# Patient Record
Sex: FEMALE | Race: WHITE | NOT HISPANIC OR LATINO | Employment: FULL TIME | URBAN - METROPOLITAN AREA
[De-identification: names, ages, dates, MRNs, and addresses within clinical notes are randomized per-mention and may not be internally consistent; named-entity substitution may affect disease eponyms.]

---

## 2017-01-16 ENCOUNTER — OFFICE VISIT (OUTPATIENT)
Dept: INTERNAL MEDICINE | Facility: CLINIC | Age: 32
End: 2017-01-16
Payer: COMMERCIAL

## 2017-01-16 VITALS
TEMPERATURE: 99 F | SYSTOLIC BLOOD PRESSURE: 122 MMHG | HEART RATE: 78 BPM | WEIGHT: 127.44 LBS | DIASTOLIC BLOOD PRESSURE: 66 MMHG | HEIGHT: 63 IN | BODY MASS INDEX: 22.58 KG/M2

## 2017-01-16 DIAGNOSIS — J06.9 UPPER RESPIRATORY TRACT INFECTION, UNSPECIFIED TYPE: Primary | ICD-10-CM

## 2017-01-16 PROCEDURE — 99213 OFFICE O/P EST LOW 20 MIN: CPT | Mod: S$GLB,,, | Performed by: INTERNAL MEDICINE

## 2017-01-16 PROCEDURE — 99999 PR PBB SHADOW E&M-EST. PATIENT-LVL III: CPT | Mod: PBBFAC,,, | Performed by: INTERNAL MEDICINE

## 2017-01-16 PROCEDURE — 1159F MED LIST DOCD IN RCRD: CPT | Mod: S$GLB,,, | Performed by: INTERNAL MEDICINE

## 2017-01-16 RX ORDER — HYDROCODONE BITARTRATE AND HOMATROPINE METHYLBROMIDE ORAL SOLUTION 5; 1.5 MG/5ML; MG/5ML
5 LIQUID ORAL EVERY 4 HOURS PRN
Qty: 175 ML | Refills: 0 | Status: SHIPPED | OUTPATIENT
Start: 2017-01-16 | End: 2017-01-26

## 2017-01-16 RX ORDER — METHYLPREDNISOLONE 4 MG/1
TABLET ORAL
Qty: 1 PACKAGE | Refills: 0 | Status: SHIPPED | OUTPATIENT
Start: 2017-01-16 | End: 2017-04-24 | Stop reason: ALTCHOICE

## 2017-01-16 NOTE — MR AVS SNAPSHOT
[facility]   [place]  [place] [address]  Phone: [phone]  Fax: [phone]                  Sari Serna   2017 5:40 PM   Office Visit    Description:  Female : 1985   Provider:  Rufus Albrecht MD   Department:  [facility]           Reason for Visit     Sinusitis     Cough           Diagnoses this Visit        Comments    Upper respiratory tract infection, unspecified type    -  Primary            To Do List           Goals (5 Years of Data)     None      Follow-Up and Disposition     Return if symptoms worsen or fail to improve.       These Medications        Disp Refills Start End    hydrocodone-homatropine 5-1.5 mg/5 ml (HYCODAN) 5-1.5 mg/5 mL Syrp 175 mL 0 2017    Take 5 mLs by mouth every 4 (four) hours as needed (cough). - Oral    Pharmacy: [facility]/pharmacy #[id number] - [place] - [address] Ph #: [phone]       methylPREDNISolone (MEDROL DOSEPACK) 4 mg tablet 1 Package 0 2017     use as directed    Pharmacy: [facility]/pharmacy #[id number] - [place] - [address] Ph #: [phone]         [facility] On Call     [facility] On Call Nurse Care Line -  Assistance  Registered nurses in the [facility] On Call Center provide clinical advisement, health education, appointment booking, and other advisory services.  Call for this free service at [phone].             Medications           Message regarding Medications     Verify the changes and/or additions to your medication regime listed below are the same as discussed with your clinician today.  If any of these changes or additions are incorrect, please notify your healthcare provider.        START taking these NEW medications        Refills    hydrocodone-homatropine 5-1.5 mg/5 ml (HYCODAN) 5-1.5 mg/5 mL Syrp 0    Sig: Take 5 mLs by mouth every 4 (four) hours as needed (cough).    Class: Normal    Route: Oral    methylPREDNISolone (MEDROL DOSEPACK) 4 mg tablet  "0    Sig: use as directed    Class: Normal      STOP taking these medications     promethazine-codeine 6.25-10 mg/5 ml (PHENERGAN WITH CODEINE) 6.25-10 mg/5 mL syrup Take 5 mLs by mouth every 4 (four) hours as needed for Cough.           Verify that the below list of medications is an accurate representation of the medications you are currently taking.  If none reported, the list may be blank. If incorrect, please contact your healthcare provider. Carry this list with you in case of emergency.           Current Medications     clonazePAM (KLONOPIN) 0.5 MG tablet TAKE 1/2-1 TABLET TWICE A DAY AS NEEDED FOR ANXIETY    escitalopram oxalate (LEXAPRO) 10 MG tablet TAKE 1 TABLET (10 MG TOTAL) BY MOUTH ONCE DAILY.    norgestimate-ethinyl estradiol (ORTHO-CYCLEN) 0.25-35 mg-mcg per tablet Take 1 tablet by mouth once daily. Schedule annual exam    escitalopram oxalate (LEXAPRO) 10 MG tablet TAKE 1 TABLET (10 MG TOTAL) BY MOUTH ONCE DAILY.    fluticasone-salmeterol 250-50 mcg/dose (ADVAIR) 250-50 mcg/dose diskus inhaler Inhale 1 puff into the lungs 2 (two) times daily.    hydrocodone-homatropine 5-1.5 mg/5 ml (HYCODAN) 5-1.5 mg/5 mL Syrp Take 5 mLs by mouth every 4 (four) hours as needed (cough).    levalbuterol (XOPENEX HFA) 45 mcg/actuation inhaler Inhale 1-2 puffs into the lungs every 4 (four) hours as needed for Wheezing.    methylPREDNISolone (MEDROL DOSEPACK) 4 mg tablet use as directed           Clinical Reference Information           Vital Signs - Last Recorded  Most recent update: 1/16/2017  5:40 PM by Jeana Richardson MA    BP Pulse Temp Ht Wt LMP    122/66 (BP Location: Right arm, Patient Position: Sitting, BP Method: Manual) 78 98.6 °F (37 °C) (Oral) 5' 3" (1.6 m) 57.8 kg (127 lb 6.8 oz) 12/31/2016    BMI                22.57 kg/m2          Blood Pressure          Most Recent Value    BP  122/66      Allergies as of 1/16/2017     Doxycycline      Immunizations Administered on Date of Encounter - 1/16/2017     None "

## 2017-01-16 NOTE — PROGRESS NOTES
Subjective:       Patient ID: Sari Serna is a 31 y.o. female.    Chief Complaint: Sinusitis and Cough    HPI  Pt with 1 week of nasal congestion, coryza, min prod cough w/o F/C, SOB  Review of Systems    Objective:      Physical Exam   Constitutional: She appears well-developed. No distress.   HENT:   Head: Normocephalic.   Mouth/Throat: Oropharynx is clear and moist.   Sinuses nontender   Eyes: Conjunctivae and EOM are normal.   Neck: Normal range of motion. No tracheal deviation present.   Cardiovascular: Normal rate, regular rhythm and intact distal pulses.    Pulmonary/Chest: Effort normal and breath sounds normal. No respiratory distress.   Abdominal: She exhibits no distension.   Musculoskeletal: Normal range of motion. She exhibits no edema.   Lymphadenopathy:     She has no cervical adenopathy.   Neurological: She is alert. No cranial nerve deficit. She exhibits normal muscle tone. Coordination normal.   Skin: Skin is warm and dry. No rash noted. She is not diaphoretic. No erythema.   Psychiatric: She has a normal mood and affect. Her behavior is normal.   Vitals reviewed.      Assessment:       1. Upper respiratory tract infection, unspecified type        Plan:       Sari Strong was seen today for sinusitis and cough.    Diagnoses and all orders for this visit:    Upper respiratory tract infection, unspecified type  -     hydrocodone-homatropine 5-1.5 mg/5 ml (HYCODAN) 5-1.5 mg/5 mL Syrp; Take 5 mLs by mouth every 4 (four) hours as needed (cough).  -     methylPREDNISolone (MEDROL DOSEPACK) 4 mg tablet; use as directed      Return if symptoms worsen or fail to improve.

## 2017-01-25 DIAGNOSIS — Z30.9 ENCOUNTER FOR CONTRACEPTIVE MANAGEMENT, UNSPECIFIED CONTRACEPTIVE ENCOUNTER TYPE: ICD-10-CM

## 2017-01-25 RX ORDER — NORGESTIMATE AND ETHINYL ESTRADIOL 0.25-0.035
1 KIT ORAL DAILY
Qty: 28 TABLET | Refills: 3 | Status: SHIPPED | OUTPATIENT
Start: 2017-01-25 | End: 2017-04-24 | Stop reason: SDUPTHER

## 2017-03-27 RX ORDER — ESCITALOPRAM OXALATE 10 MG/1
TABLET ORAL
Qty: 30 TABLET | Refills: 0 | Status: SHIPPED | OUTPATIENT
Start: 2017-03-27 | End: 2017-04-30 | Stop reason: SDUPTHER

## 2017-04-24 ENCOUNTER — OFFICE VISIT (OUTPATIENT)
Dept: OBSTETRICS AND GYNECOLOGY | Facility: CLINIC | Age: 32
End: 2017-04-24
Payer: COMMERCIAL

## 2017-04-24 VITALS
WEIGHT: 126.63 LBS | DIASTOLIC BLOOD PRESSURE: 72 MMHG | SYSTOLIC BLOOD PRESSURE: 118 MMHG | BODY MASS INDEX: 22.44 KG/M2 | HEIGHT: 63 IN

## 2017-04-24 DIAGNOSIS — Z11.51 SCREENING FOR HUMAN PAPILLOMAVIRUS: ICD-10-CM

## 2017-04-24 DIAGNOSIS — Z12.4 ROUTINE CERVICAL SMEAR: ICD-10-CM

## 2017-04-24 DIAGNOSIS — Z01.419 ENCOUNTER FOR GYNECOLOGICAL EXAMINATION (GENERAL) (ROUTINE) WITHOUT ABNORMAL FINDINGS: Primary | ICD-10-CM

## 2017-04-24 PROCEDURE — 88175 CYTOPATH C/V AUTO FLUID REDO: CPT

## 2017-04-24 PROCEDURE — 99395 PREV VISIT EST AGE 18-39: CPT | Mod: S$GLB,,, | Performed by: OBSTETRICS & GYNECOLOGY

## 2017-04-24 PROCEDURE — 87624 HPV HI-RISK TYP POOLED RSLT: CPT

## 2017-04-24 PROCEDURE — 99999 PR PBB SHADOW E&M-EST. PATIENT-LVL II: CPT | Mod: PBBFAC,,, | Performed by: OBSTETRICS & GYNECOLOGY

## 2017-04-24 RX ORDER — NORGESTIMATE AND ETHINYL ESTRADIOL 0.25-0.035
1 KIT ORAL DAILY
Qty: 84 TABLET | Refills: 3 | Status: SHIPPED | OUTPATIENT
Start: 2017-04-24 | End: 2018-04-16 | Stop reason: SDUPTHER

## 2017-04-24 RX ORDER — CLONAZEPAM 0.5 MG/1
TABLET ORAL
Qty: 30 TABLET | Refills: 0 | Status: SHIPPED | OUTPATIENT
Start: 2017-04-24 | End: 2018-10-27 | Stop reason: SDUPTHER

## 2017-04-24 RX ORDER — NORGESTIMATE AND ETHINYL ESTRADIOL 0.25-0.035
1 KIT ORAL DAILY
Qty: 84 TABLET | Refills: 3 | OUTPATIENT
Start: 2017-04-24

## 2017-04-24 NOTE — PROGRESS NOTES
Subjective:       Patient ID: Sari Serna is a 32 y.o. female.    Chief Complaint:  Well Woman (Last annual 2015 pap normal )      Patient Active Problem List   Diagnosis    Unspecified viral infection, in conditions classified elsewhere and of unspecified site    Low back pain    Acne    Eye refraction disorder - Both Eyes    Chronic vaginitis       History of Present Illness  32 y.o. yo  here for annual exam. No gyn complaints. Doing well. Happy with OCP      Past Medical History:   Diagnosis Date    Acne     ALLERGIC RHINITIS     Anxiety     Dysmenorrhea     Low back pain     Migraine headache        Past Surgical History:   Procedure Laterality Date    FACIAL COSMETIC SURGERY             OB History    Para Term  AB SAB TAB Ectopic Multiple Living   0                      Patient's last menstrual period was 2017.   Date of Last Pap: 10/27/2012    Review of Systems  Review of Systems   Constitutional: Negative for fatigue and unexpected weight change.   Respiratory: Negative for shortness of breath.    Cardiovascular: Negative for chest pain.   Gastrointestinal: Negative for abdominal pain, constipation, diarrhea, nausea and vomiting.   Genitourinary: Negative for dysuria.   Musculoskeletal: Negative for back pain.   Skin: Negative for rash.   Neurological: Negative for headaches.   Hematological: Does not bruise/bleed easily.   Psychiatric/Behavioral: Negative for behavioral problems.        Objective:   Physical Exam:   Constitutional: She is oriented to person, place, and time. Vital signs are normal. She appears well-developed and well-nourished. No distress.        Pulmonary/Chest: She exhibits no mass. Right breast exhibits no mass, no nipple discharge, no skin change, no tenderness, no bleeding and no swelling. Left breast exhibits no mass, no nipple discharge, no skin change, no tenderness, no bleeding and no swelling. Breasts are symmetrical.         Abdominal: Soft. Normal appearance and bowel sounds are normal. She exhibits no distension and no mass. There is no tenderness. There is no rebound.     Genitourinary: Vagina normal and uterus normal. There is no rash, tenderness, lesion or injury on the right labia. There is no rash, tenderness, lesion or injury on the left labia. Uterus is not deviated, not enlarged, not fixed, not tender, not hosting fibroids and not experiencing uterine prolapse. Cervix is normal. Right adnexum displays no mass, no tenderness and no fullness. Left adnexum displays no mass, no tenderness and no fullness. No erythema, tenderness, rectocele, cystocele or unspecified prolapse of vaginal walls in the vagina. No vaginal discharge found. Cervix exhibits no motion tenderness, no discharge and no friability.           Musculoskeletal: Normal range of motion and moves all extremeties.      Lymphadenopathy:     She has no axillary adenopathy.        Right: No supraclavicular adenopathy present.        Left: No supraclavicular adenopathy present.    Neurological: She is alert and oriented to person, place, and time.    Skin: Skin is warm and dry.    Psychiatric: She has a normal mood and affect. Her behavior is normal. Judgment normal.        Assessment/ Plan:     1. Encounter for gynecological examination (general) (routine) without abnormal findings  norgestimate-ethinyl estradiol (ORTHO-CYCLEN) 0.25-35 mg-mcg per tablet   2. Routine cervical smear  Liquid-based pap smear, screening   3. Screening for human papillomavirus  HPV High Risk Genotypes, PCR       Follow-up with me in 1 year

## 2017-04-24 NOTE — MR AVS SNAPSHOT
[facility]  [address]  [place] [address]  Phone: [phone]  Fax: [phone]                  Sari Serna   2017 3:30 PM   Office Visit    Description:  Female : 1985   Provider:  Liudmila Monaco MD   Department:  [facility]           Reason for Visit     Well Woman           Diagnoses this Visit        Comments    Encounter for gynecological examination (general) (routine) without abnormal findings    -  Primary     Routine cervical smear         Screening for human papillomavirus                To Do List           Future Appointments        Provider Department Dept Phone    2017 1:40 PM Nhi Shearer MD [facility] [phone]      Goals (5 Years of Data)     None      Follow-Up and Disposition     Return in about 1 year (around 2018) for Annual exam.    Follow-up and Disposition History       These Medications        Disp Refills Start End    norgestimate-ethinyl estradiol (ORTHO-CYCLEN) 0.25-35 mg-mcg per tablet 84 tablet 3 2017     Take 1 tablet by mouth once daily. - Oral    Pharmacy: [facility]/pharmacy #[id number] - [place] - [address] Ph #: [phone]         [facility] On Call     [facility] On Call Nurse Care Line -  Assistance  Unless otherwise directed by your provider, please contact [facility] On-Call, our nurse care line that is available for  assistance.     Registered nurses in the [facility] On Call Center provide: appointment scheduling, clinical advisement, health education, and other advisory services.  Call: [phone] (toll free)               Medications           Message regarding Medications     Verify the changes and/or additions to your medication regime listed below are the same as discussed with your clinician today.  If any of these changes or additions are incorrect, please notify your healthcare provider.        CHANGE how you are taking these medications      "Start Taking Instead of    norgestimate-ethinyl estradiol (ORTHO-CYCLEN) 0.25-35 mg-mcg per tablet norgestimate-ethinyl estradiol (ORTHO-CYCLEN) 0.25-35 mg-mcg per tablet    Dosage:  Take 1 tablet by mouth once daily. Dosage:  Take 1 tablet by mouth once daily. Schedule annual exam    Reason for Change:  Reorder       STOP taking these medications     methylPREDNISolone (MEDROL DOSEPACK) 4 mg tablet use as directed           Verify that the below list of medications is an accurate representation of the medications you are currently taking.  If none reported, the list may be blank. If incorrect, please contact your healthcare provider. Carry this list with you in case of emergency.           Current Medications     cetirizine (ZYRTEC) 10 mg Cap Take by mouth.    clonazePAM (KLONOPIN) 0.5 MG tablet TAKE 1/2-1 TABLET TWICE A DAY AS NEEDED FOR ANXIETY    escitalopram oxalate (LEXAPRO) 10 MG tablet TAKE 1 TABLET BY MOUTH ONCE DAILY.    norgestimate-ethinyl estradiol (ORTHO-CYCLEN) 0.25-35 mg-mcg per tablet Take 1 tablet by mouth once daily.    fluticasone-salmeterol 250-50 mcg/dose (ADVAIR) 250-50 mcg/dose diskus inhaler Inhale 1 puff into the lungs 2 (two) times daily.    levalbuterol (XOPENEX HFA) 45 mcg/actuation inhaler Inhale 1-2 puffs into the lungs every 4 (four) hours as needed for Wheezing.           Clinical Reference Information           Your Vitals Were     BP Height Weight Last Period BMI    118/72 5' 3" (1.6 m) 57.5 kg (126 lb 10.5 oz) 04/17/2017 22.44 kg/m2      Blood Pressure          Most Recent Value    BP  118/72      Allergies as of 4/24/2017     Doxycycline      Immunizations Administered on Date of Encounter - 4/24/2017     None      Orders Placed During Today's Visit      Normal Orders This Visit    HPV High Risk Genotypes, PCR     Liquid-based pap smear, screening       Language Assistance Services     ATTENTION: Language assistance services are available, free of charge. Please call " [phone].      ATENCIÓN: Si habla español, tiene a vizcaino disposición servicios gratuitos de asistencia lingüística. Llame al [phone].     CHÚ Ý: N?u b?n nói Ti?ng Vi?t, có các d?ch v? h? tr? ngôn ng? mi?n phí dành cho b?n. G?i s? [phone].         [facility] complies with applicable Federal civil rights laws and does not discriminate on the basis of race, color, national origin, age, disability, or sex.

## 2017-04-30 RX ORDER — ESCITALOPRAM OXALATE 10 MG/1
TABLET ORAL
Qty: 30 TABLET | Refills: 0 | Status: SHIPPED | OUTPATIENT
Start: 2017-04-30 | End: 2017-05-04 | Stop reason: SDUPTHER

## 2017-05-01 LAB
HPV16 DNA SPEC QL NAA+PROBE: NEGATIVE
HPV16+18+H RISK 12 DNA CVX-IMP: NEGATIVE
HPV18 DNA SPEC QL NAA+PROBE: NEGATIVE

## 2017-05-02 ENCOUNTER — OFFICE VISIT (OUTPATIENT)
Dept: OPTOMETRY | Facility: CLINIC | Age: 32
End: 2017-05-02
Payer: COMMERCIAL

## 2017-05-02 DIAGNOSIS — H10.13 ALLERGIC CONJUNCTIVITIS, BILATERAL: Primary | ICD-10-CM

## 2017-05-02 PROCEDURE — 92012 INTRM OPH EXAM EST PATIENT: CPT | Mod: S$GLB,,, | Performed by: OPTOMETRIST

## 2017-05-02 PROCEDURE — 99999 PR PBB SHADOW E&M-EST. PATIENT-LVL II: CPT | Mod: PBBFAC,,, | Performed by: OPTOMETRIST

## 2017-05-02 RX ORDER — FLUOROMETHOLONE 1 MG/ML
1 SUSPENSION/ DROPS OPHTHALMIC 3 TIMES DAILY
Qty: 5 ML | Refills: 0 | Status: SHIPPED | OUTPATIENT
Start: 2017-05-02 | End: 2017-05-09

## 2017-05-02 NOTE — PROGRESS NOTES
HPI     OD sore to touch since yesterday. No redness, epiphoria, discharge or   photophobia. Usually wears DWSCL 12-14 hrs, doesn't sleep in lenses.       Last edited by Kyra Gale on 5/2/2017  2:28 PM.     ROS     Negative for: Constitutional, Gastrointestinal, Neurological, Skin,   Genitourinary, Musculoskeletal, HENT, Endocrine, Cardiovascular, Eyes,   Respiratory, Psychiatric, Allergic/Imm, Heme/Lymph    Last edited by Chevy Bowling, OD on 5/2/2017  2:41 PM. (History)        Assessment /Plan     For exam results, see Encounter Report.    Allergic conjunctivitis, bilateral  -     fluorometholone 0.1% (FML) 0.1 % DrpS; Place 1 drop into both eyes 3 (three) times daily.  Dispense: 5 mL; Refill: 0      1. Start FML drops 1 drop 3x/day x 1 week, use zaditor long term for prevention. Also rewet drops for lubrication when wearing contacts. RTC 1 week if no better.

## 2017-05-05 RX ORDER — ESCITALOPRAM OXALATE 10 MG/1
10 TABLET ORAL DAILY
Qty: 90 TABLET | Refills: 1 | Status: SHIPPED | OUTPATIENT
Start: 2017-05-05 | End: 2017-11-28 | Stop reason: SDUPTHER

## 2017-06-14 ENCOUNTER — OFFICE VISIT (OUTPATIENT)
Dept: INTERNAL MEDICINE | Facility: CLINIC | Age: 32
End: 2017-06-14
Payer: COMMERCIAL

## 2017-06-14 VITALS
BODY MASS INDEX: 22.81 KG/M2 | SYSTOLIC BLOOD PRESSURE: 130 MMHG | WEIGHT: 128.75 LBS | HEART RATE: 72 BPM | DIASTOLIC BLOOD PRESSURE: 70 MMHG | HEIGHT: 63 IN

## 2017-06-14 DIAGNOSIS — F41.1 GAD (GENERALIZED ANXIETY DISORDER): ICD-10-CM

## 2017-06-14 DIAGNOSIS — F33.9 RECURRENT MAJOR DEPRESSIVE DISORDER, REMISSION STATUS UNSPECIFIED: ICD-10-CM

## 2017-06-14 DIAGNOSIS — R11.2 NAUSEA AND VOMITING, INTRACTABILITY OF VOMITING NOT SPECIFIED, UNSPECIFIED VOMITING TYPE: ICD-10-CM

## 2017-06-14 DIAGNOSIS — Z00.00 PREVENTATIVE HEALTH CARE: Primary | ICD-10-CM

## 2017-06-14 DIAGNOSIS — R68.89 HEAT INTOLERANCE: ICD-10-CM

## 2017-06-14 DIAGNOSIS — R00.2 PALPITATION: ICD-10-CM

## 2017-06-14 PROCEDURE — 93010 ELECTROCARDIOGRAM REPORT: CPT | Mod: S$GLB,,, | Performed by: INTERNAL MEDICINE

## 2017-06-14 PROCEDURE — 93005 ELECTROCARDIOGRAM TRACING: CPT | Mod: S$GLB,,, | Performed by: INTERNAL MEDICINE

## 2017-06-14 PROCEDURE — 99395 PREV VISIT EST AGE 18-39: CPT | Mod: S$GLB,,, | Performed by: INTERNAL MEDICINE

## 2017-06-14 PROCEDURE — 99999 PR PBB SHADOW E&M-EST. PATIENT-LVL III: CPT | Mod: PBBFAC,,, | Performed by: INTERNAL MEDICINE

## 2017-06-14 NOTE — PROGRESS NOTES
Subjective:       Patient ID: Sari Serna is a 32 y.o. female.    Chief Complaint: Annual Exam    HPI 32-year-old female presents to clinic today for annual physical exam she reports having some heat intolerance with occasional palpitations nausea she states that occur maybe twice a year for the last 4 years on one occasion she actually threw up.  Denies any abdominal pain no significant headaches.  Denies any chest pain or shortness of breath.  No syncope.  She does report that she believes that her heart rate was elevated when it occurred.  Review of Systems  otherwise negative  Objective:      Physical Exam  General: Well-appearing, well-nourished.  No distress  HEENT: conjunctivae are normal.  Pupils are equal and reative to light.  TM's are clear and intact bilaterally.  Hearing is grossly normal.  Nasopharynx is clear.  Oropharynx is clear.  Neck: Supple.  No thyroid megaly.  No bruits.  Lymph: No cervical or supraclavicular adenopathy.  Heart: Regular rate and rhythm, without murmur, rub or gallop.  Lungs: Clear to auscultation; respiratory effort normal.  Abdomen: Soft, nontender, nondistended.  Normoactive bowel sounds.  No hepatomegaly.  No masses.  Extremities: Good distal pulses.  No edema.  Psych: Oriented to time person place.  Judgment and insight seem unimpaired.  Mood and affect are appropriate.  Assessment:       1. Preventative health care    2. Heat intolerance    3. Palpitation    4. ADAM (generalized anxiety disorder)    5. Recurrent major depressive disorder, remission status unspecified    6. Nausea and vomiting, intractability of vomiting not specified, unspecified vomiting type        Plan:       Sari Strong was seen today for annual exam.    Diagnoses and all orders for this visit:    Preventative health care  -     CBC auto differential; Future  -     Comprehensive metabolic panel; Future  -     Lipid panel; Future  -     TSH; Future    Heat intolerance  Check  TSH.  Palpitation  -     IN OFFICE EKG 12-LEAD (to [place])  Discussed with patient that if her symptoms become more frequent or more concerned we may need to have a cardiology consultation.  too infrequent at this point to perform Holter monitor given episodes or twice a year may be.  ADAM (generalized anxiety disorder)  Controlled.  Continue current medical regimen.  Prescription refills addressed.  Followup advised. See after visit summary.  Recurrent major depressive disorder, remission status unspecified  Controlled.  Continue current medical regimen.  Prescription refills addressed.  Followup advised. See after visit summary.  Nausea and vomiting, intractability of vomiting not specified, unspecified vomiting type  -     US Abdomen Complete; Future

## 2017-06-24 ENCOUNTER — HOSPITAL ENCOUNTER (OUTPATIENT)
Dept: RADIOLOGY | Facility: HOSPITAL | Age: 32
Discharge: HOME | End: 2017-06-24
Attending: INTERNAL MEDICINE
Payer: COMMERCIAL

## 2017-06-24 DIAGNOSIS — R11.2 NAUSEA AND VOMITING, INTRACTABILITY OF VOMITING NOT SPECIFIED, UNSPECIFIED VOMITING TYPE: ICD-10-CM

## 2017-07-24 ENCOUNTER — PATIENT MESSAGE (OUTPATIENT)
Dept: INTERNAL MEDICINE | Facility: CLINIC | Age: 32
End: 2017-07-24

## 2017-07-29 ENCOUNTER — LAB VISIT (OUTPATIENT)
Dept: LAB | Facility: HOSPITAL | Age: 32
End: 2017-07-29
Attending: INTERNAL MEDICINE
Payer: COMMERCIAL

## 2017-07-29 DIAGNOSIS — Z00.00 PREVENTATIVE HEALTH CARE: ICD-10-CM

## 2017-07-29 LAB
ALBUMIN SERPL BCP-MCNC: 4 G/DL
ALP SERPL-CCNC: 55 U/L
ALT SERPL W/O P-5'-P-CCNC: 13 U/L
ANION GAP SERPL CALC-SCNC: 10 MMOL/L
AST SERPL-CCNC: 20 U/L
BASOPHILS # BLD AUTO: 0.01 K/UL
BASOPHILS NFR BLD: 0.2 %
BILIRUB SERPL-MCNC: 0.5 MG/DL
BUN SERPL-MCNC: 9 MG/DL
CALCIUM SERPL-MCNC: 9.2 MG/DL
CHLORIDE SERPL-SCNC: 105 MMOL/L
CHOLEST/HDLC SERPL: 2.8 {RATIO}
CO2 SERPL-SCNC: 24 MMOL/L
CREAT SERPL-MCNC: 0.8 MG/DL
DIFFERENTIAL METHOD: NORMAL
EOSINOPHIL # BLD AUTO: 0.1 K/UL
EOSINOPHIL NFR BLD: 1.1 %
ERYTHROCYTE [DISTWIDTH] IN BLOOD BY AUTOMATED COUNT: 13.1 %
EST. GFR  (AFRICAN AMERICAN): >60 ML/MIN/1.73 M^2
EST. GFR  (NON AFRICAN AMERICAN): >60 ML/MIN/1.73 M^2
GLUCOSE SERPL-MCNC: 93 MG/DL
HCT VFR BLD AUTO: 44.3 %
HDL/CHOLESTEROL RATIO: 35.2 %
HDLC SERPL-MCNC: 236 MG/DL
HDLC SERPL-MCNC: 83 MG/DL
HGB BLD-MCNC: 15 G/DL
LDLC SERPL CALC-MCNC: 129.8 MG/DL
LYMPHOCYTES # BLD AUTO: 1.8 K/UL
LYMPHOCYTES NFR BLD: 34.6 %
MCH RBC QN AUTO: 29.8 PG
MCHC RBC AUTO-ENTMCNC: 33.9 G/DL
MCV RBC AUTO: 88 FL
MONOCYTES # BLD AUTO: 0.4 K/UL
MONOCYTES NFR BLD: 8.4 %
NEUTROPHILS # BLD AUTO: 2.9 K/UL
NEUTROPHILS NFR BLD: 55.5 %
NONHDLC SERPL-MCNC: 153 MG/DL
PLATELET # BLD AUTO: 307 K/UL
PMV BLD AUTO: 10.5 FL
POTASSIUM SERPL-SCNC: 3.9 MMOL/L
PROT SERPL-MCNC: 7.6 G/DL
RBC # BLD AUTO: 5.04 M/UL
SODIUM SERPL-SCNC: 139 MMOL/L
TRIGL SERPL-MCNC: 116 MG/DL
TSH SERPL DL<=0.005 MIU/L-ACNC: 1.03 UIU/ML
WBC # BLD AUTO: 5.23 K/UL

## 2017-07-29 PROCEDURE — 85025 COMPLETE CBC W/AUTO DIFF WBC: CPT

## 2017-07-29 PROCEDURE — 80053 COMPREHEN METABOLIC PANEL: CPT

## 2017-07-29 PROCEDURE — 80061 LIPID PANEL: CPT

## 2017-07-29 PROCEDURE — 84443 ASSAY THYROID STIM HORMONE: CPT

## 2017-07-29 PROCEDURE — 36415 COLL VENOUS BLD VENIPUNCTURE: CPT | Mod: PO

## 2017-08-05 ENCOUNTER — HOSPITAL ENCOUNTER (OUTPATIENT)
Dept: RADIOLOGY | Facility: HOSPITAL | Age: 32
Discharge: HOME | End: 2017-08-05
Attending: INTERNAL MEDICINE
Payer: COMMERCIAL

## 2017-08-05 PROCEDURE — 76700 US EXAM ABDOM COMPLETE: CPT | Mod: 26,,, | Performed by: RADIOLOGY

## 2017-08-05 PROCEDURE — 76700 US EXAM ABDOM COMPLETE: CPT | Mod: TC

## 2017-08-06 ENCOUNTER — PATIENT MESSAGE (OUTPATIENT)
Dept: INTERNAL MEDICINE | Facility: CLINIC | Age: 32
End: 2017-08-06

## 2017-11-06 ENCOUNTER — OFFICE VISIT (OUTPATIENT)
Dept: URGENT CARE | Facility: CLINIC | Age: 32
End: 2017-11-06
Payer: COMMERCIAL

## 2017-11-06 VITALS
TEMPERATURE: 98 F | HEART RATE: 78 BPM | RESPIRATION RATE: 18 BRPM | BODY MASS INDEX: 23.04 KG/M2 | WEIGHT: 130 LBS | OXYGEN SATURATION: 99 % | HEIGHT: 63 IN | DIASTOLIC BLOOD PRESSURE: 81 MMHG | SYSTOLIC BLOOD PRESSURE: 123 MMHG

## 2017-11-06 DIAGNOSIS — N30.00 ACUTE CYSTITIS WITHOUT HEMATURIA: Primary | ICD-10-CM

## 2017-11-06 DIAGNOSIS — R30.0 DYSURIA: ICD-10-CM

## 2017-11-06 LAB
B-HCG UR QL: NEGATIVE
BILIRUB UR QL STRIP: NEGATIVE
CTP QC/QA: YES
GLUCOSE UR QL STRIP: NEGATIVE
KETONES UR QL STRIP: NEGATIVE
LEUKOCYTE ESTERASE UR QL STRIP: POSITIVE
PH, POC UA: 7.5
POC BLOOD, URINE: NEGATIVE
POC NITRATES, URINE: POSITIVE
PROT UR QL STRIP: NEGATIVE
SP GR UR STRIP: 1.01 (ref 1–1.03)
UROBILINOGEN UR STRIP-ACNC: NORMAL (ref 0.1–1.1)

## 2017-11-06 PROCEDURE — 81003 URINALYSIS AUTO W/O SCOPE: CPT | Mod: QW,S$GLB,, | Performed by: PHYSICIAN ASSISTANT

## 2017-11-06 PROCEDURE — 81025 URINE PREGNANCY TEST: CPT | Mod: S$GLB,,, | Performed by: PHYSICIAN ASSISTANT

## 2017-11-06 PROCEDURE — 99203 OFFICE O/P NEW LOW 30 MIN: CPT | Mod: S$GLB,,, | Performed by: PHYSICIAN ASSISTANT

## 2017-11-06 RX ORDER — NITROFURANTOIN 25; 75 MG/1; MG/1
100 CAPSULE ORAL 2 TIMES DAILY
Qty: 10 CAPSULE | Refills: 0 | Status: SHIPPED | OUTPATIENT
Start: 2017-11-06 | End: 2017-11-11

## 2017-11-06 NOTE — PROGRESS NOTES
"Subjective:       Patient ID: Sari Serna is a 32 y.o. female.    Vitals:  height is 5' 3" (1.6 m) and weight is 59 kg (130 lb). Her temperature is 98.1 °F (36.7 °C). Her blood pressure is 123/81 and her pulse is 78. Her respiration is 18 and oxygen saturation is 99%.     Chief Complaint: Dysuria    Dysuria    This is a new problem. The current episode started yesterday. The problem occurs every urination. The problem has been gradually worsening. The quality of the pain is described as burning. The pain is at a severity of 6/10. The pain is moderate. There has been no fever. She is sexually active. There is no history of pyelonephritis. Associated symptoms include frequency and urgency. Pertinent negatives include no chills, hematuria, nausea or vomiting. She has tried nothing for the symptoms. The treatment provided no relief.     Review of Systems   Constitution: Negative for chills and fever.   Skin: Negative for itching.   Musculoskeletal: Negative for back pain.   Gastrointestinal: Negative for abdominal pain, nausea and vomiting.   Genitourinary: Positive for dysuria, frequency and urgency. Negative for genital sores, hematuria, missed menses and non-menstrual bleeding.       Objective:      Physical Exam   Constitutional: She is oriented to person, place, and time. She appears well-developed and well-nourished.   HENT:   Head: Normocephalic and atraumatic.   Right Ear: External ear normal.   Left Ear: External ear normal.   Nose: Nose normal.   Mouth/Throat: Mucous membranes are normal.   Eyes: Conjunctivae and lids are normal.   Neck: Trachea normal and full passive range of motion without pain. Neck supple.   Cardiovascular: Normal rate, regular rhythm and normal heart sounds.    Pulmonary/Chest: Effort normal and breath sounds normal. No respiratory distress.   Abdominal: Soft. Normal appearance and bowel sounds are normal. She exhibits no distension, no abdominal bruit, no pulsatile midline " mass and no mass. There is no tenderness.   Musculoskeletal: Normal range of motion. She exhibits no edema or tenderness (no cva tenderness).   Neurological: She is alert and oriented to person, place, and time. She has normal strength.   Skin: Skin is warm, dry and intact. She is not diaphoretic. No pallor.   Psychiatric: She has a normal mood and affect. Her speech is normal and behavior is normal. Judgment and thought content normal. Cognition and memory are normal.   Nursing note and vitals reviewed.      Assessment:       1. Acute cystitis without hematuria        Plan:         Acute cystitis without hematuria    Other orders  -     nitrofurantoin, macrocrystal-monohydrate, (MACROBID) 100 MG capsule; Take 1 capsule (100 mg total) by mouth 2 (two) times daily.  Dispense: 10 capsule; Refill: 0

## 2017-11-29 RX ORDER — ESCITALOPRAM OXALATE 10 MG/1
10 TABLET ORAL DAILY
Qty: 90 TABLET | Refills: 1 | Status: SHIPPED | OUTPATIENT
Start: 2017-11-29 | End: 2018-06-05 | Stop reason: SDUPTHER

## 2018-03-04 ENCOUNTER — OFFICE VISIT (OUTPATIENT)
Dept: URGENT CARE | Facility: CLINIC | Age: 33
End: 2018-03-04
Payer: COMMERCIAL

## 2018-03-04 VITALS
WEIGHT: 125 LBS | SYSTOLIC BLOOD PRESSURE: 118 MMHG | DIASTOLIC BLOOD PRESSURE: 76 MMHG | BODY MASS INDEX: 22.15 KG/M2 | OXYGEN SATURATION: 98 % | TEMPERATURE: 98 F | HEART RATE: 99 BPM | RESPIRATION RATE: 18 BRPM | HEIGHT: 63 IN

## 2018-03-04 DIAGNOSIS — J40 BRONCHITIS: ICD-10-CM

## 2018-03-04 DIAGNOSIS — J30.1 ACUTE SEASONAL ALLERGIC RHINITIS DUE TO POLLEN: Primary | ICD-10-CM

## 2018-03-04 PROCEDURE — 99213 OFFICE O/P EST LOW 20 MIN: CPT | Mod: S$GLB,,, | Performed by: FAMILY MEDICINE

## 2018-03-04 RX ORDER — PROMETHAZINE HYDROCHLORIDE AND CODEINE PHOSPHATE 6.25; 1 MG/5ML; MG/5ML
SOLUTION ORAL
Qty: 180 ML | Refills: 0 | Status: SHIPPED | OUTPATIENT
Start: 2018-03-04 | End: 2018-03-12

## 2018-03-04 RX ORDER — ALBUTEROL SULFATE 90 UG/1
2 AEROSOL, METERED RESPIRATORY (INHALATION) EVERY 6 HOURS PRN
Qty: 1 INHALER | Refills: 1 | Status: SHIPPED | OUTPATIENT
Start: 2018-03-04 | End: 2018-03-12

## 2018-03-04 RX ORDER — PREDNISONE 10 MG/1
TABLET ORAL
Qty: 30 TABLET | Refills: 0 | Status: SHIPPED | OUTPATIENT
Start: 2018-03-04 | End: 2018-03-12

## 2018-03-04 RX ORDER — BENZONATATE 100 MG/1
100 CAPSULE ORAL EVERY 6 HOURS PRN
Qty: 30 CAPSULE | Refills: 1 | Status: SHIPPED | OUTPATIENT
Start: 2018-03-04 | End: 2018-03-07

## 2018-03-04 NOTE — PROGRESS NOTES
"Subjective:       Patient ID: Sari Serna is a 32 y.o. female.    Vitals:  height is 5' 3" (1.6 m) and weight is 56.7 kg (125 lb). Her temperature is 98.2 °F (36.8 °C). Her blood pressure is 118/76 and her pulse is 99. Her respiration is 18 and oxygen saturation is 98%.     Chief Complaint: Cough    Cough   This is a new problem. The current episode started in the past 7 days. The problem has been gradually worsening. The problem occurs constantly. The cough is productive of sputum. Associated symptoms include nasal congestion and postnasal drip. Pertinent negatives include no chest pain, chills, ear pain, eye redness, fever, headaches, myalgias, sore throat, shortness of breath or wheezing. She has tried OTC cough suppressant (Phenegran with codiene. ) for the symptoms. The treatment provided no relief. Her past medical history is significant for bronchitis.     Review of Systems   Constitution: Negative for chills, fever and malaise/fatigue.   HENT: Positive for postnasal drip. Negative for congestion, ear pain, hoarse voice and sore throat.    Eyes: Negative for discharge and redness.   Cardiovascular: Negative for chest pain, dyspnea on exertion and leg swelling.   Respiratory: Positive for cough and sputum production. Negative for shortness of breath and wheezing.    Musculoskeletal: Negative for myalgias.   Gastrointestinal: Negative for abdominal pain and nausea.   Neurological: Negative for headaches.       Objective:      Physical Exam   Constitutional: She is oriented to person, place, and time. She appears well-developed and well-nourished. She is cooperative.  Non-toxic appearance. She does not appear ill. No distress.   HENT:   Head: Normocephalic and atraumatic.   Right Ear: Hearing, tympanic membrane, external ear and ear canal normal.   Left Ear: Hearing, tympanic membrane, external ear and ear canal normal.   Nose: Nose normal. No mucosal edema, rhinorrhea or nasal deformity. No epistaxis. " Right sinus exhibits no maxillary sinus tenderness and no frontal sinus tenderness. Left sinus exhibits no maxillary sinus tenderness and no frontal sinus tenderness.   Mouth/Throat: Uvula is midline, oropharynx is clear and moist and mucous membranes are normal. No trismus in the jaw. Normal dentition. No uvula swelling. No posterior oropharyngeal erythema.   Eyes: Conjunctivae and lids are normal. Right eye exhibits no discharge. Left eye exhibits no discharge. No scleral icterus.   Sclera clear bilat   Neck: Trachea normal, normal range of motion, full passive range of motion without pain and phonation normal. Neck supple.   Cardiovascular: Normal rate, regular rhythm, normal heart sounds, intact distal pulses and normal pulses.    Pulmonary/Chest: Effort normal and breath sounds normal. She has no wheezes. She has no rales. She exhibits no tenderness.   Abdominal: Soft. Normal appearance and bowel sounds are normal. She exhibits no distension, no pulsatile midline mass and no mass. There is no tenderness.   Musculoskeletal: Normal range of motion. She exhibits no edema or deformity.   Lymphadenopathy:     She has no cervical adenopathy.   Neurological: She is alert and oriented to person, place, and time. She exhibits normal muscle tone. Coordination normal.   Skin: Skin is warm, dry and intact. She is not diaphoretic. No pallor.   Psychiatric: She has a normal mood and affect. Her speech is normal and behavior is normal. Judgment and thought content normal. Cognition and memory are normal.   Nursing note and vitals reviewed.      Assessment:       1. Acute seasonal allergic rhinitis due to pollen    2. Bronchitis        Plan:         Acute seasonal allergic rhinitis due to pollen    Bronchitis  -     promethazine-codeine 6.25-10 mg/5 ml (PHENERGAN WITH CODEINE) 6.25-10 mg/5 mL syrup; Take one tsp po q 6 hrs prn cough...  Dispense: 180 mL; Refill: 0  -     benzonatate (TESSALON PERLES) 100 MG capsule; Take 1  capsule (100 mg total) by mouth every 6 (six) hours as needed for Cough.  Dispense: 30 capsule; Refill: 1  -     albuterol 90 mcg/actuation inhaler; Inhale 2 puffs into the lungs every 6 (six) hours as needed for Wheezing or Shortness of Breath. Rescue  Dispense: 1 Inhaler; Refill: 1  -     predniSONE (DELTASONE) 10 MG tablet; Take 2 po bid x 3 days, one bid x 3 days, then one daily till finish  Dispense: 30 tablet; Refill: 0        Follow up with PMD

## 2018-03-04 NOTE — PATIENT INSTRUCTIONS
What Is Acute Bronchitis?  Acute bronchitis is when the airways in your lungs (bronchial tubes) become red and swollen (inflamed). It is usually caused by a viral infection. But it can also occur because of a bacteria or allergen. Symptoms include a cough that produces yellow or greenish mucus and can last for days or sometimes weeks.  Inside healthy lungs    Air travels in and out of the lungs through the airways. The linings of these airways produce sticky mucus. This mucus traps particles that enter the lungs. Tiny structures called cilia then sweep the particles out of the airways.     Healthy airway: Airways are normally open. Air moves in and out easily.      Healthy cilia: Tiny, hairlike cilia sweep mucus and particles up and out of the airways.   Lungs with bronchitis  Bronchitis often occurs with a cold or the flu virus. The airways become inflamed (red and swollen). There is a deep hacking cough from the extra mucus. Other symptoms may include:  · Wheezing  · Chest discomfort  · Shortness of breath  · Mild fever  A second infection, this time due to bacteria, may then occur. And airways irritated by allergens or smoke are more likely to get infected.        Inflamed airway: Inflammation and extra mucus narrow the airway, causing shortness of breath.      Impaired cilia: Extra mucus impairs cilia, causing congestion and wheezing. Smoking makes the problem worse.   Making a diagnosis  A physical exam, health history, and certain tests help your healthcare provider make the diagnosis.  Health history  Your healthcare provider will ask you about your symptoms.  The exam  Your provider listens to your chest for signs of congestion. He or she may also check your ears, nose, and throat.  Possible tests  · A sputum test for bacteria. This requires a sample of mucus from your lungs.  · A nasal or throat swab. This tests to see if you have a bacterial infection.  · A chest X-ray. This is done if your healthcare  provider thinks you have pneumonia.  · Tests to check for an underlying condition. Other tests may be done to check for things such as allergies, asthma, or COPD (chronic obstructive pulmonary disease). You may need to see a specialist for more lung function testing.  Treating a cough  The main treatment for bronchitis is easing symptoms. Avoiding smoke, allergens, and other things that trigger coughing can often help. If the infection is bacterial, you may be given antibiotics. During the illness, it's important to get plenty of sleep. To ease symptoms:  · Dont smoke. Also avoid secondhand smoke.  · Use a humidifier. Or try breathing in steam from a hot shower. This may help loosen mucus.  · Drink a lot of water and juice. They can soothe the throat and may help thin mucus.  · Sit up or use extra pillows when in bed. This helps to lessen coughing and congestion.  · Ask your provider about using medicine. Ask about using cough medicine, pain and fever medicine, or a decongestant.  Antibiotics  Most cases of bronchitis are caused by cold or flu viruses. They dont need antibiotics to treat them, even if your mucus is thick and green or yellow. Antibiotics dont treat viral illness and antibiotics have not been shown to have any benefit in cases of acute bronchitis. Taking antibiotics when they are not needed increases your risk of getting an infection later that is antibiotic-resistant. Antibiotics can also cause severe cases of diarrhea that require other antibiotics to treat.  It is important that you accept your healthcare provider's opinion to not use antibiotics. Your provider will prescribe antibiotics if the infection is caused by bacteria. If they are prescribed:  · Take all of the medicine. Take the medicine until it is used up, even if symptoms have improved. If you dont, the bronchitis may come back.  · Take the medicines as directed. For instance, some medicines should be taken with food.  · Ask about  side effects. Ask your provider or pharmacist what side effects are common, and what to do about them.  Follow-up care  You should see your provider again in 2 to 3 weeks. By this time, symptoms should have improved. An infection that lasts longer may mean you have a more serious problem.  Prevention  · Avoid tobacco smoke. If you smoke, quit. Stay away from smoky places. Ask friends and family not to smoke around you, or in your home or car.  · Get checked for allergies.  · Ask your provider about getting a yearly flu shot. Also ask about pneumococcal or pneumonia shots.  · Wash your hands often. This helps reduce the chance of picking up viruses that cause colds and flu.  Call your healthcare provider if:  · Symptoms worsen, or you have new symptoms  · Breathing problems worsen or  become severe  · Symptoms dont get better within a week, or within 3 days of taking antibiotics   Date Last Reviewed: 2/1/2017  © 0063-1135 [employer] [address] [place] [address], [place] [address]. All rights reserved. This information is not intended as a substitute for professional medical care. Always follow your healthcare professional's instructions.

## 2018-03-05 ENCOUNTER — PATIENT MESSAGE (OUTPATIENT)
Dept: INTERNAL MEDICINE | Facility: CLINIC | Age: 33
End: 2018-03-05

## 2018-03-07 ENCOUNTER — OFFICE VISIT (OUTPATIENT)
Dept: INTERNAL MEDICINE | Facility: CLINIC | Age: 33
End: 2018-03-07
Payer: COMMERCIAL

## 2018-03-07 ENCOUNTER — PATIENT MESSAGE (OUTPATIENT)
Dept: INTERNAL MEDICINE | Facility: CLINIC | Age: 33
End: 2018-03-07

## 2018-03-07 ENCOUNTER — TELEPHONE (OUTPATIENT)
Dept: URGENT CARE | Facility: CLINIC | Age: 33
End: 2018-03-07

## 2018-03-07 VITALS
WEIGHT: 133.63 LBS | TEMPERATURE: 98 F | BODY MASS INDEX: 23.68 KG/M2 | OXYGEN SATURATION: 98 % | SYSTOLIC BLOOD PRESSURE: 116 MMHG | HEIGHT: 63 IN | DIASTOLIC BLOOD PRESSURE: 62 MMHG | HEART RATE: 72 BPM

## 2018-03-07 DIAGNOSIS — J06.9 URI WITH COUGH AND CONGESTION: Primary | ICD-10-CM

## 2018-03-07 PROCEDURE — 99999 PR PBB SHADOW E&M-EST. PATIENT-LVL III: CPT | Mod: PBBFAC,,, | Performed by: INTERNAL MEDICINE

## 2018-03-07 PROCEDURE — 99213 OFFICE O/P EST LOW 20 MIN: CPT | Mod: S$GLB,,, | Performed by: INTERNAL MEDICINE

## 2018-03-07 RX ORDER — PROMETHAZINE HYDROCHLORIDE AND DEXTROMETHORPHAN HYDROBROMIDE 6.25; 15 MG/5ML; MG/5ML
5 SYRUP ORAL EVERY 6 HOURS PRN
Qty: 118 ML | Refills: 0 | Status: SHIPPED | OUTPATIENT
Start: 2018-03-07 | End: 2018-03-12

## 2018-03-07 NOTE — PROGRESS NOTES
Subjective:       Patient ID: Sari Serna is a 32 y.o. female.    Chief Complaint: Cold Exposure (poss flu like symptoms )    HPI Mrs. Serna is a 32 year old female who presents with chief complaint of cold.  She began feeling ill last Wednesday.  Her first symptom was a sore throat.  She now has associated congestion, runny nose, sinus pressure, cough, chest pain with coughing, and headaches.  She went to an urgent care on Sunday.  She was diagnosed with bronchitis and she was prescribed prednisone, albuterol, promethazine with codeine cough syrup, and Tessalon Perles.  She has been taking his medications since her urgent care appointment.  However she is not getting any relief from her symptoms.  She finds the cough to be the most bothersome symptom and this seems to keep her awake at night.  Her symptoms are constant.  Nothing is making her symptoms better or worse.    Of note due to her history of allergies, she also uses a Neti pot and takes Nasonex and Zyrtec.    Review of Systems   Constitutional: Negative for fever.   HENT: Positive for congestion, rhinorrhea, sinus pressure and sore throat. Negative for ear pain.    Respiratory: Positive for cough (chest pain with cough).    Cardiovascular: Negative for chest pain.   Gastrointestinal: Negative for diarrhea and vomiting.   Skin: Negative for rash.   Allergic/Immunologic: Positive for environmental allergies.   Neurological: Positive for headaches.       Objective:      Physical Exam   Constitutional: She is oriented to person, place, and time. She appears well-developed and well-nourished. No distress.   HENT:   Head: Normocephalic and atraumatic.   Right Ear: External ear normal.   Left Ear: External ear normal.   Nose: Mucosal edema and rhinorrhea present.   Mouth/Throat: Oropharynx is clear and moist. No posterior oropharyngeal erythema.   Eyes: Conjunctivae and EOM are normal. Right eye exhibits no discharge. Left eye exhibits no discharge.  No scleral icterus.   Cardiovascular: Normal rate, regular rhythm, normal heart sounds and intact distal pulses.  Exam reveals no gallop and no friction rub.    No murmur heard.  Pulmonary/Chest: Effort normal and breath sounds normal. No respiratory distress. She has no wheezes. She has no rales.   Neurological: She is alert and oriented to person, place, and time.   Skin: Skin is warm and dry. She is not diaphoretic.   Psychiatric: She has a normal mood and affect. Her behavior is normal. Judgment and thought content normal.   Vitals reviewed.      Assessment:       1. URI with cough and congestion        Plan:     #1 URI with cough and congestion  Discussed with patient that she has a viral illness, and while we have no cure for this viral illness, we can try to treat the symptoms as they arise.  Discussed with patient that she is already being treated with appropriate medications for treatment of her symptoms.  We will try Promethazine DM cough syrup to see if this provides more relief than her current cough syrup.  She is aware that she cannot take both cough syrups at the same time.  Advised to continue Nasonex, and tessalon perles (if desired)  Stressed the importance of rest and hydration  Return for fever (>100) that does not respond to tylenol or ibuprofen or cough > 6 weeks    RTC PRN

## 2018-03-07 NOTE — LETTER
March 7, 2018      [facility]  [address]  [place] [address]  Phone: [phone]  Fax: [phone]       Patient: Sari Serna   YOB: 1985  Date of Visit: 03/07/2018    To Whom It May Concern:    Citlali Serna  was at [facility] on 03/07/2018. She may return to work/school on 03/08/2018 with no restrictions. If you have any questions or concerns, or if I can be of further assistance, please do not hesitate to contact me.    Sincerely,    Adenike Geronimo MA

## 2018-03-09 ENCOUNTER — PATIENT MESSAGE (OUTPATIENT)
Dept: INTERNAL MEDICINE | Facility: CLINIC | Age: 33
End: 2018-03-09

## 2018-03-10 ENCOUNTER — OFFICE VISIT (OUTPATIENT)
Dept: URGENT CARE | Facility: CLINIC | Age: 33
End: 2018-03-10
Payer: COMMERCIAL

## 2018-03-10 ENCOUNTER — HOSPITAL ENCOUNTER (EMERGENCY)
Facility: HOSPITAL | Age: 33
Discharge: HOME | End: 2018-03-10
Attending: EMERGENCY MEDICINE
Payer: COMMERCIAL

## 2018-03-10 VITALS
DIASTOLIC BLOOD PRESSURE: 80 MMHG | HEART RATE: 110 BPM | WEIGHT: 130 LBS | TEMPERATURE: 99 F | OXYGEN SATURATION: 97 % | RESPIRATION RATE: 16 BRPM | HEIGHT: 63 IN | BODY MASS INDEX: 23.04 KG/M2 | SYSTOLIC BLOOD PRESSURE: 134 MMHG

## 2018-03-10 VITALS
DIASTOLIC BLOOD PRESSURE: 58 MMHG | RESPIRATION RATE: 18 BRPM | SYSTOLIC BLOOD PRESSURE: 122 MMHG | WEIGHT: 130 LBS | BODY MASS INDEX: 23.04 KG/M2 | HEIGHT: 63 IN | TEMPERATURE: 99 F | OXYGEN SATURATION: 98 % | HEART RATE: 74 BPM

## 2018-03-10 DIAGNOSIS — R09.1 PLEURISY: ICD-10-CM

## 2018-03-10 DIAGNOSIS — R05.9 COUGH: ICD-10-CM

## 2018-03-10 DIAGNOSIS — J40 BRONCHITIS: Primary | ICD-10-CM

## 2018-03-10 DIAGNOSIS — J06.9 VIRAL URI WITH COUGH: Primary | ICD-10-CM

## 2018-03-10 LAB
B-HCG UR QL: NEGATIVE
CTP QC/QA: YES

## 2018-03-10 PROCEDURE — 81025 URINE PREGNANCY TEST: CPT | Performed by: EMERGENCY MEDICINE

## 2018-03-10 PROCEDURE — 25000003 PHARM REV CODE 250: Performed by: EMERGENCY MEDICINE

## 2018-03-10 PROCEDURE — 99214 OFFICE O/P EST MOD 30 MIN: CPT | Mod: 25,S$GLB,, | Performed by: FAMILY MEDICINE

## 2018-03-10 PROCEDURE — 96372 THER/PROPH/DIAG INJ SC/IM: CPT | Mod: S$GLB,,, | Performed by: FAMILY MEDICINE

## 2018-03-10 PROCEDURE — 99284 EMERGENCY DEPT VISIT MOD MDM: CPT | Mod: 25

## 2018-03-10 RX ORDER — BETAMETHASONE SODIUM PHOSPHATE AND BETAMETHASONE ACETATE 3; 3 MG/ML; MG/ML
6 INJECTION, SUSPENSION INTRA-ARTICULAR; INTRALESIONAL; INTRAMUSCULAR; SOFT TISSUE
Status: COMPLETED | OUTPATIENT
Start: 2018-03-10 | End: 2018-03-10

## 2018-03-10 RX ORDER — AZITHROMYCIN 250 MG/1
TABLET, FILM COATED ORAL
Qty: 6 TABLET | Refills: 0 | Status: SHIPPED | OUTPATIENT
Start: 2018-03-10 | End: 2018-04-03

## 2018-03-10 RX ORDER — CODEINE PHOSPHATE AND GUAIFENESIN 10; 100 MG/5ML; MG/5ML
5 SOLUTION ORAL
Status: COMPLETED | OUTPATIENT
Start: 2018-03-10 | End: 2018-03-10

## 2018-03-10 RX ORDER — HYDROCODONE POLISTIREX AND CHLORPHENIRAMINE POLISTIREX 10; 8 MG/5ML; MG/5ML
5 SUSPENSION, EXTENDED RELEASE ORAL
Status: DISCONTINUED | OUTPATIENT
Start: 2018-03-10 | End: 2018-03-10

## 2018-03-10 RX ORDER — TRAMADOL HYDROCHLORIDE 50 MG/1
50 TABLET ORAL
Qty: 6 TABLET | Refills: 0 | Status: SHIPPED | OUTPATIENT
Start: 2018-03-10 | End: 2018-03-12

## 2018-03-10 RX ORDER — KETOROLAC TROMETHAMINE 10 MG/1
10 TABLET, FILM COATED ORAL
Qty: 15 TABLET | Refills: 0 | Status: SHIPPED | OUTPATIENT
Start: 2018-03-10 | End: 2018-04-03

## 2018-03-10 RX ORDER — KETOROLAC TROMETHAMINE 30 MG/ML
30 INJECTION, SOLUTION INTRAMUSCULAR; INTRAVENOUS
Status: COMPLETED | OUTPATIENT
Start: 2018-03-10 | End: 2018-03-10

## 2018-03-10 RX ADMIN — GUAIFENESIN AND CODEINE PHOSPHATE 5 ML: 100; 10 SOLUTION ORAL at 05:03

## 2018-03-10 RX ADMIN — KETOROLAC TROMETHAMINE 30 MG: 30 INJECTION, SOLUTION INTRAMUSCULAR; INTRAVENOUS at 05:03

## 2018-03-10 RX ADMIN — BETAMETHASONE SODIUM PHOSPHATE AND BETAMETHASONE ACETATE 6 MG: 3; 3 INJECTION, SUSPENSION INTRA-ARTICULAR; INTRALESIONAL; INTRAMUSCULAR; SOFT TISSUE at 05:03

## 2018-03-10 NOTE — ED PROVIDER NOTES
Encounter Date: 3/10/2018    SCRIBE #1 NOTE: Veena SANTILLAN am scribing for, and in the presence of, Dr. Ambriz.       History     Chief Complaint   Patient presents with    Cough     states has had bronchitis x1 week; coughing spells causing urinary frequency; and concerned may have broken ribs;      Sari Serna is a 32 y.o. female who  has a past medical history of Acne; ALLERGIC RHINITIS; Anxiety; Dysmenorrhea; Low back pain; and Migraine headache.    The patient presents to the ED due to persistent cough that began approximately 1.5 weeks ago.  Cough is productive with green/yellow phlegm.  Pt states she had episodes of hard cough that caused her to vomit and feel SOB.  She denies any associated fever/chills, body aches, headache, or abdominal pain. Pt states she had seen her PCP twice recently for the same symptoms.  Pt was first seen 6 days ago, diagnosed with allergies, and prescribed prednisone, albuterol, and codeine, which did not improve her symptoms.  Pt states she returned to her doctor 3 days ago and was prescribed promethazine, which still did not relieve her symptoms.  She reports associated left upper rib pain.  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  Pt is a nonsmoker, does not drink ETOH, and denies any other substance abuse.       The history is provided by the patient.     Review of patient's allergies indicates:   Allergen Reactions    Doxycycline      Other reaction(s): Stomach upset     Past Medical History:   Diagnosis Date    Acne     ALLERGIC RHINITIS     Anxiety     Dysmenorrhea     Low back pain     Migraine headache      Past Surgical History:   Procedure Laterality Date    FACIAL COSMETIC SURGERY      2013     Family History   Problem Relation Age of Onset    Endometriosis Mother     Cirrhosis Mother     Stroke Paternal Grandfather     Amblyopia Neg Hx     Blindness Neg Hx     Cataracts Neg Hx     Glaucoma Neg Hx      Macular degeneration Neg Hx     Retinal detachment Neg Hx     Strabismus Neg Hx     Breast cancer Neg Hx     Colon cancer Neg Hx     Ovarian cancer Neg Hx     Thyroid disease Neg Hx     Cancer Neg Hx      Social History   Substance Use Topics    Smoking status: Never Smoker    Smokeless tobacco: Never Used    Alcohol use Yes      Comment: occasion     Review of Systems   Constitutional: Negative for chills and fever.   HENT: Negative for sore throat.    Respiratory: Positive for cough and shortness of breath (secondary to cough).    Cardiovascular: Negative for chest pain.   Gastrointestinal: Positive for vomiting (post-tussive). Negative for constipation, diarrhea and nausea.   Genitourinary: Negative for dysuria, frequency and urgency.   Musculoskeletal: Negative for back pain.   Skin: Negative for rash and wound.   Neurological: Negative for syncope and weakness.   Hematological: Does not bruise/bleed easily.   Psychiatric/Behavioral: Negative for agitation, behavioral problems and confusion.       Physical Exam     Initial Vitals [03/10/18 0320]   BP Pulse Resp Temp SpO2   133/77 84 20 98.4 °F (36.9 °C) 99 %      MAP       95.67         Physical Exam    Nursing note and vitals reviewed.  Constitutional: She appears well-developed and well-nourished. She is not diaphoretic. No distress.   Well-appearing, NAD   HENT:   Head: Normocephalic and atraumatic.   Mouth/Throat: Oropharynx is clear and moist.   Eyes: EOM are normal. Pupils are equal, round, and reactive to light.   Neck: Normal range of motion. Neck supple. No tracheal deviation present.   Cardiovascular: Normal rate, regular rhythm, normal heart sounds and intact distal pulses.   Pulmonary/Chest: Breath sounds normal. No stridor. No respiratory distress. She has no wheezes.   Grossly clear, no wheezes   Abdominal: Soft. Bowel sounds are normal. She exhibits no distension and no mass. There is no tenderness.   Musculoskeletal: Normal range of  motion. She exhibits no edema.   Neurological: She is alert and oriented to person, place, and time. She has normal strength. No cranial nerve deficit or sensory deficit.   Skin: Skin is warm and dry. Capillary refill takes less than 2 seconds. No rash noted. No pallor.   Psychiatric: She has a normal mood and affect. Her behavior is normal. Thought content normal.         ED Course   Procedures  Labs Reviewed   POCT URINE PREGNANCY        Imaging Results          X-Ray Chest PA And Lateral (Final result)  Result time 03/10/18 04:30:33    Final result by Tiffanie Stokes MD (03/10/18 04:30:33)                 Impression:        No acute radiographic findings in the chest.      Electronically signed by: TIFFANIE STOKES MD  Date:     03/10/18  Time:    04:30              Narrative:    Technique: PA and LAT chest radiographs.    Comparison: Radiograph 01/14/2004.    Findings:    Mediastinal structures are midline. Cardiac silhouette is normal in size. Lung volumes are normal and symmetric. No pulmonary consolidation.  No pneumothorax or pleural effusion. No free air beneath the diaphragm. Bones demonstrate no acute abnormalities.                                Medical Decision Making:   History:   Old Medical Records: I decided to obtain old medical records.  Initial Assessment:   32 y.o. female presents with persistent cough for 1.5 weeks despite multiple evaluations and medications. Vitals unremarkable, afebrile, exam benign.  Lungs are clear.  Plan to obtain CXR and reassess.  Differential Diagnosis:   Differential Diagnosis includes, but is not limited to:  Viral URI, Strep pharyngitis, viral pharyngitis, foreign body aspiration/ingestion, bronchitis, asthma exacerbation, CHF exacerbation, COPD exacerbation, allergy/atopy, influenza, pertussis, PE, pneumonia, lung abscess, fungal infection, TB, epiglottitis.    Clinical Tests:   Radiological Study: Ordered and Reviewed  ED Management:  CXR clear.  On reassessment,  patient well-appearing.  Discussed symptomatic and supportive care for likely viral URI, cough, and importance of PCP f/u for further evaluation and management.  Patient stable for D/C, given return precautions including fever, inability to tolerate Po, SOB, chest pain, or any other concerns.      After complete evaluation, including thorough history and physical exam, the patient's symptoms are most likely due to viral upper respiratory infection. There are no concerning features on physical exam to suggest bacterial otitis media/externa, sinusitis, pharyngitis, or peritonsillar abscess. Vital signs do not suggest sepsis. Lung sounds are clear and not consistent with pneumonia. There is no neck pain or limited ROM to suggest retropharyngeal abscess or meningitis. The patient will be treated with supportive care.      Upon re-evaluation, the patient's status has remained stable.  After complete ED evaluation, clinical impression is most consistent with viral URI with cough.  At this time, I feel there is no emergent condition requiring further evaluation or admission. I believe the patient is stable for discharge from the ED. The patient and any additional family present were updated with test results, overall clinical impression, and recommended further plan of care. All questions were answered. The patient expressed understanding and agreed with current plan for discharge with PCP follow-up within 1 week. Strict return precautions were provided, including return/worsening of current symptoms or any other concerns.       Medications   guaifenesin-codeine 100-10 mg/5 ml syrup 5 mL (5 mLs Oral Given 3/10/18 0521)                             Clinical Impression:   The primary encounter diagnosis was Viral URI with cough. A diagnosis of Cough was also pertinent to this visit.          I, Dr. Nima Ambriz, personally performed the services described in this documentation. All medical record entries made by the irina  were at my direction and in my presence.  I have reviewed the chart and agree that the record reflects my personal performance and is accurate and complete.     Nima Ambriz MD.                 Nima Ambriz MD  04/10/18 0800

## 2018-03-10 NOTE — ED NOTES
Patient identifiers for Sari Serna checked and correct.  LOC: The patient is awake, alert and aware of environment with an appropriate affect, the patient is oriented x 3 and speaking appropriately.  APPEARANCE: Patient uncomfortable and in no acute distress, patient is clean and well groomed, patient's clothing are properly fastened.  SKIN: The skin is warm and dry, patient has normal skin turgor and moist mucus membranes.  MUSKULOSKELETAL: Patient moving all extremities well, no obvious swelling or deformities noted.  RESPIRATORY: Airway is open and patent, respirations are spontaneous, patient has a normal effort and rate.

## 2018-03-10 NOTE — PROGRESS NOTES
"Subjective:       Patient ID: Sari Serna is a 32 y.o. female.    Vitals:  height is 5' 3" (1.6 m) and weight is 59 kg (130 lb). Her tympanic temperature is 99 °F (37.2 °C). Her blood pressure is 134/80 and her pulse is 110. Her respiration is 16 and oxygen saturation is 97%.     Chief Complaint: Pain    PT being treated for Bronchitis 7 days ago. Pt here today with bilateral side pain. Pt seen at [facility] ED 3 this am, normal CXR.  Reviewed past history.  Patient had low dose oral steroid with bronchidilatory initially.  She was seen another time and received cough medicine and was in ED last night.  CXR was normal in appearance.  She was given cough medicine.  She is reporting worsening pain in her chest constantly in lower rib area bilaterally and reports most severe with coughing or sneezing.     She has been taking ibuprofen every 4-6 hours which she reports is 800 mg each time.       Pain   This is a new problem. The current episode started yesterday. The problem occurs constantly. The problem has been rapidly worsening. Pertinent negatives include no abdominal pain, chest pain, chills, fever, headaches, nausea, rash, sore throat or vomiting. The symptoms are aggravated by coughing and walking. She has tried NSAIDs for the symptoms. The treatment provided no relief.     Review of Systems   Constitution: Negative for chills and fever.   HENT: Negative for sore throat.    Eyes: Negative for blurred vision.   Cardiovascular: Negative for chest pain.   Respiratory: Negative for shortness of breath.    Skin: Negative for rash.   Musculoskeletal: Negative for back pain and joint pain.   Gastrointestinal: Negative for abdominal pain, diarrhea, nausea and vomiting.   Neurological: Negative for headaches.   Psychiatric/Behavioral: The patient is not nervous/anxious.        Objective:      Physical Exam   Constitutional: She is oriented to person, place, and time. She appears well-developed and " well-nourished. She is cooperative.  Non-toxic appearance. She does not appear ill. She appears distressed.   HENT:   Head: Normocephalic and atraumatic.   Right Ear: Hearing, tympanic membrane, external ear and ear canal normal.   Left Ear: Hearing, tympanic membrane, external ear and ear canal normal.   Nose: Nose normal. No mucosal edema, rhinorrhea or nasal deformity. No epistaxis. Right sinus exhibits no maxillary sinus tenderness and no frontal sinus tenderness. Left sinus exhibits no maxillary sinus tenderness and no frontal sinus tenderness.   Mouth/Throat: Uvula is midline, oropharynx is clear and moist and mucous membranes are normal. No trismus in the jaw. Normal dentition. No uvula swelling. No posterior oropharyngeal erythema.   Eyes: Conjunctivae and lids are normal. No scleral icterus.   Sclera clear bilat   Neck: Trachea normal, full passive range of motion without pain and phonation normal. Neck supple.   Cardiovascular: Regular rhythm, normal heart sounds, intact distal pulses and normal pulses.  Tachycardia present.    Pulmonary/Chest: Effort normal. No respiratory distress. She has no decreased breath sounds. She has no wheezes. She has rhonchi in the right lower field and the left lower field. She has no rales. She exhibits tenderness and bony tenderness. She exhibits no mass, no crepitus, no edema, no deformity, no swelling and no retraction.       Few rhonchi that are fine and scattered in the bases.  No wheezes or rales.    Abdominal: Soft. Normal appearance and bowel sounds are normal. She exhibits no distension. There is no tenderness.   Musculoskeletal: Normal range of motion. She exhibits no edema or deformity.   Neurological: She is alert and oriented to person, place, and time. She exhibits normal muscle tone. Coordination normal.   Skin: Skin is warm, dry and intact. She is not diaphoretic. No pallor.   Psychiatric: She has a normal mood and affect. Her speech is normal and behavior is  normal. Judgment and thought content normal. Cognition and memory are normal.   Nursing note and vitals reviewed.      Assessment:       1. Bronchitis    2. Pleurisy        Plan:         Bronchitis  -     azithromycin (ZITHROMAX Z-GELY) 250 MG tablet; Take 2 tablets (500 mg) on  Day 1,  followed by 1 tablet (250 mg) once daily on Days 2 through 5.  Dispense: 6 tablet; Refill: 0  -     Ambulatory referral to Pulmonology    Pleurisy  -     betamethasone acetate-betamethasone sodium phosphate injection 6 mg; Inject 1 mL (6 mg total) into the muscle one time.  -     ketorolac injection 30 mg; Inject 30 mg into the muscle one time.  -     ketorolac (TORADOL) 10 mg tablet; Take 1 tablet (10 mg total) by mouth 3 (three) times daily after meals. Start 6 hours after shot.  Dispense: 15 tablet; Refill: 0  -     traMADol (ULTRAM) 50 mg tablet; Take 1 tablet (50 mg total) by mouth every 24 hours as needed for Pain (use especially at bedtime as needed.).  Dispense: 6 tablet; Refill: 0  -     Ambulatory referral to Pulmonology      Follow Up Comments   Make sure that you follow up with your primary care doctor in the next 2-5 days if needed .  Return to the Urgent Care if signs or symptoms change and certainly if you have worsening symptoms go to the nearest emergency department for further evaluation.

## 2018-03-10 NOTE — PATIENT INSTRUCTIONS
Pleurisy  You have pain in your chest. Your healthcare provider has told you that you have pleurisy, or pleuritis. Pleurisy is swelling (inflammation) of the pleura. The pleura are two layers of thin smooth tissue that surround the lungs and line the chest.  What are the symptoms of pleurisy?     Pleurisy is inflammation of the pleura. The pleura cover the lungs and line the chest.   Pleurisy usually causes sharp chest pain. It is usually worse when you take a deep breath, cough, or sneeze.  What causes pleurisy?  Many things can cause pleurisy. A common cause is a viral infection like the flu or pneumonia. Serious lung problems that can cause it include:  · A blood clot in the lung (pulmonary embolism)  · Air between the pleura (pneumothorax)  Serious heart problems that can cause pleurisy include:  · Heart attack  · Inflammation of the covering of the heart (pericarditis)  How is pleurisy diagnosed?  Your healthcare provider examines you and asks you about your symptoms and health history. He or she will first check you for the serious causes of chest pain. You may have:  · Lab tests  · Imaging tests such as chest X-ray, CT scan, or ultrasound  · EKG  How is pleurisy treated?  Treatment depends on what is causing the pleurisy. Serious conditions are treated in the hospital. You may need medicines to decrease the inflammation and pain.     Call 911  Call 911 if any of these occur:  · Trouble breathing  · Chest pain that gets worse  Call your healthcare provider  Call your healthcare provider right away if you have:  · A fever of 100.4°F (38°C) or higher, or as directed by your healthcare provider   Date Last Reviewed: 11/1/2016  © 0011-0422 [employer]. [address] [place] [address], [place] [address]. All rights reserved. This information is not intended as a substitute for professional medical care. Always follow your healthcare professional's instructions.    Sari Strong was seen today for  pain.    Diagnoses and all orders for this visit:    Bronchitis  -     azithromycin (ZITHROMAX Z-GELY) 250 MG tablet; Take 2 tablets (500 mg) on  Day 1,  followed by 1 tablet (250 mg) once daily on Days 2 through 5.  -     Ambulatory referral to Pulmonology    Pleurisy  -     betamethasone acetate-betamethasone sodium phosphate injection 6 mg; Inject 1 mL (6 mg total) into the muscle one time.  -     ketorolac injection 30 mg; Inject 30 mg into the muscle one time.  -     ketorolac (TORADOL) 10 mg tablet; Take 1 tablet (10 mg total) by mouth 3 (three) times daily after meals. Start 6 hours after shot.  -     traMADol (ULTRAM) 50 mg tablet; Take 1 tablet (50 mg total) by mouth every 24 hours as needed for Pain (use especially at bedtime as needed.).  -     Ambulatory referral to Pulmonology            Follow Up Comments   Make sure that you follow up with your primary care doctor in the next 2-5 days if needed .  Return to the Urgent Care if signs or symptoms change and certainly if you have worsening symptoms go to the nearest emergency department for further evaluation.     Alie Melendrez MD

## 2018-03-12 ENCOUNTER — PATIENT MESSAGE (OUTPATIENT)
Dept: INTERNAL MEDICINE | Facility: CLINIC | Age: 33
End: 2018-03-12

## 2018-03-12 ENCOUNTER — HOSPITAL ENCOUNTER (OUTPATIENT)
Dept: RADIOLOGY | Facility: HOSPITAL | Age: 33
Discharge: HOME | End: 2018-03-12
Attending: INTERNAL MEDICINE
Payer: COMMERCIAL

## 2018-03-12 ENCOUNTER — LAB VISIT (OUTPATIENT)
Dept: LAB | Facility: HOSPITAL | Age: 33
End: 2018-03-12
Attending: INTERNAL MEDICINE
Payer: COMMERCIAL

## 2018-03-12 ENCOUNTER — OFFICE VISIT (OUTPATIENT)
Dept: INTERNAL MEDICINE | Facility: CLINIC | Age: 33
End: 2018-03-12
Payer: COMMERCIAL

## 2018-03-12 VITALS
HEART RATE: 90 BPM | BODY MASS INDEX: 24.18 KG/M2 | WEIGHT: 136.44 LBS | OXYGEN SATURATION: 97 % | DIASTOLIC BLOOD PRESSURE: 80 MMHG | HEIGHT: 63 IN | SYSTOLIC BLOOD PRESSURE: 124 MMHG

## 2018-03-12 DIAGNOSIS — R07.9 CHEST PAIN, UNSPECIFIED TYPE: ICD-10-CM

## 2018-03-12 DIAGNOSIS — R07.9 CHEST PAIN, UNSPECIFIED TYPE: Primary | ICD-10-CM

## 2018-03-12 DIAGNOSIS — R07.89 CHEST WALL PAIN: Primary | ICD-10-CM

## 2018-03-12 LAB
ALBUMIN SERPL BCP-MCNC: 3.8 G/DL
ALP SERPL-CCNC: 68 U/L
ALT SERPL W/O P-5'-P-CCNC: 15 U/L
ANION GAP SERPL CALC-SCNC: 12 MMOL/L
AST SERPL-CCNC: 16 U/L
BASOPHILS # BLD AUTO: 0.03 K/UL
BASOPHILS NFR BLD: 0.2 %
BILIRUB SERPL-MCNC: 0.3 MG/DL
BUN SERPL-MCNC: 13 MG/DL
CALCIUM SERPL-MCNC: 9.9 MG/DL
CHLORIDE SERPL-SCNC: 102 MMOL/L
CO2 SERPL-SCNC: 25 MMOL/L
CREAT SERPL-MCNC: 0.7 MG/DL
CRP SERPL-MCNC: 16.2 MG/L
D DIMER PPP IA.FEU-MCNC: 0.68 MG/L FEU
DIFFERENTIAL METHOD: ABNORMAL
EOSINOPHIL # BLD AUTO: 0 K/UL
EOSINOPHIL NFR BLD: 0.1 %
ERYTHROCYTE [DISTWIDTH] IN BLOOD BY AUTOMATED COUNT: 13 %
ERYTHROCYTE [SEDIMENTATION RATE] IN BLOOD BY WESTERGREN METHOD: 35 MM/HR
EST. GFR  (AFRICAN AMERICAN): >60 ML/MIN/1.73 M^2
EST. GFR  (NON AFRICAN AMERICAN): >60 ML/MIN/1.73 M^2
GLUCOSE SERPL-MCNC: 94 MG/DL
HCT VFR BLD AUTO: 43.6 %
HGB BLD-MCNC: 14.5 G/DL
IMM GRANULOCYTES # BLD AUTO: 0.09 K/UL
IMM GRANULOCYTES NFR BLD AUTO: 0.7 %
LYMPHOCYTES # BLD AUTO: 1.8 K/UL
LYMPHOCYTES NFR BLD: 12.9 %
MCH RBC QN AUTO: 29.2 PG
MCHC RBC AUTO-ENTMCNC: 33.3 G/DL
MCV RBC AUTO: 88 FL
MONOCYTES # BLD AUTO: 0.5 K/UL
MONOCYTES NFR BLD: 3.6 %
NEUTROPHILS # BLD AUTO: 11.2 K/UL
NEUTROPHILS NFR BLD: 82.5 %
NRBC BLD-RTO: 0 /100 WBC
PLATELET # BLD AUTO: 425 K/UL
PMV BLD AUTO: 10 FL
POTASSIUM SERPL-SCNC: 4 MMOL/L
PROT SERPL-MCNC: 8 G/DL
RBC # BLD AUTO: 4.96 M/UL
SODIUM SERPL-SCNC: 139 MMOL/L
WBC # BLD AUTO: 13.62 K/UL

## 2018-03-12 PROCEDURE — 86140 C-REACTIVE PROTEIN: CPT

## 2018-03-12 PROCEDURE — 93010 ELECTROCARDIOGRAM REPORT: CPT | Mod: S$GLB,,, | Performed by: INTERNAL MEDICINE

## 2018-03-12 PROCEDURE — 99214 OFFICE O/P EST MOD 30 MIN: CPT | Mod: S$GLB,,, | Performed by: INTERNAL MEDICINE

## 2018-03-12 PROCEDURE — 85379 FIBRIN DEGRADATION QUANT: CPT

## 2018-03-12 PROCEDURE — 36415 COLL VENOUS BLD VENIPUNCTURE: CPT | Mod: PO

## 2018-03-12 PROCEDURE — 85025 COMPLETE CBC W/AUTO DIFF WBC: CPT

## 2018-03-12 PROCEDURE — 93005 ELECTROCARDIOGRAM TRACING: CPT | Mod: S$GLB,,, | Performed by: INTERNAL MEDICINE

## 2018-03-12 PROCEDURE — 71110 X-RAY EXAM RIBS BIL 3 VIEWS: CPT | Mod: 26,,, | Performed by: RADIOLOGY

## 2018-03-12 PROCEDURE — 80053 COMPREHEN METABOLIC PANEL: CPT

## 2018-03-12 PROCEDURE — 85651 RBC SED RATE NONAUTOMATED: CPT

## 2018-03-12 PROCEDURE — 99999 PR PBB SHADOW E&M-EST. PATIENT-LVL IV: CPT | Mod: PBBFAC,,, | Performed by: INTERNAL MEDICINE

## 2018-03-12 PROCEDURE — 71110 X-RAY EXAM RIBS BIL 3 VIEWS: CPT | Mod: TC,FY

## 2018-03-12 RX ORDER — METHYLPREDNISOLONE 4 MG/1
TABLET ORAL
Qty: 1 PACKAGE | Refills: 0 | Status: SHIPPED | OUTPATIENT
Start: 2018-03-12 | End: 2018-04-03

## 2018-03-12 RX ORDER — TIZANIDINE 4 MG/1
4 TABLET ORAL EVERY 8 HOURS PRN
Qty: 30 TABLET | Refills: 0 | Status: SHIPPED | OUTPATIENT
Start: 2018-03-12 | End: 2018-03-22

## 2018-03-12 RX ORDER — HYDROCODONE BITARTRATE AND ACETAMINOPHEN 5; 325 MG/1; MG/1
1 TABLET ORAL EVERY 8 HOURS PRN
Qty: 21 TABLET | Refills: 0 | Status: SHIPPED | OUTPATIENT
Start: 2018-03-12 | End: 2018-03-19

## 2018-03-12 NOTE — PROGRESS NOTES
Subjective:       Patient ID: Sari Serna is a 32 y.o. female.    Chief Complaint: Cough    HPI 32-year-old female presents to clinic today she reports that she feels absolutely terrible.  She is accompanied today by her mother because she felt actually too weak and bad to drive.  She's been sick for around 12 days she's exercising 4 different providers she's gone to urgent care twice seen a colleague within the office and gone to the emergency department.  She has had severe coughing that now she has severe pain in her left axillary and left shoulder blade region she scared that she may have broken a rib she's coughed so hard so long.  3/4 she received medical attention and was prescribed promethazine with codeine, Tessalon Perles albuterol inhaler and prednisone.  She reports no improvement was seen by medical provider on 3/7 were no additional pharmacologic prescription were provided except for Promethazine DM.  On 3/10 she went to the emergency department was diagnosed with a viral URI and no medication was provided.  She had urine testing and a chest x-ray.  Chest x-ray was negative.  On 3/10 she went to urgent care where she was prescribed a Z-Blue, Toradol and steroid injection and a prescription for tramadol and Toradol and a pulmonary consultation.  She states she just feels like she can't take being sick like this any longer.  She denies any persistent fever.  States that the cough is improved but the pain from the coughing is severe.  She feels absolutely terrible.  She doesn't feel like really any other medications have helped.  She may have had some transient benefit wall after she received a steroid injection.  Pain in her chest does increase with breathing and movement.  Review of Systems  no rashes noted joint arthritis no significant shortness of breath  Objective:      Physical Exam  General:  appears under the weather and in discomfort.  well-nourished.  No distress  HEENT: conjunctivae  are normal.  Pupils are equal and reative to light.  TM's are clear and intact bilaterally.  Hearing is grossly normal.  Nasopharynx is clear.  Oropharynx is clear.  Neck: Supple.  No thyroid megaly.  No bruits.  Lymph: No cervical or supraclavicular adenopathy.  Heart: Regular rate and rhythm, without murmur, rub or gallop.  Lungs: Clear to auscultation; respiratory effort normal.  Abdomen: Soft, nontender, nondistended.  Normoactive bowel sounds.  No hepatomegaly.  No masses.  Extremities: Good distal pulses.  No edema.  Psych: Oriented to time person place.  Judgment and insight seem unimpaired.  Mood and affect are appropriate.  EKG performed and reviewed in office normal cardiology interpretation pending    Assessment:       1. Chest pain, unspecified type        Plan:       Sari Strong was seen today for cough.    Diagnoses and all orders for this visit:    Chest pain, unspecified type  -     X-Ray Ribs 3 Views Bilateral; Future radiologist's interpretation negative for fracture.  -     tiZANidine (ZANAFLEX) 4 MG tablet; Take 1 tablet (4 mg total) by mouth every 8 (eight) hours as needed.  -     hydrocodone-acetaminophen 5-325mg (NORCO) 5-325 mg per tablet; Take 1 tablet by mouth every 8 (eight) hours as needed for Pain.  Discontinue tramadol  -     CBC auto differential; Future patient does have an elevation of her white blood cell count this may be related to possible infectious process versus recent steroids.  Will recommend that she go ahead and complete the Z-Blue previously prescribed.  -     Comprehensive metabolic panel; Future  -     Sedimentation rate, manual; Future  -     D dimer, quantitative; Future  -     C-reactive protein; Future  -     IN OFFICE EKG 12-LEAD (to Muse)       Discussed use, benefits, as well as potential adverse side effects.  Follow-up scheduled and precautions given.  Attempted to contact patient by phone and left message on her voice message

## 2018-03-13 ENCOUNTER — PATIENT MESSAGE (OUTPATIENT)
Dept: INTERNAL MEDICINE | Facility: CLINIC | Age: 33
End: 2018-03-13

## 2018-03-14 ENCOUNTER — PATIENT MESSAGE (OUTPATIENT)
Dept: INTERNAL MEDICINE | Facility: CLINIC | Age: 33
End: 2018-03-14

## 2018-03-14 ENCOUNTER — HOSPITAL ENCOUNTER (EMERGENCY)
Facility: HOSPITAL | Age: 33
Discharge: HOME | End: 2018-03-14
Attending: EMERGENCY MEDICINE
Payer: COMMERCIAL

## 2018-03-14 ENCOUNTER — TELEPHONE (OUTPATIENT)
Dept: INTERNAL MEDICINE | Facility: CLINIC | Age: 33
End: 2018-03-14

## 2018-03-14 VITALS
RESPIRATION RATE: 16 BRPM | BODY MASS INDEX: 23.04 KG/M2 | SYSTOLIC BLOOD PRESSURE: 122 MMHG | HEART RATE: 87 BPM | WEIGHT: 130 LBS | DIASTOLIC BLOOD PRESSURE: 75 MMHG | OXYGEN SATURATION: 97 % | HEIGHT: 63 IN | TEMPERATURE: 98 F

## 2018-03-14 DIAGNOSIS — R07.9 CHEST PAIN: ICD-10-CM

## 2018-03-14 LAB
B-HCG UR QL: NEGATIVE
CTP QC/QA: YES

## 2018-03-14 PROCEDURE — 99284 EMERGENCY DEPT VISIT MOD MDM: CPT | Mod: 25

## 2018-03-14 PROCEDURE — 25500020 PHARM REV CODE 255: Performed by: EMERGENCY MEDICINE

## 2018-03-14 PROCEDURE — 93005 ELECTROCARDIOGRAM TRACING: CPT

## 2018-03-14 PROCEDURE — 93010 ELECTROCARDIOGRAM REPORT: CPT | Mod: ,,, | Performed by: INTERNAL MEDICINE

## 2018-03-14 PROCEDURE — 81025 URINE PREGNANCY TEST: CPT | Performed by: EMERGENCY MEDICINE

## 2018-03-14 RX ADMIN — IOHEXOL 100 ML: 350 INJECTION, SOLUTION INTRAVENOUS at 02:03

## 2018-03-14 NOTE — ED NOTES
Pt states she has been having cough and allergies x 2 weeks.  Pt states that she was seen in ED and UC and diagnosed with bronchitis.  Pt states that she continues having rib pain, worse to L side.  Pt states she was seen per PCP and had blood work drawn.  Pt states she was told to come to ED d/t elevated D dimer.

## 2018-03-14 NOTE — ED PROVIDER NOTES
Encounter Date: 3/14/2018    SCRIBE #1 NOTE: I, Cooper Valles, am scribing for, and in the presence of,  Dr. Maia Leal. I have scribed the entire note.       History     Chief Complaint   Patient presents with    Abnormal Lab     sent by pcp for elevated d-dimer, wbc, sed rate, and crp. Pt has been having a cough for the past 2 weeks and is c/o left side pain.      Time patient was seen by the provider: 1:45 PM      The patient is a 32 y.o. female with co-morbidities including: elevated D-dimer, WBC, sed rate, and CRP who presents to the ED with a complaint of abnormal lab results and left sided chest wall pain. She has been feeling allergy symptoms for the last two weeks with associated cough. She has tried various remedies for her symptoms including benadryl, tylenol, Claritin and steroid injection.  She was seen at urgent care and given po prednisone, toradol, and tramadol all with no relief. She saw her PCP one day prior who Rx muscle relaxer and vicodin with no relief. She currently c/o left sided chest wall pain. She denies feeling SOB but does feel chest wall pain with inhalation. She is a non-smoker, has been less active than normal, has not noticed any swelling in her legs, no fhx of blood clots of DVT, does take birth control.  Pt is in the ED today for a CT of her chest to rule out PE.        The history is provided by the patient.     Review of patient's allergies indicates:   Allergen Reactions    Doxycycline      Other reaction(s): Stomach upset     Past Medical History:   Diagnosis Date    Acne     ALLERGIC RHINITIS     Anxiety     Dysmenorrhea     Low back pain     Migraine headache      Past Surgical History:   Procedure Laterality Date    FACIAL COSMETIC SURGERY      2013     Family History   Problem Relation Age of Onset    Endometriosis Mother     Cirrhosis Mother     Stroke Paternal Grandfather     Amblyopia Neg Hx     Blindness Neg Hx     Cataracts Neg Hx     Glaucoma Neg  Hx     Macular degeneration Neg Hx     Retinal detachment Neg Hx     Strabismus Neg Hx     Breast cancer Neg Hx     Colon cancer Neg Hx     Ovarian cancer Neg Hx     Thyroid disease Neg Hx     Cancer Neg Hx      Social History   Substance Use Topics    Smoking status: Never Smoker    Smokeless tobacco: Never Used    Alcohol use Yes      Comment: occasion     Review of Systems   Constitutional: Negative for activity change, appetite change, chills and fever.   HENT: Negative for congestion and sore throat.    Respiratory: Positive for cough and chest tightness. Negative for shortness of breath.    Cardiovascular: Negative for chest pain and palpitations.   Gastrointestinal: Negative for abdominal pain, nausea and vomiting.   Endocrine: Negative for polydipsia and polyphagia.   Genitourinary: Negative for difficulty urinating and dysuria.   Musculoskeletal: Negative for back pain.        Left side chest wall pain   Skin: Negative for pallor and rash.   Neurological: Negative for dizziness and weakness.   Hematological: Does not bruise/bleed easily.   All other systems reviewed and are negative.      Physical Exam     Initial Vitals [03/14/18 1338]   BP Pulse Resp Temp SpO2   (!) 114/58 77 20 98.4 °F (36.9 °C) 98 %      MAP       76.67         Physical Exam    Nursing note and vitals reviewed.  Constitutional: She appears well-developed and well-nourished. She is not diaphoretic. No distress.   HENT:   Head: Normocephalic and atraumatic.   Mouth/Throat: Oropharynx is clear and moist.   Eyes: Conjunctivae are normal.   Neck: Normal range of motion. Neck supple.   Cardiovascular: Normal rate, regular rhythm and normal heart sounds.   Pulmonary/Chest: Breath sounds normal. She has no wheezes. She exhibits tenderness (left lateral chest wall tenderness to palpation).   Abdominal: Soft. She exhibits no distension. There is no tenderness.   Musculoskeletal: Normal range of motion. She exhibits no edema or  tenderness (calf).   Neurological: She is alert and oriented to person, place, and time.   Skin: Skin is warm and dry.   Psychiatric: Her behavior is normal. Thought content normal.         ED Course   Procedures  Labs Reviewed   POCT URINE PREGNANCY     Imaging Results          CTA Chest Non-Coronary (PE Study) (Final result)  Result time 03/14/18 15:29:36    Final result by Deysi Kat MD (03/14/18 15:29:36)                 Impression:        No acute cardiopulmonary abnormality identified.  Specifically, no evidence for pulmonary thromboembolus or pulmonary infarction.    A few additional findings as above.      Electronically signed by: DEYSI KAT MD, MD  Date:     03/14/18  Time:    15:29              Narrative:    Chest CT with contrast.    Comparison: Chest radiograph 3/10/18, abdominal ultrasound 8/5/17    Technique: 1.25 mm axial images of the chest were obtained after the administration of 100 cc of Omni 350 IV contrast.  This is a PE protocol.    Results:No evidence for pulmonary thromboembolism to the level of the segmental arteries.    There is a normal 3 vessel left sided arch.  No evidence for aneurysm or dissection.  The heart is normal in size without significant pericardial fluid.  No evidence for cardiac thrombus.  The pulmonary trunk and bilateral main pulmonary arteries are normal in size.    The esophagus appears normal in caliber and contour along its course.  No mediastinal, hilar or axillary lymphadenopathy.    The trachea and major bronchi are patent.  Minimal biapical pleural-parenchymal scarring. Minimal dependent atelectasis.  No suspicious pulmonary nodules. The lungs are symmetrically well expanded. No focal consolidation, pleural effusion or pneumothorax.    The structures at the base of the neck are without significant abnormality.  The extrathoracic soft tissues are within normal limits. Included upper abdomen is significant for several scattered low-attenuation parenchymal  foci throughout both hepatic lobes, 2 of which are larger than 1 cm with fluid attenuation suggesting simple cysts, while the others are subcentimeter and too small to characterize.    Included osseous structures appear intact.                              EKG Readings: (Independently Interpreted)   3:50 PM  NSR sinus arrhythmia  rate 84  no STEMI  no ectopy          Medical Decision Making:   History:   Old Records Summarized: records from clinic visits.       <> Summary of Records: CMP x2 days prior shows normal renal function  Initial Assessment:   31 y/o f who presents with left chest wall pain and elevated D-dimer measured by her PCP 2 days prior  Differential Diagnosis:   Pe, chest wall pain, pneumonia, pleurisy, anxiety  Clinical Tests:   Radiological Study: Ordered and Reviewed  ED Management:  1:55 PM  CTA, EKG and UPT    3:49 PM  CTA negative, will discharge home.  Pt is well appearing and in no distress    Pt counseled on their diagnosis and the importance of following up with PCP.  Pt also cautioned on when to return to ED.  Pt verbalizes understanding of discharge plan and will return to ED immediately if symptoms worsen                        Clinical Impression:   The encounter diagnosis was Chest pain.    Disposition:   Disposition: Discharged  Condition: Stable       I, Dr. Maia Leal, personally performed the services described in this documentation. All medical record entries made by the scribe were at my direction and in my presence.  I have reviewed the chart and agree that the record reflects my personal performance and is accurate and complete. Maia Leal MD.  12:11 PM 03/15/2018                     Maia Leal MD  03/15/18 [id number]

## 2018-04-01 ENCOUNTER — PATIENT MESSAGE (OUTPATIENT)
Dept: INTERNAL MEDICINE | Facility: CLINIC | Age: 33
End: 2018-04-01

## 2018-04-03 ENCOUNTER — OFFICE VISIT (OUTPATIENT)
Dept: DERMATOLOGY | Facility: CLINIC | Age: 33
End: 2018-04-03
Payer: COMMERCIAL

## 2018-04-03 DIAGNOSIS — L23.2 ALLERGIC CONTACT DERMATITIS DUE TO COSMETICS: Primary | ICD-10-CM

## 2018-04-03 DIAGNOSIS — L72.0 MILIA: ICD-10-CM

## 2018-04-03 DIAGNOSIS — H01.116 EYELID DERMATITIS, ALLERGIC/CONTACT, LEFT: ICD-10-CM

## 2018-04-03 DIAGNOSIS — L21.9 SEBORRHEIC DERMATITIS: ICD-10-CM

## 2018-04-03 DIAGNOSIS — L81.4 LENTIGO: ICD-10-CM

## 2018-04-03 DIAGNOSIS — H01.113 EYELID DERMATITIS, ALLERGIC/CONTACT, RIGHT: ICD-10-CM

## 2018-04-03 DIAGNOSIS — D22.9 MULTIPLE BENIGN NEVI: ICD-10-CM

## 2018-04-03 PROCEDURE — 99203 OFFICE O/P NEW LOW 30 MIN: CPT | Mod: 25,S$GLB,, | Performed by: DERMATOLOGY

## 2018-04-03 PROCEDURE — 10040 EXTRACTION: CPT | Mod: S$GLB,,, | Performed by: DERMATOLOGY

## 2018-04-03 RX ORDER — KETOCONAZOLE 20 MG/ML
SHAMPOO, SUSPENSION TOPICAL
Qty: 240 ML | Refills: 5 | Status: SHIPPED | OUTPATIENT
Start: 2018-04-03 | End: 2019-10-21 | Stop reason: SDUPTHER

## 2018-04-03 RX ORDER — TACROLIMUS 1 MG/G
OINTMENT TOPICAL
Qty: 60 G | Refills: 2 | Status: SHIPPED | OUTPATIENT
Start: 2018-04-03 | End: 2019-01-30

## 2018-04-03 NOTE — PROGRESS NOTES
Subjective:       Patient ID:  Sari Serna is a 32 y.o. female who presents for   Chief Complaint   Patient presents with    Rash     eyelids, FBSE     Rash  - Initial  Affected locations: eyelids.  Duration: 10 years  Signs / symptoms: burning, peeling, redness, scaling, irritated, dryness and swelling  Severity: moderate  Timing: intermittent  Exacerbated by: products, donny mascara and makeup remover wipes.  Relieving factors/Treatments tried: nothing (tried protopic and elidel in past when very flared)  Improvement on treatment: no relief      Also, complains of bumps on eyes. Getting more and more. Growing. Rubs her eyes a lot due to allergies which causes them to get red and irritated.    Review of Systems   Constitutional: Negative for fever, chills, weight loss, weight gain, fatigue, night sweats and malaise.   Eyes: Positive for itching, eye watering, eye irritation and eyelid inflammation. Negative for visual change.   Skin: Positive for rash. Negative for daily sunscreen use and recent sunburn.   Hematologic/Lymphatic: Bruises/bleeds easily (bruises easily, unknown cause).        Objective:    Physical Exam   Constitutional: She appears well-developed and well-nourished.   Eyes: Abnormal Lids.    Neurological: She is alert and oriented to person, place, and time.   Psychiatric: She has a normal mood and affect.   Skin:   Areas Examined (abnormalities noted in diagram):   Scalp / Hair Palpated and Inspected  Head / Face Inspection Performed  Neck Inspection Performed  Chest / Axilla Inspection Performed  Abdomen Inspection Performed  Genitals / Buttocks / Groin Inspection Performed  Back Inspection Performed  RUE Inspected  LUE Inspection Performed  RLE Inspected  LLE Inspection Performed  Nails and Digits Inspection Performed                   Diagram Legend     Erythematous scaling macule/papule c/w actinic keratosis       Vascular papule c/w angioma      Pigmented verrucoid papule/plaque c/w  "seborrheic keratosis      Yellow umbilicated papule c/w sebaceous hyperplasia      Irregularly shaped tan macule c/w lentigo     1-2 mm smooth white papules consistent with Milia      Movable subcutaneous cyst with punctum c/w epidermal inclusion cyst      Subcutaneous movable cyst c/w pilar cyst      Firm pink to brown papule c/w dermatofibroma      Pedunculated fleshy papule(s) c/w skin tag(s)      Evenly pigmented macule c/w junctional nevus     Mildly variegated pigmented, slightly irregular-bordered macule c/w mildly atypical nevus      Flesh colored to evenly pigmented papule c/w intradermal nevus       Pink pearly papule/plaque c/w basal cell carcinoma      Erythematous hyperkeratotic cursted plaque c/w SCC      Surgical scar with no sign of skin cancer recurrence      Open and closed comedones      Inflammatory papules and pustules      Verrucoid papule consistent consistent with wart     Erythematous eczematous patches and plaques     Dystrophic onycholytic nail with subungual debris c/w onychomycosis     Umbilicated papule    Erythematous-base heme-crusted tan verrucoid plaque consistent with inflamed seborrheic keratosis     Erythematous Silvery Scaling Plaque c/w Psoriasis     See annotation      Assessment / Plan:      Allergic contact dermatitis due to cosmetics  Suspect allergy to MI/MCI. Avoid suspected allergen. Patient was given list of "safe" products to use.   Discussed patch testing with patient if condition recurs or persists.  Discussed 3 possible outcomes:   1. Positive test, but clinically irrelevant   2. Negative test--pt is not allergic to the 60-70 most common allergens on [employer]   3. Positive test--allergen is avoided, and rash resolves.   Discussed patches will be applied on a Monday, and test will be read on a Wednesday and Friday.  Pt is unable to get the back wet for these days.     Eyelid dermatitis, allergic/contact, left and right  -     tacrolimus (PROTOPIC) 0.1 % " ointment; Apply to affected areas of face BID prn eczema/rash.  Dispense: 60 g; Refill: 2    Seborrheic dermatitis  -     ketoconazole (NIZORAL) 2 % shampoo; Wash scalp with medicated shampoo at least 2x/week. Let sit on scalp at least 5 minutes prior to rinsing.  Dispense: 240 mL; Refill: 5    Multiple benign nevi  Multiple benign-appearing nevi present on exam today. Reassurance provided. Counseled patient to periodically examine moles and return to clinic if any moles change or become symptomatic.    Lentigo  These are benign sun spots which should be monitored for changes. Patient instructed in importance of daily broad-spectrum sunscreen use with SPF of at least 30. Sun avoidance and topical protection/protective clothing discussed.    Milia  This is a small, benign cyst. Reassurance provided. No treatment is necessary unless it is symptomatic. These may resolve on their own.  Acne surgery procedure note:  The affected areas were cleaned with alcohol. Lesions were incised with #11 blade or with 20 gauge needle. Contents of the milia and/or comedones were expressed with a comedo extractor x 4 lesions. No complications.     Follow-up in about 2 months (around 6/3/2018), or if symptoms worsen or fail to improve.

## 2018-04-16 DIAGNOSIS — Z01.419 ENCOUNTER FOR GYNECOLOGICAL EXAMINATION (GENERAL) (ROUTINE) WITHOUT ABNORMAL FINDINGS: ICD-10-CM

## 2018-04-16 RX ORDER — NORGESTIMATE AND ETHINYL ESTRADIOL 0.25-0.035
1 KIT ORAL DAILY
Qty: 84 TABLET | Refills: 0 | Status: SHIPPED | OUTPATIENT
Start: 2018-04-16 | End: 2018-07-12 | Stop reason: SDUPTHER

## 2018-06-01 ENCOUNTER — OFFICE VISIT (OUTPATIENT)
Dept: DERMATOLOGY | Facility: CLINIC | Age: 33
End: 2018-06-01
Payer: COMMERCIAL

## 2018-06-01 DIAGNOSIS — L81.9 POSTINFLAMMATORY PIGMENTARY CHANGES: ICD-10-CM

## 2018-06-01 DIAGNOSIS — L23.2 ALLERGIC CONTACT DERMATITIS DUE TO COSMETICS: ICD-10-CM

## 2018-06-01 DIAGNOSIS — L21.9 SEBORRHEIC DERMATITIS: Primary | ICD-10-CM

## 2018-06-01 PROCEDURE — 99213 OFFICE O/P EST LOW 20 MIN: CPT | Mod: S$GLB,,, | Performed by: DERMATOLOGY

## 2018-06-01 NOTE — PROGRESS NOTES
Subjective:       Patient ID:  Sari Serna is a 33 y.o. female who presents for   Chief Complaint   Patient presents with    Rash     face, follow up, doing much better     Pt is here today for a follow up on her rash. Pt states that she is doing much better. She did not use the protopic because it burned, but the shampoo gave her significant relief.         Rash  - Follow-up  Diagnosis: seb derm and ACD.  Symptom course: improving  Currently using: ketoconazole shampoo; free and clear products.  Affected locations: face and scalp  Signs / symptoms: asymptomatic  Severity: mild      Review of Systems   Skin: Negative for itching and rash.        Objective:    Physical Exam   Constitutional: She appears well-developed and well-nourished. No distress.   Eyes: Lids are normal.  No conjunctival no injection.   Neurological: She is alert and oriented to person, place, and time. She is not disoriented.   Psychiatric: She has a normal mood and affect.   Skin:   Areas Examined (abnormalities noted in diagram):   Scalp / Hair Palpated and Inspected  Head / Face Inspection Performed  Neck Inspection Performed  RUE Inspected  LUE Inspection Performed              Diagram Legend     Erythematous scaling macule/papule c/w actinic keratosis       Vascular papule c/w angioma      Pigmented verrucoid papule/plaque c/w seborrheic keratosis      Yellow umbilicated papule c/w sebaceous hyperplasia      Irregularly shaped tan macule c/w lentigo     1-2 mm smooth white papules consistent with Milia      Movable subcutaneous cyst with punctum c/w epidermal inclusion cyst      Subcutaneous movable cyst c/w pilar cyst      Firm pink to brown papule c/w dermatofibroma      Pedunculated fleshy papule(s) c/w skin tag(s)      Evenly pigmented macule c/w junctional nevus     Mildly variegated pigmented, slightly irregular-bordered macule c/w mildly atypical nevus      Flesh colored to evenly pigmented papule c/w intradermal nevus        Pink pearly papule/plaque c/w basal cell carcinoma      Erythematous hyperkeratotic cursted plaque c/w SCC      Surgical scar with no sign of skin cancer recurrence      Open and closed comedones      Inflammatory papules and pustules      Verrucoid papule consistent consistent with wart     Erythematous eczematous patches and plaques     Dystrophic onycholytic nail with subungual debris c/w onychomycosis     Umbilicated papule    Erythematous-base heme-crusted tan verrucoid plaque consistent with inflamed seborrheic keratosis     Erythematous Silvery Scaling Plaque c/w Psoriasis     See annotation      Assessment / Plan:        Seborrheic dermatitis  Can now use ketoconazole shampoo prn only.    Allergic contact dermatitis due to cosmetics  Much improved. Rec'd continue to avoid MCI/MI in all products.    Postinflammatory pigmentary changes  This is a normal discoloration of the skin after an inflammatory process has resolved. It may take months to years to fade.  Patient instructed on importance of daily sun protection with a broad-spectrum sunscreen of SPF 30 or higher. Sun avoidance and topical protection discussed.   Patient encouraged to wear hat for all outdoor exposure.    Follow-up if symptoms worsen or fail to improve.

## 2018-06-05 RX ORDER — ESCITALOPRAM OXALATE 10 MG/1
TABLET ORAL
Qty: 90 TABLET | Refills: 0 | Status: SHIPPED | OUTPATIENT
Start: 2018-06-05 | End: 2018-09-06 | Stop reason: SDUPTHER

## 2018-07-10 DIAGNOSIS — Z01.419 ENCOUNTER FOR GYNECOLOGICAL EXAMINATION (GENERAL) (ROUTINE) WITHOUT ABNORMAL FINDINGS: ICD-10-CM

## 2018-07-10 RX ORDER — NORGESTIMATE AND ETHINYL ESTRADIOL 0.25-0.035
1 KIT ORAL DAILY
Qty: 84 TABLET | Refills: 0 | OUTPATIENT
Start: 2018-07-10

## 2018-07-12 DIAGNOSIS — Z01.419 ENCOUNTER FOR GYNECOLOGICAL EXAMINATION (GENERAL) (ROUTINE) WITHOUT ABNORMAL FINDINGS: ICD-10-CM

## 2018-07-13 RX ORDER — NORGESTIMATE AND ETHINYL ESTRADIOL 0.25-0.035
1 KIT ORAL DAILY
Qty: 84 TABLET | Refills: 1 | Status: SHIPPED | OUTPATIENT
Start: 2018-07-13 | End: 2018-12-10 | Stop reason: SDUPTHER

## 2018-09-07 RX ORDER — ESCITALOPRAM OXALATE 10 MG/1
TABLET ORAL
Qty: 90 TABLET | Refills: 0 | Status: SHIPPED | OUTPATIENT
Start: 2018-09-07 | End: 2018-11-26

## 2018-09-26 ENCOUNTER — OFFICE VISIT (OUTPATIENT)
Dept: URGENT CARE | Facility: CLINIC | Age: 33
End: 2018-09-26
Payer: COMMERCIAL

## 2018-09-26 VITALS
HEART RATE: 90 BPM | WEIGHT: 130 LBS | TEMPERATURE: 99 F | HEIGHT: 63 IN | DIASTOLIC BLOOD PRESSURE: 69 MMHG | BODY MASS INDEX: 23.04 KG/M2 | SYSTOLIC BLOOD PRESSURE: 119 MMHG
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
## 2018-09-26 DIAGNOSIS — S19.9XXA INJURY OF NECK, INITIAL ENCOUNTER: ICD-10-CM

## 2018-09-26 PROCEDURE — 99203 OFFICE O/P NEW LOW 30 MIN: CPT | Mod: S$GLB,,, | Performed by: PREVENTIVE MEDICINE

## 2018-09-26 RX ORDER — CETIRIZINE HYDROCHLORIDE 5 MG/1
5 TABLET ORAL DAILY
COMMUNITY
End: 2021-05-14

## 2018-09-26 RX ORDER — DIPHENHYDRAMINE HCL 25 MG
25 CAPSULE ORAL EVERY 6 HOURS PRN
COMMUNITY
End: 2023-10-02 | Stop reason: ALTCHOICE
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
## 2018-09-26 NOTE — LETTER
[facility] [place]  [address], [address]  [place] [address]  Phone: [phone]  Fax: [phone]    Pt Name: Sari Serna  Injury Date: 09/25/2018   Employee ID: [id number] Date: 09/26/2018   Company: [employer]      Appointment Time: 12:30 PM Arrived:    Provider: Nico Lopez MD Time Out:1:55 PM     Office Treatment:   1. Work related injury    2. Injury of neck, initial encounter          Patient Instructions: (Take Ibuprofen or Tylenol if needed for pain & discomfort)      Restrictions:(Regular Duty 9/26/2018)  Discharged from Occupational Health     ajcalli

## 2018-10-07 ENCOUNTER — PATIENT MESSAGE (OUTPATIENT)
Dept: INTERNAL MEDICINE | Facility: CLINIC | Age: 33
End: 2018-10-07

## 2018-10-08 ENCOUNTER — OFFICE VISIT (OUTPATIENT)
Dept: FAMILY MEDICINE | Facility: CLINIC | Age: 33
End: 2018-10-08
Payer: COMMERCIAL

## 2018-10-08 VITALS
SYSTOLIC BLOOD PRESSURE: 118 MMHG | TEMPERATURE: 99 F | HEIGHT: 63 IN | DIASTOLIC BLOOD PRESSURE: 76 MMHG | OXYGEN SATURATION: 98 % | WEIGHT: 129 LBS | BODY MASS INDEX: 22.86 KG/M2 | HEART RATE: 83 BPM

## 2018-10-08 DIAGNOSIS — R09.82 POSTNASAL DRIP: ICD-10-CM

## 2018-10-08 DIAGNOSIS — R09.81 SINUS CONGESTION: Primary | ICD-10-CM

## 2018-10-08 PROCEDURE — 3008F BODY MASS INDEX DOCD: CPT | Mod: CPTII,S$GLB,, | Performed by: FAMILY MEDICINE

## 2018-10-08 PROCEDURE — 99999 PR PBB SHADOW E&M-EST. PATIENT-LVL III: CPT | Mod: PBBFAC,,, | Performed by: FAMILY MEDICINE

## 2018-10-08 PROCEDURE — 99214 OFFICE O/P EST MOD 30 MIN: CPT | Mod: S$GLB,,, | Performed by: FAMILY MEDICINE

## 2018-10-08 RX ORDER — FLUTICASONE PROPIONATE 50 MCG
2 SPRAY, SUSPENSION (ML) NASAL DAILY
Qty: 1 BOTTLE | Refills: 0 | Status: SHIPPED | OUTPATIENT
Start: 2018-10-08

## 2018-10-08 RX ORDER — CETIRIZINE HYDROCHLORIDE, PSEUDOEPHEDRINE HYDROCHLORIDE 5; 120 MG/1; MG/1
1 TABLET, FILM COATED, EXTENDED RELEASE ORAL 2 TIMES DAILY
Qty: 60 TABLET | Refills: 2 | Status: SHIPPED | OUTPATIENT
Start: 2018-10-08 | End: 2018-10-18

## 2018-10-08 NOTE — PROGRESS NOTES
HPI:  Sari Serna is a 33 y.o. year old female that  presents with concern about cough for the last 4 days. She denies any fever. She has been taking OTC mucinex and Zyrtec everyday. She denies fever.The cough is clear mucus. She is about to go on a trip so she wants to control the cough better. Her sick contact and she is a .  Chief Complaint   Patient presents with    Cough   .     HPI      Past Medical History:   Diagnosis Date    Acne     ALLERGIC RHINITIS     Anxiety     Dysmenorrhea     Low back pain     Migraine headache      Social History     Socioeconomic History    Marital status: Single     Spouse name: Not on file    Number of children: Not on file    Years of education: Not on file    Highest education level: Not on file   Social Needs    Financial resource strain: Not on file    Food insecurity - worry: Not on file    Food insecurity - inability: Not on file    Transportation needs - medical: Not on file    Transportation needs - non-medical: Not on file   Occupational History    Occupation: therpist   Tobacco Use    Smoking status: Never Smoker    Smokeless tobacco: Never Used   Substance and Sexual Activity    Alcohol use: Yes     Comment: occasion    Drug use: No    Sexual activity: Yes     Partners: Male     Birth control/protection: OCP     Comment: Single    Other Topics Concern    Are you pregnant or think you may be? No    Breast-feeding No   Social History Narrative    Not on file     Past Surgical History:   Procedure Laterality Date    FACIAL COSMETIC SURGERY      2013     Family History   Problem Relation Age of Onset    Endometriosis Mother     Cirrhosis Mother     Stroke Paternal Grandfather     Amblyopia Neg Hx     Blindness Neg Hx     Cataracts Neg Hx     Glaucoma Neg Hx     Macular degeneration Neg Hx     Retinal detachment Neg Hx     Strabismus Neg Hx     Breast cancer Neg Hx     Colon cancer Neg Hx     Ovarian cancer Neg  "Hx     Thyroid disease Neg Hx     Cancer Neg Hx     Melanoma Neg Hx            Review of Systems  General ROS: negative for chills, fever or weight loss  ENT ROS: negative for epistaxis, sore throat or rhinorrhea  Respiratory ROS: no cough, shortness of breath, or wheezing  Cardiovascular ROS: no chest pain or dyspnea on exertion  Gastrointestinal ROS: no abdominal pain, change in bowel habits, or black/ bloody stools    Physical Exam:  /76   Pulse 83   Temp 98.8 °F (37.1 °C) (Oral)   Ht 5' 3" (1.6 m)   Wt 58.5 kg (128 lb 15.5 oz)   LMP 10/02/2018   SpO2 98%   BMI 22.85 kg/m²   General appearance: alert, cooperative, no distress  Constitutional:Oriented to person, place, and time.appears well-developed and well-nourished.  HEENT: Normocephalic, atraumatic, neck symmetrical, no nasal discharge, TM- clear bilaterally, mild maxillary sinus tenderness-andrés  Lungs: clear to auscultation bilaterally, no dullness to percussion bilaterally  Heart: regular rate and rhythm without rub; no displacement of the PMI , S1&S2 present  Abdomen: soft, non-tender; bowel sounds normoactive; no organomegaly  Physical Exam    LABS:    Complete Blood Count  Lab Results   Component Value Date    RBC 4.96 03/12/2018    HGB 14.5 03/12/2018    HCT 43.6 03/12/2018    MCV 88 03/12/2018    MCH 29.2 03/12/2018    MCHC 33.3 03/12/2018    RDW 13.0 03/12/2018     (H) 03/12/2018    MPV 10.0 03/12/2018    GRAN 11.2 (H) 03/12/2018    GRAN 82.5 (H) 03/12/2018    LYMPH 1.8 03/12/2018    LYMPH 12.9 (L) 03/12/2018    MONO 0.5 03/12/2018    MONO 3.6 (L) 03/12/2018    EOS 0.0 03/12/2018    BASO 0.03 03/12/2018    EOSINOPHIL 0.1 03/12/2018    BASOPHIL 0.2 03/12/2018    DIFFMETHOD Automated 03/12/2018       Comprehensive Metabolic Panel  Lab Results   Component Value Date    GLU 94 03/12/2018    BUN 13 03/12/2018    CREATININE 0.7 03/12/2018     03/12/2018    K 4.0 03/12/2018     03/12/2018    PROT 8.0 03/12/2018    ALBUMIN " 3.8 03/12/2018    BILITOT 0.3 03/12/2018    AST 16 03/12/2018    ALKPHOS 68 03/12/2018    CO2 25 03/12/2018    ALT 15 03/12/2018    ANIONGAP 12 03/12/2018    EGFRNONAA >60.0 03/12/2018    ESTGFRAFRICA >60.0 03/12/2018       LIPID  Lab Results   Component Value Date    CHOL 236 (H) 07/29/2017    HDL 83 (H) 07/29/2017         TSH  Lab Results   Component Value Date    TSH 1.026 07/29/2017       Current Outpatient Medications   Medication Sig Dispense Refill    cetirizine (ZYRTEC) 5 MG tablet Take 5 mg by mouth once daily.      clonazePAM (KLONOPIN) 0.5 MG tablet TAKE 1/2-1 TABLET TWICE A DAY AS NEEDED FOR ANXIETY 30 tablet 0    diphenhydrAMINE (BENADRYL) 25 mg capsule Take 25 mg by mouth every 6 (six) hours as needed for Itching.      escitalopram oxalate (LEXAPRO) 10 MG tablet TAKE BY MOUTH 1 TABLET ONCE DAILY 90 tablet 0    FEMYNOR 0.25-35 mg-mcg per tablet TAKE 1 TABLET BY MOUTH ONCE DAILY. 84 tablet 1    ketoconazole (NIZORAL) 2 % shampoo Wash scalp with medicated shampoo at least 2x/week. Let sit on scalp at least 5 minutes prior to rinsing 240 mL 5    tacrolimus (PROTOPIC) 0.1 % ointment Apply to affected areas of face BID prn eczema/rash. 60 g 2    cetirizine-pseudoephedrine 5-120 mg Tb12 Take 1 tablet by mouth 2 (two) times daily. for 10 days 60 tablet 2    fluticasone (FLONASE) 50 mcg/actuation nasal spray 2 sprays (100 mcg total) by Each Nare route once daily. 1 Bottle 0     No current facility-administered medications for this visit.        Assessment:    ICD-10-CM ICD-9-CM    1. Sinus congestion R09.81 478.19 cetirizine-pseudoephedrine 5-120 mg Tb12      fluticasone (FLONASE) 50 mcg/actuation nasal spray   2. Postnasal drip R09.82 784.91 cetirizine-pseudoephedrine 5-120 mg Tb12      fluticasone (FLONASE) 50 mcg/actuation nasal spray         Plan:    Follow-up if symptoms worsen or fail to improve.          Jasmyne Miles MD

## 2018-10-10 ENCOUNTER — PATIENT MESSAGE (OUTPATIENT)
Dept: INTERNAL MEDICINE | Facility: CLINIC | Age: 33
End: 2018-10-10

## 2018-10-27 ENCOUNTER — PATIENT MESSAGE (OUTPATIENT)
Dept: INTERNAL MEDICINE | Facility: CLINIC | Age: 33
End: 2018-10-27

## 2018-10-27 DIAGNOSIS — F41.1 GAD (GENERALIZED ANXIETY DISORDER): Primary | ICD-10-CM

## 2018-10-27 RX ORDER — CLONAZEPAM 0.5 MG/1
TABLET ORAL
Qty: 30 TABLET | Refills: 0 | Status: SHIPPED | OUTPATIENT
Start: 2018-10-27 | End: 2021-05-29 | Stop reason: SDUPTHER

## 2018-11-13 ENCOUNTER — PATIENT MESSAGE (OUTPATIENT)
Dept: INTERNAL MEDICINE | Facility: CLINIC | Age: 33
End: 2018-11-13

## 2018-11-26 ENCOUNTER — OFFICE VISIT (OUTPATIENT)
Dept: INTERNAL MEDICINE | Facility: CLINIC | Age: 33
End: 2018-11-26
Payer: COMMERCIAL

## 2018-11-26 ENCOUNTER — PATIENT MESSAGE (OUTPATIENT)
Dept: INTERNAL MEDICINE | Facility: CLINIC | Age: 33
End: 2018-11-26

## 2018-11-26 VITALS
DIASTOLIC BLOOD PRESSURE: 80 MMHG | BODY MASS INDEX: 23.6 KG/M2 | OXYGEN SATURATION: 97 % | HEIGHT: 63 IN | TEMPERATURE: 99 F | WEIGHT: 133.19 LBS | HEART RATE: 119 BPM | SYSTOLIC BLOOD PRESSURE: 128 MMHG

## 2018-11-26 DIAGNOSIS — Z00.00 PREVENTATIVE HEALTH CARE: ICD-10-CM

## 2018-11-26 DIAGNOSIS — F33.9 RECURRENT MAJOR DEPRESSIVE DISORDER, REMISSION STATUS UNSPECIFIED: ICD-10-CM

## 2018-11-26 DIAGNOSIS — F41.1 GAD (GENERALIZED ANXIETY DISORDER): ICD-10-CM

## 2018-11-26 DIAGNOSIS — F43.10 PTSD (POST-TRAUMATIC STRESS DISORDER): Primary | ICD-10-CM

## 2018-11-26 PROBLEM — J40 BRONCHITIS: Status: RESOLVED | Noted: 2018-03-10 | Resolved: 2018-11-26

## 2018-11-26 PROCEDURE — 99999 PR PBB SHADOW E&M-EST. PATIENT-LVL III: CPT | Mod: PBBFAC,,, | Performed by: INTERNAL MEDICINE

## 2018-11-26 PROCEDURE — 3008F BODY MASS INDEX DOCD: CPT | Mod: CPTII,S$GLB,, | Performed by: INTERNAL MEDICINE

## 2018-11-26 PROCEDURE — 99215 OFFICE O/P EST HI 40 MIN: CPT | Mod: S$GLB,,, | Performed by: INTERNAL MEDICINE

## 2018-11-26 RX ORDER — ESCITALOPRAM OXALATE 10 MG/1
15 TABLET ORAL DAILY
Qty: 135 TABLET | Refills: 1 | Status: SHIPPED | OUTPATIENT
Start: 2018-11-26 | End: 2019-05-26 | Stop reason: SDUPTHER

## 2018-11-30 ENCOUNTER — TELEPHONE (OUTPATIENT)
Dept: FAMILY MEDICINE | Facility: CLINIC | Age: 33
End: 2018-11-30

## 2018-11-30 NOTE — TELEPHONE ENCOUNTER
Could you please contact patient and inform her that the forms were completed for her.  Find out if she wants to pick them up or if we can get them back to her in a more convenient means.
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
## 2018-12-10 ENCOUNTER — TELEPHONE (OUTPATIENT)
Dept: OBSTETRICS AND GYNECOLOGY | Facility: CLINIC | Age: 33
End: 2018-12-10

## 2018-12-10 DIAGNOSIS — Z01.419 ENCOUNTER FOR GYNECOLOGICAL EXAMINATION (GENERAL) (ROUTINE) WITHOUT ABNORMAL FINDINGS: ICD-10-CM

## 2018-12-10 RX ORDER — NORGESTIMATE AND ETHINYL ESTRADIOL 0.25-0.035
KIT ORAL
Qty: 84 TABLET | Refills: 1 | Status: SHIPPED | OUTPATIENT
Start: 2018-12-10 | End: 2019-04-12 | Stop reason: SDUPTHER

## 2018-12-10 RX ORDER — ESCITALOPRAM OXALATE 10 MG/1
TABLET ORAL
Qty: 90 TABLET | Refills: 0 | Status: SHIPPED | OUTPATIENT
Start: 2018-12-10 | End: 2019-01-30

## 2018-12-17 ENCOUNTER — TELEPHONE (OUTPATIENT)
Dept: INTERNAL MEDICINE | Facility: CLINIC | Age: 33
End: 2018-12-17

## 2018-12-17 ENCOUNTER — PATIENT MESSAGE (OUTPATIENT)
Dept: INTERNAL MEDICINE | Facility: CLINIC | Age: 33
End: 2018-12-17

## 2018-12-17 NOTE — TELEPHONE ENCOUNTER
----- Message from Christelle Moore sent at 12/17/2018  2:56 PM CST -----  Contact: [phone]/ self   Pt called stating she was out of work with short term disability and her insurance states they never got the pt's information and they been trying to contact the office and they haven't been able to get in touch with anybody . Pt needs to find out what is going on . Please advise

## 2019-01-13 ENCOUNTER — OFFICE VISIT (OUTPATIENT)
Dept: URGENT CARE | Facility: CLINIC | Age: 34
End: 2019-01-13
Payer: COMMERCIAL

## 2019-01-13 VITALS
TEMPERATURE: 100 F | HEIGHT: 63 IN | WEIGHT: 133 LBS | SYSTOLIC BLOOD PRESSURE: 124 MMHG | RESPIRATION RATE: 16 BRPM | OXYGEN SATURATION: 98 % | DIASTOLIC BLOOD PRESSURE: 75 MMHG | BODY MASS INDEX: 23.57 KG/M2 | HEART RATE: 79 BPM

## 2019-01-13 DIAGNOSIS — J30.1 SEASONAL ALLERGIC RHINITIS DUE TO POLLEN: ICD-10-CM

## 2019-01-13 DIAGNOSIS — J02.9 SORE THROAT: Primary | ICD-10-CM

## 2019-01-13 DIAGNOSIS — R21 RASH: ICD-10-CM

## 2019-01-13 LAB
CTP QC/QA: YES
S PYO RRNA THROAT QL PROBE: NEGATIVE

## 2019-01-13 PROCEDURE — 87880 POCT RAPID STREP A: ICD-10-PCS | Mod: QW,S$GLB,, | Performed by: FAMILY MEDICINE

## 2019-01-13 PROCEDURE — 3008F PR BODY MASS INDEX (BMI) DOCUMENTED: ICD-10-PCS | Mod: CPTII,S$GLB,, | Performed by: FAMILY MEDICINE

## 2019-01-13 PROCEDURE — 99213 OFFICE O/P EST LOW 20 MIN: CPT | Mod: S$GLB,,, | Performed by: FAMILY MEDICINE

## 2019-01-13 PROCEDURE — 87880 STREP A ASSAY W/OPTIC: CPT | Mod: QW,S$GLB,, | Performed by: FAMILY MEDICINE

## 2019-01-13 PROCEDURE — 3008F BODY MASS INDEX DOCD: CPT | Mod: CPTII,S$GLB,, | Performed by: FAMILY MEDICINE

## 2019-01-13 PROCEDURE — 99213 PR OFFICE/OUTPT VISIT, EST, LEVL III, 20-29 MIN: ICD-10-PCS | Mod: S$GLB,,, | Performed by: FAMILY MEDICINE

## 2019-01-13 RX ORDER — TRIAMCINOLONE ACETONIDE 1 MG/G
CREAM TOPICAL 2 TIMES DAILY
Qty: 80 G | Refills: 1 | Status: SHIPPED | OUTPATIENT
Start: 2019-01-13 | End: 2019-06-04

## 2019-01-13 NOTE — PATIENT INSTRUCTIONS
"  Contact Dermatitis  Contact dermatitis is a skin rash caused by something that touches the skin and makes it irritated and inflamed. Your skin may be red, swollen, dry, and may be cracked. Blisters may form and ooze. The rash will itch.  Contact dermatitis can form on the face and neck, backs of hands, forearms, genitals, and lower legs.  People can get contact dermatitis from lots of sources. These include:  · Plants such as poison ivy, oak, or sumac  · Chemicals in hair dyes and rinses, soaps, solvents, waxes, fingernail polish, and deodorants   · Jewelry or watchbands made of nickel  Contact dermatitis is not passed from person to person.  Talk with your healthcare provider about what may have caused the rash. A type of allergy testing called "patch testing" may be used to discover what you are allergic to. You will need to avoid the source of your rash in the future to prevent it from coming back.  Treatment is done to relieve itching and prevent the rash from coming back. The rash should go away in a few days to a few weeks.  Home care  Your healthcare provider may prescribe medicine to relieve swelling and itching. Follow all instructions when using these medicines.  General care:  · Avoid anything that heats up your skin, such as hot showers or baths, or direct sunlight. This can make itching worse.  · Apply cold compresses to soothe your sores to help relieve your symptoms. Do this for 30 minutes 3 to 4 times a day. You can make a cold compress by soaking a cloth in cold water. Squeeze out excess water. You can add colloidal oatmeal to the water to help reduce itching. For severe itching in a small area, apply an ice pack wrapped in a thin towel. Do this for 20 minutes 3 to 4 times a day.  · You can also try wet dressings. One way to do this is to wear a wet piece of clothing under a dry one. Wear a damp shirt under a dry shirt if your upper body is affected. This can relieve itching and prevent you from " scratching the affected area.  · You can also help relieve large areas of itching by taking a lukewarm bath with colloidal oatmeal added to the water.  · Use hydrocortisone cream for redness and irritation, unless another medicine was prescribed. You can also use benzocaine anesthetic cream or spray. Calamine lotion can also relieve mild symptoms.  · Use oral diphenhydramine to help reduce itching. You can buy this antihistamine at drug and grocery stores. It can make you sleepy, so use lower doses during the daytime. Or you can use loratadine. This is an antihistamine that will not make you sleepy. Do not use diphenhydramine if you have glaucoma or have trouble urinating due to an enlarged prostate.  · If a plant causes your rash, make sure to wash your skin and the clothes you were wearing when you came into contact with the plant. This is to wash away the plant oils that gave you the rash and prevent more or worse symptoms.  · Stay away from the substance or object that causes your symptoms. If you cant avoid it, wear gloves or some other type of protection.  Follow-up care  Follow up with your healthcare provider, or as advised.  When to seek medical advice  Call your healthcare provider right away if any of these occur:  · Spreading of the rash to other parts of your body  · Severe swelling of your face, eyelids, mouth, throat or tongue  · Trouble urinating due to swelling in the genital area  · Fever of 100.4°F (38°C) or higher  · Redness or swelling that gets worse  · Pain that gets worse  · Foul-smelling fluid leaking from the skin  · Yellow-brown crusts on the open blisters  Date Last Reviewed: 9/1/2016  © 5003-3521 [employer] [address] [place] [address], [place] [address]. All rights reserved. This information is not intended as a substitute for professional medical care. Always follow your healthcare professional's instructions.

## 2019-01-13 NOTE — PROGRESS NOTES
"Subjective:       Patient ID: Sari Serna is a 33 y.o. female.    Vitals:  height is 5' 3" (1.6 m) and weight is 60.3 kg (133 lb). Her oral temperature is 99.5 °F (37.5 °C). Her blood pressure is 124/75 and her pulse is 79. Her respiration is 16 and oxygen saturation is 98%.     Chief Complaint: Rash and Sore Throat    C/o rash started yesterday on elbow, now spreading to forearms andlegs, itching a lot, no unusual exposure, no unusual food,.  Also c/o sore throat and post nasal drip      Rash   This is a new problem. The current episode started yesterday. The problem has been waxing and waning since onset. The affected locations include the left arm, right arm, right lower leg, right upper leg, left upper leg and left lower leg. The rash is characterized by itchiness and redness. She was exposed to nothing. Associated symptoms include congestion, coughing and a sore throat. Pertinent negatives include no fever. Treatments tried: Benadryl, Promethazine DM. The treatment provided mild relief. Her past medical history is significant for allergies, eczema and varicella.   Sore Throat    This is a new problem. Episode onset: 6 days. The problem has been gradually improving. Neither side of throat is experiencing more pain than the other. The pain is at a severity of 2/10. The pain is mild. Associated symptoms include congestion, coughing and headaches. Treatments tried: Zyrtec DM. The treatment provided mild relief.       Constitution: Negative for chills and fever.   HENT: Positive for congestion, postnasal drip, sinus pain, sinus pressure and sore throat. Negative for facial swelling.    Neck: Negative for painful lymph nodes.   Eyes: Negative for eye itching and eyelid swelling.   Respiratory: Positive for cough.    Musculoskeletal: Negative for joint pain and joint swelling.   Skin: Positive for rash. Negative for color change, pale, wound, abrasion, laceration, lesion, skin thickening/induration, puncture " wound, erythema, bruising, abscess, avulsion and hives.   Allergic/Immunologic: Negative for environmental allergies, immunocompromised state and hives.   Neurological: Positive for headaches.   Hematologic/Lymphatic: Negative for swollen lymph nodes.       Objective:      Physical Exam   Constitutional: She is oriented to person, place, and time. She appears well-developed and well-nourished. She is cooperative.  Non-toxic appearance. She does not appear ill. No distress.   HENT:   Head: Normocephalic and atraumatic.   Right Ear: Hearing, tympanic membrane, external ear and ear canal normal.   Left Ear: Hearing, tympanic membrane, external ear and ear canal normal.   Nose: Nose normal. No mucosal edema, rhinorrhea or nasal deformity. No epistaxis. Right sinus exhibits no maxillary sinus tenderness and no frontal sinus tenderness. Left sinus exhibits no maxillary sinus tenderness and no frontal sinus tenderness.   Mouth/Throat: Uvula is midline, oropharynx is clear and moist and mucous membranes are normal. No trismus in the jaw. Normal dentition. No uvula swelling. No oropharyngeal exudate or posterior oropharyngeal erythema.   Eyes: Conjunctivae and lids are normal. Right eye exhibits no discharge. Left eye exhibits no discharge. No scleral icterus.   Sclera clear bilat   Neck: Trachea normal, normal range of motion, full passive range of motion without pain and phonation normal. Neck supple. No JVD present.   Cardiovascular: Normal rate, regular rhythm, normal heart sounds, intact distal pulses and normal pulses. Exam reveals no gallop.   Pulmonary/Chest: Effort normal and breath sounds normal. No stridor. No respiratory distress. She has no wheezes.   Abdominal: Soft. Normal appearance and bowel sounds are normal. She exhibits no distension, no pulsatile midline mass and no mass. There is no tenderness.   Musculoskeletal: Normal range of motion. She exhibits no edema or deformity.   Lymphadenopathy:     She has  no cervical adenopathy.   Neurological: She is alert and oriented to person, place, and time. She exhibits normal muscle tone. Coordination normal.   Skin: Skin is warm, dry and intact. She is not diaphoretic. No erythema. No pallor.   Mild non snad papery, macular erythema of elbows, and legs   Psychiatric: She has a normal mood and affect. Her speech is normal and behavior is normal. Judgment and thought content normal. Cognition and memory are normal.   Nursing note and vitals reviewed.      Assessment:       1. Sore throat    2. Rash        Plan:         Sore throat  -     POCT rapid strep A    Rash     Claritin one daily or zyrtec    If rash spreads RTC or call

## 2019-01-14 ENCOUNTER — PATIENT MESSAGE (OUTPATIENT)
Dept: INTERNAL MEDICINE | Facility: CLINIC | Age: 34
End: 2019-01-14

## 2019-01-14 DIAGNOSIS — R21 RASH: Primary | ICD-10-CM

## 2019-01-14 DIAGNOSIS — R53.83 FATIGUE, UNSPECIFIED TYPE: ICD-10-CM

## 2019-01-16 ENCOUNTER — TELEPHONE (OUTPATIENT)
Dept: INTERNAL MEDICINE | Facility: CLINIC | Age: 34
End: 2019-01-16

## 2019-01-17 NOTE — TELEPHONE ENCOUNTER
Add additional lab orders to patient's scheduled labs.  All but TSH now I see that's already there.

## 2019-01-26 ENCOUNTER — LAB VISIT (OUTPATIENT)
Dept: LAB | Facility: HOSPITAL | Age: 34
End: 2019-01-26
Attending: INTERNAL MEDICINE
Payer: COMMERCIAL

## 2019-01-26 DIAGNOSIS — Z00.00 PREVENTATIVE HEALTH CARE: ICD-10-CM

## 2019-01-26 DIAGNOSIS — R53.83 FATIGUE, UNSPECIFIED TYPE: ICD-10-CM

## 2019-01-26 DIAGNOSIS — R21 RASH: ICD-10-CM

## 2019-01-26 LAB
25(OH)D3+25(OH)D2 SERPL-MCNC: 22 NG/ML
ALBUMIN SERPL BCP-MCNC: 3.9 G/DL
ALP SERPL-CCNC: 71 U/L
ALT SERPL W/O P-5'-P-CCNC: 13 U/L
ANION GAP SERPL CALC-SCNC: 9 MMOL/L
AST SERPL-CCNC: 21 U/L
BASOPHILS # BLD AUTO: 0.04 K/UL
BASOPHILS NFR BLD: 0.8 %
BILIRUB SERPL-MCNC: 0.5 MG/DL
BUN SERPL-MCNC: 10 MG/DL
CALCIUM SERPL-MCNC: 9.4 MG/DL
CHLORIDE SERPL-SCNC: 105 MMOL/L
CHOLEST SERPL-MCNC: 240 MG/DL
CHOLEST/HDLC SERPL: 3 {RATIO}
CO2 SERPL-SCNC: 24 MMOL/L
CREAT SERPL-MCNC: 0.7 MG/DL
CRP SERPL-MCNC: 1.11 MG/L
DIFFERENTIAL METHOD: ABNORMAL
EOSINOPHIL # BLD AUTO: 0.1 K/UL
EOSINOPHIL NFR BLD: 1.6 %
ERYTHROCYTE [DISTWIDTH] IN BLOOD BY AUTOMATED COUNT: 12.4 %
ERYTHROCYTE [SEDIMENTATION RATE] IN BLOOD BY WESTERGREN METHOD: 24 MM/HR
EST. GFR  (AFRICAN AMERICAN): >60 ML/MIN/1.73 M^2
EST. GFR  (NON AFRICAN AMERICAN): >60 ML/MIN/1.73 M^2
ESTIMATED AVG GLUCOSE: 97 MG/DL
GLUCOSE SERPL-MCNC: 88 MG/DL
HBA1C MFR BLD HPLC: 5 %
HCT VFR BLD AUTO: 44.6 %
HDLC SERPL-MCNC: 79 MG/DL
HDLC SERPL: 32.9 %
HGB BLD-MCNC: 14.9 G/DL
IMM GRANULOCYTES # BLD AUTO: 0.01 K/UL
IMM GRANULOCYTES NFR BLD AUTO: 0.2 %
LDLC SERPL CALC-MCNC: 148.6 MG/DL
LYMPHOCYTES # BLD AUTO: 2.3 K/UL
LYMPHOCYTES NFR BLD: 45.1 %
MCH RBC QN AUTO: 29.4 PG
MCHC RBC AUTO-ENTMCNC: 33.4 G/DL
MCV RBC AUTO: 88 FL
MONOCYTES # BLD AUTO: 0.3 K/UL
MONOCYTES NFR BLD: 5.8 %
NEUTROPHILS # BLD AUTO: 2.3 K/UL
NEUTROPHILS NFR BLD: 46.5 %
NONHDLC SERPL-MCNC: 161 MG/DL
NRBC BLD-RTO: 0 /100 WBC
PLATELET # BLD AUTO: 358 K/UL
PMV BLD AUTO: 10.3 FL
POTASSIUM SERPL-SCNC: 4 MMOL/L
PROT SERPL-MCNC: 7.6 G/DL
RBC # BLD AUTO: 5.07 M/UL
SODIUM SERPL-SCNC: 138 MMOL/L
TRIGL SERPL-MCNC: 62 MG/DL
TSH SERPL DL<=0.005 MIU/L-ACNC: 0.41 UIU/ML
WBC # BLD AUTO: 5.03 K/UL

## 2019-01-26 PROCEDURE — 36415 COLL VENOUS BLD VENIPUNCTURE: CPT | Mod: PO

## 2019-01-26 PROCEDURE — 82306 VITAMIN D 25 HYDROXY: CPT

## 2019-01-26 PROCEDURE — 83036 HEMOGLOBIN GLYCOSYLATED A1C: CPT

## 2019-01-26 PROCEDURE — 86038 ANTINUCLEAR ANTIBODIES: CPT

## 2019-01-26 PROCEDURE — 80053 COMPREHEN METABOLIC PANEL: CPT

## 2019-01-26 PROCEDURE — 84443 ASSAY THYROID STIM HORMONE: CPT

## 2019-01-26 PROCEDURE — 86431 RHEUMATOID FACTOR QUANT: CPT

## 2019-01-26 PROCEDURE — 80061 LIPID PANEL: CPT

## 2019-01-26 PROCEDURE — 86141 C-REACTIVE PROTEIN HS: CPT

## 2019-01-26 PROCEDURE — 86235 NUCLEAR ANTIGEN ANTIBODY: CPT | Mod: 59

## 2019-01-26 PROCEDURE — 85025 COMPLETE CBC W/AUTO DIFF WBC: CPT

## 2019-01-26 PROCEDURE — 83525 ASSAY OF INSULIN: CPT

## 2019-01-26 PROCEDURE — 86039 ANTINUCLEAR ANTIBODIES (ANA): CPT

## 2019-01-26 PROCEDURE — 82542 COL CHROMOTOGRAPHY QUAL/QUAN: CPT

## 2019-01-26 PROCEDURE — 85652 RBC SED RATE AUTOMATED: CPT

## 2019-01-27 ENCOUNTER — PATIENT MESSAGE (OUTPATIENT)
Dept: INTERNAL MEDICINE | Facility: CLINIC | Age: 34
End: 2019-01-27

## 2019-01-28 LAB
ANA SER QL IF: POSITIVE
ANA TITR SER IF: NORMAL {TITER}
INSULIN COLLECTION INTERVAL: NORMAL
INSULIN SERPL-ACNC: 6.8 UU/ML
RHEUMATOID FACT SERPL-ACNC: <10 IU/ML

## 2019-01-29 LAB
ANTI SM ANTIBODY: 0.8 EU
ANTI SM/RNP ANTIBODY: 0.59 EU
ANTI-SM INTERPRETATION: NEGATIVE
ANTI-SM/RNP INTERPRETATION: NEGATIVE
ANTI-SSA ANTIBODY: 0.89 EU
ANTI-SSA INTERPRETATION: NEGATIVE
ANTI-SSB ANTIBODY: 0 EU
ANTI-SSB INTERPRETATION: NEGATIVE
DSDNA AB SER-ACNC: NORMAL [IU]/ML

## 2019-01-30 ENCOUNTER — OFFICE VISIT (OUTPATIENT)
Dept: INTERNAL MEDICINE | Facility: CLINIC | Age: 34
End: 2019-01-30
Payer: COMMERCIAL

## 2019-01-30 ENCOUNTER — LAB VISIT (OUTPATIENT)
Dept: LAB | Facility: HOSPITAL | Age: 34
End: 2019-01-30
Attending: INTERNAL MEDICINE
Payer: COMMERCIAL

## 2019-01-30 VITALS
BODY MASS INDEX: 23.55 KG/M2 | HEART RATE: 90 BPM | DIASTOLIC BLOOD PRESSURE: 76 MMHG | SYSTOLIC BLOOD PRESSURE: 114 MMHG | WEIGHT: 132.94 LBS | HEIGHT: 63 IN

## 2019-01-30 DIAGNOSIS — R68.89 HEAT INTOLERANCE: ICD-10-CM

## 2019-01-30 DIAGNOSIS — R51.9 NONINTRACTABLE HEADACHE, UNSPECIFIED CHRONICITY PATTERN, UNSPECIFIED HEADACHE TYPE: ICD-10-CM

## 2019-01-30 DIAGNOSIS — R19.7 DIARRHEA, UNSPECIFIED TYPE: ICD-10-CM

## 2019-01-30 DIAGNOSIS — R25.1 TREMOR: ICD-10-CM

## 2019-01-30 DIAGNOSIS — H53.9 VISUAL DISTURBANCE: ICD-10-CM

## 2019-01-30 DIAGNOSIS — R23.2 FLUSHING: ICD-10-CM

## 2019-01-30 DIAGNOSIS — L50.9 HIVES: ICD-10-CM

## 2019-01-30 DIAGNOSIS — Z00.00 PREVENTATIVE HEALTH CARE: Primary | ICD-10-CM

## 2019-01-30 LAB
T3FREE SERPL-MCNC: 2.9 PG/ML
T4 FREE SERPL-MCNC: 1.01 NG/DL

## 2019-01-30 PROCEDURE — 3008F PR BODY MASS INDEX (BMI) DOCUMENTED: ICD-10-PCS | Mod: CPTII,S$GLB,, | Performed by: INTERNAL MEDICINE

## 2019-01-30 PROCEDURE — 36415 COLL VENOUS BLD VENIPUNCTURE: CPT | Mod: PO

## 2019-01-30 PROCEDURE — 99395 PREV VISIT EST AGE 18-39: CPT | Mod: 25,S$GLB,, | Performed by: INTERNAL MEDICINE

## 2019-01-30 PROCEDURE — 84481 FREE ASSAY (FT-3): CPT

## 2019-01-30 PROCEDURE — 99395 PR PREVENTIVE VISIT,EST,18-39: ICD-10-PCS | Mod: 25,S$GLB,, | Performed by: INTERNAL MEDICINE

## 2019-01-30 PROCEDURE — 99999 PR PBB SHADOW E&M-EST. PATIENT-LVL III: CPT | Mod: PBBFAC,,, | Performed by: INTERNAL MEDICINE

## 2019-01-30 PROCEDURE — 99213 OFFICE O/P EST LOW 20 MIN: CPT | Mod: 25,S$GLB,, | Performed by: INTERNAL MEDICINE

## 2019-01-30 PROCEDURE — 99999 PR PBB SHADOW E&M-EST. PATIENT-LVL III: ICD-10-PCS | Mod: PBBFAC,,, | Performed by: INTERNAL MEDICINE

## 2019-01-30 PROCEDURE — 86376 MICROSOMAL ANTIBODY EACH: CPT

## 2019-01-30 PROCEDURE — 99213 PR OFFICE/OUTPT VISIT, EST, LEVL III, 20-29 MIN: ICD-10-PCS | Mod: 25,S$GLB,, | Performed by: INTERNAL MEDICINE

## 2019-01-30 PROCEDURE — 3008F BODY MASS INDEX DOCD: CPT | Mod: CPTII,S$GLB,, | Performed by: INTERNAL MEDICINE

## 2019-01-30 PROCEDURE — 84439 ASSAY OF FREE THYROXINE: CPT

## 2019-01-30 NOTE — PROGRESS NOTES
Subjective:       Patient ID: Sari Serna is a 33 y.o. female.    Chief Complaint:  Multiple complaints including flushing heat intolerance visual disturbance, headaches, diarrhea, recent hives, tremor    HPI 33-year-old female presents to clinic today she reports that over the last year and a half she has had before episodes were she had blurred vision that lasted for around 15 min followed by a sharp headache she does not recall up the headache was throbbing she does report some light sensitivity no nausea vomiting no focal deficits.  She has also had problems with he flushing like hot flashes also has sweats feels like her hair is thinning.  Also has had some tremor noticed in her hands by others.  Mother has a family history of Graves disease helpful 1 gene for emphysema, in remission for biliary sclerosis.  Her mother discussed her concerns recur her prior to the visit and had requested her get a battery of blood work which was reviewed it is notable for vitamin-D being insufficient positive for nonspecific URI low titer hep pattern and is borderline low normal TSH of 0.4.  Review of Systems  see separate progress note for same date of service  Objective:      Physical Exam  see separate progress note for same date of service  Assessment:            Heat intolerance     Hives     Tremor     Flushing     Diarrhea, unspecified type     Visual disturbance     Nonintractable headache, unspecified chronicity pattern, unspecified headache type        Plan:       Sari Strong was seen today  Heat intolerance  -     THYROID PEROXIDASE ANTIBODY; Future  -     T4, free; Future  -     T3, free; Future    Hives  -     THYROID PEROXIDASE ANTIBODY; Future  -     T4, free; Future  -     T3, free; Future    Tremor  -     THYROID PEROXIDASE ANTIBODY; Future  -     T4, free; Future  -     T3, free; Future    Flushing  -     5HIAA, Urine 24 Hour (Neuroendo); Future    Diarrhea, unspecified type  -     5HIAA, Urine 24  Hour (Neuroendo); Future    Visual disturbance  -     Ambulatory consult to Optometry    Nonintractable headache, unspecified chronicity pattern, unspecified headache type  Proceed with above workup.  If Optometry and workup above is negative for Graves and carcinoid, will proceed with further headache evaluation possible MRI.

## 2019-01-30 NOTE — PROGRESS NOTES
Subjective:       Patient ID: Sari Serna is a 33 y.o. female.    Chief Complaint: Annual Exam    HPI 33-year-old female presents to clinic today for annual physical.  Patient has problems that we addressed separately  Review of Systems  positive for heat intolerance, recent hives, tremor, flushing, visual disturbance, diarrhea, headache otherwise negative  Objective:      Physical Exam  General: Well-appearing, well-nourished.  No distress.  Appears red and flushed in the face  HEENT: conjunctivae are normal.  Pupils are equal and reative to light.  TM's are clear and intact bilaterally.  Hearing is grossly normal.  Nasopharynx is clear.  Oropharynx is clear.  Neck: Supple.  No thyroid megaly.  No bruits.  Lymph: No cervical or supraclavicular adenopathy.  Heart: Regular rate and rhythm, without murmur, rub or gallop.  Lungs: Clear to auscultation; respiratory effort normal.  Abdomen: Soft, nontender, nondistended.  Normoactive bowel sounds.  No hepatomegaly.  No masses.  Extremities: Good distal pulses.  No edema.  Psych: Oriented to time person place.  Judgment and insight seem unimpaired.  Mood and affect are appropriate.  Assessment:       1. Preventative health care    2. Heat intolerance    3. Hives    4. Tremor    5. Flushing    6. Diarrhea, unspecified type    7. Visual disturbance    8. Nonintractable headache, unspecified chronicity pattern, unspecified headache type        Plan:       Sari Strong was seen today for annual exam.    Diagnoses and all orders for this visit:    Preventative health care  Performed reviewed and updated today

## 2019-01-31 LAB — THYROPEROXIDASE IGG SERPL-ACNC: <6 IU/ML

## 2019-02-01 LAB
A1AT PROTEOTYPE S/Z, LC-MS/MS: NORMAL
A1AT SERPL NEPH-MCNC: 148 MG/DL

## 2019-02-04 ENCOUNTER — PATIENT MESSAGE (OUTPATIENT)
Dept: INTERNAL MEDICINE | Facility: CLINIC | Age: 34
End: 2019-02-04

## 2019-02-13 ENCOUNTER — OFFICE VISIT (OUTPATIENT)
Dept: OPTOMETRY | Facility: CLINIC | Age: 34
End: 2019-02-13
Payer: COMMERCIAL

## 2019-02-13 DIAGNOSIS — G43.109 OCULAR MIGRAINE: Primary | ICD-10-CM

## 2019-02-13 PROCEDURE — 99999 PR PBB SHADOW E&M-EST. PATIENT-LVL II: ICD-10-PCS | Mod: PBBFAC,,, | Performed by: OPTOMETRIST

## 2019-02-13 PROCEDURE — 99999 PR PBB SHADOW E&M-EST. PATIENT-LVL II: CPT | Mod: PBBFAC,,, | Performed by: OPTOMETRIST

## 2019-02-13 PROCEDURE — 92014 PR EYE EXAM, EST PATIENT,COMPREHESV: ICD-10-PCS | Mod: S$GLB,,, | Performed by: OPTOMETRIST

## 2019-02-13 PROCEDURE — 92014 COMPRE OPH EXAM EST PT 1/>: CPT | Mod: S$GLB,,, | Performed by: OPTOMETRIST

## 2019-02-13 NOTE — LETTER
February 13, 2019      Nhi Shearer MD  [address] [facility] [address]           [place] [facility]  [address] [place] [address]  Phone: [phone]  Fax: [phone]          Patient: Sari Serna   MR Number: [id number]   YOB: 1985   Date of Visit: 2/13/2019       Dear Dr. Nhi Shearer:    Thank you for referring Sari Serna to me for evaluation. Attached you will find relevant portions of my assessment and plan of care.    If you have questions, please do not hesitate to call me. I look forward to following Sari Serna along with you.    Sincerely,    Chevy Bowling, OD    Enclosure  CC:  No Recipients    If you would like to receive this communication electronically, please contact [email or web address] or [phone] to request more information on [employer] access.    For providers and/or their staff who would like to refer a patient to [facility], please contact us through our one-stop-shop provider referral line, [facility], at [phone].    If you feel you have received this communication in error or would no longer like to receive these types of communications, please e-mail [email or web address]

## 2019-02-13 NOTE — MEDICAL/APP STUDENT
Pt presents with intermittent blurry vision OU that lasts for 5-10 minutes. Onset 1-1.5 years ago. Has had three episodes. Last occurred two weekends ago. Has sharp frontal headache immediately after vision clears. Time of day varies, no specific environment. Denies loss of vision. Uses Clear Eyes OU prn.   JOEY: last spring, wears CL and glasses, current glasses less than 1 year  PMH: h/o of tension/cluster/sinus headaches  FOH: unremarkable

## 2019-02-13 NOTE — PROGRESS NOTES
Assessment /Plan     For exam results, see Encounter Report.    Ocular migraine  -     OCT - Optic Nerve; Future  -     [facility] Visual Field - OU - Intermediate - Both Eyes; Future      1. Prev had 3 episodes, all totally resolve, some identification with online ocular migraine pics, eye exam normal. Acuity normal. Color normal, RTC for HVf(24-2)alex tsd and OCt of rnfl. Also to bring contacts to update script.

## 2019-03-06 ENCOUNTER — PATIENT MESSAGE (OUTPATIENT)
Dept: INTERNAL MEDICINE | Facility: CLINIC | Age: 34
End: 2019-03-06

## 2019-03-07 ENCOUNTER — LAB VISIT (OUTPATIENT)
Dept: LAB | Facility: HOSPITAL | Age: 34
End: 2019-03-07
Attending: INTERNAL MEDICINE
Payer: COMMERCIAL

## 2019-03-07 DIAGNOSIS — R23.2 FLUSHING: ICD-10-CM

## 2019-03-07 DIAGNOSIS — R19.7 DIARRHEA, UNSPECIFIED TYPE: ICD-10-CM

## 2019-03-07 PROCEDURE — 83497 ASSAY OF 5-HIAA: CPT

## 2019-03-13 ENCOUNTER — CLINICAL SUPPORT (OUTPATIENT)
Dept: OPHTHALMOLOGY | Facility: CLINIC | Age: 34
End: 2019-03-13
Payer: COMMERCIAL

## 2019-03-13 ENCOUNTER — OFFICE VISIT (OUTPATIENT)
Dept: OPTOMETRY | Facility: CLINIC | Age: 34
End: 2019-03-13
Payer: COMMERCIAL

## 2019-03-13 ENCOUNTER — PATIENT MESSAGE (OUTPATIENT)
Dept: INTERNAL MEDICINE | Facility: CLINIC | Age: 34
End: 2019-03-13

## 2019-03-13 DIAGNOSIS — G43.109 OCULAR MIGRAINE: Primary | ICD-10-CM

## 2019-03-13 DIAGNOSIS — G43.109 OCULAR MIGRAINE: ICD-10-CM

## 2019-03-13 PROCEDURE — 99999 PR PBB SHADOW E&M-EST. PATIENT-LVL II: ICD-10-PCS | Mod: PBBFAC,,, | Performed by: OPTOMETRIST

## 2019-03-13 PROCEDURE — 92082 INTERMEDIATE VISUAL FIELD XM: CPT | Mod: S$GLB,,, | Performed by: OPTOMETRIST

## 2019-03-13 PROCEDURE — 99999 PR PBB SHADOW E&M-EST. PATIENT-LVL I: CPT | Mod: PBBFAC,,,

## 2019-03-13 PROCEDURE — 99999 PR PBB SHADOW E&M-EST. PATIENT-LVL I: ICD-10-PCS | Mod: PBBFAC,,,

## 2019-03-13 PROCEDURE — 92133 POSTERIOR SEGMENT OCT OPTIC NERVE(OCULAR COHERENCE TOMOGRAPHY) - OU - BOTH EYES: ICD-10-PCS | Mod: S$GLB,,, | Performed by: OPTOMETRIST

## 2019-03-13 PROCEDURE — 92082 HUMPHREY VISUAL FIELD-OU-INTERMEDIATE-BOTH EYES: ICD-10-PCS | Mod: S$GLB,,, | Performed by: OPTOMETRIST

## 2019-03-13 PROCEDURE — 92012 PR EYE EXAM, EST PATIENT,INTERMED: ICD-10-PCS | Mod: S$GLB,,, | Performed by: OPTOMETRIST

## 2019-03-13 PROCEDURE — 99999 PR PBB SHADOW E&M-EST. PATIENT-LVL II: CPT | Mod: PBBFAC,,, | Performed by: OPTOMETRIST

## 2019-03-13 PROCEDURE — 92133 CPTRZD OPH DX IMG PST SGM ON: CPT | Mod: S$GLB,,, | Performed by: OPTOMETRIST

## 2019-03-13 PROCEDURE — 92012 INTRM OPH EXAM EST PATIENT: CPT | Mod: S$GLB,,, | Performed by: OPTOMETRIST

## 2019-03-13 NOTE — PROGRESS NOTES
FRANCE COOK 02/2019  Here for HVF,OCT and contact check today.  Patient hasn't   had any visual disturbances since last visit.  Wears contacts, vision and   comfort good.  Not using any drops.  Needs contact rx    Last edited by Chevy Bowling, OD on 3/13/2019  4:11 PM. (History)            Assessment /Plan     For exam results, see Encounter Report.    Ocular migraine      1. Normal VF and normal OCT of rnfl. NO new symptoms. NO ocular origin. Monitor condition. Patient to report any changes. RTC 1 year recheck.

## 2019-03-18 LAB — 5OH-INDOLEACETATE 24H UR-MRATE: NORMAL MG/(24.H)

## 2019-04-12 ENCOUNTER — OFFICE VISIT (OUTPATIENT)
Dept: OBSTETRICS AND GYNECOLOGY | Facility: CLINIC | Age: 34
End: 2019-04-12
Payer: COMMERCIAL

## 2019-04-12 VITALS
DIASTOLIC BLOOD PRESSURE: 80 MMHG | WEIGHT: 131.31 LBS | SYSTOLIC BLOOD PRESSURE: 118 MMHG | BODY MASS INDEX: 23.27 KG/M2 | HEIGHT: 63 IN

## 2019-04-12 DIAGNOSIS — Z01.419 ENCOUNTER FOR GYNECOLOGICAL EXAMINATION (GENERAL) (ROUTINE) WITHOUT ABNORMAL FINDINGS: ICD-10-CM

## 2019-04-12 PROCEDURE — 99999 PR PBB SHADOW E&M-EST. PATIENT-LVL III: CPT | Mod: PBBFAC,,, | Performed by: OBSTETRICS & GYNECOLOGY

## 2019-04-12 PROCEDURE — 99395 PR PREVENTIVE VISIT,EST,18-39: ICD-10-PCS | Mod: S$GLB,,, | Performed by: OBSTETRICS & GYNECOLOGY

## 2019-04-12 PROCEDURE — 99999 PR PBB SHADOW E&M-EST. PATIENT-LVL III: ICD-10-PCS | Mod: PBBFAC,,, | Performed by: OBSTETRICS & GYNECOLOGY

## 2019-04-12 PROCEDURE — 99395 PREV VISIT EST AGE 18-39: CPT | Mod: S$GLB,,, | Performed by: OBSTETRICS & GYNECOLOGY

## 2019-04-12 RX ORDER — NORGESTIMATE AND ETHINYL ESTRADIOL 0.25-0.035
1 KIT ORAL DAILY
Qty: 84 TABLET | Refills: 3 | Status: SHIPPED | OUTPATIENT
Start: 2019-04-12 | End: 2020-05-16 | Stop reason: SDUPTHER

## 2019-04-12 NOTE — PROGRESS NOTES
Subjective:       Patient ID: Sari Serna is a 34 y.o. female.    Chief Complaint:  Annual Exam (last pap/hpv 2017 roderick, c/o pain and bleding with intercourse )      Patient Active Problem List   Diagnosis    Eye refraction disorder - Both Eyes    ADAM (generalized anxiety disorder)    Recurrent major depressive disorder    Pleurisy       History of Present Illness  34 y.o. yo  here for annual exam. On OCP. Had initially painful heavy periods and was put on OCP which made then better. Had u/s in the past which showed retroverted uterus. Mom had bad endometriosis and had to have complete hyst in her 40's. Pt not missing pills and occasionally had BTB, bleeding with sex and some pain. We discussed laparoscopy is the best way to dx endo. She says she is fine for right now and does not want to change anything. I rec if symptoms progress then call and will do u/s and consider dx scope. Pt agrees. All questions answered.     Patient had a normal pap smear and HPV in 2017 at last annual visit. I explained new guidelines. Will repeat pap and HPV every 3 years. Answered all questions. Patient agrees.     Past Medical History:   Diagnosis Date    Acne     ALLERGIC RHINITIS     Anxiety     Dysmenorrhea     Low back pain     Migraine headache        Past Surgical History:   Procedure Laterality Date    FACIAL COSMETIC SURGERY             OB History    Para Term  AB Living   0             SAB TAB Ectopic Multiple Live Births                   Patient's last menstrual period was 2019 (exact date).   Date of Last Pap: 2017    Review of Systems  Review of Systems   Constitutional: Negative for fatigue and unexpected weight change.   Respiratory: Negative for shortness of breath.    Cardiovascular: Negative for chest pain.   Gastrointestinal: Negative for abdominal pain, constipation, diarrhea, nausea and vomiting.   Genitourinary: Negative for dysuria.   Musculoskeletal: Negative  for back pain.   Skin: Negative for rash.   Neurological: Negative for headaches.   Hematological: Does not bruise/bleed easily.   Psychiatric/Behavioral: Negative for behavioral problems.        Objective:   Physical Exam:   Constitutional: She is oriented to person, place, and time. Vital signs are normal. She appears well-developed and well-nourished. No distress.        Pulmonary/Chest: She exhibits no mass. Right breast exhibits no mass, no nipple discharge, no skin change, no tenderness, no bleeding and no swelling. Left breast exhibits no mass, no nipple discharge, no skin change, no tenderness, no bleeding and no swelling. Breasts are symmetrical.        Abdominal: Soft. Normal appearance and bowel sounds are normal. She exhibits no distension and no mass. There is no tenderness. There is no rebound.     Genitourinary: Vagina normal and uterus normal. There is no rash, tenderness, lesion or injury on the right labia. There is no rash, tenderness, lesion or injury on the left labia. Uterus is not deviated, not enlarged, not fixed, not tender, not hosting fibroids and not experiencing uterine prolapse. Cervix is normal. Right adnexum displays no mass, no tenderness and no fullness. Left adnexum displays no mass, no tenderness and no fullness. No erythema, tenderness, rectocele, cystocele or unspecified prolapse of vaginal walls in the vagina. No vaginal discharge found. Cervix exhibits no motion tenderness, no discharge and no friability.           Musculoskeletal: Normal range of motion and moves all extremeties.      Lymphadenopathy:     She has no axillary adenopathy.        Right: No supraclavicular adenopathy present.        Left: No supraclavicular adenopathy present.    Neurological: She is alert and oriented to person, place, and time.    Skin: Skin is warm and dry.    Psychiatric: She has a normal mood and affect. Her behavior is normal. Judgment normal.        Assessment/ Plan:     1. Encounter for  gynecological examination (general) (routine) without abnormal findings  norgestimate-ethinyl estradiol (FEMYNOR) 0.25-35 mg-mcg per tablet       Follow-up with me in 1 year

## 2019-04-22 ENCOUNTER — PATIENT MESSAGE (OUTPATIENT)
Dept: DERMATOLOGY | Facility: CLINIC | Age: 34
End: 2019-04-22

## 2019-04-23 ENCOUNTER — PATIENT MESSAGE (OUTPATIENT)
Dept: INTERNAL MEDICINE | Facility: CLINIC | Age: 34
End: 2019-04-23

## 2019-04-23 ENCOUNTER — TELEPHONE (OUTPATIENT)
Dept: DERMATOLOGY | Facility: CLINIC | Age: 34
End: 2019-04-23

## 2019-04-23 NOTE — TELEPHONE ENCOUNTER
----- Message from Izabela Kahn sent at 4/23/2019  9:09 AM CDT -----  Contact: Pt  Type:  Patient Returning Call    Who Called: MIC WORKMAN [[id number]]    Who Left Message for Patient: Jaspal Wood MA      Does the patient know what this is regarding?:sooner appointment    Best Call Back Number:[phone]    Additional Information: No

## 2019-04-23 NOTE — TELEPHONE ENCOUNTER
Called PT in regards to assisting with scheduling sooner appointment. LVM for PT to call back at her convenience.

## 2019-05-26 DIAGNOSIS — F33.9 RECURRENT MAJOR DEPRESSIVE DISORDER, REMISSION STATUS UNSPECIFIED: ICD-10-CM

## 2019-05-26 DIAGNOSIS — F43.10 PTSD (POST-TRAUMATIC STRESS DISORDER): ICD-10-CM

## 2019-05-26 DIAGNOSIS — F41.1 GAD (GENERALIZED ANXIETY DISORDER): ICD-10-CM

## 2019-05-27 RX ORDER — ESCITALOPRAM OXALATE 10 MG/1
15 TABLET ORAL DAILY
Qty: 135 TABLET | Refills: 1 | Status: SHIPPED | OUTPATIENT
Start: 2019-05-27 | End: 2019-11-28 | Stop reason: SDUPTHER

## 2019-06-01 ENCOUNTER — OFFICE VISIT (OUTPATIENT)
Dept: URGENT CARE | Facility: CLINIC | Age: 34
End: 2019-06-01
Payer: COMMERCIAL

## 2019-06-01 VITALS
OXYGEN SATURATION: 98 % | RESPIRATION RATE: 18 BRPM | WEIGHT: 130 LBS | DIASTOLIC BLOOD PRESSURE: 70 MMHG | HEART RATE: 83 BPM | TEMPERATURE: 100 F | SYSTOLIC BLOOD PRESSURE: 144 MMHG | BODY MASS INDEX: 23.04 KG/M2 | HEIGHT: 63 IN

## 2019-06-01 DIAGNOSIS — R53.83 FATIGUE, UNSPECIFIED TYPE: ICD-10-CM

## 2019-06-01 DIAGNOSIS — R50.9 FEVER, UNSPECIFIED FEVER CAUSE: Primary | ICD-10-CM

## 2019-06-01 LAB
CTP QC/QA: YES
FLUAV AG NPH QL: NEGATIVE
FLUBV AG NPH QL: NEGATIVE

## 2019-06-01 PROCEDURE — 99214 OFFICE O/P EST MOD 30 MIN: CPT | Mod: S$GLB,,, | Performed by: PHYSICIAN ASSISTANT

## 2019-06-01 PROCEDURE — 87804 POCT INFLUENZA A/B: ICD-10-PCS | Mod: QW,S$GLB,, | Performed by: PHYSICIAN ASSISTANT

## 2019-06-01 PROCEDURE — 3008F BODY MASS INDEX DOCD: CPT | Mod: CPTII,S$GLB,, | Performed by: PHYSICIAN ASSISTANT

## 2019-06-01 PROCEDURE — 3008F PR BODY MASS INDEX (BMI) DOCUMENTED: ICD-10-PCS | Mod: CPTII,S$GLB,, | Performed by: PHYSICIAN ASSISTANT

## 2019-06-01 PROCEDURE — 87804 INFLUENZA ASSAY W/OPTIC: CPT | Mod: QW,S$GLB,, | Performed by: PHYSICIAN ASSISTANT

## 2019-06-01 PROCEDURE — 99214 PR OFFICE/OUTPT VISIT, EST, LEVL IV, 30-39 MIN: ICD-10-PCS | Mod: S$GLB,,, | Performed by: PHYSICIAN ASSISTANT

## 2019-06-01 NOTE — PATIENT INSTRUCTIONS
Febrile Illness, Uncertain Cause (Adult)  You have a fever, but the cause is not certain. A fever is a natural reaction of the body to an illness such as infection due to a virus or bacteria. In most cases, the temperature itself is not harmful. It actually helps the body fight infections. A fever does not need to be treated unless you feel very uncomfortable.  Sometimes a fever can be an early sign of a more serious infection, so make sure to follow up if your condition worsens.  Home care  Unless given other instructions by your healthcare provider, follow these guidelines when caring for yourself at home.  General care  · If your symptoms are severe, rest at home for the first 2 to 3 days. When you resume activity, don't let yourself get too tired.  · Do not smoke. Also avoid being exposed to secondhand smoke.  · Your appetite may be poor, so a light diet is fine. Avoid dehydration by drinking 6 to 8 glasses of fluids per day (such as water, soft drinks, sports drinks, juices, tea, or soup). Extra fluids will help loosen secretions in the nose and lungs.  Medicines  · You can take acetaminophen or ibuprofen for pain, unless you were given a different fever-reducing/pain medicine to use. (Note: If you have chronic liver or kidney disease or have ever had a stomach ulcer or gastrointestinal bleeding, talk with your healthcare provider before using these medicines. Also talk to your provider if you are taking medicine to prevent blood clots.) Aspirin should never be given to anyone younger than 18 years of age who is ill with a viral infection or fever. It may cause severe liver or brain damage.  · If you were given antibiotics, take them until they are used up, or your healthcare provider tells you to stop. It is important to finish the antibiotics even though you feel better. This is to make sure the infection has cleared. Be aware that antibiotics are not usually given for a fever with an unknown  cause.  · Over-the-counter medicines will not shorten the duration of the illness. However, they may be helpful for the following symptoms: cough, sore throat, or nasal and sinus congestion. Ask your pharmacist for product suggestions. (Note: Do not use decongestants if you have high blood pressure.)  Follow-up care  Follow up with your healthcare provider, or as advised.  · If a culture was done, you will be notified if your treatment needs to be changed. You can call as directed for the results.  · If X-rays, a CT, or an ultrasound were done, a specialist will review them. You will be notified of any findings that may affect your care.  Call 911  Contact emergency services right away if any of these occur:  · Trouble breathing or swallowing, or wheezing  · Chest pain  · Confusion  · Extreme drowsiness or trouble awakening  · Fainting or loss of consciousness  · Rapid heart rate  · Low blood pressure  · Vomiting blood, or large amounts of blood in stool  · Seizure  When to seek medical advice  Call your healthcare provider right away if any of these occur:  · Cough with lots of colored sputum (mucus) or blood in your sputum  · Severe headache  · Face, neck, throat, or ear pain  · Feeling drowsy  · Abdominal pain  · Repeated vomiting or diarrhea  · Joint pain or a new rash  · Burning when urinating  · Fever of 100.4°F (38°C) or higher, that does not get better after taking fever-reducing medicine  · Feeling weak or dizzy  Date Last Reviewed: 7/30/2015 © 2000-2017 [employer]. [address] [place] [address], [place] [address]. All rights reserved. This information is not intended as a substitute for professional medical care. Always follow your healthcare professional's instructions.      Please follow up with your Primary care provider within 2-5 days if your signs and symptoms have not resolved or worsen.     If your condition worsens or fails to improve we recommend that you receive another evaluation at  the emergency room immediately or contact your primary medical clinic to discuss your concerns.   You must understand that you have received an Urgent Care treatment only and that you may be released before all of your medical problems are known or treated. You, the patient, will arrange for follow up care as instructed.     RED FLAGS/WARNING SYMPTOMS DISCUSSED WITH PATIENT THAT WOULD WARRANT EMERGENT MEDICAL ATTENTION. PATIENT VERBALIZED UNDERSTANDING.

## 2019-06-01 NOTE — PROGRESS NOTES
"Subjective:       Patient ID: Sari Serna is a 34 y.o. female.    Vitals:  height is 5' 3" (1.6 m) and weight is 59 kg (130 lb). Her temperature is 99.8 °F (37.7 °C). Her blood pressure is 144/70 (abnormal) and her pulse is 83. Her respiration is 18 and oxygen saturation is 98%.     Chief Complaint: Fever    [place]  She was around someone who was sick with a live virus.  She states last night she started to feel run down and had a low-grade fever this morning.  She denies any specific symptoms other than fatigue and fever.    Fever    This is a new problem. The current episode started yesterday. The problem occurs constantly. The maximum temperature noted was 100 to 100.9 F. The temperature was taken using an oral thermometer. Pertinent negatives include no abdominal pain, chest pain, congestion, coughing, diarrhea, ear pain, headaches, muscle aches, nausea, rash, sleepiness, sore throat, urinary pain, vomiting or wheezing. She has tried nothing for the symptoms.       Constitution: Positive for fatigue and fever. Negative for chills.   HENT: Negative for ear pain, congestion and sore throat.    Neck: Negative for painful lymph nodes.   Cardiovascular: Negative for chest pain and leg swelling.   Eyes: Negative for double vision and blurred vision.   Respiratory: Negative for cough, shortness of breath and wheezing.    Gastrointestinal: Negative for abdominal pain, nausea, vomiting and diarrhea.   Genitourinary: Negative for dysuria, frequency, urgency and history of kidney stones.   Musculoskeletal: Negative for joint pain, joint swelling, muscle cramps and muscle ache.   Skin: Negative for color change, pale, rash and bruising.   Allergic/Immunologic: Negative for seasonal allergies.   Neurological: Negative for dizziness, history of vertigo, light-headedness, passing out and headaches.   Hematologic/Lymphatic: Negative for swollen lymph nodes.   Psychiatric/Behavioral: " Negative for nervous/anxious, sleep disturbance and depression. The patient is not nervous/anxious.        Objective:      Physical Exam   Constitutional: She is oriented to person, place, and time. She appears well-developed and well-nourished. She is cooperative.  Non-toxic appearance. She does not appear ill. No distress.   HENT:   Head: Normocephalic and atraumatic.   Right Ear: Hearing, tympanic membrane, external ear and ear canal normal.   Left Ear: Hearing, tympanic membrane, external ear and ear canal normal.   Nose: Nose normal. No mucosal edema, rhinorrhea or nasal deformity. No epistaxis. Right sinus exhibits no maxillary sinus tenderness and no frontal sinus tenderness. Left sinus exhibits no maxillary sinus tenderness and no frontal sinus tenderness.   Mouth/Throat: Uvula is midline, oropharynx is clear and moist and mucous membranes are normal. No trismus in the jaw. Normal dentition. No uvula swelling. No posterior oropharyngeal erythema. Tonsils are 1+ on the right. Tonsils are 1+ on the left. No tonsillar exudate.   Eyes: Conjunctivae and lids are normal. Right eye exhibits no discharge. Left eye exhibits no discharge. No scleral icterus.   Sclera clear bilat   Neck: Trachea normal, normal range of motion, full passive range of motion without pain and phonation normal. Neck supple.   Cardiovascular: Normal rate, regular rhythm, normal heart sounds, intact distal pulses and normal pulses.   Pulmonary/Chest: Effort normal and breath sounds normal. No respiratory distress.   Abdominal: Soft. Normal appearance and bowel sounds are normal. She exhibits no distension, no pulsatile midline mass and no mass. There is no tenderness.   Musculoskeletal: Normal range of motion. She exhibits no edema or deformity.   Lymphadenopathy:        Head (right side): No submental, no submandibular, no tonsillar, no preauricular, no posterior auricular and no occipital adenopathy present.        Head (left side): No  submental, no submandibular, no tonsillar, no preauricular, no posterior auricular and no occipital adenopathy present.     She has no cervical adenopathy.        Right cervical: No superficial cervical, no deep cervical and no posterior cervical adenopathy present.       Left cervical: No superficial cervical, no deep cervical and no posterior cervical adenopathy present.   Neurological: She is alert and oriented to person, place, and time. She exhibits normal muscle tone. Coordination normal.   Skin: Skin is warm, dry and intact. She is not diaphoretic. No pallor.   Psychiatric: She has a normal mood and affect. Her speech is normal and behavior is normal. Judgment and thought content normal. Cognition and memory are normal.   Nursing note and vitals reviewed.      Assessment:       1. Fever, unspecified fever cause    2. Fatigue, unspecified type        Plan:         Fever, unspecified fever cause  -     POCT Influenza A/B    Fatigue, unspecified type          Febrile Illness, Uncertain Cause (Adult)  You have a fever, but the cause is not certain. A fever is a natural reaction of the body to an illness such as infection due to a virus or bacteria. In most cases, the temperature itself is not harmful. It actually helps the body fight infections. A fever does not need to be treated unless you feel very uncomfortable.  Sometimes a fever can be an early sign of a more serious infection, so make sure to follow up if your condition worsens.  Home care  Unless given other instructions by your healthcare provider, follow these guidelines when caring for yourself at home.  General care  · If your symptoms are severe, rest at home for the first 2 to 3 days. When you resume activity, don't let yourself get too tired.  · Do not smoke. Also avoid being exposed to secondhand smoke.  · Your appetite may be poor, so a light diet is fine. Avoid dehydration by drinking 6 to 8 glasses of fluids per day (such as water, soft drinks,  sports drinks, juices, tea, or soup). Extra fluids will help loosen secretions in the nose and lungs.  Medicines  · You can take acetaminophen or ibuprofen for pain, unless you were given a different fever-reducing/pain medicine to use. (Note: If you have chronic liver or kidney disease or have ever had a stomach ulcer or gastrointestinal bleeding, talk with your healthcare provider before using these medicines. Also talk to your provider if you are taking medicine to prevent blood clots.) Aspirin should never be given to anyone younger than 18 years of age who is ill with a viral infection or fever. It may cause severe liver or brain damage.  · If you were given antibiotics, take them until they are used up, or your healthcare provider tells you to stop. It is important to finish the antibiotics even though you feel better. This is to make sure the infection has cleared. Be aware that antibiotics are not usually given for a fever with an unknown cause.  · Over-the-counter medicines will not shorten the duration of the illness. However, they may be helpful for the following symptoms: cough, sore throat, or nasal and sinus congestion. Ask your pharmacist for product suggestions. (Note: Do not use decongestants if you have high blood pressure.)  Follow-up care  Follow up with your healthcare provider, or as advised.  · If a culture was done, you will be notified if your treatment needs to be changed. You can call as directed for the results.  · If X-rays, a CT, or an ultrasound were done, a specialist will review them. You will be notified of any findings that may affect your care.  Call 911  Contact emergency services right away if any of these occur:  · Trouble breathing or swallowing, or wheezing  · Chest pain  · Confusion  · Extreme drowsiness or trouble awakening  · Fainting or loss of consciousness  · Rapid heart rate  · Low blood pressure  · Vomiting blood, or large amounts of blood in stool  · Seizure  When to  seek medical advice  Call your healthcare provider right away if any of these occur:  · Cough with lots of colored sputum (mucus) or blood in your sputum  · Severe headache  · Face, neck, throat, or ear pain  · Feeling drowsy  · Abdominal pain  · Repeated vomiting or diarrhea  · Joint pain or a new rash  · Burning when urinating  · Fever of 100.4°F (38°C) or higher, that does not get better after taking fever-reducing medicine  · Feeling weak or dizzy  Date Last Reviewed: 7/30/2015  © 5980-6352 [employer]. [address] [place] [address]. All rights reserved. This information is not intended as a substitute for professional medical care. Always follow your healthcare professional's instructions.      Please follow up with your Primary care provider within 2-5 days if your signs and symptoms have not resolved or worsen.     If your condition worsens or fails to improve we recommend that you receive another evaluation at the emergency room immediately or contact your primary medical clinic to discuss your concerns.   You must understand that you have received an Urgent Care treatment only and that you may be released before all of your medical problems are known or treated. You, the patient, will arrange for follow up care as instructed.     RED FLAGS/WARNING SYMPTOMS DISCUSSED WITH PATIENT THAT WOULD WARRANT EMERGENT MEDICAL ATTENTION. PATIENT VERBALIZED UNDERSTANDING.

## 2019-06-03 ENCOUNTER — PATIENT MESSAGE (OUTPATIENT)
Dept: INTERNAL MEDICINE | Facility: CLINIC | Age: 34
End: 2019-06-03

## 2019-06-04 ENCOUNTER — OFFICE VISIT (OUTPATIENT)
Dept: FAMILY MEDICINE | Facility: CLINIC | Age: 34
End: 2019-06-04
Payer: COMMERCIAL

## 2019-06-04 VITALS
OXYGEN SATURATION: 97 % | HEART RATE: 93 BPM | BODY MASS INDEX: 23.48 KG/M2 | HEIGHT: 63 IN | WEIGHT: 132.5 LBS | SYSTOLIC BLOOD PRESSURE: 114 MMHG | DIASTOLIC BLOOD PRESSURE: 64 MMHG | TEMPERATURE: 99 F

## 2019-06-04 DIAGNOSIS — J06.9 UPPER RESPIRATORY TRACT INFECTION, UNSPECIFIED TYPE: Primary | ICD-10-CM

## 2019-06-04 DIAGNOSIS — R05.9 COUGH: ICD-10-CM

## 2019-06-04 PROCEDURE — 3008F BODY MASS INDEX DOCD: CPT | Mod: CPTII,S$GLB,, | Performed by: FAMILY MEDICINE

## 2019-06-04 PROCEDURE — 99214 OFFICE O/P EST MOD 30 MIN: CPT | Mod: S$GLB,,, | Performed by: FAMILY MEDICINE

## 2019-06-04 PROCEDURE — 99999 PR PBB SHADOW E&M-EST. PATIENT-LVL III: ICD-10-PCS | Mod: PBBFAC,,, | Performed by: FAMILY MEDICINE

## 2019-06-04 PROCEDURE — 99214 PR OFFICE/OUTPT VISIT, EST, LEVL IV, 30-39 MIN: ICD-10-PCS | Mod: S$GLB,,, | Performed by: FAMILY MEDICINE

## 2019-06-04 PROCEDURE — 3008F PR BODY MASS INDEX (BMI) DOCUMENTED: ICD-10-PCS | Mod: CPTII,S$GLB,, | Performed by: FAMILY MEDICINE

## 2019-06-04 PROCEDURE — 99999 PR PBB SHADOW E&M-EST. PATIENT-LVL III: CPT | Mod: PBBFAC,,, | Performed by: FAMILY MEDICINE

## 2019-06-04 RX ORDER — CODEINE PHOSPHATE AND GUAIFENESIN 10; 100 MG/5ML; MG/5ML
5 SOLUTION ORAL EVERY 8 HOURS PRN
Qty: 118 ML | Refills: 0 | Status: SHIPPED | OUTPATIENT
Start: 2019-06-04 | End: 2019-06-14

## 2019-06-04 NOTE — PROGRESS NOTES
Subjective:       Patient ID: Sari Serna is a 34 y.o. female.    Chief Complaint: URI (x 4 days); Cough; Fever; Sore Throat; and Otalgia (right ear)    HPI   33 yo female pt of Dr. Nhi Shearer with ADAM and major depressive disorder presents c/o URI symptoms for 4 days. Pt notes fever and body aches for the past 4 days. Has sore throat, cough and right ear pain. Pt has had a sick contact. T max 100.7. Pt has taken Ibuprofen, Robitussin and Flonase. Takes daily Zrtec for allergies.  Review of Systems   Constitutional: Positive for activity change, chills, fatigue and fever. Negative for appetite change.   HENT:        See HPI   Eyes: Negative for discharge and redness.   Respiratory: Positive for cough. Negative for shortness of breath and wheezing.    Gastrointestinal: Positive for nausea. Negative for diarrhea and vomiting.   Genitourinary: Negative for hematuria.   Neurological: Positive for weakness.       Objective:      Physical Exam   Constitutional: She is oriented to person, place, and time. She appears well-developed and well-nourished. No distress.   HENT:   Head: Normocephalic and atraumatic.   Right Ear: Hearing, tympanic membrane, external ear and ear canal normal.   Left Ear: Hearing, tympanic membrane, external ear and ear canal normal.   Nose: Mucosal edema present. Right sinus exhibits no maxillary sinus tenderness and no frontal sinus tenderness. Left sinus exhibits no maxillary sinus tenderness and no frontal sinus tenderness.   Mouth/Throat: Uvula is midline, oropharynx is clear and moist and mucous membranes are normal. No oropharyngeal exudate.   Eyes: Pupils are equal, round, and reactive to light. Conjunctivae and EOM are normal. Right eye exhibits no discharge. Left eye exhibits no discharge. No scleral icterus.   Neck: Normal range of motion. Neck supple. No JVD present. No thyromegaly present.   Cardiovascular: Normal rate, regular rhythm and normal heart sounds.    Pulmonary/Chest: Effort normal and breath sounds normal. She has no wheezes. She has no rales.   Abdominal: Soft. Bowel sounds are normal.   Lymphadenopathy:     She has no cervical adenopathy.   Neurological: She is alert and oriented to person, place, and time.   Skin: Skin is warm and dry. She is not diaphoretic.   Vitals reviewed.      Assessment:       See plan  Plan:       Sari Strong was seen today for uri, cough, fever, sore throat and otalgia.    Diagnoses and all orders for this visit:    Upper respiratory tract infection, unspecified type  -     guaifenesin-codeine 100-10 mg/5 ml (CHERATUSSIN AC)  mg/5 mL syrup; Take 5 mLs by mouth every 8 (eight) hours as needed for Cough.  -    Continue Flonase and Zyrtec    Cough  - Probable due to postnasal drip    F/U if symptoms worsen or no improvement in symptoms.

## 2019-06-05 ENCOUNTER — PATIENT MESSAGE (OUTPATIENT)
Dept: INTERNAL MEDICINE | Facility: CLINIC | Age: 34
End: 2019-06-05

## 2019-06-05 RX ORDER — AZITHROMYCIN 250 MG/1
TABLET, FILM COATED ORAL
Qty: 6 TABLET | Refills: 0 | Status: SHIPPED | OUTPATIENT
Start: 2019-06-05 | End: 2019-07-15

## 2019-07-10 ENCOUNTER — PATIENT MESSAGE (OUTPATIENT)
Dept: INTERNAL MEDICINE | Facility: CLINIC | Age: 34
End: 2019-07-10

## 2019-07-10 ENCOUNTER — TELEPHONE (OUTPATIENT)
Dept: INTERNAL MEDICINE | Facility: CLINIC | Age: 34
End: 2019-07-10

## 2019-07-10 NOTE — TELEPHONE ENCOUNTER
Patient went to ED in [place] on Monday 07/08/19 for severe cough, no antibiotic was given to her. She was diagnose with Pleurisy.   Patient was scheduled with Dr Shearer on 07/15/19.  Patient asking if she need something sooner.  Please advised.

## 2019-07-10 NOTE — TELEPHONE ENCOUNTER
As of now, we have not received ED record from [place].  If she is clinically feeling better with a recent diagnosis of pleurisy and they treated her with steroids, she does not necessarily need a follow-up visit unless she is having persistent symptoms or problems.

## 2019-07-10 NOTE — TELEPHONE ENCOUNTER
----- Message from Leena Kahn sent at 7/10/2019  1:31 PM CDT -----  Contact: [phone]/self  Patient is requesting a call back. She went to the ER in [place] and was supposed to send the records to your office. She would like to confirm you received them and discuss if she needs a follow up. Diagnosis was Pleurisy. Thanks

## 2019-07-11 ENCOUNTER — TELEPHONE (OUTPATIENT)
Dept: INTERNAL MEDICINE | Facility: CLINIC | Age: 34
End: 2019-07-11

## 2019-07-11 NOTE — TELEPHONE ENCOUNTER
----- Message from Gloria Lopez sent at 7/11/2019 10:34 AM CDT -----  Contact: Self/ [phone]  Patient is calling to inform that she is not on any steroids yet.  Please advise.

## 2019-07-15 ENCOUNTER — PATIENT MESSAGE (OUTPATIENT)
Dept: INTERNAL MEDICINE | Facility: CLINIC | Age: 34
End: 2019-07-15

## 2019-07-15 ENCOUNTER — OFFICE VISIT (OUTPATIENT)
Dept: INTERNAL MEDICINE | Facility: CLINIC | Age: 34
End: 2019-07-15
Payer: COMMERCIAL

## 2019-07-15 ENCOUNTER — LAB VISIT (OUTPATIENT)
Dept: LAB | Facility: HOSPITAL | Age: 34
End: 2019-07-15
Attending: INTERNAL MEDICINE
Payer: COMMERCIAL

## 2019-07-15 VITALS
HEIGHT: 63 IN | DIASTOLIC BLOOD PRESSURE: 70 MMHG | OXYGEN SATURATION: 96 % | HEART RATE: 77 BPM | BODY MASS INDEX: 23.99 KG/M2 | TEMPERATURE: 99 F | WEIGHT: 135.38 LBS | SYSTOLIC BLOOD PRESSURE: 112 MMHG

## 2019-07-15 DIAGNOSIS — Z83.49 FAMILY HISTORY OF ALPHA 1 ANTITRYPSIN DEFICIENCY: ICD-10-CM

## 2019-07-15 DIAGNOSIS — R09.1 PLEURISY: ICD-10-CM

## 2019-07-15 DIAGNOSIS — R05.9 COUGH: Primary | ICD-10-CM

## 2019-07-15 LAB — A1AT SERPL-MCNC: 102 MG/DL (ref 100–190)

## 2019-07-15 PROCEDURE — 99214 PR OFFICE/OUTPT VISIT, EST, LEVL IV, 30-39 MIN: ICD-10-PCS | Mod: S$GLB,,, | Performed by: INTERNAL MEDICINE

## 2019-07-15 PROCEDURE — 3008F BODY MASS INDEX DOCD: CPT | Mod: CPTII,S$GLB,, | Performed by: INTERNAL MEDICINE

## 2019-07-15 PROCEDURE — 36415 COLL VENOUS BLD VENIPUNCTURE: CPT | Mod: PO

## 2019-07-15 PROCEDURE — 99214 OFFICE O/P EST MOD 30 MIN: CPT | Mod: S$GLB,,, | Performed by: INTERNAL MEDICINE

## 2019-07-15 PROCEDURE — 99999 PR PBB SHADOW E&M-EST. PATIENT-LVL III: CPT | Mod: PBBFAC,,, | Performed by: INTERNAL MEDICINE

## 2019-07-15 PROCEDURE — 82103 ALPHA-1-ANTITRYPSIN TOTAL: CPT

## 2019-07-15 PROCEDURE — 3008F PR BODY MASS INDEX (BMI) DOCUMENTED: ICD-10-PCS | Mod: CPTII,S$GLB,, | Performed by: INTERNAL MEDICINE

## 2019-07-15 PROCEDURE — 99999 PR PBB SHADOW E&M-EST. PATIENT-LVL III: ICD-10-PCS | Mod: PBBFAC,,, | Performed by: INTERNAL MEDICINE

## 2019-07-15 RX ORDER — CODEINE PHOSPHATE AND GUAIFENESIN 10; 100 MG/5ML; MG/5ML
5 SOLUTION ORAL 3 TIMES DAILY PRN
Qty: 150 ML | Refills: 0 | Status: SHIPPED | OUTPATIENT
Start: 2019-07-15 | End: 2019-07-25

## 2019-08-04 ENCOUNTER — PATIENT MESSAGE (OUTPATIENT)
Dept: INTERNAL MEDICINE | Facility: CLINIC | Age: 34
End: 2019-08-04

## 2019-08-13 ENCOUNTER — OFFICE VISIT (OUTPATIENT)
Dept: URGENT CARE | Facility: CLINIC | Age: 34
End: 2019-08-13
Payer: COMMERCIAL

## 2019-08-13 VITALS
BODY MASS INDEX: 23.04 KG/M2 | WEIGHT: 130 LBS | HEIGHT: 63 IN | RESPIRATION RATE: 16 BRPM | DIASTOLIC BLOOD PRESSURE: 84 MMHG | SYSTOLIC BLOOD PRESSURE: 126 MMHG | HEART RATE: 86 BPM | TEMPERATURE: 99 F

## 2019-08-13 DIAGNOSIS — F43.0 ANXIETY IN ACUTE STRESS REACTION: ICD-10-CM

## 2019-08-13 DIAGNOSIS — F41.1 ANXIETY IN ACUTE STRESS REACTION: ICD-10-CM
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
## 2019-08-13 DIAGNOSIS — S60.042A CONTUSION OF LEFT RING FINGER WITHOUT DAMAGE TO NAIL, INITIAL ENCOUNTER: ICD-10-CM

## 2019-08-13 PROCEDURE — 99204 OFFICE O/P NEW MOD 45 MIN: CPT | Mod: S$GLB,,, | Performed by: PREVENTIVE MEDICINE

## 2019-08-13 PROCEDURE — 99204 PR OFFICE/OUTPT VISIT, NEW, LEVL IV, 45-59 MIN: ICD-10-PCS | Mod: S$GLB,,, | Performed by: PREVENTIVE MEDICINE

## 2019-08-13 PROCEDURE — 73140 X-RAY EXAM OF FINGER(S): CPT | Mod: FY,LT,S$GLB, | Performed by: RADIOLOGY

## 2019-08-13 PROCEDURE — 73140 XR FINGER 2 OR MORE VIEWS: ICD-10-PCS | Mod: FY,LT,S$GLB, | Performed by: RADIOLOGY

## 2019-08-13 NOTE — PROGRESS NOTES
"Subjective:       Patient ID: Sari Serna is a 34 y.o. female.    Chief Complaint: Hand Injury (Left right finger)    Ambulatory anxious and tearful patient with c/o her Left ring finger that was injured on 8/13/19.  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 She states this is the second time she has injured herself while at work and is awaiting crisis training. She states her finger hurts "a little".   BS    Review of Systems   Constitution: Negative. Negative for malaise/fatigue.   HENT: Negative for nosebleeds.    Eyes: Negative.    Cardiovascular: Negative for chest pain and syncope.   Respiratory: Negative.  Negative for shortness of breath.    Skin: Negative.    Musculoskeletal: Positive for joint pain and joint swelling. Negative for back pain and neck pain.   Gastrointestinal: Negative.  Negative for abdominal pain.   Genitourinary: Negative.  Negative for hematuria.   Neurological: Negative.  Negative for dizziness, numbness and weakness.   Psychiatric/Behavioral: The patient is nervous/anxious (anxious about current event stating second time a crisis situation has occured and has not received training for this situation).    All other systems reviewed and are negative.      Objective:      Physical Exam   Constitutional: She is oriented to person, place, and time. She appears well-developed and well-nourished. She appears distressed.   HENT:   Head: Normocephalic and atraumatic.   Eyes: Pupils are equal, round, and reactive to light. Conjunctivae and EOM are normal.   Neck: Normal range of motion. Neck supple.   Cardiovascular: Normal rate, regular rhythm, normal heart sounds and intact distal pulses.   Pulmonary/Chest: Effort normal and breath sounds normal.   Abdominal: Soft. Bowel sounds are normal.   Musculoskeletal:        Right hand: She exhibits tenderness.        Hands:  Neurological: She is alert and oriented to person, " place, and time.   Skin: Skin is warm and dry.   Psychiatric:   anxious     X-ray Finger 2 Or More Views    Result Date: 8/13/2019  EXAMINATION: XR FINGER 2 OR MORE VIEWS CLINICAL HISTORY: TRAUMA;  Contusion of left ring finger without damage to nail, initial encounter TECHNIQUE: Three views left 4th digit series COMPARISON: None FINDINGS: Bones are well mineralized. Overall alignment is within normal limits.  No displaced fracture, dislocation or destructive osseous process. Joint spaces appear relatively maintained. No subcutaneous emphysema or radiodense retained foreign body.     No acute displaced fracture-dislocation identified. Electronically signed by: Barry Kat MD Date:    08/13/2019 Time:    16:42   Assessment:       1. Work related injury    2. Contusion of left ring finger without damage to nail, initial encounter    3. Anxiety in acute stress reaction        Plan:            Patient Instructions: (ice to area; elevate; Ibuprofen or aleve for pain as directed on the package)   Restrictions: Regular Duty(follow up with supervisor for proper training programs for crisis situations at work)  Follow up in about 2 days (around 8/15/2019).

## 2019-08-13 NOTE — PATIENT INSTRUCTIONS
Your x-ray was read and interpreted by me.  It is currently pending review by radiology.  If there is a change in the reading I or someone from this clinic will call you with the results and further instructions.    Finger Contusion  You have a contusion. This is also called a bruise. There is swelling and some bleeding under the skin, but no broken bones. This injury generally takes a few days to a few weeks to heal. During that time, the bruise will typically change in color from reddish, to purple-blue, to greenish-yellow, then to yellow-brown.  A finger contusion may be treated with a splint or daniel tape (taping the injured finger to the one next to it for support). Minor contusions likely will need no other treatment.  Home care  · Elevate the hand to reduce pain and swelling. As much as possible, sit or lie down with the hand raised about the level of your heart. This is especially important during the first 48 hours.  · Ice the finger to help reduce pain and swelling. Wrap a cold source (ice pack or ice cubes in a plastic bag) in a thin towel. Apply to the bruised finger for 20 minutes every 1 to 2 hours the first day. Continue this 3 to 4 times a day until the pain and swelling goes away.  · If daniel tape was applied and it becomes wet or dirty, change it. You may replace it with paper, plastic, or cloth tape. Before taping, put a thin strip of cotton or gauze between the fingers to absorb sweat.  · Unless another medication was prescribed, you can take acetaminophen, ibuprofen, or naproxen to control pain. (If you have chronic liver or kidney disease or ever had a stomach ulcer or GI bleeding, talk with your doctor before using these medicines.)  Follow up  Follow up with your healthcare provider or our staff as advised. Call if you are not improving within 1 to 2 weeks.  When to seek medical advice   Call your healthcare provider right away if you have any of the following:  · Increased pain or  swelling  · Hand or arm becomes cold, blue, numb or tingly  · Signs of infection: Warmth, drainage, or increased redness or pain around the bruise  · Inability to move the injured finger or hand   · Frequent bruising for unknown reasons  Date Last Reviewed: 4/29/2015  © 8545-5223 [employer]. [address] [place] [address]. All rights reserved. This information is not intended as a substitute for professional medical care. Always follow your healthcare professional's instructions.

## 2019-08-13 NOTE — LETTER
[facility]  [address], [address]  [facility] [address]  Phone: [phone]  Fax: [phone]  [facility]: [email or web address]    Pt Name: Sari Serna  Injury Date: 08/13/2019   Employee ID: [id number] Date of First Treatment: 08/13/2019   Company: [employer]      Appointment Time: 03:10 PM Arrived: 3:22PM   Provider: [facility] Time Out:4:55PM     Office Treatment:     1. Work related injury    2. Contusion of left ring finger without damage to nail, initial encounter    3. Anxiety in acute stress reaction          Patient Instructions: (ice to area; elevate; Ibuprofen or aleve for pain as directed on the package)    Restrictions: Regular Duty(follow up with supervisor for proper training programs for crisis situations at work)     Return Appointment: 8/15/2019 at 1:00PM  LN

## 2019-09-09 ENCOUNTER — PATIENT MESSAGE (OUTPATIENT)
Dept: INTERNAL MEDICINE | Facility: CLINIC | Age: 34
End: 2019-09-09

## 2019-09-11 ENCOUNTER — PATIENT MESSAGE (OUTPATIENT)
Dept: INTERNAL MEDICINE | Facility: CLINIC | Age: 34
End: 2019-09-11

## 2019-09-11 DIAGNOSIS — T78.40XA ALLERGIC STATE, INITIAL ENCOUNTER: Primary | ICD-10-CM

## 2019-09-12 ENCOUNTER — PATIENT MESSAGE (OUTPATIENT)
Dept: INTERNAL MEDICINE | Facility: CLINIC | Age: 34
End: 2019-09-12

## 2019-09-13 ENCOUNTER — PATIENT MESSAGE (OUTPATIENT)
Dept: INTERNAL MEDICINE | Facility: CLINIC | Age: 34
End: 2019-09-13

## 2019-09-19 ENCOUNTER — OFFICE VISIT (OUTPATIENT)
Dept: ALLERGY | Facility: CLINIC | Age: 34
End: 2019-09-19
Payer: COMMERCIAL

## 2019-09-19 ENCOUNTER — LAB VISIT (OUTPATIENT)
Dept: LAB | Facility: HOSPITAL | Age: 34
End: 2019-09-19
Attending: ALLERGY & IMMUNOLOGY
Payer: COMMERCIAL

## 2019-09-19 VITALS — WEIGHT: 134.69 LBS | HEIGHT: 63 IN | BODY MASS INDEX: 23.86 KG/M2

## 2019-09-19 DIAGNOSIS — H10.13 ALLERGIC CONJUNCTIVITIS OF BOTH EYES: ICD-10-CM

## 2019-09-19 DIAGNOSIS — J30.89 ALLERGIC RHINITIS DUE TO DUST MITE: ICD-10-CM

## 2019-09-19 DIAGNOSIS — J30.89 ALLERGIC RHINITIS DUE TO DUST MITE: Primary | ICD-10-CM

## 2019-09-19 PROCEDURE — 99999 PR PBB SHADOW E&M-EST. PATIENT-LVL III: ICD-10-PCS | Mod: PBBFAC,,, | Performed by: ALLERGY & IMMUNOLOGY

## 2019-09-19 PROCEDURE — 36415 COLL VENOUS BLD VENIPUNCTURE: CPT | Mod: PO

## 2019-09-19 PROCEDURE — 99999 PR PBB SHADOW E&M-EST. PATIENT-LVL III: CPT | Mod: PBBFAC,,, | Performed by: ALLERGY & IMMUNOLOGY

## 2019-09-19 PROCEDURE — 86003 ALLG SPEC IGE CRUDE XTRC EA: CPT | Mod: 59

## 2019-09-19 PROCEDURE — 99244 OFF/OP CNSLTJ NEW/EST MOD 40: CPT | Mod: S$GLB,,, | Performed by: ALLERGY & IMMUNOLOGY

## 2019-09-19 PROCEDURE — 86003 ALLG SPEC IGE CRUDE XTRC EA: CPT

## 2019-09-19 PROCEDURE — 99244 PR OFFICE CONSULTATION,LEVEL IV: ICD-10-PCS | Mod: S$GLB,,, | Performed by: ALLERGY & IMMUNOLOGY

## 2019-09-19 NOTE — PROGRESS NOTES
Subjective:       Patient ID: Sari Serna is a 34 y.o. female.    Chief Complaint:  Allergies (positive dust mite allergy)      35 yo woman presents for consult from Dr Nhi Shearer for allergic rhinitis. She states has had issues lifelong. At 6 was tried on shots but was traumatic so stopped. She saw Dr Mcgill 2014 and had skin test with 4+ dust mite mix, fusarium and eastern tree nix. She has congestion all the time. Has itchy throat, eyes, nose and face. Has sneeze and runny nose. No chest symptoms. She does get bad URI about 3 times per year with increased congestion, cough for many weeks and antibiotics. Never had pneumonia. No season worse. Am little worse but has all day. No triggers. had covers on bed as child and never felt helped but wonders if she should now. No asthma or eczema. No known food, insect or latex allergy.       Environmental History: see history section for home environment  Review of Systems   Constitutional: Negative for appetite change, chills, fatigue and fever.   HENT: Positive for congestion, postnasal drip, rhinorrhea, sinus pressure and sneezing. Negative for ear discharge, ear pain, facial swelling, nosebleeds, sore throat, trouble swallowing and voice change.    Eyes: Positive for discharge and itching. Negative for redness and visual disturbance.   Respiratory: Negative for cough, choking, chest tightness, shortness of breath and wheezing.    Cardiovascular: Negative for chest pain, palpitations and leg swelling.   Gastrointestinal: Negative for abdominal distention, abdominal pain, constipation, diarrhea, nausea and vomiting.   Genitourinary: Negative for difficulty urinating.   Musculoskeletal: Negative for arthralgias, gait problem, joint swelling and myalgias.   Skin: Negative for color change and rash.   Neurological: Negative for dizziness, syncope, weakness, light-headedness and headaches.   Hematological: Negative for adenopathy. Does not bruise/bleed easily.    Psychiatric/Behavioral: Negative for agitation, behavioral problems, confusion and sleep disturbance. The patient is not nervous/anxious.         Objective:      Physical Exam   Constitutional: She is oriented to person, place, and time. She appears well-developed and well-nourished. No distress.   HENT:   Head: Normocephalic and atraumatic.   Right Ear: Hearing, tympanic membrane, external ear and ear canal normal.   Left Ear: Hearing, tympanic membrane, external ear and ear canal normal.   Nose: No mucosal edema, rhinorrhea, sinus tenderness or septal deviation. No epistaxis. Right sinus exhibits no maxillary sinus tenderness and no frontal sinus tenderness. Left sinus exhibits no maxillary sinus tenderness and no frontal sinus tenderness.   Mouth/Throat: Uvula is midline, oropharynx is clear and moist and mucous membranes are normal. No uvula swelling.   Eyes: Conjunctivae are normal. Right eye exhibits no discharge. Left eye exhibits no discharge.   Neck: Normal range of motion. No thyromegaly present.   Cardiovascular: Normal rate, regular rhythm and normal heart sounds.   No murmur heard.  Pulmonary/Chest: Effort normal and breath sounds normal. No respiratory distress. She has no wheezes.   Abdominal: Soft. She exhibits no distension. There is no tenderness.   Musculoskeletal: Normal range of motion. She exhibits no edema or tenderness.   Lymphadenopathy:     She has no cervical adenopathy.   Neurological: She is alert and oriented to person, place, and time.   Skin: Skin is warm and dry. No rash noted. No erythema.   Psychiatric: She has a normal mood and affect. Her behavior is normal. Judgment and thought content normal.   Nursing note and vitals reviewed.      Laboratory:   none performed   Assessment:       1. Allergic rhinitis due to dust mite    2. Allergic conjunctivitis of both eyes         Plan:       1. Dust mite avoidance, measures discussed and handout provided.  2. Immunocaps today to eval  current allergens  3. continue Flonase 2 SEN daily, zyrtec daily and benadryl at night as needed  4. Start Odactra,   All risks and benefits discussed.  Protocol discussed in detail including first dose in clinic and if missed more then 2 days need to return to clinic to resume.  Patient advised to remain off beta blockers while on Odactra and to notify injection clinic and MD of any new medications starts.  5. Phone review

## 2019-09-20 ENCOUNTER — TELEPHONE (OUTPATIENT)
Dept: PHARMACY | Facility: CLINIC | Age: 34
End: 2019-09-20

## 2019-09-20 NOTE — TELEPHONE ENCOUNTER
LVM for callback to inform patient that [facility] received prescription for Odactra and prior authorization is required.  [facility] will be back in touch once insurance determination is received.

## 2019-09-23 ENCOUNTER — PATIENT MESSAGE (OUTPATIENT)
Dept: ALLERGY | Facility: CLINIC | Age: 34
End: 2019-09-23

## 2019-09-23 LAB
A ALTERNATA IGE QN: <0.35 KU/L
A FUMIGATUS IGE QN: <0.35 KU/L
ALLERGEN CHAETOMIUM GLOBOSUM IGE: <0.35 KU/L
ALLERGEN WALNUT TREE IGE: <0.35 KU/L
ALLERGEN WHITE PINE TREE IGE: <0.35 KU/L
BAHIA GRASS IGE QN: <0.35 KU/L
BALD CYPRESS IGE QN: <0.35 KU/L
BERMUDA GRASS IGE QN: <0.35 KU/L
C HERBARUM IGE QN: <0.35 KU/L
C LUNATA IGE QN: <0.35 KU/L
CAT DANDER IGE QN: <0.35 KU/L
CHAETOMIUM GLOB. CLASS: NORMAL
COMMON RAGWEED IGE QN: <0.35 KU/L
COTTONWOOD IGE QN: <0.35 KU/L
D FARINAE IGE QN: 17.2 KU/L
D PTERONYSS IGE QN: 19.9 KU/L
DEPRECATED A ALTERNATA IGE RAST QL: NORMAL
DEPRECATED A FUMIGATUS IGE RAST QL: NORMAL
DEPRECATED BAHIA GRASS IGE RAST QL: NORMAL
DEPRECATED BALD CYPRESS IGE RAST QL: NORMAL
DEPRECATED BERMUDA GRASS IGE RAST QL: NORMAL
DEPRECATED C HERBARUM IGE RAST QL: NORMAL
DEPRECATED C LUNATA IGE RAST QL: NORMAL
DEPRECATED CAT DANDER IGE RAST QL: NORMAL
DEPRECATED COMMON RAGWEED IGE RAST QL: NORMAL
DEPRECATED COTTONWOOD IGE RAST QL: NORMAL
DEPRECATED D FARINAE IGE RAST QL: ABNORMAL
DEPRECATED D PTERONYSS IGE RAST QL: ABNORMAL
DEPRECATED DOG DANDER IGE RAST QL: NORMAL
DEPRECATED ELDER IGE RAST QL: NORMAL
DEPRECATED ENGL PLANTAIN IGE RAST QL: NORMAL
DEPRECATED JOHNSON GRASS IGE RAST QL: NORMAL
DEPRECATED LONDON PLANE IGE RAST QL: NORMAL
DEPRECATED MUGWORT IGE RAST QL: NORMAL
DEPRECATED P NOTATUM IGE RAST QL: NORMAL
DEPRECATED PECAN/HICK TREE IGE RAST QL: NORMAL
DEPRECATED ROACH IGE RAST QL: NORMAL
DEPRECATED S ROSTRATA IGE RAST QL: NORMAL
DEPRECATED SALTWORT IGE RAST QL: NORMAL
DEPRECATED SILVER BIRCH IGE RAST QL: NORMAL
DEPRECATED TIMOTHY IGE RAST QL: NORMAL
DEPRECATED WHITE OAK IGE RAST QL: NORMAL
DEPRECATED WILLOW IGE RAST QL: NORMAL
DOG DANDER IGE QN: <0.35 KU/L
ELDER IGE QN: <0.35 KU/L
ENGL PLANTAIN IGE QN: <0.35 KU/L
JOHNSON GRASS IGE QN: <0.35 KU/L
LONDON PLANE IGE QN: <0.35 KU/L
MUGWORT IGE QN: <0.35 KU/L
P NOTATUM IGE QN: <0.35 KU/L
PECAN/HICK TREE IGE QN: <0.35 KU/L
ROACH IGE QN: <0.35 KU/L
S ROSTRATA IGE QN: <0.35 KU/L
SALTWORT IGE QN: <0.35 KU/L
SILVER BIRCH IGE QN: <0.35 KU/L
TIMOTHY IGE QN: <0.35 KU/L
WALNUT TREE CLASS: NORMAL
WHITE OAK IGE QN: <0.35 KU/L
WHITE PINE CLASS: NORMAL
WILLOW IGE QN: <0.35 KU/L

## 2019-09-23 RX ORDER — EPINEPHRINE 0.3 MG/.3ML
1 INJECTION SUBCUTANEOUS ONCE AS NEEDED
Qty: 2 EACH | Refills: 1 | Status: SHIPPED | OUTPATIENT
Start: 2019-09-23 | End: 2021-03-06

## 2019-09-27 NOTE — TELEPHONE ENCOUNTER
DOCUMENTATION ONLY:  Prior authorization for Odactra approved from 9/26/19 to 9/25/20    Case Id: [id number]    Co-pay: $55.00    Patient Assistance is required

## 2019-10-15 ENCOUNTER — TELEPHONE (OUTPATIENT)
Dept: PHARMACY | Facility: CLINIC | Age: 34
End: 2019-10-15

## 2019-10-15 NOTE — TELEPHONE ENCOUNTER
Initial Odactra consult completed on 10/15. Odactra will be  delivered to [facility] pending confimation. $55 copay- $25 for Odactra and $30 for Epipen. First dose date pending appointment.  CC on file. Confirmed 2 patient identifiers - name and . Therapy Appropriate.    Lab Results   Component Value Date    DFARINAECLAS CLASS III 2019    DPTERONYSSIN CLASS IV 2019       Indication: dust-mite allergy  Goals of Therapy: Improve QoL through reduction in allergy symptoms     Patient was counseled on the administration directions:  1. Administer first dose in a health care setting due to the potential for allergic reactions; monitor patient for 30 minutes after first dose.   2. If well tolerated, subsequent doses may be taken at home  3. Remove sublingual tablet from blister immediately prior to administration.  4. Place tablet(s) under tongue until completely dissolved (>1 minute) and then swallow.   5. Wash hands after handling tablet.   6. Avoid food or beverage for 5 minutes following dissolution of tablet (to prevent the swallowing of allergen extract).  7. Have Epipen for [facility] appt. Verfied pt has Epipen script.  8. If you have throat swelling, difficulty swallowing or difficulty breathing, use Epi Pen, call 911 and go to E.R.  9. May begin to have relief of house dust mite allergy symptoms within 2-3 months.  Storage: RT  Missed Doses: take dose as soon as remembered. If not remembered till next day, skip dose - never take 2 doses in one day.     Patient was counseled on the following possible side effects, which include, but are not limited to:  · Oral itching (61%), Local pruritus (in the ear; 52%), Throat irritation (67%)  · swelling of lips (18%), swollen tongue (16%), Mouth edema (20%)  · nausea (14%)    DDIs: medication list reviewed- None upon initial review. Patient understands to report any medication changes to [facility] and provider.     Patient verbalized understanding. Consultation included: the  importance of compliance and the importance of keeping all follow up appointments. All questions answered and addressed to patients satisfaction. [facility] will touch-base with patient 1 week after start and [facility] to contact patient in 3 weeks for refills.

## 2019-10-17 ENCOUNTER — TELEPHONE (OUTPATIENT)
Dept: ALLERGY | Facility: CLINIC | Age: 34
End: 2019-10-17

## 2019-10-17 NOTE — TELEPHONE ENCOUNTER
We have received Odactra and Epipens for above patient. They will be in locked medicine cabinet on the 8th floor. I will have Darlene call patient to schedule an appointment for her to receive first does in the [place] office.     Thank you

## 2019-10-20 ENCOUNTER — PATIENT MESSAGE (OUTPATIENT)
Dept: DERMATOLOGY | Facility: CLINIC | Age: 34
End: 2019-10-20

## 2019-10-20 DIAGNOSIS — L21.9 SEBORRHEIC DERMATITIS: ICD-10-CM

## 2019-10-21 ENCOUNTER — TELEPHONE (OUTPATIENT)
Dept: ALLERGY | Facility: CLINIC | Age: 34
End: 2019-10-21

## 2019-10-21 RX ORDER — KETOCONAZOLE 20 MG/ML
SHAMPOO, SUSPENSION TOPICAL
Qty: 240 ML | Refills: 0 | Status: SHIPPED | OUTPATIENT
Start: 2019-10-21 | End: 2020-12-10 | Stop reason: SDUPTHER

## 2019-10-21 NOTE — TELEPHONE ENCOUNTER
Called and left a message for the patient to give us a call back in regards to scheduling her an appointment for her first dose of Odactra in the [facility] with Dr. Melara.

## 2019-10-23 ENCOUNTER — TELEPHONE (OUTPATIENT)
Dept: ALLERGY | Facility: CLINIC | Age: 34
End: 2019-10-23

## 2019-10-23 NOTE — TELEPHONE ENCOUNTER
Left message telling pt she could schedule appt with dr dorsey via portal or by calling main number.

## 2019-10-29 ENCOUNTER — PATIENT OUTREACH (OUTPATIENT)
Dept: ADMINISTRATIVE | Facility: OTHER | Age: 34
End: 2019-10-29

## 2019-11-01 ENCOUNTER — OFFICE VISIT (OUTPATIENT)
Dept: ALLERGY | Facility: CLINIC | Age: 34
End: 2019-11-01
Payer: COMMERCIAL

## 2019-11-01 VITALS — WEIGHT: 135.13 LBS | HEIGHT: 63 IN | BODY MASS INDEX: 23.94 KG/M2

## 2019-11-01 DIAGNOSIS — J30.89 ALLERGIC RHINITIS DUE TO DUST MITE: Primary | ICD-10-CM

## 2019-11-01 DIAGNOSIS — H10.13 ALLERGIC CONJUNCTIVITIS, BILATERAL: ICD-10-CM

## 2019-11-01 PROCEDURE — 99214 PR OFFICE/OUTPT VISIT, EST, LEVL IV, 30-39 MIN: ICD-10-PCS | Mod: S$GLB,,, | Performed by: ALLERGY & IMMUNOLOGY

## 2019-11-01 PROCEDURE — 3008F PR BODY MASS INDEX (BMI) DOCUMENTED: ICD-10-PCS | Mod: CPTII,S$GLB,, | Performed by: ALLERGY & IMMUNOLOGY

## 2019-11-01 PROCEDURE — 99999 PR PBB SHADOW E&M-EST. PATIENT-LVL II: ICD-10-PCS | Mod: PBBFAC,,, | Performed by: ALLERGY & IMMUNOLOGY

## 2019-11-01 PROCEDURE — 99214 OFFICE O/P EST MOD 30 MIN: CPT | Mod: S$GLB,,, | Performed by: ALLERGY & IMMUNOLOGY

## 2019-11-01 PROCEDURE — 99999 PR PBB SHADOW E&M-EST. PATIENT-LVL II: CPT | Mod: PBBFAC,,, | Performed by: ALLERGY & IMMUNOLOGY

## 2019-11-01 PROCEDURE — 3008F BODY MASS INDEX DOCD: CPT | Mod: CPTII,S$GLB,, | Performed by: ALLERGY & IMMUNOLOGY

## 2019-11-01 NOTE — PROGRESS NOTES
Subjective:       Patient ID: Sari Serna is a 34 y.o. female.    Chief Complaint:  No chief complaint on file.      35 yo woman presents for continued evaluation of allergic rhinitis and conjunctivitis to dust mites. She was last seen 9/19/19. She is on Flonase 2 SEN daily. Zyrtec 10 mg daily and benadryl qhs as needed. She still has issues of congestion, itch eyes, nose throat, sneeze and runny nose. No asthma or chest symptoms. She would like to start Odactra and is here for first dose. She has Epipen on hand    Prior History taken 9/19/19: consult from Dr Nhi Shearer for allergic rhinitis. She states has had issues lifelong. At 6 was tried on shots but was traumatic so stopped. She saw Dr Mcgill 2014 and had skin test with 4+ dust mite mix, fusarium and eastern tree nix. She has congestion all the time. Has itchy throat, eyes, nose and face. Has sneeze and runny nose. No chest symptoms. She does get bad URI about 3 times per year with increased congestion, cough for many weeks and antibiotics. Never had pneumonia. No season worse. Am little worse but has all day. No triggers. had covers on bed as child and never felt helped but wonders if she should now. No asthma or eczema. No known food, insect or latex allergy.       Environmental History: see history section for home environment  Review of Systems   Constitutional: Negative for appetite change, chills, fatigue and fever.   HENT: Positive for congestion, postnasal drip, rhinorrhea, sinus pressure and sneezing. Negative for ear discharge, ear pain, facial swelling, nosebleeds, sore throat, trouble swallowing and voice change.    Eyes: Positive for discharge and itching. Negative for redness and visual disturbance.   Respiratory: Negative for cough, choking, chest tightness, shortness of breath and wheezing.    Cardiovascular: Negative for chest pain, palpitations and leg swelling.   Gastrointestinal: Negative for abdominal distention, abdominal  pain, constipation, diarrhea, nausea and vomiting.   Genitourinary: Negative for difficulty urinating.   Musculoskeletal: Negative for arthralgias, gait problem, joint swelling and myalgias.   Skin: Negative for color change and rash.   Neurological: Negative for dizziness, syncope, weakness, light-headedness and headaches.   Hematological: Negative for adenopathy. Does not bruise/bleed easily.   Psychiatric/Behavioral: Negative for agitation, behavioral problems, confusion and sleep disturbance. The patient is not nervous/anxious.         Objective:      Physical Exam   Constitutional: She is oriented to person, place, and time. She appears well-developed and well-nourished. No distress.   HENT:   Head: Normocephalic and atraumatic.   Right Ear: Hearing, tympanic membrane, external ear and ear canal normal.   Left Ear: Hearing, tympanic membrane, external ear and ear canal normal.   Nose: No mucosal edema, rhinorrhea, sinus tenderness or septal deviation. No epistaxis. Right sinus exhibits no maxillary sinus tenderness and no frontal sinus tenderness. Left sinus exhibits no maxillary sinus tenderness and no frontal sinus tenderness.   Mouth/Throat: Uvula is midline, oropharynx is clear and moist and mucous membranes are normal. No uvula swelling.   Eyes: Conjunctivae are normal. Right eye exhibits no discharge. Left eye exhibits no discharge.   Neck: Normal range of motion. No thyromegaly present.   Cardiovascular: Normal rate, regular rhythm and normal heart sounds.   No murmur heard.  Pulmonary/Chest: Effort normal and breath sounds normal. No respiratory distress. She has no wheezes.   Abdominal: Soft. She exhibits no distension. There is no tenderness.   Musculoskeletal: Normal range of motion. She exhibits no edema or tenderness.   Lymphadenopathy:     She has no cervical adenopathy.   Neurological: She is alert and oriented to person, place, and time.   Skin: Skin is warm and dry. No rash noted. No erythema.    Psychiatric: She has a normal mood and affect. Her behavior is normal. Judgment and thought content normal.   Nursing note and vitals reviewed.      Laboratory:   none performed    1st dose Odactra administered and observed for 30 minutes wit no signs or symptoms of reaction  Assessment:       1. Allergic rhinitis due to dust mite    2. Allergic conjunctivitis, bilateral         Plan:       1. Dust mite avoidance, measures discussed and handout provided.  2.  continue Flonase 2 SEN daily, zyrtec daily and benadryl at night as needed  3. continue Odactra daily, if miss more then 2 doses return to clinic for next dose,   All risks and benefits discussed.  Protocol discussed in detail including first dose in clinic and if missed more then 2 days need to return to clinic to resume.  Patient advised to remain off beta blockers while on Odactra and to notify injection clinic and MD of any new medications starts.

## 2019-11-04 ENCOUNTER — PATIENT MESSAGE (OUTPATIENT)
Dept: ALLERGY | Facility: CLINIC | Age: 34
End: 2019-11-04

## 2019-11-07 ENCOUNTER — PATIENT MESSAGE (OUTPATIENT)
Dept: FAMILY MEDICINE | Facility: CLINIC | Age: 34
End: 2019-11-07

## 2019-11-14 ENCOUNTER — TELEPHONE (OUTPATIENT)
Dept: PHARMACY | Facility: CLINIC | Age: 34
End: 2019-11-14

## 2019-11-28 DIAGNOSIS — F33.9 RECURRENT MAJOR DEPRESSIVE DISORDER, REMISSION STATUS UNSPECIFIED: ICD-10-CM

## 2019-11-28 DIAGNOSIS — F41.1 GAD (GENERALIZED ANXIETY DISORDER): ICD-10-CM

## 2019-11-28 DIAGNOSIS — F43.10 PTSD (POST-TRAUMATIC STRESS DISORDER): ICD-10-CM

## 2019-11-29 ENCOUNTER — TELEPHONE (OUTPATIENT)
Dept: PHARMACY | Facility: CLINIC | Age: 34
End: 2019-11-29

## 2019-11-29 RX ORDER — ESCITALOPRAM OXALATE 10 MG/1
15 TABLET ORAL DAILY
Qty: 135 TABLET | Refills: 0 | Status: SHIPPED | OUTPATIENT
Start: 2019-11-29 | End: 2020-02-26

## 2019-12-20 NOTE — TELEPHONE ENCOUNTER
----- Message from Kandace Zheng sent at 3/14/2018  8:46 AM CDT -----  Contact: Self [phone]  Patient is calling to talk to nurse concerning questions on her test results. Please advice   
Discussed recent lab and tests results the patient given her atypical chest pain out of proportion from her preceding symptoms of cough given positive d-dimer and inflammatory markers concerned about the possibility of pulmonary embolism.  Patient was advised to go to the emergency department for further evaluation and workup including CT of her chest.  Contacted the emergency department and spoke to Lazaro mcgowan regarding patient's clinical presentation an upcoming arrival.  Advised that he could have the physician call me if he needed any additional assistance or information.  
Pt states returning call from MD and would like to know test results. Please advise.   
Single Mechanical/Accidental Fall

## 2019-12-24 ENCOUNTER — TELEPHONE (OUTPATIENT)
Dept: PHARMACY | Facility: CLINIC | Age: 34
End: 2019-12-24

## 2019-12-28 ENCOUNTER — PATIENT MESSAGE (OUTPATIENT)
Dept: ALLERGY | Facility: CLINIC | Age: 34
End: 2019-12-28

## 2019-12-30 ENCOUNTER — PATIENT MESSAGE (OUTPATIENT)
Dept: OPTOMETRY | Facility: CLINIC | Age: 34
End: 2019-12-30

## 2020-01-24 ENCOUNTER — TELEPHONE (OUTPATIENT)
Dept: PHARMACY | Facility: CLINIC | Age: 35
End: 2020-01-24

## 2020-02-19 ENCOUNTER — TELEPHONE (OUTPATIENT)
Dept: PHARMACY | Facility: CLINIC | Age: 35
End: 2020-02-19

## 2020-02-19 NOTE — TELEPHONE ENCOUNTER
Rx call for Odactra refill pt reached shipping  with  arrival, copay 25.00 CCOF: 5559 with pt consent @004. Address and  confirmed. Patient has about 7 doses on hand at this time. Patient has not started any new medications, has had no missed doses and no side effects present. Patient is currently taking the medication as directed by doctors instruction Place 1 tablet under the tongue once daily. Patient does have a safe place in their residence to keep medication at desired temperature away from small children and pets. Patient also does have the capability of contacting 911 in the event of an emergency. Patient states they do not have any questions or concerns at this time.

## 2020-02-22 DIAGNOSIS — F43.10 PTSD (POST-TRAUMATIC STRESS DISORDER): ICD-10-CM

## 2020-02-22 DIAGNOSIS — F33.9 RECURRENT MAJOR DEPRESSIVE DISORDER, REMISSION STATUS UNSPECIFIED: ICD-10-CM

## 2020-02-22 DIAGNOSIS — F41.1 GAD (GENERALIZED ANXIETY DISORDER): ICD-10-CM

## 2020-02-26 RX ORDER — ESCITALOPRAM OXALATE 10 MG/1
TABLET ORAL
Qty: 135 TABLET | Refills: 0 | Status: SHIPPED | OUTPATIENT
Start: 2020-02-26 | End: 2020-05-29

## 2020-03-29 ENCOUNTER — PATIENT MESSAGE (OUTPATIENT)
Dept: INTERNAL MEDICINE | Facility: CLINIC | Age: 35
End: 2020-03-29

## 2020-04-13 ENCOUNTER — TELEPHONE (OUTPATIENT)
Dept: PHARMACY | Facility: CLINIC | Age: 35
End: 2020-04-13

## 2020-04-13 NOTE — TELEPHONE ENCOUNTER
Refill readiness and clinical follow up for Odactra confirmed with patient for allergies; name/ confirmed; no missed doses; no new medications; no new allergies; no side effects noted; address confirmed for 4/15 shipment and  delivery. $25 copay. CCOF 6164. Patient states she has 7 doses remaining.    Patient is experiencing painful bowel movements but they are likely due to endometriosis or an internal hemorrhoid bleed. She bled bright red blood on 3/29 but has not had an episode since then. Patient is also keeping her provider in the loop. If blood persists, her provider will schedule a virtual visit. Advised patient that she can use MiraLax on a regular basis to make her stools compressible and she verbalized understanding. Will follow up with patient in 6 months, sooner if needed. Patient knows to reach out to [facility] at any time    Dionisio Corrales, Pharm.D.  Pharmacy Resident, PGY-1   [facility]

## 2020-04-20 ENCOUNTER — PATIENT MESSAGE (OUTPATIENT)
Dept: INTERNAL MEDICINE | Facility: CLINIC | Age: 35
End: 2020-04-20

## 2020-05-12 ENCOUNTER — TELEPHONE (OUTPATIENT)
Dept: PHARMACY | Facility: CLINIC | Age: 35
End: 2020-05-12

## 2020-05-16 ENCOUNTER — OFFICE VISIT (OUTPATIENT)
Dept: FAMILY MEDICINE | Facility: CLINIC | Age: 35
End: 2020-05-16
Payer: COMMERCIAL

## 2020-05-16 ENCOUNTER — PATIENT MESSAGE (OUTPATIENT)
Dept: INTERNAL MEDICINE | Facility: CLINIC | Age: 35
End: 2020-05-16

## 2020-05-16 ENCOUNTER — PATIENT MESSAGE (OUTPATIENT)
Dept: OBSTETRICS AND GYNECOLOGY | Facility: CLINIC | Age: 35
End: 2020-05-16

## 2020-05-16 DIAGNOSIS — Z76.0 ENCOUNTER FOR MEDICATION REFILL: ICD-10-CM

## 2020-05-16 DIAGNOSIS — Z76.0 ENCOUNTER FOR MEDICATION REFILL: Primary | ICD-10-CM

## 2020-05-16 PROCEDURE — 99212 PR OFFICE/OUTPT VISIT, EST, LEVL II, 10-19 MIN: ICD-10-PCS | Mod: 95,,, | Performed by: NURSE PRACTITIONER

## 2020-05-16 PROCEDURE — 99212 OFFICE O/P EST SF 10 MIN: CPT | Mod: 95,,, | Performed by: NURSE PRACTITIONER

## 2020-05-16 RX ORDER — NORGESTIMATE AND ETHINYL ESTRADIOL 0.25-0.035
1 KIT ORAL DAILY
Qty: 84 TABLET | Refills: 0 | Status: SHIPPED | OUTPATIENT
Start: 2020-05-16 | End: 2020-05-18 | Stop reason: SDUPTHER

## 2020-05-16 NOTE — PATIENT INSTRUCTIONS
Birth Control: The Pill    Birth control pills contain hormones that help prevent pregnancy. The pills are prescribed by your healthcare provider. There are many types of birth control pills available. If you have side effects from one type of pill, tell your healthcare provider. He or she may be able to prescribe a pill that works better for you.  Pregnancy rates  Talk to your healthcare provider about the effectiveness of this birth control method.  Using the pill  · Take one pill daily. Take it at around the same time each day.  · Follow your healthcare providers guidelines on when to start your first pack of pills. You may need to use another form of birth control for a week or more after you start.  · Know what to do if you forget to take a pill. (Consult your healthcare provider or check the package.) If you miss more than one pill, you may need to use a backup method of birth control for a week or more.  Pros  · Low pregnancy rate  · No interruption to sex  · Easy to use  · Can help make periods more regular  · May lower your risk of ovarian cysts and certain cancers  · May decrease menstrual cramps, menstrual flow, and acne  Cons  · Does not protect against sexually transmitted infection (STIs)  · Requires taking a pill on time each day  · May not work as well when taken with certain other medicines (check with your pharmacist)  · May cause side effects such as nausea, irregular bleeding, headaches, breast tenderness, fatigue, or mood changes (these often go away within 3 months)  · May increase the risk of blood clots, heart attack, and stroke  The pill may not be for you  The pill may not be for you if:  · You are a smoker and over age 35  · You have high blood pressure or gallbladder, liver, cerebrovascular  or heart disease  · You have diabetes, migraines, blood clot in the vein or artery, lupus, depression, certain lipid disorders, or take medicines that interfere with the pill  In these cases,  discuss the risks with your healthcare provider.  Date Last Reviewed: 3/1/2017  © 5965-8026 [employer]. [address] [place] [address], [place] [address]. All rights reserved. This information is not intended as a substitute for professional medical care. Always follow your healthcare professional's instructions.    Your birth control pills have been refilled for 1 pack.  Please call your woman's health provider and schedule your PAP smear, due last month.  If you have concerns or questions, please do not hesitate to call.  If you have any questions, please do not hesitate to call.  You can reach us at [phone] Monday through Friday 7 a.m. to 6:30 p.m., Saturday 9 a.m. to 4:30 p.m. and Sunday 9 a.m to 2:30 p.m.  Or call your gynecologist.    Thank you for using the [facility]!    RICARDO Martin, CNP, FNP-BC [facility]    To rate your experience with GENEVIEVE Martin, click on the link below:      [email or web address]

## 2020-05-16 NOTE — PROGRESS NOTES
Ms. Serna is a 35 y.o. female patient of Nih Shearer MD who presents to the clinic today for No chief complaint on file.    The patient location is: [place]  The chief complaint leading to consultation is: out of BCPs today  Visit type: audiovisual  Total time spent with patient: 5 minutes  Each patient to whom he or she provides medical services by telemedicine is:  (1) informed of the relationship between the physician and patient and the respective role of any other health care provider with respect to management of the patient; and (2) notified that he or she may decline to receive medical services by telemedicine and may withdraw from such care at any time.      HPI    Pt, who is not known to me, reports a new problem to me: need for BCP refill      Answers for HPI/ROS submitted by the patient on 5/16/2020   activity change: No  unexpected weight change: No  neck pain: No  hearing loss: No  rhinorrhea: No  trouble swallowing: No  eye discharge: No  visual disturbance: No  chest tightness: No  wheezing: No  chest pain: No  palpitations: No  blood in stool: No  constipation: No  vomiting: No  diarrhea: No  polydipsia: No  polyuria: No  difficulty urinating: No  hematuria: No  menstrual problem: No  dysuria: No  joint swelling: No  arthralgias: No  headaches: No  weakness: No  confusion: No  dysphoric mood: No      These symptoms began n/a--pt has no sxs. Still having some pelvic pain but sxs controlled withBCPs.  Pt denies the following symptoms: change in menses or pelvic sxs.    Aggravating factors include n/a    Relieving factors include continuous BCP..    OTC Medications tried are none n/a .    Prescription medications taken for symptoms are Femynor.    Pertinent history:  H/o pelvic pain, possibly endometriosis.  Last pap 2017, due this year..    ROS    Constitutional: No fever.    ROS neg.      Past Medical History:   Diagnosis Date    Acne     ALLERGIC RHINITIS     Allergy     Anxiety      Dysmenorrhea     Low back pain     Migraine headache     Recurrent upper respiratory infection (URI)        Current Outpatient Medications:     allerg xt,D.farinae-D.pteronys (ODACTRA) 12 SQ-HDM Subl, Place 1 tablet under the tongue once daily., Disp: 30 tablet, Rfl: 12    cetirizine (ZYRTEC) 5 MG tablet, Take 5 mg by mouth once daily., Disp: , Rfl:     clonazePAM (KLONOPIN) 0.5 MG tablet, TAKE 1/2-1 TABLET TWICE A DAY AS NEEDED FOR ANXIETY, Disp: 30 tablet, Rfl: 0    diphenhydrAMINE (BENADRYL) 25 mg capsule, Take 25 mg by mouth every 6 (six) hours as needed for Itching., Disp: , Rfl:     EPINEPHrine (EPIPEN) 0.3 mg/0.3 mL AtIn, Inject 0.3 mLs (0.3 mg total) into the muscle once as needed., Disp: 2 each, Rfl: 1    escitalopram oxalate (LEXAPRO) 10 MG tablet, TAKE 1.5 TABLETS BY MOUTH EVERY DAY, Disp: 135 tablet, Rfl: 0    fluticasone (FLONASE) 50 mcg/actuation nasal spray, 2 sprays (100 mcg total) by Each Nare route once daily. (Patient not taking: Reported on 11/1/2019), Disp: 1 Bottle, Rfl: 0    ketoconazole (NIZORAL) 2 % shampoo, Wash scalp with medicated shampoo at least 2x/week. Let sit on scalp at least 5 minutes prior to rinsing, Disp: 240 mL, Rfl: 0    norgestimate-ethinyl estradiol (FEMYNOR) 0.25-35 mg-mcg per tablet, Take 1 tablet by mouth once daily., Disp: 84 tablet, Rfl: 3    Pt is not a smoker.    ALLERGIES AND MEDICATIONS: updated and reviewed.  Review of patient's allergies indicates:   Allergen Reactions    Doxycycline      Other reaction(s): Stomach upset     Current Outpatient Medications   Medication Sig Dispense Refill    allerg xt,D.farinae-D.pteronys (ODACTRA) 12 SQ-HDM Subl Place 1 tablet under the tongue once daily. 30 tablet 12    cetirizine (ZYRTEC) 5 MG tablet Take 5 mg by mouth once daily.      clonazePAM (KLONOPIN) 0.5 MG tablet TAKE 1/2-1 TABLET TWICE A DAY AS NEEDED FOR ANXIETY 30 tablet 0    diphenhydrAMINE (BENADRYL) 25 mg capsule Take 25 mg by mouth every 6 (six)  hours as needed for Itching.      EPINEPHrine (EPIPEN) 0.3 mg/0.3 mL AtIn Inject 0.3 mLs (0.3 mg total) into the muscle once as needed. 2 each 1    escitalopram oxalate (LEXAPRO) 10 MG tablet TAKE 1.5 TABLETS BY MOUTH EVERY  tablet 0    fluticasone (FLONASE) 50 mcg/actuation nasal spray 2 sprays (100 mcg total) by Each Nare route once daily. (Patient not taking: Reported on 11/1/2019) 1 Bottle 0    ketoconazole (NIZORAL) 2 % shampoo Wash scalp with medicated shampoo at least 2x/week. Let sit on scalp at least 5 minutes prior to rinsing 240 mL 0    norgestimate-ethinyl estradiol (FEMYNOR) 0.25-35 mg-mcg per tablet Take 1 tablet by mouth once daily. 84 tablet 3     No current facility-administered medications for this visit.          PHYSICAL EXAM    Alert, coop 35 y.o. female patient in no acute distress, is not ill-appearing.    There were no vitals filed for this visit.  Medications & PMH all reviewed today.    PE  Vitals: Not taken per patient  Gen:   Well developed, well-nourished in NAD  Psych:   Behavior and affect appropriate for situation and setting.  HENT:   Normocephalic, atraumatic,  Symmetrical facial movements.    Eyes without visible discharge or swelling.  No sneezing during the visit.  Voice steady, no hoarseness noted.  Resp:   Normal respiratory effort  No retractions  No increased work of breathing  No audible wheezes  Talks in full sentences.  Neuro:  Responds promptly to questions showing good insight.  MSK:  Moves head and upper extremities evenly.  Skin:   No observed rashes/bruises    Encounter for medication refill  -     norgestimate-ethinyl estradioL (FEMYNOR) 0.25-35 mg-mcg per tablet; Take 1 tablet by mouth once daily.  Dispense: 84 tablet; Refill: 0        Pt today presents with report of need for RF of BCPs.  Doing well on the regimen.  .    On exam the patient appears healthy, in NAD.    History and exam consistent with well woman request RF of BCPs .    This is a new  problem to me.  No work up is planned.        I have reviewed the following additional tests today: pap 2017 neg    Advised to schedule WWE.  States she will once in-person visits resume during this Covid-19 time.   I advised her they are resuming this week.  Pt advised to perform comfort measures recommended on patient instruction sheet .    Explained exam findings, diagnosis and treatment plan to patient  Questions answered and patient states understanding.

## 2020-05-16 NOTE — Clinical Note
Nhi Shearer MD,  I saw your patient today in [facility]. If you have any questions, please do not hesitate to contact me.  Thank you!  GENEVIEVE Martin, [facility]

## 2020-05-17 ENCOUNTER — PATIENT MESSAGE (OUTPATIENT)
Dept: ALLERGY | Facility: CLINIC | Age: 35
End: 2020-05-17

## 2020-05-18 ENCOUNTER — PATIENT MESSAGE (OUTPATIENT)
Dept: INTERNAL MEDICINE | Facility: CLINIC | Age: 35
End: 2020-05-18

## 2020-05-18 RX ORDER — NORGESTIMATE AND ETHINYL ESTRADIOL 0.25-0.035
1 KIT ORAL DAILY
Qty: 84 TABLET | Refills: 0 | Status: SHIPPED | OUTPATIENT
Start: 2020-05-18 | End: 2020-10-26 | Stop reason: SDUPTHER

## 2020-05-29 DIAGNOSIS — F43.10 PTSD (POST-TRAUMATIC STRESS DISORDER): ICD-10-CM

## 2020-05-29 DIAGNOSIS — F33.9 RECURRENT MAJOR DEPRESSIVE DISORDER, REMISSION STATUS UNSPECIFIED: ICD-10-CM

## 2020-05-29 DIAGNOSIS — F41.1 GAD (GENERALIZED ANXIETY DISORDER): ICD-10-CM

## 2020-05-29 RX ORDER — ESCITALOPRAM OXALATE 10 MG/1
TABLET ORAL
Qty: 135 TABLET | Refills: 0 | Status: SHIPPED | OUTPATIENT
Start: 2020-05-29 | End: 2020-10-02

## 2020-06-15 ENCOUNTER — TELEPHONE (OUTPATIENT)
Dept: PHARMACY | Facility: CLINIC | Age: 35
End: 2020-06-15

## 2020-07-16 ENCOUNTER — TELEPHONE (OUTPATIENT)
Dept: PHARMACY | Facility: CLINIC | Age: 35
End: 2020-07-16

## 2020-07-23 ENCOUNTER — TELEPHONE (OUTPATIENT)
Dept: PHARMACY | Facility: CLINIC | Age: 35
End: 2020-07-23

## 2020-07-27 ENCOUNTER — TELEPHONE (OUTPATIENT)
Dept: PHARMACY | Facility: CLINIC | Age: 35
End: 2020-07-27

## 2020-08-19 ENCOUNTER — TELEPHONE (OUTPATIENT)
Dept: PHARMACY | Facility: CLINIC | Age: 35
End: 2020-08-19

## 2020-09-03 NOTE — TELEPHONE ENCOUNTER
Call attempt 4- Odactra refill. No Answer. LVM. MyChart not read. Sent postcard. inBasket provider.      Severo Schwarz, PharmD  [facility]

## 2020-09-11 ENCOUNTER — TELEPHONE (OUTPATIENT)
Dept: ALLERGY | Facility: CLINIC | Age: 35
End: 2020-09-11

## 2020-09-11 NOTE — TELEPHONE ENCOUNTER
----- Message from Severo Schwarz, Ian sent at 9/3/2020  1:40 PM CDT -----  Regarding: Shelby Melara and Staff,    [facility] has been attempting to reach Ms. Serna regarding prescription for Sari. After numerous unsuccessful attempts, we are sending a postcard to the address on file. At this point in time, no further contact will be made from [facility] until patient responds to our mail correspondence.    If there is anything else we can do to further assist, please let us know.     Thanks,  Severo Schwarz, PharmD  [facility]  P: [phone]

## 2020-09-11 NOTE — TELEPHONE ENCOUNTER
Tried to reach out to patient, no answer, message left for patient to return our call.  Pharmacy hasn't been able to reach patient regarding the refill for Odactra.

## 2020-09-14 ENCOUNTER — TELEPHONE (OUTPATIENT)
Dept: DERMATOLOGY | Facility: CLINIC | Age: 35
End: 2020-09-14

## 2020-09-14 ENCOUNTER — PATIENT OUTREACH (OUTPATIENT)
Dept: ADMINISTRATIVE | Facility: OTHER | Age: 35
End: 2020-09-14

## 2020-09-14 ENCOUNTER — OFFICE VISIT (OUTPATIENT)
Dept: DERMATOLOGY | Facility: CLINIC | Age: 35
End: 2020-09-14
Payer: COMMERCIAL

## 2020-09-14 VITALS — TEMPERATURE: 97 F

## 2020-09-14 DIAGNOSIS — L81.9 POSTINFLAMMATORY PIGMENTARY CHANGES: ICD-10-CM

## 2020-09-14 DIAGNOSIS — Q82.8 LICHEN SPINULOSUS: Primary | ICD-10-CM

## 2020-09-14 DIAGNOSIS — L85.8 KERATOSIS PILARIS: ICD-10-CM

## 2020-09-14 PROCEDURE — 99214 OFFICE O/P EST MOD 30 MIN: CPT | Mod: S$GLB,,, | Performed by: DERMATOLOGY

## 2020-09-14 PROCEDURE — 99214 PR OFFICE/OUTPT VISIT, EST, LEVL IV, 30-39 MIN: ICD-10-PCS | Mod: S$GLB,,, | Performed by: DERMATOLOGY

## 2020-09-14 RX ORDER — UREA 40 %
CREAM (GRAM) TOPICAL
Qty: 28 G | Refills: 1 | Status: SHIPPED | OUTPATIENT
Start: 2020-09-14

## 2020-09-14 NOTE — PROGRESS NOTES
Subjective:       Patient ID:  Sari Serna is a 35 y.o. female who presents for   Chief Complaint   Patient presents with    Lesion    Spot     Lesion - Initial  Affected locations: chest  Duration: 1 week (in this location, started 1 month ago and moves around)  Signs / symptoms: asymptomatic  Severity: mild  Timing: constant  Aggravated by: nothing  Relieving factors/Treatments tried: nothing    Spot - Initial  Affected locations: back  Duration: 3 weeks  Signs / symptoms: redness, itching and irritated  Severity: mild to moderate  Timing: constant  Aggravated by: nothing  Relieving factors/Treatments tried: nothing      Review of Systems   Constitutional: Negative for fever.   Respiratory: Negative for cough and shortness of breath.    Musculoskeletal: Negative for joint swelling and arthralgias.   Skin: Negative for recent sunburn.   Hematologic/Lymphatic: Does not bruise/bleed easily.        Objective:    Physical Exam   Constitutional: She appears well-developed and well-nourished. No distress.   Neurological: She is alert and oriented to person, place, and time. She is not disoriented.   Psychiatric: She has a normal mood and affect.   Skin:   Areas Examined (abnormalities noted in diagram):   Head / Face Inspection Performed  Neck Inspection Performed  Chest / Axilla Inspection Performed  Back Inspection Performed  RUE Inspected  LUE Inspection Performed              Diagram Legend     Erythematous scaling macule/papule c/w actinic keratosis       Vascular papule c/w angioma      Pigmented verrucoid papule/plaque c/w seborrheic keratosis      Yellow umbilicated papule c/w sebaceous hyperplasia      Irregularly shaped tan macule c/w lentigo     1-2 mm smooth white papules consistent with Milia      Movable subcutaneous cyst with punctum c/w epidermal inclusion cyst      Subcutaneous movable cyst c/w pilar cyst      Firm pink to brown papule c/w dermatofibroma      Pedunculated fleshy papule(s)  c/w skin tag(s)      Evenly pigmented macule c/w junctional nevus     Mildly variegated pigmented, slightly irregular-bordered macule c/w mildly atypical nevus      Flesh colored to evenly pigmented papule c/w intradermal nevus       Pink pearly papule/plaque c/w basal cell carcinoma      Erythematous hyperkeratotic cursted plaque c/w SCC      Surgical scar with no sign of skin cancer recurrence      Open and closed comedones      Inflammatory papules and pustules      Verrucoid papule consistent consistent with wart     Erythematous eczematous patches and plaques     Dystrophic onycholytic nail with subungual debris c/w onychomycosis     Umbilicated papule    Erythematous-base heme-crusted tan verrucoid plaque consistent with inflamed seborrheic keratosis     Erythematous Silvery Scaling Plaque c/w Psoriasis     See annotation      Assessment / Plan:        Lichen spinulosus  -     urea (CARMOL) 40 % Crea; AAA BID  Dispense: 28 g; Refill: 1  Informational brochure on the condition was provided to the patient.    Keratosis pilaris  This condition is present in approximately 10% of the population and is a normal skin variation. It may be a sign of having sensitive skin and tends to run in families. There is no cure, but some measures can improve the condition.   Recommended using a mild cleanser and a daily moisturizer that contains ammonium lactate or salicyclic acid, such as AmLactin or CeraVe SA lotion/cream.    Postinflammatory pigmentary changes  This is a normal discoloration of the skin after an inflammatory process has resolved. It may take months to years to fade.  Patient instructed on importance of daily sun protection with a broad-spectrum sunscreen of SPF 30 or higher. Sun avoidance and topical protection discussed.     Follow up if symptoms worsen or fail to improve.

## 2020-09-23 ENCOUNTER — OFFICE VISIT (OUTPATIENT)
Dept: INTERNAL MEDICINE | Facility: CLINIC | Age: 35
End: 2020-09-23
Payer: COMMERCIAL

## 2020-09-23 VITALS
WEIGHT: 131.38 LBS | HEART RATE: 85 BPM | DIASTOLIC BLOOD PRESSURE: 60 MMHG | SYSTOLIC BLOOD PRESSURE: 120 MMHG | OXYGEN SATURATION: 98 % | HEIGHT: 63 IN | TEMPERATURE: 98 F | BODY MASS INDEX: 23.28 KG/M2

## 2020-09-23 DIAGNOSIS — S16.1XXA STRAIN OF NECK MUSCLE, INITIAL ENCOUNTER: Primary | ICD-10-CM

## 2020-09-23 PROCEDURE — 3008F BODY MASS INDEX DOCD: CPT | Mod: CPTII,S$GLB,, | Performed by: INTERNAL MEDICINE

## 2020-09-23 PROCEDURE — 3008F PR BODY MASS INDEX (BMI) DOCUMENTED: ICD-10-PCS | Mod: CPTII,S$GLB,, | Performed by: INTERNAL MEDICINE

## 2020-09-23 PROCEDURE — 99999 PR PBB SHADOW E&M-EST. PATIENT-LVL IV: CPT | Mod: PBBFAC,,, | Performed by: INTERNAL MEDICINE

## 2020-09-23 PROCEDURE — 99213 PR OFFICE/OUTPT VISIT, EST, LEVL III, 20-29 MIN: ICD-10-PCS | Mod: S$GLB,,, | Performed by: INTERNAL MEDICINE

## 2020-09-23 PROCEDURE — 99999 PR PBB SHADOW E&M-EST. PATIENT-LVL IV: ICD-10-PCS | Mod: PBBFAC,,, | Performed by: INTERNAL MEDICINE

## 2020-09-23 PROCEDURE — 99213 OFFICE O/P EST LOW 20 MIN: CPT | Mod: S$GLB,,, | Performed by: INTERNAL MEDICINE

## 2020-09-23 RX ORDER — CYCLOBENZAPRINE HCL 10 MG
10 TABLET ORAL 3 TIMES DAILY PRN
Qty: 15 TABLET | Refills: 0 | Status: SHIPPED | OUTPATIENT
Start: 2020-09-23 | End: 2020-10-03

## 2020-09-23 NOTE — PROGRESS NOTES
"Subjective:       Patient ID: Sari Serna is a 35 y.o. female.    Chief Complaint: Neck Pain      Neck pain  Onsetl; yesteray  Location; bilateral R?L    Duration; no without movement worse with moemnt   Scale; 7/10   Description ;  Sharp   Alleviation; ibuprofn   Assoicates  Denies  Previous epidsoes  Surgery; no prior surgery  RF   Red flags      HPI  Review of Systems      Objective:       /60 (BP Location: Right arm, Patient Position: Sitting, BP Method: Medium (Manual))   Pulse 85   Temp 97.9 °F (36.6 °C) (Temporal)   Ht 5' 3" (1.6 m)   Wt 59.6 kg (131 lb 6.3 oz)   SpO2 98%   BMI 23.28 kg/m²     Physical Exam      ASCVD  The ASCVD Risk score (Mariaelenamorgan HASSAN Jr., et al., 2013) failed to calculate for the following reasons:    The 2013 ASCVD risk score is only valid for ages 40 to 79    Basic Labs  CMP  Sodium   Date Value Ref Range Status   01/26/2019 138 136 - 145 mmol/L Final     Potassium   Date Value Ref Range Status   01/26/2019 4.0 3.5 - 5.1 mmol/L Final     Chloride   Date Value Ref Range Status   01/26/2019 105 95 - 110 mmol/L Final     CO2   Date Value Ref Range Status   01/26/2019 24 23 - 29 mmol/L Final     Glucose   Date Value Ref Range Status   01/26/2019 88 70 - 110 mg/dL Final     BUN, Bld   Date Value Ref Range Status   01/26/2019 10 6 - 20 mg/dL Final     Creatinine   Date Value Ref Range Status   01/26/2019 0.7 0.5 - 1.4 mg/dL Final     Calcium   Date Value Ref Range Status   01/26/2019 9.4 8.7 - 10.5 mg/dL Final     Total Protein   Date Value Ref Range Status   01/26/2019 7.6 6.0 - 8.4 g/dL Final     Albumin   Date Value Ref Range Status   01/26/2019 3.9 3.5 - 5.2 g/dL Final     Total Bilirubin   Date Value Ref Range Status   01/26/2019 0.5 0.1 - 1.0 mg/dL Final     Comment:     For infants and newborns, interpretation of results should be based  on gestational age, weight and in agreement with clinical  observations.  Premature Infant recommended reference ranges:  Up to 24 " hours.............<8.0 mg/dL  Up to 48 hours............<12.0 mg/dL  3-5 days..................<15.0 mg/dL  6-29 days.................<15.0 mg/dL       Alkaline Phosphatase   Date Value Ref Range Status   01/26/2019 71 55 - 135 U/L Final     AST   Date Value Ref Range Status   01/26/2019 21 10 - 40 U/L Final     ALT   Date Value Ref Range Status   01/26/2019 13 10 - 44 U/L Final     Anion Gap   Date Value Ref Range Status   01/26/2019 9 8 - 16 mmol/L Final     eGFR if    Date Value Ref Range Status   01/26/2019 >60.0 >60 mL/min/1.73 m^2 Final     eGFR if non    Date Value Ref Range Status   01/26/2019 >60.0 >60 mL/min/1.73 m^2 Final     Comment:     Calculation used to obtain the estimated glomerular filtration  rate (eGFR) is the CKD-EPI equation.        Lab Results   Component Value Date    WBC 5.03 01/26/2019    HGB 14.9 01/26/2019    HCT 44.6 01/26/2019    MCV 88 01/26/2019     (H) 01/26/2019       Lab Results   Component Value Date    TSH 0.407 01/26/2019         STD  No results found for: HIV1X2, KBJ16GXIT  No results found for: RPR  No results found for: HAV, HEPAIGM, HEPBIGM, HEPBCAB, HBEAG, HEPCAB    Lipids  Lab Results   Component Value Date    CHOL 240 (H) 01/26/2019     Lab Results   Component Value Date    HDL 79 (H) 01/26/2019     Lab Results   Component Value Date    LDLCALC 148.6 01/26/2019     Lab Results   Component Value Date    TRIG 62 01/26/2019     Lab Results   Component Value Date    CHOLHDL 32.9 01/26/2019       DM  Lab Results   Component Value Date    HGBA1C 5.0 01/26/2019       Assessment:       1. Strain of neck muscle, initial encounter        Plan:       Sari Strong was seen today for neck pain.    Diagnoses and all orders for this visit:    Strain of neck muscle, initial encounter\  New;uncontrolled  Signs, symptoms consistent with simple neck strain   history or pyhsical exam do not suggest fracture, discitis, epidural abcess,   I provided  instruction on supportive care measures   Rx for flexeril sent to pharmacy   reviewed signs and symptoms that should prompt return to provider or evaluation in the ED  -     cyclobenzaprine (FLEXERIL) 10 MG tablet; Take 1 tablet (10 mg total) by mouth 3 (three) times daily as needed for Muscle spasms.    No future appointments.      Medication List with Changes/Refills   New Medications    CYCLOBENZAPRINE (FLEXERIL) 10 MG TABLET    Take 1 tablet (10 mg total) by mouth 3 (three) times daily as needed for Muscle spasms.   Current Medications    ALLERG XT,D.FARINAE-D.PTERONYS (ODACTRA) 12 SQ-HDM SUBL    Place 1 tablet under the tongue once daily.    CETIRIZINE (ZYRTEC) 5 MG TABLET    Take 5 mg by mouth once daily.    CLONAZEPAM (KLONOPIN) 0.5 MG TABLET    TAKE 1/2-1 TABLET TWICE A DAY AS NEEDED FOR ANXIETY    DIPHENHYDRAMINE (BENADRYL) 25 MG CAPSULE    Take 25 mg by mouth every 6 (six) hours as needed for Itching.    EPINEPHRINE (EPIPEN) 0.3 MG/0.3 ML ATIN    Inject 0.3 mLs (0.3 mg total) into the muscle once as needed.    ESCITALOPRAM OXALATE (LEXAPRO) 10 MG TABLET    TAKE 1 AND 1/2 TABLETS BY MOUTH EVERY DAY    FLUTICASONE (FLONASE) 50 MCG/ACTUATION NASAL SPRAY    2 sprays (100 mcg total) by Each Nare route once daily.    KETOCONAZOLE (NIZORAL) 2 % SHAMPOO    Wash scalp with medicated shampoo at least 2x/week. Let sit on scalp at least 5 minutes prior to rinsing    NORGESTIMATE-ETHINYL ESTRADIOL (FEMYNOR) 0.25-35 MG-MCG PER TABLET    Take 1 tablet by mouth once daily.    UREA (CARMOL) 40 % CREA    AAA BID         Disclaimer:  This note has been generated using voice-recognition software. There may be grammatical or spelling errors that have been missed during proof-reading

## 2020-10-06 ENCOUNTER — OCCUPATIONAL HEALTH (OUTPATIENT)
Dept: URGENT CARE | Facility: CLINIC | Age: 35
End: 2020-10-06
Payer: COMMERCIAL

## 2020-10-06 DIAGNOSIS — R50.9 FEVER, UNSPECIFIED FEVER CAUSE: Primary | ICD-10-CM

## 2020-10-06 LAB
CTP QC/QA: YES
SARS-COV-2 RDRP RESP QL NAA+PROBE: NEGATIVE

## 2020-10-06 PROCEDURE — U0002 COVID-19 LAB TEST NON-CDC: HCPCS | Mod: QW,S$GLB,, | Performed by: PREVENTIVE MEDICINE

## 2020-10-06 PROCEDURE — U0002: ICD-10-PCS | Mod: QW,S$GLB,, | Performed by: PREVENTIVE MEDICINE

## 2020-12-08 ENCOUNTER — PATIENT OUTREACH (OUTPATIENT)
Dept: ADMINISTRATIVE | Facility: OTHER | Age: 35
End: 2020-12-08

## 2020-12-08 ENCOUNTER — PATIENT MESSAGE (OUTPATIENT)
Dept: DERMATOLOGY | Facility: CLINIC | Age: 35
End: 2020-12-08

## 2020-12-10 ENCOUNTER — OFFICE VISIT (OUTPATIENT)
Dept: DERMATOLOGY | Facility: CLINIC | Age: 35
End: 2020-12-10
Payer: COMMERCIAL

## 2020-12-10 VITALS — TEMPERATURE: 98 F

## 2020-12-10 DIAGNOSIS — Z13.0 SCREENING FOR ENDOCRINE, NUTRITIONAL, METABOLIC AND IMMUNITY DISORDER: ICD-10-CM

## 2020-12-10 DIAGNOSIS — Z79.899 LONG-TERM USE OF HIGH-RISK MEDICATION: ICD-10-CM

## 2020-12-10 DIAGNOSIS — L65.9 ALOPECIA OF SCALP: Primary | ICD-10-CM

## 2020-12-10 DIAGNOSIS — Z13.228 SCREENING FOR ENDOCRINE, NUTRITIONAL, METABOLIC AND IMMUNITY DISORDER: ICD-10-CM

## 2020-12-10 DIAGNOSIS — Z13.29 SCREENING FOR ENDOCRINE, NUTRITIONAL, METABOLIC AND IMMUNITY DISORDER: ICD-10-CM

## 2020-12-10 DIAGNOSIS — L65.8 FEMALE PATTERN BALDNESS: ICD-10-CM

## 2020-12-10 DIAGNOSIS — L21.9 SEBORRHEIC DERMATITIS: ICD-10-CM

## 2020-12-10 DIAGNOSIS — Z13.21 SCREENING FOR ENDOCRINE, NUTRITIONAL, METABOLIC AND IMMUNITY DISORDER: ICD-10-CM

## 2020-12-10 PROCEDURE — 1126F PR PAIN SEVERITY QUANTIFIED, NO PAIN PRESENT: ICD-10-PCS | Mod: S$GLB,,, | Performed by: DERMATOLOGY

## 2020-12-10 PROCEDURE — 99214 PR OFFICE/OUTPT VISIT, EST, LEVL IV, 30-39 MIN: ICD-10-PCS | Mod: S$GLB,,, | Performed by: DERMATOLOGY

## 2020-12-10 PROCEDURE — 1126F AMNT PAIN NOTED NONE PRSNT: CPT | Mod: S$GLB,,, | Performed by: DERMATOLOGY

## 2020-12-10 PROCEDURE — 99214 OFFICE O/P EST MOD 30 MIN: CPT | Mod: S$GLB,,, | Performed by: DERMATOLOGY

## 2020-12-10 RX ORDER — KETOCONAZOLE 20 MG/ML
SHAMPOO, SUSPENSION TOPICAL
Qty: 240 ML | Refills: 5 | Status: SHIPPED | OUTPATIENT
Start: 2020-12-10

## 2020-12-10 RX ORDER — ESCITALOPRAM OXALATE 10 MG/1
1 TABLET ORAL
COMMUNITY
End: 2021-03-06

## 2020-12-10 RX ORDER — SPIRONOLACTONE 100 MG/1
100 TABLET, FILM COATED ORAL DAILY
Qty: 90 TABLET | Refills: 0 | Status: SHIPPED | OUTPATIENT
Start: 2020-12-10 | End: 2021-03-12

## 2020-12-10 RX ORDER — LORATADINE 10 MG/1
TABLET ORAL
COMMUNITY

## 2020-12-10 NOTE — PROGRESS NOTES
Subjective:       Patient ID:  Sari Serna is a 35 y.o. female who presents for   Chief Complaint   Patient presents with    Hair Loss     Hair Loss - Initial  Affected locations: scalp  Duration: 1 year  Signs / symptoms: itching (hair thinning, can see scalp more easily; she has not noticed increased shedding)  Severity: mild to moderate  Timing: intermittent  Aggravated by: stress  Treatments tried: ketoconazole shampoo.  Improvement on treatment: no relief      Review of Systems   Constitutional: Negative for fever.   Respiratory: Negative for cough and shortness of breath.    Genitourinary: Positive for irregular periods.   Musculoskeletal: Negative for joint swelling and arthralgias.   Skin: Positive for itching. Negative for rash and recent sunburn.   Hematologic/Lymphatic: Does not bruise/bleed easily.        Objective:    Physical Exam   Constitutional: She appears well-developed and well-nourished. No distress.   HENT:   Mouth/Throat: Lips normal.    Eyes: Lids are normal.  No conjunctival no injection.   Neurological: She is alert and oriented to person, place, and time. She is not disoriented.   Psychiatric: She has a normal mood and affect.   Skin:   Areas Examined (abnormalities noted in diagram):   Scalp / Hair Palpated and Inspected  Head / Face Inspection Performed  Neck Inspection Performed              Diagram Legend     Erythematous scaling macule/papule c/w actinic keratosis       Vascular papule c/w angioma      Pigmented verrucoid papule/plaque c/w seborrheic keratosis      Yellow umbilicated papule c/w sebaceous hyperplasia      Irregularly shaped tan macule c/w lentigo     1-2 mm smooth white papules consistent with Milia      Movable subcutaneous cyst with punctum c/w epidermal inclusion cyst      Subcutaneous movable cyst c/w pilar cyst      Firm pink to brown papule c/w dermatofibroma      Pedunculated fleshy papule(s) c/w skin tag(s)      Evenly pigmented macule c/w  junctional nevus     Mildly variegated pigmented, slightly irregular-bordered macule c/w mildly atypical nevus      Flesh colored to evenly pigmented papule c/w intradermal nevus       Pink pearly papule/plaque c/w basal cell carcinoma      Erythematous hyperkeratotic cursted plaque c/w SCC      Surgical scar with no sign of skin cancer recurrence      Open and closed comedones      Inflammatory papules and pustules      Verrucoid papule consistent consistent with wart     Erythematous eczematous patches and plaques     Dystrophic onycholytic nail with subungual debris c/w onychomycosis     Umbilicated papule    Erythematous-base heme-crusted tan verrucoid plaque consistent with inflamed seborrheic keratosis     Erythematous Silvery Scaling Plaque c/w Psoriasis     See annotation      Assessment / Plan:        Alopecia of scalp  Screening for endocrine, nutritional, metabolic and immunity disorder  -     CBC Auto Differential; Future; Expected date: 12/10/2020  -     TSH; Future; Expected date: 12/10/2020  -     T4, Free; Future; Expected date: 12/10/2020  -     T3, Free; Future; Expected date: 12/10/2020  -     Ferritin; Future  -     Vitamin D; Future  -     Testosterone, Free; Future; Expected date: 12/10/2020  -     Comprehensive Metabolic Panel; Future; Expected date: 12/10/2020  -     DHEA-Sulfate; Future; Expected date: 12/10/2020  -     Testosterone; Future; Expected date: 12/10/2020  -     Dihydrotestosterone; Future; Expected date: 12/10/2020    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  Dispense: 90 tablet; Refill: 0  -     Basic Metabolic Panel; Future; Expected date: 01/10/2021  Discussed benefits and risks of spironolactone including but not limited to breakthrough bleeding, breast tenderness, and elevated potassium levels which may give symptoms of fatigue, palpitations, and nausea.  If patient's blood pressure runs low, this med could give symptoms of low blood pressure including lightheadedness or dizziness. Patient should limit potassium intake - avoid potassium supplements or salt substitutes and limit bananas, coconut water, and citrus fruits. Pregnancy must be avoided while taking spironolactone. If high dose or prolonged use of NSAIDS are needed, be sure to stay well hydrated.    Recheck BMP in 1 mo due to SSRI.    Recommend a trial of topical minoxidil 5% foam (Rogaine). Must use for at least 6 months to see the full effect. If regrowth occurs, continue to use it as directed or the new growth will thin again. Only apply to areas on scalp with hair loss, and discontinue if redness or irritation occurs.    Seborrheic dermatitis  -     ketoconazole (NIZORAL) 2 % shampoo; Wash scalp with medicated shampoo at least 2x/week. Let sit on scalp at least 5 minutes prior to rinsing  Dispense: 240 mL; Refill: 5      Follow up in about 3 months (around 3/10/2021) for follow up, or sooner if symptoms worsening or not improving.

## 2020-12-10 NOTE — PATIENT INSTRUCTIONS
PATIENT INFORMATION ON SPIRONOLACTONE    Spironolactone is a successful treatment for hormonal acne and/or excessive hair growth, and most patients do very well with this medication. Often it can take 3-4 months to notice improvement in acne and 6 -12 months to noticed decreased hair growth.    Most patient do not report any side effects. Common side effects include increased urination, menstrual irregularities, breast tenderness, breast enlargement, and headache. Some patients experience fatigue and dizziness. If this does occur, please let us know, and we can adjust how you take the medication.     Please stop this drug if:  · You become pregnant. This medication can cause birth defects (although human studies have not confirmed this, animal studies have shown that uncommonly male fetuses' genitals do not develop properly).   · You are prescribed the following medications:   · Bactrim (trimethoprim-sulfamethoxazole), a common antibiotic  · Lithium  · Digoxin   You develop a skin rash or blisters.   You notice your heart racing or irregular heartbeats.    There was previously controversy around the use of spironolactone and the development of breast cancer. This is because in animal studies, there was an increased risk of benign (noncancerous) tumors in rats. However, a large (2.4 million women) study in [place] confirms that this medication does not increase the risk of cancerous or noncancerous breast tumors. The medication may lead to gynecomastia (increased breast size), but not breast cancer, which is why we do not use this medication in men.    This medication can increase potassium levels, so please avoid eating excessive amounts of foods which have high potassium levels, such as bananas, orange juice, avocados, cooked spinach, and sweet potatoes. We may or may not monitor your potassium level while adjusting the dose of the medication. Typically, we monitor potassium in people over 40 years old, those  with restrictive diets (i.e. vegan), people with a history of kidney disease, people with a history of cardiac disease, those on  blood pressure medications (such as ACE inhibitors, angiotensin receptor blockers, aldosterone blockers), NSAIDs (such as ibuprofen, naproxen, indomethacin), and potassium supplements.    If you are on a selective serotonin reuptake inhibitor (SSRI; such as citalopram, escitalopram, fluoxetine, paroxetine, sertraline, etc.), we will need to monitor your sodium levels.

## 2020-12-11 ENCOUNTER — LAB VISIT (OUTPATIENT)
Dept: LAB | Facility: HOSPITAL | Age: 35
End: 2020-12-11
Attending: DERMATOLOGY
Payer: COMMERCIAL

## 2020-12-11 DIAGNOSIS — Z13.29 SCREENING FOR ENDOCRINE, NUTRITIONAL, METABOLIC AND IMMUNITY DISORDER: ICD-10-CM

## 2020-12-11 DIAGNOSIS — Z13.228 SCREENING FOR ENDOCRINE, NUTRITIONAL, METABOLIC AND IMMUNITY DISORDER: ICD-10-CM

## 2020-12-11 DIAGNOSIS — Z13.21 SCREENING FOR ENDOCRINE, NUTRITIONAL, METABOLIC AND IMMUNITY DISORDER: ICD-10-CM

## 2020-12-11 DIAGNOSIS — Z13.0 SCREENING FOR ENDOCRINE, NUTRITIONAL, METABOLIC AND IMMUNITY DISORDER: ICD-10-CM

## 2020-12-11 DIAGNOSIS — L65.9 ALOPECIA OF SCALP: ICD-10-CM

## 2020-12-11 LAB
ALBUMIN SERPL BCP-MCNC: 3.9 G/DL (ref 3.5–5.2)
ALP SERPL-CCNC: 62 U/L (ref 55–135)
ALT SERPL W/O P-5'-P-CCNC: 13 U/L (ref 10–44)
ANION GAP SERPL CALC-SCNC: 9 MMOL/L (ref 8–16)
AST SERPL-CCNC: 19 U/L (ref 10–40)
BASOPHILS # BLD AUTO: 0.03 K/UL (ref 0–0.2)
BASOPHILS NFR BLD: 0.4 % (ref 0–1.9)
BILIRUB SERPL-MCNC: 0.3 MG/DL (ref 0.1–1)
BUN SERPL-MCNC: 13 MG/DL (ref 6–20)
CALCIUM SERPL-MCNC: 8.8 MG/DL (ref 8.7–10.5)
CHLORIDE SERPL-SCNC: 104 MMOL/L (ref 95–110)
CO2 SERPL-SCNC: 24 MMOL/L (ref 23–29)
CREAT SERPL-MCNC: 0.7 MG/DL (ref 0.5–1.4)
DIFFERENTIAL METHOD: ABNORMAL
EOSINOPHIL # BLD AUTO: 0.1 K/UL (ref 0–0.5)
EOSINOPHIL NFR BLD: 1.9 % (ref 0–8)
ERYTHROCYTE [DISTWIDTH] IN BLOOD BY AUTOMATED COUNT: 12.6 % (ref 11.5–14.5)
EST. GFR  (AFRICAN AMERICAN): >60 ML/MIN/1.73 M^2
EST. GFR  (NON AFRICAN AMERICAN): >60 ML/MIN/1.73 M^2
FERRITIN SERPL-MCNC: 50 NG/ML (ref 20–300)
GLUCOSE SERPL-MCNC: 84 MG/DL (ref 70–110)
HCT VFR BLD AUTO: 42 % (ref 37–48.5)
HGB BLD-MCNC: 13.7 G/DL (ref 12–16)
IMM GRANULOCYTES # BLD AUTO: 0.01 K/UL (ref 0–0.04)
IMM GRANULOCYTES NFR BLD AUTO: 0.1 % (ref 0–0.5)
LYMPHOCYTES # BLD AUTO: 2.8 K/UL (ref 1–4.8)
LYMPHOCYTES NFR BLD: 40.7 % (ref 18–48)
MCH RBC QN AUTO: 28.9 PG (ref 27–31)
MCHC RBC AUTO-ENTMCNC: 32.6 G/DL (ref 32–36)
MCV RBC AUTO: 89 FL (ref 82–98)
MONOCYTES # BLD AUTO: 0.5 K/UL (ref 0.3–1)
MONOCYTES NFR BLD: 6.5 % (ref 4–15)
NEUTROPHILS # BLD AUTO: 3.5 K/UL (ref 1.8–7.7)
NEUTROPHILS NFR BLD: 50.4 % (ref 38–73)
NRBC BLD-RTO: 0 /100 WBC
PLATELET # BLD AUTO: 365 K/UL (ref 150–350)
PMV BLD AUTO: 10.1 FL (ref 9.2–12.9)
POTASSIUM SERPL-SCNC: 4 MMOL/L (ref 3.5–5.1)
PROT SERPL-MCNC: 7.5 G/DL (ref 6–8.4)
RBC # BLD AUTO: 4.74 M/UL (ref 4–5.4)
SODIUM SERPL-SCNC: 137 MMOL/L (ref 136–145)
T3FREE SERPL-MCNC: 2.7 PG/ML (ref 2.3–4.2)
T4 FREE SERPL-MCNC: 0.96 NG/DL (ref 0.71–1.51)
TSH SERPL DL<=0.005 MIU/L-ACNC: 0.89 UIU/ML (ref 0.4–4)
WBC # BLD AUTO: 6.97 K/UL (ref 3.9–12.7)

## 2020-12-11 PROCEDURE — 84439 ASSAY OF FREE THYROXINE: CPT

## 2020-12-11 PROCEDURE — 84403 ASSAY OF TOTAL TESTOSTERONE: CPT

## 2020-12-11 PROCEDURE — 84402 ASSAY OF FREE TESTOSTERONE: CPT

## 2020-12-11 PROCEDURE — 82627 DEHYDROEPIANDROSTERONE: CPT

## 2020-12-11 PROCEDURE — 84443 ASSAY THYROID STIM HORMONE: CPT

## 2020-12-11 PROCEDURE — 85025 COMPLETE CBC W/AUTO DIFF WBC: CPT

## 2020-12-11 PROCEDURE — 82642 DIHYDROTESTOSTERONE: CPT

## 2020-12-11 PROCEDURE — 82306 VITAMIN D 25 HYDROXY: CPT

## 2020-12-11 PROCEDURE — 82728 ASSAY OF FERRITIN: CPT

## 2020-12-11 PROCEDURE — 80053 COMPREHEN METABOLIC PANEL: CPT

## 2020-12-11 PROCEDURE — 84481 FREE ASSAY (FT-3): CPT

## 2020-12-11 PROCEDURE — 36415 COLL VENOUS BLD VENIPUNCTURE: CPT | Mod: PO

## 2020-12-12 LAB
25(OH)D3+25(OH)D2 SERPL-MCNC: 21 NG/ML (ref 30–96)
DHEA-S SERPL-MCNC: 311 UG/DL (ref 74.8–410.2)
TESTOST SERPL-MCNC: 29 NG/DL (ref 5–73)

## 2020-12-16 ENCOUNTER — CLINICAL SUPPORT (OUTPATIENT)
Dept: URGENT CARE | Facility: CLINIC | Age: 35
End: 2020-12-16
Payer: COMMERCIAL

## 2020-12-16 DIAGNOSIS — Z20.822 COVID-19 RULED OUT: Primary | ICD-10-CM

## 2020-12-16 LAB
CTP QC/QA: YES
SARS-COV-2 RDRP RESP QL NAA+PROBE: NEGATIVE
TESTOST FREE SERPL-MCNC: 0.6 PG/ML

## 2020-12-16 PROCEDURE — U0002: ICD-10-PCS | Mod: QW,S$GLB,, | Performed by: NURSE PRACTITIONER

## 2020-12-16 PROCEDURE — U0002 COVID-19 LAB TEST NON-CDC: HCPCS | Mod: QW,S$GLB,, | Performed by: NURSE PRACTITIONER

## 2020-12-17 ENCOUNTER — PATIENT MESSAGE (OUTPATIENT)
Dept: DERMATOLOGY | Facility: CLINIC | Age: 35
End: 2020-12-17

## 2020-12-17 DIAGNOSIS — R79.0 LOW FERRITIN: ICD-10-CM

## 2020-12-17 DIAGNOSIS — E55.9 VITAMIN D DEFICIENCY: Primary | ICD-10-CM

## 2020-12-17 LAB — ANDROSTANOLONE SERPL-MCNC: 72 PG/ML

## 2020-12-18 ENCOUNTER — TELEPHONE (OUTPATIENT)
Dept: DERMATOLOGY | Facility: CLINIC | Age: 35
End: 2020-12-18

## 2020-12-18 NOTE — TELEPHONE ENCOUNTER
----- Message from Li Mata MD sent at 12/17/2020 12:08 PM CST -----  Please schedule repeat labs in 2 mo.

## 2020-12-18 NOTE — TELEPHONE ENCOUNTER
LVM informing PT that she is to repeat lab in Feb,. Asked PT to call back if she needed assistance with scheduling.

## 2021-01-04 ENCOUNTER — PATIENT MESSAGE (OUTPATIENT)
Dept: DERMATOLOGY | Facility: CLINIC | Age: 36
End: 2021-01-04

## 2021-01-15 ENCOUNTER — PATIENT MESSAGE (OUTPATIENT)
Dept: INTERNAL MEDICINE | Facility: CLINIC | Age: 36
End: 2021-01-15

## 2021-01-16 ENCOUNTER — LAB VISIT (OUTPATIENT)
Dept: LAB | Facility: HOSPITAL | Age: 36
End: 2021-01-16
Attending: INTERNAL MEDICINE
Payer: COMMERCIAL

## 2021-01-16 DIAGNOSIS — R79.0 LOW FERRITIN: ICD-10-CM

## 2021-01-16 DIAGNOSIS — E55.9 VITAMIN D DEFICIENCY: ICD-10-CM

## 2021-01-16 DIAGNOSIS — L65.8 FEMALE PATTERN BALDNESS: ICD-10-CM

## 2021-01-16 DIAGNOSIS — Z79.899 LONG-TERM USE OF HIGH-RISK MEDICATION: ICD-10-CM

## 2021-01-16 LAB
25(OH)D3+25(OH)D2 SERPL-MCNC: 47 NG/ML (ref 30–96)
ANION GAP SERPL CALC-SCNC: 10 MMOL/L (ref 8–16)
BUN SERPL-MCNC: 8 MG/DL (ref 6–20)
CALCIUM SERPL-MCNC: 9.5 MG/DL (ref 8.7–10.5)
CHLORIDE SERPL-SCNC: 103 MMOL/L (ref 95–110)
CO2 SERPL-SCNC: 26 MMOL/L (ref 23–29)
CREAT SERPL-MCNC: 0.7 MG/DL (ref 0.5–1.4)
EST. GFR  (AFRICAN AMERICAN): >60 ML/MIN/1.73 M^2
EST. GFR  (NON AFRICAN AMERICAN): >60 ML/MIN/1.73 M^2
FERRITIN SERPL-MCNC: 53 NG/ML (ref 20–300)
GLUCOSE SERPL-MCNC: 89 MG/DL (ref 70–110)
POTASSIUM SERPL-SCNC: 4.5 MMOL/L (ref 3.5–5.1)
SODIUM SERPL-SCNC: 139 MMOL/L (ref 136–145)

## 2021-01-16 PROCEDURE — 80048 BASIC METABOLIC PNL TOTAL CA: CPT

## 2021-01-16 PROCEDURE — 36415 COLL VENOUS BLD VENIPUNCTURE: CPT | Mod: PO

## 2021-01-16 PROCEDURE — 82728 ASSAY OF FERRITIN: CPT

## 2021-01-16 PROCEDURE — 82306 VITAMIN D 25 HYDROXY: CPT

## 2021-01-19 ENCOUNTER — PATIENT MESSAGE (OUTPATIENT)
Dept: DERMATOLOGY | Facility: CLINIC | Age: 36
End: 2021-01-19

## 2021-01-19 DIAGNOSIS — R79.0 LOW FERRITIN: Primary | ICD-10-CM

## 2021-02-23 ENCOUNTER — PATIENT MESSAGE (OUTPATIENT)
Dept: ADMINISTRATIVE | Facility: CLINIC | Age: 36
End: 2021-02-23

## 2021-02-23 ENCOUNTER — PATIENT MESSAGE (OUTPATIENT)
Dept: DERMATOLOGY | Facility: CLINIC | Age: 36
End: 2021-02-23

## 2021-02-26 ENCOUNTER — IMMUNIZATION (OUTPATIENT)
Dept: INTERNAL MEDICINE | Facility: CLINIC | Age: 36
End: 2021-02-26
Payer: COMMERCIAL

## 2021-02-26 DIAGNOSIS — Z23 NEED FOR VACCINATION: Primary | ICD-10-CM

## 2021-02-26 PROCEDURE — 91300 COVID-19, MRNA, LNP-S, PF, 30 MCG/0.3 ML DOSE VACCINE: ICD-10-PCS | Mod: S$GLB,,, | Performed by: INTERNAL MEDICINE

## 2021-02-26 PROCEDURE — 91300 COVID-19, MRNA, LNP-S, PF, 30 MCG/0.3 ML DOSE VACCINE: CPT | Mod: S$GLB,,, | Performed by: INTERNAL MEDICINE

## 2021-02-26 PROCEDURE — 0001A COVID-19, MRNA, LNP-S, PF, 30 MCG/0.3 ML DOSE VACCINE: CPT | Mod: CV19,S$GLB,, | Performed by: INTERNAL MEDICINE

## 2021-02-26 PROCEDURE — 0001A COVID-19, MRNA, LNP-S, PF, 30 MCG/0.3 ML DOSE VACCINE: ICD-10-PCS | Mod: CV19,S$GLB,, | Performed by: INTERNAL MEDICINE

## 2021-03-01 ENCOUNTER — TELEPHONE (OUTPATIENT)
Dept: INTERNAL MEDICINE | Facility: CLINIC | Age: 36
End: 2021-03-01

## 2021-03-06 ENCOUNTER — OFFICE VISIT (OUTPATIENT)
Dept: INTERNAL MEDICINE | Facility: CLINIC | Age: 36
End: 2021-03-06
Payer: COMMERCIAL

## 2021-03-06 ENCOUNTER — LAB VISIT (OUTPATIENT)
Dept: LAB | Facility: HOSPITAL | Age: 36
End: 2021-03-06
Attending: INTERNAL MEDICINE
Payer: COMMERCIAL

## 2021-03-06 VITALS
OXYGEN SATURATION: 98 % | DIASTOLIC BLOOD PRESSURE: 62 MMHG | BODY MASS INDEX: 23.83 KG/M2 | HEART RATE: 94 BPM | TEMPERATURE: 97 F | HEIGHT: 63 IN | SYSTOLIC BLOOD PRESSURE: 104 MMHG | WEIGHT: 134.5 LBS

## 2021-03-06 DIAGNOSIS — Z00.00 PREVENTATIVE HEALTH CARE: ICD-10-CM

## 2021-03-06 DIAGNOSIS — R20.2 NUMBNESS OR TINGLING: ICD-10-CM

## 2021-03-06 DIAGNOSIS — R20.0 NUMBNESS OR TINGLING: ICD-10-CM

## 2021-03-06 DIAGNOSIS — Z00.00 PREVENTATIVE HEALTH CARE: Primary | ICD-10-CM

## 2021-03-06 PROBLEM — R09.1 PLEURISY: Status: RESOLVED | Noted: 2018-03-10 | Resolved: 2021-03-06

## 2021-03-06 LAB
CHOLEST SERPL-MCNC: 231 MG/DL (ref 120–199)
CHOLEST/HDLC SERPL: 3.2 {RATIO} (ref 2–5)
HDLC SERPL-MCNC: 73 MG/DL (ref 40–75)
HDLC SERPL: 31.6 % (ref 20–50)
LDLC SERPL CALC-MCNC: 140.8 MG/DL (ref 63–159)
NONHDLC SERPL-MCNC: 158 MG/DL
TRIGL SERPL-MCNC: 86 MG/DL (ref 30–150)

## 2021-03-06 PROCEDURE — 1126F PR PAIN SEVERITY QUANTIFIED, NO PAIN PRESENT: ICD-10-PCS | Mod: S$GLB,,, | Performed by: INTERNAL MEDICINE

## 2021-03-06 PROCEDURE — 36415 COLL VENOUS BLD VENIPUNCTURE: CPT | Mod: PO | Performed by: INTERNAL MEDICINE

## 2021-03-06 PROCEDURE — 99999 PR PBB SHADOW E&M-EST. PATIENT-LVL IV: CPT | Mod: PBBFAC,,, | Performed by: INTERNAL MEDICINE

## 2021-03-06 PROCEDURE — 3008F BODY MASS INDEX DOCD: CPT | Mod: CPTII,S$GLB,, | Performed by: INTERNAL MEDICINE

## 2021-03-06 PROCEDURE — 86703 HIV-1/HIV-2 1 RESULT ANTBDY: CPT | Performed by: INTERNAL MEDICINE

## 2021-03-06 PROCEDURE — 86803 HEPATITIS C AB TEST: CPT | Performed by: INTERNAL MEDICINE

## 2021-03-06 PROCEDURE — 80061 LIPID PANEL: CPT | Performed by: INTERNAL MEDICINE

## 2021-03-06 PROCEDURE — 99999 PR PBB SHADOW E&M-EST. PATIENT-LVL IV: ICD-10-PCS | Mod: PBBFAC,,, | Performed by: INTERNAL MEDICINE

## 2021-03-06 PROCEDURE — 1126F AMNT PAIN NOTED NONE PRSNT: CPT | Mod: S$GLB,,, | Performed by: INTERNAL MEDICINE

## 2021-03-06 PROCEDURE — 99395 PR PREVENTIVE VISIT,EST,18-39: ICD-10-PCS | Mod: S$GLB,,, | Performed by: INTERNAL MEDICINE

## 2021-03-06 PROCEDURE — 99395 PREV VISIT EST AGE 18-39: CPT | Mod: S$GLB,,, | Performed by: INTERNAL MEDICINE

## 2021-03-06 PROCEDURE — 3008F PR BODY MASS INDEX (BMI) DOCUMENTED: ICD-10-PCS | Mod: CPTII,S$GLB,, | Performed by: INTERNAL MEDICINE

## 2021-03-08 LAB — HIV 1+2 AB+HIV1 P24 AG SERPL QL IA: NEGATIVE

## 2021-03-09 ENCOUNTER — PATIENT MESSAGE (OUTPATIENT)
Dept: INTERNAL MEDICINE | Facility: CLINIC | Age: 36
End: 2021-03-09

## 2021-03-09 LAB — HCV AB SERPL QL IA: NEGATIVE

## 2021-03-10 ENCOUNTER — PATIENT MESSAGE (OUTPATIENT)
Dept: DERMATOLOGY | Facility: CLINIC | Age: 36
End: 2021-03-10

## 2021-03-12 ENCOUNTER — OFFICE VISIT (OUTPATIENT)
Dept: DERMATOLOGY | Facility: CLINIC | Age: 36
End: 2021-03-12
Payer: COMMERCIAL

## 2021-03-12 VITALS — TEMPERATURE: 98 F

## 2021-03-12 DIAGNOSIS — Z79.899 LONG-TERM USE OF HIGH-RISK MEDICATION: ICD-10-CM

## 2021-03-12 DIAGNOSIS — L65.8 FEMALE PATTERN BALDNESS: Primary | ICD-10-CM

## 2021-03-12 DIAGNOSIS — L72.0 EIC (EPIDERMAL INCLUSION CYST): ICD-10-CM

## 2021-03-12 PROCEDURE — 1126F AMNT PAIN NOTED NONE PRSNT: CPT | Mod: S$GLB,,, | Performed by: DERMATOLOGY

## 2021-03-12 PROCEDURE — 1126F PR PAIN SEVERITY QUANTIFIED, NO PAIN PRESENT: ICD-10-PCS | Mod: S$GLB,,, | Performed by: DERMATOLOGY

## 2021-03-12 PROCEDURE — 99214 OFFICE O/P EST MOD 30 MIN: CPT | Mod: S$GLB,,, | Performed by: DERMATOLOGY

## 2021-03-12 PROCEDURE — 99214 PR OFFICE/OUTPT VISIT, EST, LEVL IV, 30-39 MIN: ICD-10-PCS | Mod: S$GLB,,, | Performed by: DERMATOLOGY

## 2021-03-12 RX ORDER — SPIRONOLACTONE 50 MG/1
150 TABLET, FILM COATED ORAL DAILY
Qty: 270 TABLET | Refills: 0 | Status: SHIPPED | OUTPATIENT
Start: 2021-03-12 | End: 2021-05-18

## 2021-03-19 ENCOUNTER — IMMUNIZATION (OUTPATIENT)
Dept: INTERNAL MEDICINE | Facility: CLINIC | Age: 36
End: 2021-03-19
Payer: COMMERCIAL

## 2021-03-19 DIAGNOSIS — Z23 NEED FOR VACCINATION: Primary | ICD-10-CM

## 2021-03-19 PROCEDURE — 91300 COVID-19, MRNA, LNP-S, PF, 30 MCG/0.3 ML DOSE VACCINE: CPT | Mod: PBBFAC | Performed by: INTERNAL MEDICINE

## 2021-03-19 PROCEDURE — 0002A COVID-19, MRNA, LNP-S, PF, 30 MCG/0.3 ML DOSE VACCINE: CPT | Mod: PBBFAC | Performed by: INTERNAL MEDICINE

## 2021-03-22 ENCOUNTER — OFFICE VISIT (OUTPATIENT)
Dept: INTERNAL MEDICINE | Facility: CLINIC | Age: 36
End: 2021-03-22
Payer: COMMERCIAL

## 2021-03-22 ENCOUNTER — LAB VISIT (OUTPATIENT)
Dept: LAB | Facility: HOSPITAL | Age: 36
End: 2021-03-22
Attending: INTERNAL MEDICINE
Payer: COMMERCIAL

## 2021-03-22 VITALS
HEIGHT: 63 IN | BODY MASS INDEX: 23.86 KG/M2 | SYSTOLIC BLOOD PRESSURE: 120 MMHG | DIASTOLIC BLOOD PRESSURE: 76 MMHG | TEMPERATURE: 97 F | WEIGHT: 134.69 LBS | HEART RATE: 101 BPM | OXYGEN SATURATION: 98 % | RESPIRATION RATE: 16 BRPM

## 2021-03-22 DIAGNOSIS — R19.7 DIARRHEA, UNSPECIFIED TYPE: ICD-10-CM

## 2021-03-22 DIAGNOSIS — R15.2 FECAL URGENCY: ICD-10-CM

## 2021-03-22 DIAGNOSIS — R19.7 DIARRHEA, UNSPECIFIED TYPE: Primary | ICD-10-CM

## 2021-03-22 PROCEDURE — 99214 PR OFFICE/OUTPT VISIT, EST, LEVL IV, 30-39 MIN: ICD-10-PCS | Mod: S$GLB,,, | Performed by: INTERNAL MEDICINE

## 2021-03-22 PROCEDURE — 99999 PR PBB SHADOW E&M-EST. PATIENT-LVL IV: CPT | Mod: PBBFAC,,, | Performed by: INTERNAL MEDICINE

## 2021-03-22 PROCEDURE — 99214 OFFICE O/P EST MOD 30 MIN: CPT | Mod: S$GLB,,, | Performed by: INTERNAL MEDICINE

## 2021-03-22 PROCEDURE — 82746 ASSAY OF FOLIC ACID SERUM: CPT | Performed by: INTERNAL MEDICINE

## 2021-03-22 PROCEDURE — 83516 IMMUNOASSAY NONANTIBODY: CPT | Performed by: INTERNAL MEDICINE

## 2021-03-22 PROCEDURE — 3008F PR BODY MASS INDEX (BMI) DOCUMENTED: ICD-10-PCS | Mod: CPTII,S$GLB,, | Performed by: INTERNAL MEDICINE

## 2021-03-22 PROCEDURE — 3008F BODY MASS INDEX DOCD: CPT | Mod: CPTII,S$GLB,, | Performed by: INTERNAL MEDICINE

## 2021-03-22 PROCEDURE — 82607 VITAMIN B-12: CPT | Performed by: INTERNAL MEDICINE

## 2021-03-22 PROCEDURE — 99999 PR PBB SHADOW E&M-EST. PATIENT-LVL IV: ICD-10-PCS | Mod: PBBFAC,,, | Performed by: INTERNAL MEDICINE

## 2021-03-22 PROCEDURE — 82784 ASSAY IGA/IGD/IGG/IGM EACH: CPT | Performed by: INTERNAL MEDICINE

## 2021-03-23 LAB
FOLATE SERPL-MCNC: 10.2 NG/ML (ref 4–24)
IGA SERPL-MCNC: 205 MG/DL (ref 40–350)
VIT B12 SERPL-MCNC: 394 PG/ML (ref 210–950)

## 2021-03-25 LAB — TTG IGA SER-ACNC: 6 UNITS

## 2021-04-10 ENCOUNTER — LAB VISIT (OUTPATIENT)
Dept: LAB | Facility: HOSPITAL | Age: 36
End: 2021-04-10
Attending: DERMATOLOGY
Payer: COMMERCIAL

## 2021-04-10 DIAGNOSIS — L65.8 FEMALE PATTERN BALDNESS: ICD-10-CM

## 2021-04-10 DIAGNOSIS — Z79.899 LONG-TERM USE OF HIGH-RISK MEDICATION: ICD-10-CM

## 2021-04-10 LAB
ANION GAP SERPL CALC-SCNC: 9 MMOL/L (ref 8–16)
BUN SERPL-MCNC: 15 MG/DL (ref 6–20)
CALCIUM SERPL-MCNC: 9.5 MG/DL (ref 8.7–10.5)
CHLORIDE SERPL-SCNC: 104 MMOL/L (ref 95–110)
CO2 SERPL-SCNC: 26 MMOL/L (ref 23–29)
CREAT SERPL-MCNC: 0.7 MG/DL (ref 0.5–1.4)
EST. GFR  (AFRICAN AMERICAN): >60 ML/MIN/1.73 M^2
EST. GFR  (NON AFRICAN AMERICAN): >60 ML/MIN/1.73 M^2
GLUCOSE SERPL-MCNC: 89 MG/DL (ref 70–110)
POTASSIUM SERPL-SCNC: 4.6 MMOL/L (ref 3.5–5.1)
SODIUM SERPL-SCNC: 139 MMOL/L (ref 136–145)

## 2021-04-10 PROCEDURE — 80048 BASIC METABOLIC PNL TOTAL CA: CPT | Performed by: DERMATOLOGY

## 2021-04-16 ENCOUNTER — OFFICE VISIT (OUTPATIENT)
Dept: INTERNAL MEDICINE | Facility: CLINIC | Age: 36
End: 2021-04-16
Payer: COMMERCIAL

## 2021-04-16 ENCOUNTER — PATIENT MESSAGE (OUTPATIENT)
Dept: INTERNAL MEDICINE | Facility: CLINIC | Age: 36
End: 2021-04-16

## 2021-04-16 DIAGNOSIS — K90.49 CARBOHYDRATE INTOLERANCE: ICD-10-CM

## 2021-04-16 DIAGNOSIS — K58.9 IRRITABLE BOWEL SYNDROME, UNSPECIFIED TYPE: Primary | ICD-10-CM

## 2021-04-16 PROCEDURE — 99213 OFFICE O/P EST LOW 20 MIN: CPT | Mod: 95,,, | Performed by: INTERNAL MEDICINE

## 2021-04-16 PROCEDURE — 99213 PR OFFICE/OUTPT VISIT, EST, LEVL III, 20-29 MIN: ICD-10-PCS | Mod: 95,,, | Performed by: INTERNAL MEDICINE

## 2021-05-14 ENCOUNTER — PATIENT MESSAGE (OUTPATIENT)
Dept: INTERNAL MEDICINE | Facility: CLINIC | Age: 36
End: 2021-05-14

## 2021-05-14 ENCOUNTER — OFFICE VISIT (OUTPATIENT)
Dept: INTERNAL MEDICINE | Facility: CLINIC | Age: 36
End: 2021-05-14
Payer: COMMERCIAL

## 2021-05-14 VITALS
WEIGHT: 129.88 LBS | BODY MASS INDEX: 23 KG/M2 | HEART RATE: 94 BPM | DIASTOLIC BLOOD PRESSURE: 84 MMHG | OXYGEN SATURATION: 98 % | SYSTOLIC BLOOD PRESSURE: 138 MMHG

## 2021-05-14 DIAGNOSIS — F41.1 GAD (GENERALIZED ANXIETY DISORDER): Primary | ICD-10-CM

## 2021-05-14 DIAGNOSIS — K90.49 CARBOHYDRATE INTOLERANCE: ICD-10-CM

## 2021-05-14 DIAGNOSIS — F33.9 RECURRENT MAJOR DEPRESSIVE DISORDER, REMISSION STATUS UNSPECIFIED: ICD-10-CM

## 2021-05-14 PROCEDURE — 99214 OFFICE O/P EST MOD 30 MIN: CPT | Mod: S$GLB,,, | Performed by: INTERNAL MEDICINE

## 2021-05-14 PROCEDURE — 3008F BODY MASS INDEX DOCD: CPT | Mod: CPTII,S$GLB,, | Performed by: INTERNAL MEDICINE

## 2021-05-14 PROCEDURE — 3008F PR BODY MASS INDEX (BMI) DOCUMENTED: ICD-10-PCS | Mod: CPTII,S$GLB,, | Performed by: INTERNAL MEDICINE

## 2021-05-14 PROCEDURE — 99999 PR PBB SHADOW E&M-EST. PATIENT-LVL III: CPT | Mod: PBBFAC,,, | Performed by: INTERNAL MEDICINE

## 2021-05-14 PROCEDURE — 1125F PR PAIN SEVERITY QUANTIFIED, PAIN PRESENT: ICD-10-PCS | Mod: S$GLB,,, | Performed by: INTERNAL MEDICINE

## 2021-05-14 PROCEDURE — 99999 PR PBB SHADOW E&M-EST. PATIENT-LVL III: ICD-10-PCS | Mod: PBBFAC,,, | Performed by: INTERNAL MEDICINE

## 2021-05-14 PROCEDURE — 99214 PR OFFICE/OUTPT VISIT, EST, LEVL IV, 30-39 MIN: ICD-10-PCS | Mod: S$GLB,,, | Performed by: INTERNAL MEDICINE

## 2021-05-14 PROCEDURE — 1125F AMNT PAIN NOTED PAIN PRSNT: CPT | Mod: S$GLB,,, | Performed by: INTERNAL MEDICINE

## 2021-06-02 NOTE — LETTER
September 19, 2019      Nhi Shearer MD  [address] [place] [address]           [place] [facility]  [address].  [place] [address]  Phone: [phone]          Patient: Sari Serna   MR Number: [id number]   YOB: 1985   Date of Visit: 9/19/2019       Dear Dr. Nhi Shearer:    Thank you for referring Sari Serna to me for evaluation. Attached you will find relevant portions of my assessment and plan of care.    If you have questions, please do not hesitate to call me. I look forward to following Sari Serna along with you.    Sincerely,    Perla Melara MD    Enclosure  CC:  No Recipients    If you would like to receive this communication electronically, please contact [email or web address] or [phone] to request more information on [employer] access.    For providers and/or their staff who would like to refer a patient to [facility], please contact us through our one-stop-shop provider referral line, [facility], at [phone].    If you feel you have received this communication in error or would no longer like to receive these types of communications, please e-mail [email or web address]         
Respiratory

## 2021-06-06 ENCOUNTER — PATIENT MESSAGE (OUTPATIENT)
Dept: INTERNAL MEDICINE | Facility: CLINIC | Age: 36
End: 2021-06-06

## 2021-06-06 DIAGNOSIS — M25.551 RIGHT HIP PAIN: Primary | ICD-10-CM

## 2021-06-07 ENCOUNTER — HOSPITAL ENCOUNTER (OUTPATIENT)
Dept: RADIOLOGY | Facility: HOSPITAL | Age: 36
Discharge: HOME | End: 2021-06-07
Attending: INTERNAL MEDICINE
Payer: COMMERCIAL

## 2021-06-07 DIAGNOSIS — M25.551 RIGHT HIP PAIN: ICD-10-CM

## 2021-06-07 PROCEDURE — 73502 XR HIP 2 VIEW RIGHT: ICD-10-PCS | Mod: 26,RT,, | Performed by: RADIOLOGY

## 2021-06-07 PROCEDURE — 73502 X-RAY EXAM HIP UNI 2-3 VIEWS: CPT | Mod: TC,PO,RT

## 2021-06-07 PROCEDURE — 73502 X-RAY EXAM HIP UNI 2-3 VIEWS: CPT | Mod: 26,RT,, | Performed by: RADIOLOGY

## 2021-06-09 ENCOUNTER — PATIENT MESSAGE (OUTPATIENT)
Dept: DERMATOLOGY | Facility: CLINIC | Age: 36
End: 2021-06-09

## 2021-06-11 ENCOUNTER — OFFICE VISIT (OUTPATIENT)
Dept: DERMATOLOGY | Facility: CLINIC | Age: 36
End: 2021-06-11
Payer: COMMERCIAL

## 2021-06-11 DIAGNOSIS — L65.8 FEMALE PATTERN BALDNESS: Primary | ICD-10-CM

## 2021-06-11 DIAGNOSIS — Z79.899 LONG-TERM USE OF HIGH-RISK MEDICATION: ICD-10-CM

## 2021-06-11 PROCEDURE — 99213 PR OFFICE/OUTPT VISIT, EST, LEVL III, 20-29 MIN: ICD-10-PCS | Mod: S$GLB,,, | Performed by: DERMATOLOGY

## 2021-06-11 PROCEDURE — 99213 OFFICE O/P EST LOW 20 MIN: CPT | Mod: S$GLB,,, | Performed by: DERMATOLOGY

## 2021-06-11 PROCEDURE — 1126F AMNT PAIN NOTED NONE PRSNT: CPT | Mod: S$GLB,,, | Performed by: DERMATOLOGY

## 2021-06-11 PROCEDURE — 1126F PR PAIN SEVERITY QUANTIFIED, NO PAIN PRESENT: ICD-10-PCS | Mod: S$GLB,,, | Performed by: DERMATOLOGY

## 2021-06-11 RX ORDER — SPIRONOLACTONE 50 MG/1
150 TABLET, FILM COATED ORAL DAILY
Qty: 90 TABLET | Refills: 5 | Status: SHIPPED | OUTPATIENT
Start: 2021-06-11 | End: 2023-05-02

## 2021-06-15 ENCOUNTER — OFFICE VISIT (OUTPATIENT)
Dept: SPORTS MEDICINE | Facility: CLINIC | Age: 36
End: 2021-06-15
Payer: COMMERCIAL

## 2021-06-15 VITALS
BODY MASS INDEX: 23.39 KG/M2 | DIASTOLIC BLOOD PRESSURE: 74 MMHG | HEIGHT: 63 IN | SYSTOLIC BLOOD PRESSURE: 130 MMHG | HEART RATE: 107 BPM | WEIGHT: 132 LBS

## 2021-06-15 DIAGNOSIS — M99.03 SOMATIC DYSFUNCTION OF LUMBAR REGION: ICD-10-CM

## 2021-06-15 DIAGNOSIS — M99.04 SOMATIC DYSFUNCTION OF SACRAL REGION: ICD-10-CM

## 2021-06-15 DIAGNOSIS — M99.05 SOMATIC DYSFUNCTION OF PELVIS REGION: ICD-10-CM

## 2021-06-15 DIAGNOSIS — M99.02 SOMATIC DYSFUNCTION OF THORACIC REGION: ICD-10-CM

## 2021-06-15 DIAGNOSIS — M25.551 RIGHT HIP PAIN: Primary | ICD-10-CM

## 2021-06-15 DIAGNOSIS — M99.06 SOMATIC DYSFUNCTION OF LOWER EXTREMITY: ICD-10-CM

## 2021-06-15 PROCEDURE — 99999 PR PBB SHADOW E&M-EST. PATIENT-LVL III: CPT | Mod: PBBFAC,,, | Performed by: ORTHOPAEDIC SURGERY

## 2021-06-15 PROCEDURE — 98927 PR OSTEOPATHIC MANIP,5-6 BODY REGN: ICD-10-PCS | Mod: S$GLB,,, | Performed by: ORTHOPAEDIC SURGERY

## 2021-06-15 PROCEDURE — 97110 PR THERAPEUTIC EXERCISES: ICD-10-PCS | Mod: S$GLB,,, | Performed by: ORTHOPAEDIC SURGERY

## 2021-06-15 PROCEDURE — 97110 THERAPEUTIC EXERCISES: CPT | Mod: S$GLB,,, | Performed by: ORTHOPAEDIC SURGERY

## 2021-06-15 PROCEDURE — 99204 PR OFFICE/OUTPT VISIT, NEW, LEVL IV, 45-59 MIN: ICD-10-PCS | Mod: 25,S$GLB,, | Performed by: ORTHOPAEDIC SURGERY

## 2021-06-15 PROCEDURE — 99204 OFFICE O/P NEW MOD 45 MIN: CPT | Mod: 25,S$GLB,, | Performed by: ORTHOPAEDIC SURGERY

## 2021-06-15 PROCEDURE — 99999 PR PBB SHADOW E&M-EST. PATIENT-LVL III: ICD-10-PCS | Mod: PBBFAC,,, | Performed by: ORTHOPAEDIC SURGERY

## 2021-06-15 PROCEDURE — 3008F BODY MASS INDEX DOCD: CPT | Mod: CPTII,S$GLB,, | Performed by: ORTHOPAEDIC SURGERY

## 2021-06-15 PROCEDURE — 3008F PR BODY MASS INDEX (BMI) DOCUMENTED: ICD-10-PCS | Mod: CPTII,S$GLB,, | Performed by: ORTHOPAEDIC SURGERY

## 2021-06-15 PROCEDURE — 98927 OSTEOPATH MANJ 5-6 REGIONS: CPT | Mod: S$GLB,,, | Performed by: ORTHOPAEDIC SURGERY

## 2021-06-15 PROCEDURE — 1126F AMNT PAIN NOTED NONE PRSNT: CPT | Mod: S$GLB,,, | Performed by: ORTHOPAEDIC SURGERY

## 2021-06-15 PROCEDURE — 1126F PR PAIN SEVERITY QUANTIFIED, NO PAIN PRESENT: ICD-10-PCS | Mod: S$GLB,,, | Performed by: ORTHOPAEDIC SURGERY

## 2021-06-15 RX ORDER — MELOXICAM 7.5 MG/1
7.5 TABLET ORAL DAILY
Qty: 14 TABLET | Refills: 0 | Status: SHIPPED | OUTPATIENT
Start: 2021-06-15 | End: 2022-05-27 | Stop reason: ALTCHOICE

## 2021-06-26 DIAGNOSIS — Z76.0 ENCOUNTER FOR MEDICATION REFILL: ICD-10-CM

## 2021-06-28 RX ORDER — NORGESTIMATE AND ETHINYL ESTRADIOL 0.25-0.035
KIT ORAL
Qty: 84 TABLET | Refills: 0 | Status: SHIPPED | OUTPATIENT
Start: 2021-06-28 | End: 2021-10-01 | Stop reason: SDUPTHER

## 2021-07-06 ENCOUNTER — OFFICE VISIT (OUTPATIENT)
Dept: SPORTS MEDICINE | Facility: CLINIC | Age: 36
End: 2021-07-06
Payer: COMMERCIAL

## 2021-07-06 VITALS
HEIGHT: 63 IN | HEART RATE: 77 BPM | SYSTOLIC BLOOD PRESSURE: 111 MMHG | DIASTOLIC BLOOD PRESSURE: 68 MMHG | WEIGHT: 132 LBS | BODY MASS INDEX: 23.39 KG/M2

## 2021-07-06 DIAGNOSIS — M99.04 SOMATIC DYSFUNCTION OF SACRAL REGION: ICD-10-CM

## 2021-07-06 DIAGNOSIS — M25.551 RIGHT HIP PAIN: Primary | ICD-10-CM

## 2021-07-06 DIAGNOSIS — M99.03 SOMATIC DYSFUNCTION OF LUMBAR REGION: ICD-10-CM

## 2021-07-06 PROCEDURE — 3008F PR BODY MASS INDEX (BMI) DOCUMENTED: ICD-10-PCS | Mod: CPTII,S$GLB,, | Performed by: ORTHOPAEDIC SURGERY

## 2021-07-06 PROCEDURE — 1126F PR PAIN SEVERITY QUANTIFIED, NO PAIN PRESENT: ICD-10-PCS | Mod: S$GLB,,, | Performed by: ORTHOPAEDIC SURGERY

## 2021-07-06 PROCEDURE — 99999 PR PBB SHADOW E&M-EST. PATIENT-LVL III: ICD-10-PCS | Mod: PBBFAC,,, | Performed by: ORTHOPAEDIC SURGERY

## 2021-07-06 PROCEDURE — 99999 PR PBB SHADOW E&M-EST. PATIENT-LVL III: CPT | Mod: PBBFAC,,, | Performed by: ORTHOPAEDIC SURGERY

## 2021-07-06 PROCEDURE — 98925 PR OSTEOPATHIC MANIP,1-2 BODY REGN: ICD-10-PCS | Mod: S$GLB,,, | Performed by: ORTHOPAEDIC SURGERY

## 2021-07-06 PROCEDURE — 3008F BODY MASS INDEX DOCD: CPT | Mod: CPTII,S$GLB,, | Performed by: ORTHOPAEDIC SURGERY

## 2021-07-06 PROCEDURE — 99213 OFFICE O/P EST LOW 20 MIN: CPT | Mod: 25,S$GLB,, | Performed by: ORTHOPAEDIC SURGERY

## 2021-07-06 PROCEDURE — 99213 PR OFFICE/OUTPT VISIT, EST, LEVL III, 20-29 MIN: ICD-10-PCS | Mod: 25,S$GLB,, | Performed by: ORTHOPAEDIC SURGERY

## 2021-07-06 PROCEDURE — 98925 OSTEOPATH MANJ 1-2 REGIONS: CPT | Mod: S$GLB,,, | Performed by: ORTHOPAEDIC SURGERY

## 2021-07-06 PROCEDURE — 1126F AMNT PAIN NOTED NONE PRSNT: CPT | Mod: S$GLB,,, | Performed by: ORTHOPAEDIC SURGERY

## 2021-08-06 ENCOUNTER — PATIENT MESSAGE (OUTPATIENT)
Dept: INTERNAL MEDICINE | Facility: CLINIC | Age: 36
End: 2021-08-06

## 2021-08-23 ENCOUNTER — PATIENT MESSAGE (OUTPATIENT)
Dept: INTERNAL MEDICINE | Facility: CLINIC | Age: 36
End: 2021-08-23

## 2021-08-23 DIAGNOSIS — R25.1 TREMOR: Primary | ICD-10-CM

## 2021-08-24 ENCOUNTER — PATIENT MESSAGE (OUTPATIENT)
Dept: NEUROLOGY | Facility: CLINIC | Age: 36
End: 2021-08-24

## 2021-09-23 ENCOUNTER — OFFICE VISIT (OUTPATIENT)
Dept: NEUROLOGY | Facility: CLINIC | Age: 36
End: 2021-09-23
Payer: COMMERCIAL

## 2021-09-23 DIAGNOSIS — F41.1 GAD (GENERALIZED ANXIETY DISORDER): ICD-10-CM

## 2021-09-23 DIAGNOSIS — F33.9 RECURRENT MAJOR DEPRESSIVE DISORDER, REMISSION STATUS UNSPECIFIED: ICD-10-CM

## 2021-09-23 DIAGNOSIS — R25.1 TREMOR: Primary | ICD-10-CM

## 2021-09-23 PROCEDURE — 99205 OFFICE O/P NEW HI 60 MIN: CPT | Mod: S$GLB,,, | Performed by: PHYSICIAN ASSISTANT

## 2021-09-23 PROCEDURE — 1160F RVW MEDS BY RX/DR IN RCRD: CPT | Mod: CPTII,S$GLB,, | Performed by: PHYSICIAN ASSISTANT

## 2021-09-23 PROCEDURE — 99999 PR PBB SHADOW E&M-EST. PATIENT-LVL III: CPT | Mod: PBBFAC,,, | Performed by: PHYSICIAN ASSISTANT

## 2021-09-23 PROCEDURE — 1159F MED LIST DOCD IN RCRD: CPT | Mod: CPTII,S$GLB,, | Performed by: PHYSICIAN ASSISTANT

## 2021-09-23 PROCEDURE — 1160F PR REVIEW ALL MEDS BY PRESCRIBER/CLIN PHARMACIST DOCUMENTED: ICD-10-PCS | Mod: CPTII,S$GLB,, | Performed by: PHYSICIAN ASSISTANT

## 2021-09-23 PROCEDURE — 99205 PR OFFICE/OUTPT VISIT, NEW, LEVL V, 60-74 MIN: ICD-10-PCS | Mod: S$GLB,,, | Performed by: PHYSICIAN ASSISTANT

## 2021-09-23 PROCEDURE — 1159F PR MEDICATION LIST DOCUMENTED IN MEDICAL RECORD: ICD-10-PCS | Mod: CPTII,S$GLB,, | Performed by: PHYSICIAN ASSISTANT

## 2021-09-23 PROCEDURE — 99999 PR PBB SHADOW E&M-EST. PATIENT-LVL III: ICD-10-PCS | Mod: PBBFAC,,, | Performed by: PHYSICIAN ASSISTANT

## 2021-09-23 RX ORDER — PROPRANOLOL HYDROCHLORIDE 10 MG/1
10 TABLET ORAL 3 TIMES DAILY
Qty: 90 TABLET | Refills: 11 | Status: SHIPPED | OUTPATIENT
Start: 2021-09-23 | End: 2023-02-16

## 2021-09-28 ENCOUNTER — PATIENT MESSAGE (OUTPATIENT)
Dept: NEUROLOGY | Facility: CLINIC | Age: 36
End: 2021-09-28

## 2021-10-01 ENCOUNTER — PATIENT MESSAGE (OUTPATIENT)
Dept: OBSTETRICS AND GYNECOLOGY | Facility: CLINIC | Age: 36
End: 2021-10-01

## 2021-10-01 DIAGNOSIS — Z76.0 ENCOUNTER FOR MEDICATION REFILL: ICD-10-CM

## 2021-10-01 RX ORDER — NORGESTIMATE AND ETHINYL ESTRADIOL 0.25-0.035
1 KIT ORAL DAILY
Qty: 84 TABLET | Refills: 0 | Status: SHIPPED | OUTPATIENT
Start: 2021-10-01 | End: 2021-12-20

## 2021-10-06 ENCOUNTER — PATIENT MESSAGE (OUTPATIENT)
Dept: INTERNAL MEDICINE | Facility: CLINIC | Age: 36
End: 2021-10-06

## 2021-10-06 DIAGNOSIS — F43.10 PTSD (POST-TRAUMATIC STRESS DISORDER): ICD-10-CM

## 2021-10-06 DIAGNOSIS — F33.9 RECURRENT MAJOR DEPRESSIVE DISORDER, REMISSION STATUS UNSPECIFIED: ICD-10-CM

## 2021-10-06 DIAGNOSIS — F41.1 GAD (GENERALIZED ANXIETY DISORDER): ICD-10-CM

## 2021-10-06 RX ORDER — ESCITALOPRAM OXALATE 10 MG/1
15 TABLET ORAL DAILY
Qty: 135 TABLET | Refills: 0 | Status: SHIPPED | OUTPATIENT
Start: 2021-10-06 | End: 2022-01-03 | Stop reason: SDUPTHER

## 2021-10-09 ENCOUNTER — HOSPITAL ENCOUNTER (OUTPATIENT)
Dept: RADIOLOGY | Facility: HOSPITAL | Age: 36
Discharge: HOME | End: 2021-10-09
Attending: PHYSICIAN ASSISTANT
Payer: COMMERCIAL

## 2021-10-09 DIAGNOSIS — R25.1 TREMOR: ICD-10-CM

## 2021-10-09 PROCEDURE — 70551 MRI BRAIN STEM W/O DYE: CPT | Mod: TC

## 2021-10-09 PROCEDURE — 72141 MRI NECK SPINE W/O DYE: CPT | Mod: TC

## 2021-10-09 PROCEDURE — 70551 MRI BRAIN DEMYELINATING WITHOUT CONTRAST: ICD-10-PCS | Mod: 26,,, | Performed by: RADIOLOGY

## 2021-10-09 PROCEDURE — 70551 MRI BRAIN STEM W/O DYE: CPT | Mod: 26,,, | Performed by: RADIOLOGY

## 2021-10-09 PROCEDURE — 72141 MRI NECK SPINE W/O DYE: CPT | Mod: 26,,, | Performed by: RADIOLOGY

## 2021-10-09 PROCEDURE — 72141 MRI CERVICAL SPINE DEMYELINATING WITHOUT CONTRAST: ICD-10-PCS | Mod: 26,,, | Performed by: RADIOLOGY

## 2021-10-18 ENCOUNTER — PATIENT MESSAGE (OUTPATIENT)
Dept: NEUROLOGY | Facility: CLINIC | Age: 36
End: 2021-10-18
Payer: COMMERCIAL

## 2021-10-25 ENCOUNTER — OFFICE VISIT (OUTPATIENT)
Dept: URGENT CARE | Facility: CLINIC | Age: 36
End: 2021-10-25
Payer: COMMERCIAL

## 2021-10-25 VITALS
OXYGEN SATURATION: 98 % | SYSTOLIC BLOOD PRESSURE: 115 MMHG | HEIGHT: 63 IN | HEART RATE: 78 BPM | TEMPERATURE: 98 F | BODY MASS INDEX: 23.92 KG/M2 | DIASTOLIC BLOOD PRESSURE: 74 MMHG | WEIGHT: 135 LBS | RESPIRATION RATE: 14 BRPM

## 2021-10-25 DIAGNOSIS — B34.9 VIRAL SYNDROME: Primary | ICD-10-CM

## 2021-10-25 DIAGNOSIS — R51.9 HEADACHE AROUND THE EYES: ICD-10-CM

## 2021-10-25 DIAGNOSIS — R11.0 NAUSEA: ICD-10-CM

## 2021-10-25 LAB
CTP QC/QA: YES
SARS-COV-2 RDRP RESP QL NAA+PROBE: NEGATIVE

## 2021-10-25 PROCEDURE — U0002: ICD-10-PCS | Mod: QW,S$GLB,, | Performed by: NURSE PRACTITIONER

## 2021-10-25 PROCEDURE — 1160F PR REVIEW ALL MEDS BY PRESCRIBER/CLIN PHARMACIST DOCUMENTED: ICD-10-PCS | Mod: CPTII,S$GLB,, | Performed by: NURSE PRACTITIONER

## 2021-10-25 PROCEDURE — 3008F BODY MASS INDEX DOCD: CPT | Mod: CPTII,S$GLB,, | Performed by: NURSE PRACTITIONER

## 2021-10-25 PROCEDURE — 3074F SYST BP LT 130 MM HG: CPT | Mod: CPTII,S$GLB,, | Performed by: NURSE PRACTITIONER

## 2021-10-25 PROCEDURE — U0002 COVID-19 LAB TEST NON-CDC: HCPCS | Mod: QW,S$GLB,, | Performed by: NURSE PRACTITIONER

## 2021-10-25 PROCEDURE — 3078F DIAST BP <80 MM HG: CPT | Mod: CPTII,S$GLB,, | Performed by: NURSE PRACTITIONER

## 2021-10-25 PROCEDURE — 3008F PR BODY MASS INDEX (BMI) DOCUMENTED: ICD-10-PCS | Mod: CPTII,S$GLB,, | Performed by: NURSE PRACTITIONER

## 2021-10-25 PROCEDURE — 1159F MED LIST DOCD IN RCRD: CPT | Mod: CPTII,S$GLB,, | Performed by: NURSE PRACTITIONER

## 2021-10-25 PROCEDURE — 99213 PR OFFICE/OUTPT VISIT, EST, LEVL III, 20-29 MIN: ICD-10-PCS | Mod: S$GLB,CS,, | Performed by: NURSE PRACTITIONER

## 2021-10-25 PROCEDURE — 1159F PR MEDICATION LIST DOCUMENTED IN MEDICAL RECORD: ICD-10-PCS | Mod: CPTII,S$GLB,, | Performed by: NURSE PRACTITIONER

## 2021-10-25 PROCEDURE — 3078F PR MOST RECENT DIASTOLIC BLOOD PRESSURE < 80 MM HG: ICD-10-PCS | Mod: CPTII,S$GLB,, | Performed by: NURSE PRACTITIONER

## 2021-10-25 PROCEDURE — 99213 OFFICE O/P EST LOW 20 MIN: CPT | Mod: S$GLB,CS,, | Performed by: NURSE PRACTITIONER

## 2021-10-25 PROCEDURE — 1160F RVW MEDS BY RX/DR IN RCRD: CPT | Mod: CPTII,S$GLB,, | Performed by: NURSE PRACTITIONER

## 2021-10-25 PROCEDURE — 3074F PR MOST RECENT SYSTOLIC BLOOD PRESSURE < 130 MM HG: ICD-10-PCS | Mod: CPTII,S$GLB,, | Performed by: NURSE PRACTITIONER

## 2021-10-25 RX ORDER — ONDANSETRON 4 MG/1
4 TABLET, ORALLY DISINTEGRATING ORAL EVERY 12 HOURS PRN
Qty: 20 TABLET | Refills: 0 | Status: SHIPPED | OUTPATIENT
Start: 2021-10-25 | End: 2023-02-16

## 2021-11-24 ENCOUNTER — TELEPHONE (OUTPATIENT)
Dept: FAMILY MEDICINE | Facility: CLINIC | Age: 36
End: 2021-11-24
Payer: COMMERCIAL

## 2021-11-24 ENCOUNTER — PATIENT MESSAGE (OUTPATIENT)
Dept: INTERNAL MEDICINE | Facility: CLINIC | Age: 36
End: 2021-11-24

## 2021-11-24 ENCOUNTER — NURSE TRIAGE (OUTPATIENT)
Dept: ADMINISTRATIVE | Facility: CLINIC | Age: 36
End: 2021-11-24
Payer: COMMERCIAL

## 2021-11-24 ENCOUNTER — OFFICE VISIT (OUTPATIENT)
Dept: URGENT CARE | Facility: CLINIC | Age: 36
End: 2021-11-24
Payer: COMMERCIAL

## 2021-11-24 ENCOUNTER — OFFICE VISIT (OUTPATIENT)
Dept: INTERNAL MEDICINE | Facility: CLINIC | Age: 36
End: 2021-11-24
Payer: COMMERCIAL

## 2021-11-24 VITALS
TEMPERATURE: 99 F | HEIGHT: 63 IN | BODY MASS INDEX: 24.27 KG/M2 | WEIGHT: 137 LBS | SYSTOLIC BLOOD PRESSURE: 133 MMHG | RESPIRATION RATE: 18 BRPM | OXYGEN SATURATION: 98 % | HEART RATE: 76 BPM | DIASTOLIC BLOOD PRESSURE: 97 MMHG

## 2021-11-24 VITALS
OXYGEN SATURATION: 98 % | HEART RATE: 83 BPM | BODY MASS INDEX: 24.42 KG/M2 | SYSTOLIC BLOOD PRESSURE: 104 MMHG | TEMPERATURE: 98 F | HEIGHT: 63 IN | DIASTOLIC BLOOD PRESSURE: 62 MMHG | WEIGHT: 137.81 LBS

## 2021-11-24 DIAGNOSIS — J06.9 VIRAL URI: Primary | ICD-10-CM

## 2021-11-24 DIAGNOSIS — J06.9 VIRAL URI WITH COUGH: Primary | ICD-10-CM

## 2021-11-24 DIAGNOSIS — J01.90 ACUTE SINUSITIS, RECURRENCE NOT SPECIFIED, UNSPECIFIED LOCATION: Primary | ICD-10-CM

## 2021-11-24 LAB
CTP QC/QA: YES
CTP QC/QA: YES
FLUAV AG NPH QL: NEGATIVE
FLUBV AG NPH QL: NEGATIVE
SARS-COV-2 RDRP RESP QL NAA+PROBE: NEGATIVE

## 2021-11-24 PROCEDURE — 87804 POCT INFLUENZA A/B: ICD-10-PCS | Mod: 59,QW,S$GLB, | Performed by: INTERNAL MEDICINE

## 2021-11-24 PROCEDURE — 99213 PR OFFICE/OUTPT VISIT, EST, LEVL III, 20-29 MIN: ICD-10-PCS | Mod: S$GLB,,, | Performed by: INTERNAL MEDICINE

## 2021-11-24 PROCEDURE — 99999 PR PBB SHADOW E&M-EST. PATIENT-LVL III: ICD-10-PCS | Mod: PBBFAC,,, | Performed by: INTERNAL MEDICINE

## 2021-11-24 PROCEDURE — 99213 OFFICE O/P EST LOW 20 MIN: CPT | Mod: S$GLB,,,

## 2021-11-24 PROCEDURE — 99999 PR PBB SHADOW E&M-EST. PATIENT-LVL III: CPT | Mod: PBBFAC,,, | Performed by: INTERNAL MEDICINE

## 2021-11-24 PROCEDURE — U0002 COVID-19 LAB TEST NON-CDC: HCPCS | Mod: QW,S$GLB,, | Performed by: INTERNAL MEDICINE

## 2021-11-24 PROCEDURE — 87804 INFLUENZA ASSAY W/OPTIC: CPT | Mod: QW,S$GLB,, | Performed by: INTERNAL MEDICINE

## 2021-11-24 PROCEDURE — 99213 PR OFFICE/OUTPT VISIT, EST, LEVL III, 20-29 MIN: ICD-10-PCS | Mod: S$GLB,,,

## 2021-11-24 PROCEDURE — 99213 OFFICE O/P EST LOW 20 MIN: CPT | Mod: S$GLB,,, | Performed by: INTERNAL MEDICINE

## 2021-11-24 PROCEDURE — U0002: ICD-10-PCS | Mod: QW,S$GLB,, | Performed by: INTERNAL MEDICINE

## 2021-11-24 RX ORDER — BROMPHENIRAMINE MALEATE, PSEUDOEPHEDRINE HYDROCHLORIDE, AND DEXTROMETHORPHAN HYDROBROMIDE 2; 30; 10 MG/5ML; MG/5ML; MG/5ML
10 SYRUP ORAL EVERY 4 HOURS PRN
Qty: 118 ML | Refills: 0 | Status: SHIPPED | OUTPATIENT
Start: 2021-11-24 | End: 2021-12-04

## 2021-11-24 RX ORDER — PROMETHAZINE HYDROCHLORIDE AND CODEINE PHOSPHATE 6.25; 1 MG/5ML; MG/5ML
5 SOLUTION ORAL EVERY 4 HOURS PRN
Qty: 118 ML | Refills: 0 | Status: SHIPPED | OUTPATIENT
Start: 2021-11-24 | End: 2021-12-01

## 2021-11-26 ENCOUNTER — PATIENT MESSAGE (OUTPATIENT)
Dept: INTERNAL MEDICINE | Facility: CLINIC | Age: 36
End: 2021-11-26
Payer: COMMERCIAL

## 2021-12-03 ENCOUNTER — PATIENT MESSAGE (OUTPATIENT)
Dept: INTERNAL MEDICINE | Facility: CLINIC | Age: 36
End: 2021-12-03
Payer: COMMERCIAL

## 2021-12-05 ENCOUNTER — HOSPITAL ENCOUNTER (EMERGENCY)
Facility: HOSPITAL | Age: 36
Discharge: HOME | End: 2021-12-05
Attending: EMERGENCY MEDICINE
Payer: COMMERCIAL

## 2021-12-05 VITALS
HEART RATE: 67 BPM | RESPIRATION RATE: 16 BRPM | SYSTOLIC BLOOD PRESSURE: 132 MMHG | BODY MASS INDEX: 23.03 KG/M2 | DIASTOLIC BLOOD PRESSURE: 64 MMHG | WEIGHT: 130 LBS | TEMPERATURE: 99 F | OXYGEN SATURATION: 97 %

## 2021-12-05 DIAGNOSIS — R05.9 COUGH: Primary | ICD-10-CM

## 2021-12-05 DIAGNOSIS — M94.0 COSTOCHONDRITIS: ICD-10-CM

## 2021-12-05 LAB
B-HCG UR QL: NEGATIVE
CTP QC/QA: YES
POC MOLECULAR INFLUENZA A AGN: NEGATIVE
POC MOLECULAR INFLUENZA B AGN: NEGATIVE
SARS-COV-2 RDRP RESP QL NAA+PROBE: NEGATIVE

## 2021-12-05 PROCEDURE — 81025 URINE PREGNANCY TEST: CPT | Performed by: EMERGENCY MEDICINE

## 2021-12-05 PROCEDURE — 99283 EMERGENCY DEPT VISIT LOW MDM: CPT | Mod: 25

## 2021-12-05 PROCEDURE — U0002 COVID-19 LAB TEST NON-CDC: HCPCS | Performed by: PHYSICIAN ASSISTANT

## 2021-12-05 RX ORDER — ALBUTEROL SULFATE 90 UG/1
1-2 AEROSOL, METERED RESPIRATORY (INHALATION) EVERY 6 HOURS PRN
Qty: 8 G | Refills: 0 | Status: SHIPPED | OUTPATIENT
Start: 2021-12-05 | End: 2023-02-16

## 2021-12-06 ENCOUNTER — OFFICE VISIT (OUTPATIENT)
Dept: OTOLARYNGOLOGY | Facility: CLINIC | Age: 36
End: 2021-12-06
Payer: COMMERCIAL

## 2021-12-06 VITALS
DIASTOLIC BLOOD PRESSURE: 76 MMHG | BODY MASS INDEX: 23.47 KG/M2 | SYSTOLIC BLOOD PRESSURE: 130 MMHG | WEIGHT: 132.5 LBS | HEART RATE: 79 BPM

## 2021-12-06 DIAGNOSIS — J01.90 ACUTE SINUSITIS, RECURRENCE NOT SPECIFIED, UNSPECIFIED LOCATION: ICD-10-CM

## 2021-12-06 DIAGNOSIS — R09.82 PND (POST-NASAL DRIP): ICD-10-CM

## 2021-12-06 DIAGNOSIS — R05.9 COUGH: ICD-10-CM

## 2021-12-06 DIAGNOSIS — K21.9 LARYNGOPHARYNGEAL REFLUX (LPR): ICD-10-CM

## 2021-12-06 DIAGNOSIS — J30.81 ALLERGIC RHINITIS DUE TO ANIMAL HAIR AND DANDER: Primary | ICD-10-CM

## 2021-12-06 PROCEDURE — 31575 DIAGNOSTIC LARYNGOSCOPY: CPT | Mod: S$GLB,,, | Performed by: OTOLARYNGOLOGY

## 2021-12-06 PROCEDURE — 99999 PR PBB SHADOW E&M-EST. PATIENT-LVL IV: ICD-10-PCS | Mod: PBBFAC,,, | Performed by: OTOLARYNGOLOGY

## 2021-12-06 PROCEDURE — 99999 PR PBB SHADOW E&M-EST. PATIENT-LVL IV: CPT | Mod: PBBFAC,,, | Performed by: OTOLARYNGOLOGY

## 2021-12-06 PROCEDURE — 31575 PR LARYNGOSCOPY, FLEXIBLE; DIAGNOSTIC: ICD-10-PCS | Mod: S$GLB,,, | Performed by: OTOLARYNGOLOGY

## 2021-12-06 PROCEDURE — 99204 OFFICE O/P NEW MOD 45 MIN: CPT | Mod: 25,S$GLB,, | Performed by: OTOLARYNGOLOGY

## 2021-12-06 PROCEDURE — 99204 PR OFFICE/OUTPT VISIT, NEW, LEVL IV, 45-59 MIN: ICD-10-PCS | Mod: 25,S$GLB,, | Performed by: OTOLARYNGOLOGY

## 2021-12-06 RX ORDER — PANTOPRAZOLE SODIUM 40 MG/1
40 TABLET, DELAYED RELEASE ORAL DAILY
Qty: 30 TABLET | Refills: 11 | Status: SHIPPED | OUTPATIENT
Start: 2021-12-06 | End: 2023-02-16

## 2021-12-14 ENCOUNTER — PATIENT MESSAGE (OUTPATIENT)
Dept: INTERNAL MEDICINE | Facility: CLINIC | Age: 36
End: 2021-12-14
Payer: COMMERCIAL

## 2021-12-24 ENCOUNTER — PATIENT MESSAGE (OUTPATIENT)
Dept: INTERNAL MEDICINE | Facility: CLINIC | Age: 36
End: 2021-12-24
Payer: COMMERCIAL

## 2022-01-03 ENCOUNTER — PATIENT MESSAGE (OUTPATIENT)
Dept: INTERNAL MEDICINE | Facility: CLINIC | Age: 37
End: 2022-01-03
Payer: COMMERCIAL

## 2022-01-03 DIAGNOSIS — M79.643 PAIN OF HAND, UNSPECIFIED LATERALITY: Primary | ICD-10-CM

## 2022-01-23 ENCOUNTER — PATIENT MESSAGE (OUTPATIENT)
Dept: INTERNAL MEDICINE | Facility: CLINIC | Age: 37
End: 2022-01-23
Payer: COMMERCIAL

## 2022-01-28 ENCOUNTER — TELEPHONE (OUTPATIENT)
Dept: ORTHOPEDICS | Facility: CLINIC | Age: 37
End: 2022-01-28
Payer: COMMERCIAL

## 2022-01-28 DIAGNOSIS — M79.643 PAIN OF HAND, UNSPECIFIED LATERALITY: Primary | ICD-10-CM

## 2022-01-31 ENCOUNTER — OFFICE VISIT (OUTPATIENT)
Dept: ORTHOPEDICS | Facility: CLINIC | Age: 37
End: 2022-01-31
Payer: COMMERCIAL

## 2022-01-31 ENCOUNTER — HOSPITAL ENCOUNTER (OUTPATIENT)
Dept: RADIOLOGY | Facility: HOSPITAL | Age: 37
Discharge: HOME | End: 2022-01-31
Attending: ORTHOPAEDIC SURGERY
Payer: COMMERCIAL

## 2022-01-31 VITALS — BODY MASS INDEX: 23.39 KG/M2 | HEIGHT: 63 IN | WEIGHT: 132 LBS

## 2022-01-31 DIAGNOSIS — M25.531 RIGHT WRIST PAIN: ICD-10-CM

## 2022-01-31 DIAGNOSIS — M79.643 PAIN OF HAND, UNSPECIFIED LATERALITY: ICD-10-CM

## 2022-01-31 PROCEDURE — 73130 X-RAY EXAM OF HAND: CPT | Mod: 26,,, | Performed by: RADIOLOGY

## 2022-01-31 PROCEDURE — 1159F MED LIST DOCD IN RCRD: CPT | Mod: CPTII,S$GLB,, | Performed by: ORTHOPAEDIC SURGERY

## 2022-01-31 PROCEDURE — 73130 X-RAY EXAM OF HAND: CPT | Mod: TC,50,PN

## 2022-01-31 PROCEDURE — 1159F PR MEDICATION LIST DOCUMENTED IN MEDICAL RECORD: ICD-10-PCS | Mod: CPTII,S$GLB,, | Performed by: ORTHOPAEDIC SURGERY

## 2022-01-31 PROCEDURE — 3008F BODY MASS INDEX DOCD: CPT | Mod: CPTII,S$GLB,, | Performed by: ORTHOPAEDIC SURGERY

## 2022-01-31 PROCEDURE — 99203 OFFICE O/P NEW LOW 30 MIN: CPT | Mod: S$GLB,,, | Performed by: ORTHOPAEDIC SURGERY

## 2022-01-31 PROCEDURE — 73130 XR HAND COMPLETE 3 VIEWS BILATERAL: ICD-10-PCS | Mod: 26,,, | Performed by: RADIOLOGY

## 2022-01-31 PROCEDURE — 99203 PR OFFICE/OUTPT VISIT, NEW, LEVL III, 30-44 MIN: ICD-10-PCS | Mod: S$GLB,,, | Performed by: ORTHOPAEDIC SURGERY

## 2022-01-31 PROCEDURE — 3008F PR BODY MASS INDEX (BMI) DOCUMENTED: ICD-10-PCS | Mod: CPTII,S$GLB,, | Performed by: ORTHOPAEDIC SURGERY

## 2022-01-31 PROCEDURE — 99999 PR PBB SHADOW E&M-EST. PATIENT-LVL III: ICD-10-PCS | Mod: PBBFAC,,, | Performed by: ORTHOPAEDIC SURGERY

## 2022-01-31 PROCEDURE — 99999 PR PBB SHADOW E&M-EST. PATIENT-LVL III: CPT | Mod: PBBFAC,,, | Performed by: ORTHOPAEDIC SURGERY

## 2022-01-31 RX ORDER — IBUPROFEN 600 MG/1
600 TABLET ORAL 2 TIMES DAILY WITH MEALS
Qty: 60 TABLET | Refills: 1 | Status: SHIPPED | OUTPATIENT
Start: 2022-01-31 | End: 2022-04-05
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
## 2022-02-07 ENCOUNTER — OFFICE VISIT (OUTPATIENT)
Dept: OBSTETRICS AND GYNECOLOGY | Facility: CLINIC | Age: 37
End: 2022-02-07
Attending: OBSTETRICS & GYNECOLOGY
Payer: COMMERCIAL

## 2022-02-07 VITALS — HEIGHT: 63 IN | WEIGHT: 139.75 LBS | BODY MASS INDEX: 24.76 KG/M2

## 2022-02-07 DIAGNOSIS — Z12.4 ENCOUNTER FOR PAPANICOLAOU SMEAR FOR CERVICAL CANCER SCREENING: ICD-10-CM

## 2022-02-07 DIAGNOSIS — Z12.31 ENCOUNTER FOR MAMMOGRAM TO ESTABLISH BASELINE MAMMOGRAM: Primary | ICD-10-CM

## 2022-02-07 DIAGNOSIS — Z76.0 ENCOUNTER FOR MEDICATION REFILL: ICD-10-CM

## 2022-02-07 DIAGNOSIS — Z01.419 ENCOUNTER FOR GYNECOLOGICAL EXAMINATION (GENERAL) (ROUTINE) WITHOUT ABNORMAL FINDINGS: ICD-10-CM

## 2022-02-07 DIAGNOSIS — Z11.51 ENCOUNTER FOR SCREENING FOR HUMAN PAPILLOMAVIRUS (HPV): ICD-10-CM

## 2022-02-07 PROCEDURE — 1159F MED LIST DOCD IN RCRD: CPT | Mod: CPTII,S$GLB,, | Performed by: OBSTETRICS & GYNECOLOGY

## 2022-02-07 PROCEDURE — 3008F BODY MASS INDEX DOCD: CPT | Mod: CPTII,S$GLB,, | Performed by: OBSTETRICS & GYNECOLOGY

## 2022-02-07 PROCEDURE — 99395 PREV VISIT EST AGE 18-39: CPT | Mod: S$GLB,,, | Performed by: OBSTETRICS & GYNECOLOGY

## 2022-02-07 PROCEDURE — 99999 PR PBB SHADOW E&M-EST. PATIENT-LVL IV: ICD-10-PCS | Mod: PBBFAC,,, | Performed by: OBSTETRICS & GYNECOLOGY

## 2022-02-07 PROCEDURE — 3008F PR BODY MASS INDEX (BMI) DOCUMENTED: ICD-10-PCS | Mod: CPTII,S$GLB,, | Performed by: OBSTETRICS & GYNECOLOGY

## 2022-02-07 PROCEDURE — 1159F PR MEDICATION LIST DOCUMENTED IN MEDICAL RECORD: ICD-10-PCS | Mod: CPTII,S$GLB,, | Performed by: OBSTETRICS & GYNECOLOGY

## 2022-02-07 PROCEDURE — 88142 CYTOPATH C/V THIN LAYER: CPT | Performed by: OBSTETRICS & GYNECOLOGY

## 2022-02-07 PROCEDURE — 87624 HPV HI-RISK TYP POOLED RSLT: CPT | Performed by: OBSTETRICS & GYNECOLOGY

## 2022-02-07 PROCEDURE — 99999 PR PBB SHADOW E&M-EST. PATIENT-LVL IV: CPT | Mod: PBBFAC,,, | Performed by: OBSTETRICS & GYNECOLOGY

## 2022-02-07 PROCEDURE — 99395 PR PREVENTIVE VISIT,EST,18-39: ICD-10-PCS | Mod: S$GLB,,, | Performed by: OBSTETRICS & GYNECOLOGY

## 2022-02-07 RX ORDER — NORGESTIMATE AND ETHINYL ESTRADIOL 0.25-0.035
1 KIT ORAL DAILY
Qty: 84 TABLET | Refills: 3 | Status: SHIPPED | OUTPATIENT
Start: 2022-02-07 | End: 2023-02-01

## 2022-02-07 NOTE — PROGRESS NOTES
Subjective:       Patient ID: Sari Serna is a 36 y.o. female.    Chief Complaint:  Annual Exam (Last pap/hpv 2017 normal)        History of Present Illness  Sari Serna is a 36 y.o. female  who presents for annual. No gyn complaints. Doing well on OCP.      I explained new pap and HPV guidelines. Will do pap and HPV test today. Will repeat pap and HPV every 3 years. Answered all questions. Patient agrees.       No LMP recorded.   Date of Last Pap: No result found    Review of Systems  Review of Systems   Constitutional: Negative for chills and fever.        Objective:   Physical Exam:   Constitutional: She is oriented to person, place, and time. Vital signs are normal. She appears well-developed and well-nourished. No distress.        Pulmonary/Chest: She exhibits no mass. Right breast exhibits no mass, no nipple discharge, no skin change, no tenderness, no bleeding and no swelling. Left breast exhibits no mass, no nipple discharge, no skin change, no tenderness, no bleeding and no swelling. Breasts are symmetrical.        Abdominal: Soft. Bowel sounds are normal. She exhibits no distension and no mass. There is no abdominal tenderness. There is no rebound.     Genitourinary:    Vagina and uterus normal.   There is no rash, tenderness, lesion or injury on the right labia. There is no rash, tenderness, lesion or injury on the left labia. Cervix is normal. Right adnexum displays no mass, no tenderness and no fullness. Left adnexum displays no mass, no tenderness and no fullness. No erythema,  no vaginal discharge, tenderness, rectocele, cystocele or unspecified prolapse of vaginal walls in the vagina. Cervix exhibits no motion tenderness, no discharge and no friability. Uterus is not deviated, not enlarged, not fixed, not tender and not hosting fibroids.           Musculoskeletal: Normal range of motion and moves all extremeties.      Lymphadenopathy:        Right: No supraclavicular  adenopathy present.        Left: No supraclavicular adenopathy present.    Neurological: She is alert and oriented to person, place, and time.    Skin: Skin is warm and dry.    Psychiatric: She has a normal mood and affect. Her behavior is normal. Judgment normal.        Assessment/ Plan:     1. Encounter for mammogram to establish baseline mammogram  Mammo Digital Screening Bilat w/ Gustavo   2. Encounter for screening for human papillomavirus (HPV)  HPV High Risk Genotypes, PCR   3. Encounter for Papanicolaou smear for cervical cancer screening  Liquid-Based Pap Smear, Screening   4. Encounter for medication refill  norgestimate-ethinyl estradioL (FEMYNOR) 0.25-35 mg-mcg per tablet   5. Encounter for gynecological examination (general) (routine) without abnormal findings         Follow up in about 1 year (around 2/7/2023) for Annual exam.    As of April 1, 2021, the Cures Act has been passed nationally. This new law requires that all doctors progress notes, lab results, pathology reports and radiology reports be released IMMEDIATELY to the patient in the patient portal. That means that the results are released to you at the EXACT same time they are released to me. Therefore, with all of the patients that I have I am not able to reply to each patient exactly when the results come in. So there will be a delay from when you see the results to when I see them and have time to come up with a response to send you. Also I only see these results when I am on the computer at work. So if the results come in over the weekend or after 5 pm of a work day, I will not see them until the next business day. As you can tell, this is a challenge as a physician to give every patient the quick response they hope for and deserve. So please be patient! Thanks for understanding, Dr. Monaco

## 2022-02-10 ENCOUNTER — PATIENT MESSAGE (OUTPATIENT)
Dept: NEUROLOGY | Facility: CLINIC | Age: 37
End: 2022-02-10

## 2022-02-10 ENCOUNTER — OFFICE VISIT (OUTPATIENT)
Dept: NEUROLOGY | Facility: CLINIC | Age: 37
End: 2022-02-10
Payer: COMMERCIAL

## 2022-02-10 ENCOUNTER — LAB VISIT (OUTPATIENT)
Dept: LAB | Facility: HOSPITAL | Age: 37
End: 2022-02-10
Payer: COMMERCIAL

## 2022-02-10 VITALS
DIASTOLIC BLOOD PRESSURE: 68 MMHG | HEART RATE: 76 BPM | WEIGHT: 139.75 LBS | SYSTOLIC BLOOD PRESSURE: 113 MMHG | HEIGHT: 63 IN | BODY MASS INDEX: 24.76 KG/M2

## 2022-02-10 DIAGNOSIS — R25.1 TREMOR: ICD-10-CM

## 2022-02-10 DIAGNOSIS — R25.1 TREMOR: Primary | ICD-10-CM

## 2022-02-10 DIAGNOSIS — F33.9 RECURRENT MAJOR DEPRESSIVE DISORDER, REMISSION STATUS UNSPECIFIED: ICD-10-CM

## 2022-02-10 DIAGNOSIS — F41.1 GAD (GENERALIZED ANXIETY DISORDER): ICD-10-CM

## 2022-02-10 LAB — CERULOPLASMIN SERPL-MCNC: 54 MG/DL (ref 15–45)

## 2022-02-10 PROCEDURE — 82390 ASSAY OF CERULOPLASMIN: CPT | Performed by: PHYSICIAN ASSISTANT

## 2022-02-10 PROCEDURE — 1160F RVW MEDS BY RX/DR IN RCRD: CPT | Mod: CPTII,S$GLB,, | Performed by: PHYSICIAN ASSISTANT

## 2022-02-10 PROCEDURE — 3074F PR MOST RECENT SYSTOLIC BLOOD PRESSURE < 130 MM HG: ICD-10-PCS | Mod: CPTII,S$GLB,, | Performed by: PHYSICIAN ASSISTANT

## 2022-02-10 PROCEDURE — 1159F MED LIST DOCD IN RCRD: CPT | Mod: CPTII,S$GLB,, | Performed by: PHYSICIAN ASSISTANT

## 2022-02-10 PROCEDURE — 3074F SYST BP LT 130 MM HG: CPT | Mod: CPTII,S$GLB,, | Performed by: PHYSICIAN ASSISTANT

## 2022-02-10 PROCEDURE — 1159F PR MEDICATION LIST DOCUMENTED IN MEDICAL RECORD: ICD-10-PCS | Mod: CPTII,S$GLB,, | Performed by: PHYSICIAN ASSISTANT

## 2022-02-10 PROCEDURE — 99999 PR PBB SHADOW E&M-EST. PATIENT-LVL III: ICD-10-PCS | Mod: PBBFAC,,, | Performed by: PHYSICIAN ASSISTANT

## 2022-02-10 PROCEDURE — 3078F PR MOST RECENT DIASTOLIC BLOOD PRESSURE < 80 MM HG: ICD-10-PCS | Mod: CPTII,S$GLB,, | Performed by: PHYSICIAN ASSISTANT

## 2022-02-10 PROCEDURE — 1160F PR REVIEW ALL MEDS BY PRESCRIBER/CLIN PHARMACIST DOCUMENTED: ICD-10-PCS | Mod: CPTII,S$GLB,, | Performed by: PHYSICIAN ASSISTANT

## 2022-02-10 PROCEDURE — 82300 ASSAY OF CADMIUM: CPT | Performed by: PHYSICIAN ASSISTANT

## 2022-02-10 PROCEDURE — 3078F DIAST BP <80 MM HG: CPT | Mod: CPTII,S$GLB,, | Performed by: PHYSICIAN ASSISTANT

## 2022-02-10 PROCEDURE — 99999 PR PBB SHADOW E&M-EST. PATIENT-LVL III: CPT | Mod: PBBFAC,,, | Performed by: PHYSICIAN ASSISTANT

## 2022-02-10 PROCEDURE — 99214 PR OFFICE/OUTPT VISIT, EST, LEVL IV, 30-39 MIN: ICD-10-PCS | Mod: S$GLB,,, | Performed by: PHYSICIAN ASSISTANT

## 2022-02-10 PROCEDURE — 3008F BODY MASS INDEX DOCD: CPT | Mod: CPTII,S$GLB,, | Performed by: PHYSICIAN ASSISTANT

## 2022-02-10 PROCEDURE — 3008F PR BODY MASS INDEX (BMI) DOCUMENTED: ICD-10-PCS | Mod: CPTII,S$GLB,, | Performed by: PHYSICIAN ASSISTANT

## 2022-02-10 PROCEDURE — 36415 COLL VENOUS BLD VENIPUNCTURE: CPT | Performed by: PHYSICIAN ASSISTANT

## 2022-02-10 PROCEDURE — 99214 OFFICE O/P EST MOD 30 MIN: CPT | Mod: S$GLB,,, | Performed by: PHYSICIAN ASSISTANT

## 2022-02-10 NOTE — ASSESSMENT & PLAN NOTE
ET vs dystonic tremor vs enhanced physiologic tremor   Increased tone in bilateral UE/LE (difficult for patient to relax)   Spooning of L > R hand   Limit stressors  Patient would like to hold off on medications but has propranolol 10 mg up to TID PRN for tremors

## 2022-02-10 NOTE — PROGRESS NOTES
Name: Sari Serna  MRN: [id number]   CSN: [id number]      Date: 02/10/2022    Referring physician:  No referring provider defined for this encounter.    Chief Complaint: tremor     Interval History:  - tremor not bothersome to her   - talked to father, dad and paternal grandfather had tremors   - not taking propranolol, would like to hold off on meds         From Sept 2021: Sari Serna is a R HANDED 36 y.o. female with a medical issues significant for ADAM and MDD who presents for tremor. Tremor noticed in within the last year. Boyfriend and mother have pointed it out. Notices the most whenever she is typing. Mostly notices it with action and posture, never at rest. Very little caffeine consumption. Did not start any new medications around the time the tremor started. Socially consumes EtOH. Has not noticed that the tremor improves with EtOH. Denies imbalance, shuffling. Curious what the tremor is, not necessarily affecting her quality of life.   Tremor is worse when she is anxious or nervous.     Family History: none     Neuroleptic Exposure: none     Nonmotor/Premotor ROS:  Anosmia: normal   Dysarthria/Hypophonia: none   Dysphagia/Sialorrhea: none   Urinary changes: none   Constipation: intermittent -- going through dietary changes   Falls: none   Micrographia: none   Sleep issues:  -CECILE: none   -RBD: none    Review of Systems:   Review of Systems   Constitutional: Negative for chills, fever and malaise/fatigue.   HENT: Negative for hearing loss.    Eyes: Negative for blurred vision and double vision.   Respiratory: Negative for cough, shortness of breath and stridor.    Cardiovascular: Negative for chest pain and leg swelling.   Gastrointestinal: Negative for constipation, diarrhea and nausea.   Genitourinary: Negative for frequency and urgency.   Musculoskeletal: Negative for falls.   Skin: Negative for itching and rash.   Neurological: Positive for tremors. Negative for dizziness, loss of  consciousness and weakness.   Psychiatric/Behavioral: Negative for hallucinations and memory loss.         Past Medical History: The patient  has a past medical history of Acne, ALLERGIC RHINITIS, Allergy, Anxiety, Dysmenorrhea, Low back pain, Migraine headache, and Recurrent upper respiratory infection (URI).    Social History: The patient  reports that she has never smoked. She has never used smokeless tobacco. She reports current alcohol use. She reports that she does not use drugs.    Family History: Their family history includes Allergic rhinitis in her brother and father; Cirrhosis in her mother; Endometriosis in her mother; Stroke in her paternal grandfather.    Allergies: Doxycycline     Meds:   Current Outpatient Medications on File Prior to Visit   Medication Sig Dispense Refill    albuterol (PROVENTIL/VENTOLIN HFA) 90 mcg/actuation inhaler Inhale 1-2 puffs into the lungs every 6 (six) hours as needed for Wheezing. Rescue 8 g 0    clonazePAM (KLONOPIN) 0.5 MG tablet TAKE 1/2-1 TABLET TWICE A DAY AS NEEDED FOR ANXIETY 30 tablet 3    diphenhydrAMINE (BENADRYL) 25 mg capsule Take 25 mg by mouth every 6 (six) hours as needed for Itching.      EScitalopram oxalate (LEXAPRO) 10 MG tablet TAKE 1.5 TABLETS BY MOUTH ONCE DAILY. 135 tablet 0    fluticasone (FLONASE) 50 mcg/actuation nasal spray 2 sprays (100 mcg total) by Each Nare route once daily. 1 Bottle 0    ibuprofen (ADVIL,MOTRIN) 600 MG tablet Take 1 tablet (600 mg total) by mouth 2 (two) times daily with meals. 60 tablet 1    ketoconazole (NIZORAL) 2 % shampoo Wash scalp with medicated shampoo at least 2x/week. Let sit on scalp at least 5 minutes prior to rinsing 240 mL 5    loratadine (CLARITIN) 10 mg tablet       meloxicam (MOBIC) 7.5 MG tablet Take 1 tablet (7.5 mg total) by mouth once daily. 14 tablet 0    norgestimate-ethinyl estradioL (FEMYNOR) 0.25-35 mg-mcg per tablet Take 1 tablet by mouth once daily. 84 tablet 3    ondansetron  "(ZOFRAN-ODT) 4 MG TbDL Take 1 tablet (4 mg total) by mouth every 12 (twelve) hours as needed (Nausea). 20 tablet 0    pantoprazole (PROTONIX) 40 MG tablet Take 1 tablet (40 mg total) by mouth once daily. 30 tablet 11    propranoloL (INDERAL) 10 MG tablet Take 1 tablet (10 mg total) by mouth 3 (three) times daily. 90 tablet 11    urea (CARMOL) 40 % Crea AAA BID 28 g 1    spironolactone (ALDACTONE) 50 MG tablet Take 3 tablets (150 mg total) by mouth once daily. (Patient not taking: Reported on 11/24/2021) 90 tablet 5     No current facility-administered medications on file prior to visit.       Exam:  /68   Pulse 76   Ht 5' 3" (1.6 m)   Wt 63.4 kg (139 lb 12.4 oz)   BMI 24.76 kg/m²     Constitutional  Well-developed, well-nourished, appears stated age   Ophthalmoscopic  No papilledema with no hemorrhages or exudates bilaterally   Cardiovascular  Radial pulses 2+ and symmetric, no LE edema bilaterally   Neurological    * Mental status  MOCA =      - Orientation  Oriented to person, place, time, and situation     - Memory   Intact recent and remote     - Attention/concentration  Attentive, vigilant during exam     - Language  Naming & repetition intact, +2-step commands     - Fund of knowledge  Aware of current events     - Executive  Well-organized thoughts     - Other     * Cranial nerves       - CN II  PERRL, visual fields full to confrontation     - CN III, IV, VI  Extraocular movements full, normal pursuits and saccades     - CN V  Sensation V1 - V3 intact     - CN VII  Face strong and symmetric bilaterally     - CN VIII  Hearing intact bilaterally     - CN IX, X  Palate raises midline and symmetric     - CN XI  SCM and trapezius 5/5 bilaterally     - CN XII  Tongue midline   * Motor  Muscle bulk normal, strength 5/5 throughout   * Sensory   Intact to temperature and vibration throughout   * Coordination  No dysmetria with finger-to-nose or heel-to-shin   * Gait  See below.   * Deep tendon reflexes  " 2+ and symmetric throughout b/l UE   3+ b/l LE     rosenbaum absent     clonus absent    Babinski downgoing bilaterally   * Specialized movement exam  No hypophonic speech.    No facial masking.   increased tone of b/l UE and LE -- possible cogwheeling? Very difficult to relax inability to relax?    VERY slight L bradykinesia when compared to the right    No motor impersistence.   Normal-based gait.   No shortened stride length.   No postural instability.      b/l postural tremor, some spooning of b/l hands, L > R   decreased R arm swing        some truncal swaying -- patient says it is purposeful movements      Laboratory/Radiological:  - Results:  Admission on 12/05/2021, Discharged on 12/05/2021   Component Date Value Ref Range Status    POC Preg Test, Ur 12/05/2021 Negative  Negative Final     Acceptable 12/05/2021 Yes   Final    POC Rapid COVID 12/05/2021 Negative  Negative Final     Acceptable 12/05/2021 Yes   Final    POC Molecular Influenza A Ag 12/05/2021 Negative  Negative, Not Reported Final    POC Molecular Influenza B Ag 12/05/2021 Negative  Negative, Not Reported Final     Acceptable 12/05/2021 Yes   Final   Office Visit on 11/24/2021   Component Date Value Ref Range Status    POC Rapid COVID 11/24/2021 Negative  Negative Final     Acceptable 11/24/2021 Yes   Final    Rapid Influenza A Ag 11/24/2021 Negative  Negative Final    Rapid Influenza B Ag 11/24/2021 Negative  Negative Final     Acceptable 11/24/2021 Yes   Final       - Independent review of images:    From 2021 brain and cervical MRI  Brain and cervical spinal cord appear within normal limits for age without evidence for demyelination.       Diagnoses:              Assessment//Plan:   Problem List Items Addressed This Visit        Neuro    Tremor - Primary    Current Assessment & Plan     ET vs dystonic tremor vs enhanced physiologic tremor   Increased tone in  bilateral UE/LE (difficult for patient to relax)   Spooning of L > R hand   Limit stressors  Patient would like to hold off on medications but has propranolol 10 mg up to TID PRN for tremors          Relevant Orders    Ceruloplasmin    Heavy Metals Screen, Blood (Quantitative)       Psychiatric    ADAM (generalized anxiety disorder)    Current Assessment & Plan     Worsening tremor   Limit stressors           Recurrent major depressive disorder    Current Assessment & Plan     Continue lexapro, likely not contributing to tremor                    Follow up: [facility]     Collaborating Physician, Dr. Do, was available during today's encounter. Any change to plan along with cosign to appear in the EMR.       Total time spent with the patient: 35 minutes.  20 minutes of face to face consultation and 15 minutes of chart review and coordination of care, on the day of the visit. This includes face to face time and non-face to face time preparing to see the patient (eg, review of tests), obtaining and/or reviewing separately obtained history, documenting clinical information in the electronic or other health record, independently interpreting resultsand communicating results to the patient/family/caregiver, or care coordination.       Chary Cloud PA-C   [facility]  Department of Neurology  Movement Disorders

## 2022-02-11 LAB
ARSENIC BLD-MCNC: 5 NG/ML
CADMIUM BLD-MCNC: <0.2 NG/ML
CITY: NORMAL
COUNTY: NORMAL
GUARDIAN FIRST NAME: NORMAL
GUARDIAN LAST NAME: NORMAL
HOME PHONE: NORMAL
HPV HR 12 DNA SPEC QL NAA+PROBE: NEGATIVE
HPV16 AG SPEC QL: NEGATIVE
HPV18 DNA SPEC QL NAA+PROBE: NEGATIVE
LEAD BLD-MCNC: <1 MCG/DL
MERCURY BLD-MCNC: 5 NG/ML
RACE: NORMAL
STATE: NORMAL
STREET ADDRESS: NORMAL
VENOUS/CAPILLARY: NORMAL
ZIP: NORMAL

## 2022-02-14 LAB
FINAL PATHOLOGIC DIAGNOSIS: NORMAL
Lab: NORMAL

## 2022-03-14 ENCOUNTER — PATIENT MESSAGE (OUTPATIENT)
Dept: INTERNAL MEDICINE | Facility: CLINIC | Age: 37
End: 2022-03-14
Payer: COMMERCIAL

## 2022-03-14 DIAGNOSIS — F41.1 GAD (GENERALIZED ANXIETY DISORDER): Primary | ICD-10-CM

## 2022-03-16 RX ORDER — ESCITALOPRAM OXALATE 20 MG/1
20 TABLET ORAL DAILY
Qty: 90 TABLET | Refills: 1 | Status: SHIPPED | OUTPATIENT
Start: 2022-03-16 | End: 2022-12-09

## 2022-04-02 DIAGNOSIS — F41.1 GAD (GENERALIZED ANXIETY DISORDER): ICD-10-CM

## 2022-04-02 DIAGNOSIS — F43.10 PTSD (POST-TRAUMATIC STRESS DISORDER): ICD-10-CM

## 2022-04-02 DIAGNOSIS — F33.9 RECURRENT MAJOR DEPRESSIVE DISORDER, REMISSION STATUS UNSPECIFIED: ICD-10-CM

## 2022-04-02 NOTE — TELEPHONE ENCOUNTER
Care Due:                  Date            Visit Type   Department     Provider  --------------------------------------------------------------------------------                                EP -                              PRIMARY      [facility] INTERNAL  Last Visit: 05-      CARE ([facility])   MEDICINE       Nhi Shearer  Next Visit: None Scheduled  None         None Found                                                            Last  Test          Frequency    Reason                     Performed    Due Date  --------------------------------------------------------------------------------    Office Visit  12 months..  EScitalopram.............  05- 05-    Powered by [employer] by [employer]. Reference number: [id number].   4/02/2022 12:13:53 AM CDT

## 2022-04-05 RX ORDER — ESCITALOPRAM OXALATE 10 MG/1
TABLET ORAL
Qty: 90 TABLET | Refills: 1 | OUTPATIENT
Start: 2022-04-05

## 2022-04-05 NOTE — TELEPHONE ENCOUNTER
Quick DC. Inappropriate Request    Refill Authorization Note   Sari Serna  is requesting a refill authorization.  Brief Assessment and Rationale for Refill:  Quick Discontinue  Medication Therapy Plan:       Medication Reconciliation Completed:  No    Medication-related problems identified: Dose adjustment   Comments:   Pended Medication(s)       Requested Prescriptions     Pending Prescriptions Disp Refills    EScitalopram oxalate (LEXAPRO) 10 MG tablet [Pharmacy Med Name: ESCITALOPRAM 10 MG TABLET] 90 tablet 1     Sig: TAKE 1 AND 1/2 TABLETS BY MOUTH ONCE DAILY        Duplicate Pended Encounter(s)/ Last Prescribed Details: (includes pharmacy & prescriber details)   [facility]/pharmacy #[id number] - [place] - [address]   [address] [place] [address]   Phone:  [phone]  Fax:  [phone]   PETER #:  [id number]   LM Reason: --       Outpatient Medication Detail     Disp Refills Start End LM   EScitalopram oxalate (LEXAPRO) 20 MG tablet 90 tablet 1 3/16/2022  --   Sig - Route: Take 1 tablet (20 mg total) by mouth once daily. - Oral   Sent to pharmacy as: EScitalopram oxalate (LEXAPRO) 20 MG tablet   Class: Normal   Order: [id number]   Date/Time Signed: 3/16/2022 12:33       E-Prescribing Status: Receipt confirmed by pharmacy (3/16/2022 12:33 PM CDT)       Ordering Encounter Report    Associated Reports   View Encounter                Note composed:5:26 PM 04/05/2022

## 2022-04-13 ENCOUNTER — PATIENT MESSAGE (OUTPATIENT)
Dept: INTERNAL MEDICINE | Facility: CLINIC | Age: 37
End: 2022-04-13
Payer: COMMERCIAL

## 2022-04-13 RX ORDER — AMOXICILLIN 875 MG/1
875 TABLET, FILM COATED ORAL 2 TIMES DAILY
Qty: 20 TABLET | Refills: 0 | Status: SHIPPED | OUTPATIENT
Start: 2022-04-13 | End: 2022-04-23

## 2022-04-14 ENCOUNTER — HOSPITAL ENCOUNTER (OUTPATIENT)
Dept: RADIOLOGY | Facility: HOSPITAL | Age: 37
Discharge: HOME | End: 2022-04-14
Attending: OBSTETRICS & GYNECOLOGY
Payer: COMMERCIAL

## 2022-04-14 DIAGNOSIS — Z12.31 ENCOUNTER FOR MAMMOGRAM TO ESTABLISH BASELINE MAMMOGRAM: ICD-10-CM

## 2022-04-14 PROCEDURE — 77067 SCR MAMMO BI INCL CAD: CPT | Mod: TC

## 2022-04-14 PROCEDURE — 77063 MAMMO DIGITAL SCREENING BILAT WITH TOMO: ICD-10-PCS | Mod: 26,,, | Performed by: RADIOLOGY

## 2022-04-14 PROCEDURE — 77063 BREAST TOMOSYNTHESIS BI: CPT | Mod: 26,,, | Performed by: RADIOLOGY

## 2022-04-14 PROCEDURE — 77067 MAMMO DIGITAL SCREENING BILAT WITH TOMO: ICD-10-PCS | Mod: 26,,, | Performed by: RADIOLOGY

## 2022-04-14 PROCEDURE — 77067 SCR MAMMO BI INCL CAD: CPT | Mod: 26,,, | Performed by: RADIOLOGY

## 2022-04-14 PROCEDURE — 77063 BREAST TOMOSYNTHESIS BI: CPT | Mod: TC

## 2022-05-22 ENCOUNTER — PATIENT MESSAGE (OUTPATIENT)
Dept: INTERNAL MEDICINE | Facility: CLINIC | Age: 37
End: 2022-05-22
Payer: COMMERCIAL

## 2022-05-27 ENCOUNTER — OFFICE VISIT (OUTPATIENT)
Dept: URGENT CARE | Facility: CLINIC | Age: 37
End: 2022-05-27
Payer: COMMERCIAL

## 2022-05-27 VITALS
DIASTOLIC BLOOD PRESSURE: 74 MMHG | TEMPERATURE: 98 F | WEIGHT: 139 LBS | SYSTOLIC BLOOD PRESSURE: 121 MMHG | OXYGEN SATURATION: 97 % | BODY MASS INDEX: 24.63 KG/M2 | HEIGHT: 63 IN | HEART RATE: 84 BPM | RESPIRATION RATE: 17 BRPM

## 2022-05-27 DIAGNOSIS — S99.922A INJURY OF LEFT FOOT, INITIAL ENCOUNTER: ICD-10-CM

## 2022-05-27 DIAGNOSIS — S90.32XA CONTUSION OF LEFT FOOT, INITIAL ENCOUNTER: Primary | ICD-10-CM

## 2022-05-27 PROCEDURE — 3078F DIAST BP <80 MM HG: CPT | Mod: CPTII,S$GLB,, | Performed by: PHYSICIAN ASSISTANT

## 2022-05-27 PROCEDURE — 3074F PR MOST RECENT SYSTOLIC BLOOD PRESSURE < 130 MM HG: ICD-10-PCS | Mod: CPTII,S$GLB,, | Performed by: PHYSICIAN ASSISTANT

## 2022-05-27 PROCEDURE — 99213 PR OFFICE/OUTPT VISIT, EST, LEVL III, 20-29 MIN: ICD-10-PCS | Mod: 25,S$GLB,, | Performed by: PHYSICIAN ASSISTANT

## 2022-05-27 PROCEDURE — 3008F PR BODY MASS INDEX (BMI) DOCUMENTED: ICD-10-PCS | Mod: CPTII,S$GLB,, | Performed by: PHYSICIAN ASSISTANT

## 2022-05-27 PROCEDURE — 96372 THER/PROPH/DIAG INJ SC/IM: CPT | Mod: S$GLB,,, | Performed by: PHYSICIAN ASSISTANT

## 2022-05-27 PROCEDURE — 3078F PR MOST RECENT DIASTOLIC BLOOD PRESSURE < 80 MM HG: ICD-10-PCS | Mod: CPTII,S$GLB,, | Performed by: PHYSICIAN ASSISTANT

## 2022-05-27 PROCEDURE — 1160F RVW MEDS BY RX/DR IN RCRD: CPT | Mod: CPTII,S$GLB,, | Performed by: PHYSICIAN ASSISTANT

## 2022-05-27 PROCEDURE — 3008F BODY MASS INDEX DOCD: CPT | Mod: CPTII,S$GLB,, | Performed by: PHYSICIAN ASSISTANT

## 2022-05-27 PROCEDURE — 73630 X-RAY EXAM OF FOOT: CPT | Mod: FY,LT,S$GLB, | Performed by: RADIOLOGY

## 2022-05-27 PROCEDURE — 73630 XR FOOT COMPLETE 3 VIEW LEFT: ICD-10-PCS | Mod: FY,LT,S$GLB, | Performed by: RADIOLOGY

## 2022-05-27 PROCEDURE — 1160F PR REVIEW ALL MEDS BY PRESCRIBER/CLIN PHARMACIST DOCUMENTED: ICD-10-PCS | Mod: CPTII,S$GLB,, | Performed by: PHYSICIAN ASSISTANT

## 2022-05-27 PROCEDURE — 1159F PR MEDICATION LIST DOCUMENTED IN MEDICAL RECORD: ICD-10-PCS | Mod: CPTII,S$GLB,, | Performed by: PHYSICIAN ASSISTANT

## 2022-05-27 PROCEDURE — 96372 PR INJECTION,THERAP/PROPH/DIAG2ST, IM OR SUBCUT: ICD-10-PCS | Mod: S$GLB,,, | Performed by: PHYSICIAN ASSISTANT

## 2022-05-27 PROCEDURE — 3074F SYST BP LT 130 MM HG: CPT | Mod: CPTII,S$GLB,, | Performed by: PHYSICIAN ASSISTANT

## 2022-05-27 PROCEDURE — 1159F MED LIST DOCD IN RCRD: CPT | Mod: CPTII,S$GLB,, | Performed by: PHYSICIAN ASSISTANT

## 2022-05-27 PROCEDURE — 99213 OFFICE O/P EST LOW 20 MIN: CPT | Mod: 25,S$GLB,, | Performed by: PHYSICIAN ASSISTANT

## 2022-05-27 RX ORDER — DICLOFENAC SODIUM 75 MG/1
75 TABLET, DELAYED RELEASE ORAL 2 TIMES DAILY PRN
Qty: 14 TABLET | Refills: 0 | Status: SHIPPED | OUTPATIENT
Start: 2022-05-27 | End: 2022-06-03

## 2022-05-27 RX ORDER — KETOROLAC TROMETHAMINE 30 MG/ML
30 INJECTION, SOLUTION INTRAMUSCULAR; INTRAVENOUS
Status: COMPLETED | OUTPATIENT
Start: 2022-05-27 | End: 2022-05-27

## 2022-05-27 RX ADMIN — KETOROLAC TROMETHAMINE 30 MG: 30 INJECTION, SOLUTION INTRAMUSCULAR; INTRAVENOUS at 10:05

## 2022-05-27 NOTE — PROGRESS NOTES
"Subjective:       Patient ID: Sari Serna is a 37 y.o. female.    Vitals:  height is 5' 3" (1.6 m) and weight is 63 kg (139 lb). Her oral temperature is 97.7 °F (36.5 °C). Her blood pressure is 121/74 and her pulse is 84. Her respiration is 17 and oxygen saturation is 97%.     Chief Complaint: Foot Injury    Ms. Serna presents for left foot injury.  She dropped a large candle from above her head onto her left foot yesterday evening.  She immediately had bruising and swelling.  Since that time, she has had significant pain with weight-bearing and walking.  She denies any paresthesias.  She has tried ibuprofen 600, which did give her relief from the pain.      Foot Injury   The incident occurred 6 to 12 hours ago. The incident occurred at home. Injury mechanism: dropped item on it. The pain is present in the left foot. The quality of the pain is described as shooting. The pain is at a severity of 9/10. The pain is severe. The pain has been fluctuating since onset. Associated symptoms include an inability to bear weight. Pertinent negatives include no loss of motion, loss of sensation, numbness or tingling. She reports no foreign bodies present. The symptoms are aggravated by movement. The treatment provided moderate relief.       Constitution: Negative for appetite change, chills, sweating, fatigue and fever.   HENT: Negative for ear pain, ear discharge, hearing loss, drooling, congestion, postnasal drip, sinus pain, sinus pressure and sore throat.    Neck: Negative for neck stiffness and painful lymph nodes.   Cardiovascular: Negative for chest trauma, chest pain, leg swelling, palpitations, sob on exertion and passing out.   Eyes: Negative for eye pain and blurred vision.   Respiratory: Negative for chest tightness, cough, sputum production, shortness of breath and wheezing.    Gastrointestinal: Negative for abdominal pain, nausea, vomiting and diarrhea.   Genitourinary: Negative for dysuria, frequency " and urgency.   Musculoskeletal: Positive for pain and trauma. Negative for joint pain, joint swelling, muscle cramps and muscle ache.   Skin: Negative for rash.   Allergic/Immunologic: Negative for itching and sneezing.   Neurological: Negative for dizziness, history of vertigo, light-headedness, passing out, facial drooping, speech difficulty, coordination disturbances, loss of balance, headaches, altered mental status and numbness.   Hematologic/Lymphatic: Negative for swollen lymph nodes and easy bruising/bleeding. Does not bruise/bleed easily.   Psychiatric/Behavioral: Negative for altered mental status.       Objective:      Physical Exam   Constitutional: She is oriented to person, place, and time. She appears well-developed. She is cooperative.  Non-toxic appearance. She does not appear ill. No distress.   HENT:   Head: Normocephalic and atraumatic.   Ears:   Right Ear: Hearing, tympanic membrane, external ear and ear canal normal.   Left Ear: Hearing, tympanic membrane, external ear and ear canal normal.   Nose: Nose normal. No mucosal edema, rhinorrhea or nasal deformity. No epistaxis. Right sinus exhibits no maxillary sinus tenderness and no frontal sinus tenderness. Left sinus exhibits no maxillary sinus tenderness and no frontal sinus tenderness.   Mouth/Throat: Uvula is midline, oropharynx is clear and moist and mucous membranes are normal. No trismus in the jaw. Normal dentition. No uvula swelling. No posterior oropharyngeal erythema.   Eyes: Conjunctivae and lids are normal. Right eye exhibits no discharge. Left eye exhibits no discharge. No scleral icterus.   Neck: Trachea normal and phonation normal. Neck supple.   Cardiovascular: Normal rate, regular rhythm, normal heart sounds and normal pulses.   Pulmonary/Chest: Effort normal and breath sounds normal. No respiratory distress.   Abdominal: Normal appearance and bowel sounds are normal. She exhibits no distension and no mass. Soft. There is no  abdominal tenderness.   Musculoskeletal: Normal range of motion.         General: No deformity. Normal range of motion.        Feet:    Neurological: She is alert and oriented to person, place, and time. She exhibits normal muscle tone. Coordination normal.   Skin: Skin is warm, dry, intact, not diaphoretic and not pale.   Psychiatric: Her speech is normal and behavior is normal. Judgment and thought content normal.   Nursing note and vitals reviewed.          XR L foot -   No acute fracture identified.  Alignment appears satisfactory.     Assessment:       1. Contusion of left foot, initial encounter    2. Injury of left foot, initial encounter          Plan:         Contusion of left foot, initial encounter    Injury of left foot, initial encounter  -     XR FOOT COMPLETE 3 VIEW LEFT; Future; Expected date: 05/27/2022    Other orders  -     ketorolac injection 30 mg  -     diclofenac (VOLTAREN) 75 MG EC tablet; Take 1 tablet (75 mg total) by mouth 2 (two) times daily as needed (pain).  Dispense: 14 tablet; Refill: 0    ACE wrap applied.  RICE therapy.  Diagnoses and plan discussed with the patient, as well as the expected course and duration of her symptoms. All questions and concerns were addressed prior to discharge.  She was advised to follow up with her PCP within 1 week if symptoms do not improve. Emergency department precautions were given. Patient verbalized understanding and was happy with the plan of care.   Note dictated with voice recognition software, please excuse any grammatical errors.    Patient Instructions   PLEASE READ YOUR DISCHARGE INSTRUCTIONS ENTIRELY AS IT CONTAINS IMPORTANT INFORMATION.  You received an injection of a powerful NSAID today (Toradol).  Its effects will last up to 24 hours.  Please do not take another NSAID (ie aspirin, ibuprofen, Aleve, Advil or Motrin) until this time tomorrow.  If you continue to have pain, you may take Tylenol (acetaminophen) if you are not allergic to  this medication.    - Rest.    - Drink plenty of fluids.    - Tylenol or Ibuprofen as directed as needed for fever/pain.    - If you were prescribed antibiotics, please take them to completion.  - If you are female and on birth control pills - please use additional methods of contraception to prevent pregnancy while on antibiotics and for one cycle after.   - If you were prescribed a narcotic medication, a cough syrup, or a muscle relaxer, do not drive or operate heavy equipment or machinery while taking these medications, as they can cause drowsiness.   - If a referral to a specialty was made today, you should receive a phone call in the next few days to schedule an appt.  Please call [phone] to schedule an appt if have not gotten a phone call in the next few days.  - If you smoke, please stop smoking.  -You must understand that you've received an Urgent Care treatment only and that you may be released before all your medical problems are known or treated. You, the patient, will arrange for follow up care as instructed. Please arrange follow up with your primary medical clinic as soon as possible.   - Follow up with your PCP or specialty clinic as directed in the next 1-2 weeks if not improved or as needed.  You can call [phone] to schedule an appointment with the appropriate provider.    - Please return to Urgent Care or to the Emergency Department if your symptoms worsen.    Patient aware and verbalized understanding.

## 2022-05-27 NOTE — LETTER
May 27, 2022      [facility] - [facility]  [address] [place] [address]  Phone: [phone]  Fax: [phone]       Patient: Sari Serna   YOB: 1985  Date of Visit: 05/27/2022    To Whom It May Concern:    Citlali Serna  was at [facility] on 05/27/2022. The patient may return to work/school on 5/31/2022 with no restrictions. If you have any questions or concerns, or if I can be of further assistance, please do not hesitate to contact me.    Sincerely,    Debbie Heaton PA-C

## 2022-05-27 NOTE — PATIENT INSTRUCTIONS
PLEASE READ YOUR DISCHARGE INSTRUCTIONS ENTIRELY AS IT CONTAINS IMPORTANT INFORMATION.  You received an injection of a powerful NSAID today (Toradol).  Its effects will last up to 24 hours.  Please do not take another NSAID (ie aspirin, ibuprofen, Aleve, Advil or Motrin) until this time tomorrow.  If you continue to have pain, you may take Tylenol (acetaminophen) if you are not allergic to this medication.    - Rest.    - Drink plenty of fluids.    - Tylenol or Ibuprofen as directed as needed for fever/pain.    - If you were prescribed antibiotics, please take them to completion.  - If you are female and on birth control pills - please use additional methods of contraception to prevent pregnancy while on antibiotics and for one cycle after.   - If you were prescribed a narcotic medication, a cough syrup, or a muscle relaxer, do not drive or operate heavy equipment or machinery while taking these medications, as they can cause drowsiness.   - If a referral to a specialty was made today, you should receive a phone call in the next few days to schedule an appt.  Please call [phone] to schedule an appt if have not gotten a phone call in the next few days.  - If you smoke, please stop smoking.  -You must understand that you've received an Urgent Care treatment only and that you may be released before all your medical problems are known or treated. You, the patient, will arrange for follow up care as instructed. Please arrange follow up with your primary medical clinic as soon as possible.   - Follow up with your PCP or specialty clinic as directed in the next 1-2 weeks if not improved or as needed.  You can call [phone] to schedule an appointment with the appropriate provider.    - Please return to Urgent Care or to the Emergency Department if your symptoms worsen.    Patient aware and verbalized understanding.

## 2022-05-27 NOTE — PROGRESS NOTES
Subjective:       Patient ID: Sari Serna is a 37 y.o. female.    Vitals:  vitals were not taken for this visit.     Chief Complaint: Foot Injury    Foot Injury         Musculoskeletal: Positive for trauma.       Objective:      Physical Exam      Assessment:       No diagnosis found.      Plan:         There are no diagnoses linked to this encounter.

## 2022-05-30 ENCOUNTER — OFFICE VISIT (OUTPATIENT)
Dept: ORTHOPEDICS | Facility: CLINIC | Age: 37
End: 2022-05-30
Payer: COMMERCIAL

## 2022-05-30 VITALS — WEIGHT: 139 LBS | HEIGHT: 63 IN | BODY MASS INDEX: 24.63 KG/M2

## 2022-05-30 DIAGNOSIS — G56.01 CARPAL TUNNEL SYNDROME OF RIGHT WRIST: Primary | ICD-10-CM

## 2022-05-30 DIAGNOSIS — M25.531 RIGHT WRIST PAIN: ICD-10-CM

## 2022-05-30 PROCEDURE — 1159F PR MEDICATION LIST DOCUMENTED IN MEDICAL RECORD: ICD-10-PCS | Mod: CPTII,S$GLB,, | Performed by: ORTHOPAEDIC SURGERY

## 2022-05-30 PROCEDURE — 3008F PR BODY MASS INDEX (BMI) DOCUMENTED: ICD-10-PCS | Mod: CPTII,S$GLB,, | Performed by: ORTHOPAEDIC SURGERY

## 2022-05-30 PROCEDURE — 99213 PR OFFICE/OUTPT VISIT, EST, LEVL III, 20-29 MIN: ICD-10-PCS | Mod: S$GLB,,, | Performed by: ORTHOPAEDIC SURGERY

## 2022-05-30 PROCEDURE — 3008F BODY MASS INDEX DOCD: CPT | Mod: CPTII,S$GLB,, | Performed by: ORTHOPAEDIC SURGERY

## 2022-05-30 PROCEDURE — 1159F MED LIST DOCD IN RCRD: CPT | Mod: CPTII,S$GLB,, | Performed by: ORTHOPAEDIC SURGERY

## 2022-05-30 PROCEDURE — 99213 OFFICE O/P EST LOW 20 MIN: CPT | Mod: S$GLB,,, | Performed by: ORTHOPAEDIC SURGERY

## 2022-05-30 PROCEDURE — 99999 PR PBB SHADOW E&M-EST. PATIENT-LVL III: ICD-10-PCS | Mod: PBBFAC,,, | Performed by: ORTHOPAEDIC SURGERY

## 2022-05-30 PROCEDURE — 99999 PR PBB SHADOW E&M-EST. PATIENT-LVL III: CPT | Mod: PBBFAC,,, | Performed by: ORTHOPAEDIC SURGERY

## 2022-05-30 NOTE — PROGRESS NOTES
Subjective:      Patient ID: Sari Serna is a 37 y.o. female.  Chief Complaint: Pain of the Right Wrist      HPI  Sari Serna is a  37 y.o. female presenting today  for follow up of right hand and wrist pain.  She reports that she is having ongoing symptoms now starting to develop a little numbness tingling from time to time  The brace seems to be helping but she is wearing it intermittently no problems with the opposite left hand    Review of patient's allergies indicates:   Allergen Reactions    Doxycycline      Other reaction(s): Stomach upset         Current Outpatient Medications   Medication Sig Dispense Refill    albuterol (PROVENTIL/VENTOLIN HFA) 90 mcg/actuation inhaler Inhale 1-2 puffs into the lungs every 6 (six) hours as needed for Wheezing. Rescue 8 g 0    clonazePAM (KLONOPIN) 0.5 MG tablet TAKE 1/2-1 TABLET TWICE A DAY AS NEEDED FOR ANXIETY 30 tablet 3    diclofenac (VOLTAREN) 75 MG EC tablet Take 1 tablet (75 mg total) by mouth 2 (two) times daily as needed (pain). 14 tablet 0    diphenhydrAMINE (BENADRYL) 25 mg capsule Take 25 mg by mouth every 6 (six) hours as needed for Itching.      EScitalopram oxalate (LEXAPRO) 20 MG tablet Take 1 tablet (20 mg total) by mouth once daily. 90 tablet 1    fluticasone (FLONASE) 50 mcg/actuation nasal spray 2 sprays (100 mcg total) by Each Nare route once daily. 1 Bottle 0    ketoconazole (NIZORAL) 2 % shampoo Wash scalp with medicated shampoo at least 2x/week. Let sit on scalp at least 5 minutes prior to rinsing 240 mL 5    loratadine (CLARITIN) 10 mg tablet       norgestimate-ethinyl estradioL (FEMYNOR) 0.25-35 mg-mcg per tablet Take 1 tablet by mouth once daily. 84 tablet 3    ondansetron (ZOFRAN-ODT) 4 MG TbDL Take 1 tablet (4 mg total) by mouth every 12 (twelve) hours as needed (Nausea). 20 tablet 0    pantoprazole (PROTONIX) 40 MG tablet Take 1 tablet (40 mg total) by mouth once daily. 30 tablet 11    propranoloL (INDERAL) 10  "MG tablet Take 1 tablet (10 mg total) by mouth 3 (three) times daily. 90 tablet 11    spironolactone (ALDACTONE) 50 MG tablet Take 3 tablets (150 mg total) by mouth once daily. 90 tablet 5    urea (CARMOL) 40 % Crea AAA BID 28 g 1     No current facility-administered medications for this visit.       Past Medical History:   Diagnosis Date    Acne     ALLERGIC RHINITIS     Allergy     Anxiety     Dysmenorrhea     Low back pain     Migraine headache     Recurrent upper respiratory infection (URI)        Past Surgical History:   Procedure Laterality Date    FACIAL COSMETIC SURGERY      2013       OBJECTIVE:   PHYSICAL EXAM:  Height: 5' 3" (160 cm) Weight: 63 kg (139 lb)  Vitals:    05/30/22 1000   Weight: 63 kg (139 lb)   Height: 5' 3" (1.6 m)   PainSc:   3     Ortho/SPM Exam  Examination right hand mild swelling Tinel sign mildly positive Phalen's test negative  Range of motion wrist fingers full no specific areas of tenderness  Sensation intact all digits no atrophy    RADIOGRAPHS:  None  Comments: I have personally reviewed the imaging and I agree with the above radiologist's report.    ASSESSMENT/PLAN:     IMPRESSION:  1. Right wrist pain related to tendinitis.    2. Possible superimposed right carpal tunnel syndrome     PLAN:  I recommend she start back on the Motrin twice a day with food and use the brace during the day for lifting and also at night for sleeping   Since she may be having some carpal tunnel symptoms I have ordered a nerve conduction study for this  Will also try her with some Pennsaid topical anti-inflammatory cream for the hand and wrist    FOLLOW UP:  After the nerve test is complete    Disclaimer: This note has been generated using voice-recognition software. There may be typographical errors that have been missed during proof-reading.    "

## 2022-06-13 ENCOUNTER — PATIENT MESSAGE (OUTPATIENT)
Dept: DERMATOLOGY | Facility: CLINIC | Age: 37
End: 2022-06-13
Payer: COMMERCIAL

## 2022-06-13 ENCOUNTER — PATIENT MESSAGE (OUTPATIENT)
Dept: INTERNAL MEDICINE | Facility: CLINIC | Age: 37
End: 2022-06-13
Payer: COMMERCIAL

## 2022-06-24 ENCOUNTER — PATIENT MESSAGE (OUTPATIENT)
Dept: ORTHOPEDICS | Facility: CLINIC | Age: 37
End: 2022-06-24
Payer: COMMERCIAL

## 2022-07-07 ENCOUNTER — PATIENT MESSAGE (OUTPATIENT)
Dept: NEUROLOGY | Facility: CLINIC | Age: 37
End: 2022-07-07
Payer: COMMERCIAL

## 2022-08-01 ENCOUNTER — PATIENT MESSAGE (OUTPATIENT)
Dept: NEUROLOGY | Facility: CLINIC | Age: 37
End: 2022-08-01
Payer: COMMERCIAL

## 2022-08-08 ENCOUNTER — PATIENT MESSAGE (OUTPATIENT)
Dept: DERMATOLOGY | Facility: CLINIC | Age: 37
End: 2022-08-08
Payer: COMMERCIAL

## 2022-08-22 ENCOUNTER — PATIENT MESSAGE (OUTPATIENT)
Dept: ORTHOPEDICS | Facility: CLINIC | Age: 37
End: 2022-08-22
Payer: COMMERCIAL

## 2022-08-29 ENCOUNTER — OFFICE VISIT (OUTPATIENT)
Dept: URGENT CARE | Facility: CLINIC | Age: 37
End: 2022-08-29
Payer: COMMERCIAL

## 2022-08-29 ENCOUNTER — PATIENT MESSAGE (OUTPATIENT)
Dept: URGENT CARE | Facility: CLINIC | Age: 37
End: 2022-08-29
Payer: COMMERCIAL

## 2022-08-29 ENCOUNTER — NURSE TRIAGE (OUTPATIENT)
Dept: ADMINISTRATIVE | Facility: CLINIC | Age: 37
End: 2022-08-29
Payer: COMMERCIAL

## 2022-08-29 ENCOUNTER — TELEPHONE (OUTPATIENT)
Dept: INTERNAL MEDICINE | Facility: CLINIC | Age: 37
End: 2022-08-29
Payer: COMMERCIAL

## 2022-08-29 VITALS
TEMPERATURE: 99 F | SYSTOLIC BLOOD PRESSURE: 127 MMHG | BODY MASS INDEX: 24.63 KG/M2 | WEIGHT: 139 LBS | HEART RATE: 82 BPM | HEIGHT: 63 IN | OXYGEN SATURATION: 98 % | RESPIRATION RATE: 18 BRPM | DIASTOLIC BLOOD PRESSURE: 85 MMHG

## 2022-08-29 DIAGNOSIS — K21.9 GASTROESOPHAGEAL REFLUX DISEASE, UNSPECIFIED WHETHER ESOPHAGITIS PRESENT: Primary | ICD-10-CM

## 2022-08-29 PROCEDURE — 93005 EKG 12-LEAD: ICD-10-PCS | Mod: S$GLB,,, | Performed by: PHYSICIAN ASSISTANT

## 2022-08-29 PROCEDURE — 93010 ELECTROCARDIOGRAM REPORT: CPT | Mod: S$GLB,,, | Performed by: INTERNAL MEDICINE

## 2022-08-29 PROCEDURE — 99214 PR OFFICE/OUTPT VISIT, EST, LEVL IV, 30-39 MIN: ICD-10-PCS | Mod: S$GLB,,, | Performed by: PHYSICIAN ASSISTANT

## 2022-08-29 PROCEDURE — 93010 EKG 12-LEAD: ICD-10-PCS | Mod: S$GLB,,, | Performed by: INTERNAL MEDICINE

## 2022-08-29 PROCEDURE — 3074F SYST BP LT 130 MM HG: CPT | Mod: CPTII,S$GLB,, | Performed by: PHYSICIAN ASSISTANT

## 2022-08-29 PROCEDURE — 1159F PR MEDICATION LIST DOCUMENTED IN MEDICAL RECORD: ICD-10-PCS | Mod: CPTII,S$GLB,, | Performed by: PHYSICIAN ASSISTANT

## 2022-08-29 PROCEDURE — 3079F DIAST BP 80-89 MM HG: CPT | Mod: CPTII,S$GLB,, | Performed by: PHYSICIAN ASSISTANT

## 2022-08-29 PROCEDURE — 93005 ELECTROCARDIOGRAM TRACING: CPT | Mod: S$GLB,,, | Performed by: PHYSICIAN ASSISTANT

## 2022-08-29 PROCEDURE — 1159F MED LIST DOCD IN RCRD: CPT | Mod: CPTII,S$GLB,, | Performed by: PHYSICIAN ASSISTANT

## 2022-08-29 PROCEDURE — 1160F RVW MEDS BY RX/DR IN RCRD: CPT | Mod: CPTII,S$GLB,, | Performed by: PHYSICIAN ASSISTANT

## 2022-08-29 PROCEDURE — 99214 OFFICE O/P EST MOD 30 MIN: CPT | Mod: S$GLB,,, | Performed by: PHYSICIAN ASSISTANT

## 2022-08-29 PROCEDURE — 3008F BODY MASS INDEX DOCD: CPT | Mod: CPTII,S$GLB,, | Performed by: PHYSICIAN ASSISTANT

## 2022-08-29 PROCEDURE — 1160F PR REVIEW ALL MEDS BY PRESCRIBER/CLIN PHARMACIST DOCUMENTED: ICD-10-PCS | Mod: CPTII,S$GLB,, | Performed by: PHYSICIAN ASSISTANT

## 2022-08-29 PROCEDURE — 3074F PR MOST RECENT SYSTOLIC BLOOD PRESSURE < 130 MM HG: ICD-10-PCS | Mod: CPTII,S$GLB,, | Performed by: PHYSICIAN ASSISTANT

## 2022-08-29 PROCEDURE — 3079F PR MOST RECENT DIASTOLIC BLOOD PRESSURE 80-89 MM HG: ICD-10-PCS | Mod: CPTII,S$GLB,, | Performed by: PHYSICIAN ASSISTANT

## 2022-08-29 PROCEDURE — 3008F PR BODY MASS INDEX (BMI) DOCUMENTED: ICD-10-PCS | Mod: CPTII,S$GLB,, | Performed by: PHYSICIAN ASSISTANT

## 2022-08-29 RX ORDER — TRIAMCINOLONE ACETONIDE 55 UG/1
2 SPRAY, METERED NASAL DAILY
COMMUNITY
Start: 2022-06-22

## 2022-08-29 RX ORDER — SULFAMETHOXAZOLE AND TRIMETHOPRIM 800; 160 MG/1; MG/1
1 TABLET ORAL 2 TIMES DAILY
COMMUNITY
Start: 2022-06-27 | End: 2023-07-03

## 2022-08-29 RX ORDER — LIDOCAINE HYDROCHLORIDE 20 MG/ML
15 SOLUTION OROPHARYNGEAL
Status: COMPLETED | OUTPATIENT
Start: 2022-08-29 | End: 2022-08-29

## 2022-08-29 RX ORDER — MAG HYDROX/ALUMINUM HYD/SIMETH 200-200-20
30 SUSPENSION, ORAL (FINAL DOSE FORM) ORAL
Status: COMPLETED | OUTPATIENT
Start: 2022-08-29 | End: 2022-08-29

## 2022-08-29 RX ORDER — IPRATROPIUM BROMIDE 42 UG/1
SPRAY, METERED NASAL
COMMUNITY
Start: 2022-06-22

## 2022-08-29 RX ORDER — HYDROXYZINE HYDROCHLORIDE 25 MG/1
25 TABLET, FILM COATED ORAL DAILY
COMMUNITY
Start: 2022-08-25 | End: 2023-05-02 | Stop reason: ALTCHOICE

## 2022-08-29 RX ORDER — DICLOFENAC SODIUM 75 MG/1
TABLET, DELAYED RELEASE ORAL
COMMUNITY
Start: 2022-06-22 | End: 2023-10-02 | Stop reason: ALTCHOICE

## 2022-08-29 RX ORDER — PROMETHAZINE HYDROCHLORIDE AND CODEINE PHOSPHATE 6.25; 1 MG/5ML; MG/5ML
SOLUTION ORAL
COMMUNITY
Start: 2022-06-22 | End: 2023-07-03

## 2022-08-29 RX ORDER — AZELASTINE HCL 205.5 UG/1
SPRAY NASAL
COMMUNITY
Start: 2022-06-22

## 2022-08-29 RX ORDER — IBUPROFEN 600 MG/1
TABLET ORAL
COMMUNITY
Start: 2022-06-22 | End: 2023-07-03

## 2022-08-29 RX ORDER — PANTOPRAZOLE SODIUM 40 MG/1
40 TABLET, DELAYED RELEASE ORAL DAILY
Qty: 30 TABLET | Refills: 11 | Status: SHIPPED | OUTPATIENT
Start: 2022-08-29 | End: 2023-05-02

## 2022-08-29 RX ORDER — AMOXICILLIN 875 MG/1
TABLET, FILM COATED ORAL
COMMUNITY
Start: 2022-06-22 | End: 2023-07-03

## 2022-08-29 RX ADMIN — LIDOCAINE HYDROCHLORIDE 15 ML: 20 SOLUTION OROPHARYNGEAL at 11:08

## 2022-08-29 RX ADMIN — Medication 30 ML: at 11:08

## 2022-08-29 NOTE — PATIENT INSTRUCTIONS
-Take protonix as prescribed.  -See attached handout for lifestyle modification suggestions to improve symptoms.  -Call [phone] to schedule an appointment with GI. A referral has been placed in your chart.    Please follow up with your primary care provider within 2-5 days if your signs and symptoms have not resolved or worsen.     If your condition worsens or fails to improve we recommend that you receive another evaluation at the emergency room immediately or contact your primary medical clinic to discuss your concerns.   You must understand that you have received an Urgent Care treatment only and that you may be released before all of your medical problems are known or treated. You, the patient, will arrange for follow up care as instructed.

## 2022-08-29 NOTE — TELEPHONE ENCOUNTER
----- Message from Andrew Farrell sent at 8/29/2022 10:16 AM CDT -----  Contact: magnolia  Type:  Patient Returning Call    Who Called:magnolia patel/ [facility]   Would the patient rather a call back or a response via [facility]? Call back   Best Call Back Number: [phone]  Additional Information:     Calling to be seen today pt is having chest pain

## 2022-08-29 NOTE — TELEPHONE ENCOUNTER
OOC Rn Patient calling, intermittent chest pain for 3 days,   Taken Tums, not helping.  Acid Reflux just got put on protonix.  Dr. Shearer.  Pain is pressure,     4/5 10   Care adivse is to see PCP today, or UC/ED.   Calling PCP,   to see if she can see pt now.   Called.  LM for   Cannot see her right now.  Advise pt to go to UC/ED looked up UC for her on [facility].   For any new or worsening symptoms to call back.   Patient Vu.   Reason for Disposition   Chest pain lasting longer than 5 minutes    Additional Information   Negative: SEVERE difficulty breathing (e.g., struggling for each breath, speaks in single words)   Negative: Passed out (i.e., fainted, collapsed and was not responding)   Negative: Chest pain lasting longer than 5 minutes and ANY of the following:* Over 50 years old* Over 30 years old and at least one cardiac risk factor (i.e., high blood pressure, diabetes, high cholesterol, obesity, smoker or strong family history of heart disease)* Pain is crushing, pressure-like, or heavy * Took nitroglycerin and chest pain was not relieved* History of heart disease (i.e., angina, heart attack, bypass surgery, angioplasty, CHF)   Negative: Visible sweat on face or sweat dripping down face   Negative: Sounds like a life-threatening emergency to the triager   Negative: SEVERE chest pain   Negative: Pain also present in shoulder(s) or arm(s) or jaw   Negative: Difficulty breathing   Negative: Cocaine use within last 3 days   Negative: History of prior 'blood clot' in leg or lungs (i.e., deep vein thrombosis, pulmonary embolism)   Negative: Recent illness requiring prolonged bed rest (i.e., immobilization)   Negative: Hip or leg fracture in past 2 months (e.g, or had cast on leg or ankle)   Negative: Major surgery in the past month   Negative: Recent long-distance travel with prolonged time in car, bus, plane, or train (i.e., within past 2 weeks; 6 or more hours duration)   Negative: Heart beating irregularly or  very rapidly    Protocols used: Chest Pain-A-[place]

## 2022-08-29 NOTE — PROGRESS NOTES
"Subjective:       Patient ID: Sari Serna is a 37 y.o. female.    Vitals:  height is 5' 3" (1.6 m) and weight is 63 kg (139 lb). Her temperature is 98.6 °F (37 °C). Her blood pressure is 127/85 and her pulse is 82. Her respiration is 18 and oxygen saturation is 98%.     Chief Complaint: Chest Pain (tightness)    Patient presents with complaints of midline chest/epigastric pain that started 4 days ago. She reports h/o gerd and was previously prescribed protonix. Pt states the pain is worse when lying down. She describes the pain as dull with occasional sharp pains. Pt denies SOB, left sided CP, lightheadedness, dizziness, blurred vision, numbness, tingling, or weakness in extremities. She has tried tums with no relief.    Chest Pain   This is a new problem. The current episode started in the past 7 days. The onset quality is gradual. The problem has been gradually worsening. The pain is present in the substernal region. The pain is at a severity of 3/10. The pain is mild. The quality of the pain is described as burning and sharp. The pain radiates to the epigastrium. Pertinent negatives include no abdominal pain, cough, dizziness, fever, headaches, nausea, palpitations, shortness of breath, sputum production, syncope or vomiting. Treatments tried: tums. The treatment provided no relief.     Constitution: Negative for chills, fatigue and fever.   HENT:  Negative for ear pain, sinus pain and sore throat.    Neck: Negative for neck pain, neck stiffness and painful lymph nodes.   Cardiovascular:  Positive for chest pain. Negative for leg swelling, palpitations, sob on exertion and passing out.   Eyes:  Negative for eye discharge, eye itching and eye pain.   Respiratory:  Negative for chest tightness, cough, sputum production and shortness of breath.    Gastrointestinal:  Positive for heartburn. Negative for abdominal pain, nausea, vomiting and diarrhea.   Genitourinary:  Negative for dysuria, hematuria and " pelvic pain.   Musculoskeletal:  Negative for pain, muscle cramps and muscle ache.   Skin:  Negative for pale, rash and wound.   Neurological:  Negative for dizziness, light-headedness and headaches.   Hematologic/Lymphatic: Negative for swollen lymph nodes.     Objective:      Physical Exam   Constitutional: She is oriented to person, place, and time. She appears well-developed. She is cooperative.  Non-toxic appearance. She does not appear ill. No distress.   HENT:   Head: Normocephalic and atraumatic.   Ears:   Right Ear: External ear normal.   Left Ear: External ear normal.   Nose: Nose normal.   Mouth/Throat: Oropharynx is clear and moist.   Eyes: Conjunctivae, EOM and lids are normal. Right eye exhibits no discharge. Left eye exhibits no discharge. No scleral icterus.   Neck: Trachea normal and phonation normal. Neck supple.   Cardiovascular: Normal rate, regular rhythm and normal heart sounds.   No murmur heard.Exam reveals no gallop.   Pulmonary/Chest: Effort normal and breath sounds normal. No respiratory distress. She has no decreased breath sounds. She has no wheezes. She has no rhonchi. She has no rales. She exhibits no mass, no tenderness, no bony tenderness, no edema, no deformity, no swelling and no retraction.   Abdominal: There is no abdominal tenderness.   Musculoskeletal:         General: No deformity.   Neurological: She is alert and oriented to person, place, and time. Coordination normal.   Skin: Skin is warm, dry, intact, not diaphoretic and not pale.   Psychiatric: Her speech is normal and behavior is normal. Judgment and thought content normal.   Nursing note and vitals reviewed.      Assessment:       1. Gastroesophageal reflux disease, unspecified whether esophagitis present        EKG: normal EKG, normal sinus rhythm, normal sinus rhythm. HR 71 bpm.     Plan:       EKG WNL. VSS. Symptoms completely relieved with GI cocktail. Likely reflux related. Treating with lifestyle modifications,  protonix, and advised patient follow up with GI. Referral placed. ER precautions discussed.    Gastroesophageal reflux disease, unspecified whether esophagitis present  -     IN OFFICE EKG 12-LEAD (to Muse)  -     aluminum-magnesium hydroxide-simethicone 200-200-20 mg/5 mL suspension 30 mL  -     LIDOcaine HCl 2% oral solution 15 mL  -     pantoprazole (PROTONIX) 40 MG tablet; Take 1 tablet (40 mg total) by mouth once daily.  Dispense: 30 tablet; Refill: 11  -     Ambulatory referral/consult to Gastroenterology       Patient Instructions   -Take protonix as prescribed.  -See attached handout for lifestyle modification suggestions to improve symptoms.  -Call [phone] to schedule an appointment with GI. A referral has been placed in your chart.    Please follow up with your primary care provider within 2-5 days if your signs and symptoms have not resolved or worsen.     If your condition worsens or fails to improve we recommend that you receive another evaluation at the emergency room immediately or contact your primary medical clinic to discuss your concerns.   You must understand that you have received an Urgent Care treatment only and that you may be released before all of your medical problems are known or treated. You, the patient, will arrange for follow up care as instructed.

## 2022-08-29 NOTE — PROGRESS NOTES
"Subjective:       Patient ID: Sari Serna is a 37 y.o. female.    Vitals:  height is 5' 3" (1.6 m) and weight is 63 kg (139 lb).     Chief Complaint: Chest Pain (tightness)    HPI  ROS    Objective:      Physical Exam      Assessment:       No diagnosis found.      Plan:         There are no diagnoses linked to this encounter.                   "

## 2022-08-29 NOTE — TELEPHONE ENCOUNTER
I returned patient call, no answer. I left a VM informing patient that I will inform  she's at the . Please advise.

## 2022-08-30 ENCOUNTER — TELEPHONE (OUTPATIENT)
Dept: URGENT CARE | Facility: CLINIC | Age: 37
End: 2022-08-30
Payer: COMMERCIAL

## 2022-10-17 ENCOUNTER — PATIENT MESSAGE (OUTPATIENT)
Dept: INTERNAL MEDICINE | Facility: CLINIC | Age: 37
End: 2022-10-17
Payer: COMMERCIAL

## 2022-11-24 ENCOUNTER — PATIENT MESSAGE (OUTPATIENT)
Dept: INTERNAL MEDICINE | Facility: CLINIC | Age: 37
End: 2022-11-24
Payer: COMMERCIAL

## 2022-12-29 ENCOUNTER — PATIENT MESSAGE (OUTPATIENT)
Dept: SPORTS MEDICINE | Facility: CLINIC | Age: 37
End: 2022-12-29
Payer: COMMERCIAL

## 2022-12-29 DIAGNOSIS — F41.1 GAD (GENERALIZED ANXIETY DISORDER): ICD-10-CM

## 2022-12-30 RX ORDER — CLONAZEPAM 0.5 MG/1
TABLET ORAL
Qty: 30 TABLET | Refills: 3 | OUTPATIENT
Start: 2022-12-30

## 2022-12-30 NOTE — TELEPHONE ENCOUNTER
No new care gaps identified.  [facility] Embedded Care Gaps. Reference number: [id number]. 12/29/2022   10:20:22 PM CST

## 2022-12-30 NOTE — TELEPHONE ENCOUNTER
RX was denied by Dr. Hernandez. Spoke with patient and told her Dr. Shearer was out of town and she would need to wait til she returned. She stated that was fine.

## 2023-01-01 ENCOUNTER — PATIENT MESSAGE (OUTPATIENT)
Dept: INTERNAL MEDICINE | Facility: CLINIC | Age: 38
End: 2023-01-01
Payer: COMMERCIAL

## 2023-01-10 ENCOUNTER — OFFICE VISIT (OUTPATIENT)
Dept: SPORTS MEDICINE | Facility: CLINIC | Age: 38
End: 2023-01-10
Payer: COMMERCIAL

## 2023-01-10 VITALS
HEIGHT: 63 IN | WEIGHT: 138 LBS | SYSTOLIC BLOOD PRESSURE: 113 MMHG | DIASTOLIC BLOOD PRESSURE: 69 MMHG | BODY MASS INDEX: 24.45 KG/M2 | HEART RATE: 87 BPM

## 2023-01-10 DIAGNOSIS — M99.06 SOMATIC DYSFUNCTION OF LOWER EXTREMITY: ICD-10-CM

## 2023-01-10 DIAGNOSIS — M99.04 SOMATIC DYSFUNCTION OF SACRAL REGION: ICD-10-CM

## 2023-01-10 DIAGNOSIS — M99.03 SOMATIC DYSFUNCTION OF LUMBAR REGION: ICD-10-CM

## 2023-01-10 DIAGNOSIS — M99.02 SOMATIC DYSFUNCTION OF THORACIC REGION: ICD-10-CM

## 2023-01-10 DIAGNOSIS — M25.551 RIGHT HIP PAIN: Primary | ICD-10-CM

## 2023-01-10 DIAGNOSIS — M99.05 SOMATIC DYSFUNCTION OF PELVIS REGION: ICD-10-CM

## 2023-01-10 PROCEDURE — 3078F PR MOST RECENT DIASTOLIC BLOOD PRESSURE < 80 MM HG: ICD-10-PCS | Mod: CPTII,S$GLB,, | Performed by: ORTHOPAEDIC SURGERY

## 2023-01-10 PROCEDURE — 1160F RVW MEDS BY RX/DR IN RCRD: CPT | Mod: CPTII,S$GLB,, | Performed by: ORTHOPAEDIC SURGERY

## 2023-01-10 PROCEDURE — 98927 PR OSTEOPATHIC MANIP,5-6 BODY REGN: ICD-10-PCS | Mod: S$GLB,,, | Performed by: ORTHOPAEDIC SURGERY

## 2023-01-10 PROCEDURE — 98927 OSTEOPATH MANJ 5-6 REGIONS: CPT | Mod: S$GLB,,, | Performed by: ORTHOPAEDIC SURGERY

## 2023-01-10 PROCEDURE — 3078F DIAST BP <80 MM HG: CPT | Mod: CPTII,S$GLB,, | Performed by: ORTHOPAEDIC SURGERY

## 2023-01-10 PROCEDURE — 1160F PR REVIEW ALL MEDS BY PRESCRIBER/CLIN PHARMACIST DOCUMENTED: ICD-10-PCS | Mod: CPTII,S$GLB,, | Performed by: ORTHOPAEDIC SURGERY

## 2023-01-10 PROCEDURE — 3074F SYST BP LT 130 MM HG: CPT | Mod: CPTII,S$GLB,, | Performed by: ORTHOPAEDIC SURGERY

## 2023-01-10 PROCEDURE — 1159F PR MEDICATION LIST DOCUMENTED IN MEDICAL RECORD: ICD-10-PCS | Mod: CPTII,S$GLB,, | Performed by: ORTHOPAEDIC SURGERY

## 2023-01-10 PROCEDURE — 99214 PR OFFICE/OUTPT VISIT, EST, LEVL IV, 30-39 MIN: ICD-10-PCS | Mod: 25,S$GLB,, | Performed by: ORTHOPAEDIC SURGERY

## 2023-01-10 PROCEDURE — 3008F PR BODY MASS INDEX (BMI) DOCUMENTED: ICD-10-PCS | Mod: CPTII,S$GLB,, | Performed by: ORTHOPAEDIC SURGERY

## 2023-01-10 PROCEDURE — 1159F MED LIST DOCD IN RCRD: CPT | Mod: CPTII,S$GLB,, | Performed by: ORTHOPAEDIC SURGERY

## 2023-01-10 PROCEDURE — 99214 OFFICE O/P EST MOD 30 MIN: CPT | Mod: 25,S$GLB,, | Performed by: ORTHOPAEDIC SURGERY

## 2023-01-10 PROCEDURE — 99999 PR PBB SHADOW E&M-EST. PATIENT-LVL III: ICD-10-PCS | Mod: PBBFAC,,, | Performed by: ORTHOPAEDIC SURGERY

## 2023-01-10 PROCEDURE — 3008F BODY MASS INDEX DOCD: CPT | Mod: CPTII,S$GLB,, | Performed by: ORTHOPAEDIC SURGERY

## 2023-01-10 PROCEDURE — 3074F PR MOST RECENT SYSTOLIC BLOOD PRESSURE < 130 MM HG: ICD-10-PCS | Mod: CPTII,S$GLB,, | Performed by: ORTHOPAEDIC SURGERY

## 2023-01-10 PROCEDURE — 99999 PR PBB SHADOW E&M-EST. PATIENT-LVL III: CPT | Mod: PBBFAC,,, | Performed by: ORTHOPAEDIC SURGERY

## 2023-01-10 NOTE — PROGRESS NOTES
CC: right hip pain    Sari returns today for follow up evaluation of her right hip. Reports increased pain over the past 3-4 weeks. Denies radiation into leg. Pain is currently 4/10 with movement. Pain with Mack or yoga. She has stopped taking the Mobic and daily HEP. She reports improvement with OMT at previous visits. She denies new injury or trauma since her last visit.     Recall from visit on 7/6/2021  Sari is here today for follow up evaluation of her right hip pain. Patient reports her pain is 0/10 today and she is feeling 80% improved overall. She admits to compliance with her HEP, has been taking Mobic 7.5 mg as prescribed and has appreciated benefit following OMT. She denies new injury or trauma since her last visit. Patient states she has been exercising more frequently without pain and is pleased with her progress. When asked where she hurts she gestures to the lateral aspect of the right hip with four fingers.     Recall from visit on 06/15/2021  37 y.o. Female presents today for evaluation of her right hip pain. She states she believes her pain is from a belly dance class she was taking. She states her pain started the morning after she had a class. She states she will take a break from exercising and her pain will decrease but once she goes back to exercising her pain returns.When asked where her pain is located, she gestured to the lateral aspect of her hip. She describes her pain as throbbing.   How long: Patient admits to experiencing right hip pain for the past 2-3 months  What makes it better: Patient admits to decreased pain with rest and ibuprofen  What makes it worse: Patient admits to increased pain after exercise  Does it radiate: Patient denies radiating pain  Attempted treatments: Patient admits to rest and ibuprofen  Pain score: Patient admits to pain score of 0/10 and 6/10 at its worst  Any mechanical symptoms: Patient denies mechanical symptoms  Feelings of instability: Patient  denies feelings of instability  Affecting ADLs: Patient denies her pain affecting her ability to perform her ADLs      PAST MEDICAL HISTORY:   Past Medical History:   Diagnosis Date    Acne     ALLERGIC RHINITIS     Allergy     Anxiety     Dysmenorrhea     Low back pain     Migraine headache     Recurrent upper respiratory infection (URI)        PAST SURGICAL HISTORY:   Past Surgical History:   Procedure Laterality Date    FACIAL COSMETIC SURGERY      2013       FAMILY HISTORY:   Family History   Problem Relation Age of Onset    Endometriosis Mother     Cirrhosis Mother     Allergic rhinitis Father     Breast cancer Paternal Aunt     Stroke Paternal Grandfather     Allergic rhinitis Brother     Amblyopia Neg Hx     Blindness Neg Hx     Cataracts Neg Hx     Glaucoma Neg Hx     Macular degeneration Neg Hx     Retinal detachment Neg Hx     Strabismus Neg Hx     Colon cancer Neg Hx     Ovarian cancer Neg Hx     Thyroid disease Neg Hx     Cancer Neg Hx     Melanoma Neg Hx     Allergies Neg Hx     Angioedema Neg Hx     Asthma Neg Hx     Atopy Neg Hx     Eczema Neg Hx     Immunodeficiency Neg Hx     Rhinitis Neg Hx     Urticaria Neg Hx        SOCIAL HISTORY:   Social History     Socioeconomic History    Marital status: Single   Occupational History    Occupation: therpist   Tobacco Use    Smoking status: Never    Smokeless tobacco: Never   Substance and Sexual Activity    Alcohol use: Yes     Comment: occasion    Drug use: No    Sexual activity: Yes     Partners: Male     Birth control/protection: OCP     Comment: Single    Other Topics Concern    Are you pregnant or think you may be? No    Breast-feeding No       MEDICATIONS:     Current Outpatient Medications:     albuterol (PROVENTIL/VENTOLIN HFA) 90 mcg/actuation inhaler, Inhale 1-2 puffs into the lungs every 6 (six) hours as needed for Wheezing. Rescue (Patient not taking: Reported on 8/29/2022), Disp: 8 g, Rfl: 0    amoxicillin (AMOXIL) 875 MG tablet, , Disp: , Rfl:      azelastine 205.5 mcg (0.15 %) Kareem, , Disp: , Rfl:     clonazePAM (KLONOPIN) 0.5 MG tablet, TAKE 1/2-1 TABLET TWICE A DAY AS NEEDED FOR ANXIETY, Disp: 30 tablet, Rfl: 3    diclofenac (VOLTAREN) 75 MG EC tablet, , Disp: , Rfl:     diphenhydrAMINE (BENADRYL) 25 mg capsule, Take 25 mg by mouth every 6 (six) hours as needed for Itching., Disp: , Rfl:     EScitalopram oxalate (LEXAPRO) 20 MG tablet, TAKE 1 TABLET BY MOUTH EVERY DAY, Disp: 90 tablet, Rfl: 0    fluticasone (FLONASE) 50 mcg/actuation nasal spray, 2 sprays (100 mcg total) by Each Nare route once daily., Disp: 1 Bottle, Rfl: 0    hydrOXYzine HCL (ATARAX) 25 MG tablet, Take 25 mg by mouth once daily., Disp: , Rfl:     ibuprofen (ADVIL,MOTRIN) 600 MG tablet, , Disp: , Rfl:     ipratropium (ATROVENT) 42 mcg (0.06 %) nasal spray, , Disp: , Rfl:     ketoconazole (NIZORAL) 2 % shampoo, Wash scalp with medicated shampoo at least 2x/week. Let sit on scalp at least 5 minutes prior to rinsing, Disp: 240 mL, Rfl: 5    loratadine (CLARITIN) 10 mg tablet, , Disp: , Rfl:     norgestimate-ethinyl estradioL (FEMYNOR) 0.25-35 mg-mcg per tablet, Take 1 tablet by mouth once daily., Disp: 84 tablet, Rfl: 3    ondansetron (ZOFRAN-ODT) 4 MG TbDL, Take 1 tablet (4 mg total) by mouth every 12 (twelve) hours as needed (Nausea). (Patient not taking: Reported on 8/29/2022), Disp: 20 tablet, Rfl: 0    pantoprazole (PROTONIX) 40 MG tablet, Take 1 tablet (40 mg total) by mouth once daily. (Patient not taking: Reported on 8/29/2022), Disp: 30 tablet, Rfl: 11    pantoprazole (PROTONIX) 40 MG tablet, Take 1 tablet (40 mg total) by mouth once daily., Disp: 30 tablet, Rfl: 11    promethazine-codeine 6.25-10 mg/5 ml (PHENERGAN WITH CODEINE) 6.25-10 mg/5 mL syrup, , Disp: , Rfl:     propranoloL (INDERAL) 10 MG tablet, Take 1 tablet (10 mg total) by mouth 3 (three) times daily. (Patient not taking: Reported on 8/29/2022), Disp: 90 tablet, Rfl: 11    spironolactone (ALDACTONE) 50 MG tablet,  "Take 3 tablets (150 mg total) by mouth once daily., Disp: 90 tablet, Rfl: 5    sulfamethoxazole-trimethoprim 800-160mg (BACTRIM DS) 800-160 mg Tab, Take 1 tablet by mouth 2 (two) times daily., Disp: , Rfl:     triamcinolone (NASACORT) 55 mcg nasal inhaler, 2 sprays once daily., Disp: , Rfl:     urea (CARMOL) 40 % Crea, AAA BID, Disp: 28 g, Rfl: 1    ALLERGIES:   Review of patient's allergies indicates:   Allergen Reactions    Doxycycline      Other reaction(s): Stomach upset        PHYSICAL EXAMINATION:  /69   Pulse 87   Ht 5' 3" (1.6 m)   Wt 62.6 kg (138 lb)   BMI 24.45 kg/m²   Vitals signs and nursing note have been reviewed.  General: In no acute distress, well developed, well nourished, no diaphoresis  Eyes: EOM full and smooth, no eye redness or discharge  HENT: normocephalic and atraumatic, neck supple, trachea midline, no nasal discharge, no external ear redness or discharge  Cardiovascular: no LE edema  Lungs: respirations non-labored, no conversational dyspnea   Abd: non-distended, no rigidity  MSK: no amputation or deformity, no swelling of extremities  Neuro: AAOx3, CN2-12 grossly intact  Skin: No rashes, warm and dry  Psychiatric: cooperative, pleasant, mood and affect appropriate for age    HIP: RIGHT  The affected hip is compared to the contralateral hip.    Observation:    There is no edema, erythema, or ecchymosis in the lumbosacral region.   There is no Trendelenburg sign on either side  No obvious pelvic obliquity while standing.    No thoracolumbar scoliosis observed.    No midline skin abnormalities.  No atrophy noted in the lower limbs.    ROM:   Passive hip flexion to 120° on left and 120° on right.    Passive hip internal rotation to 45° on left and 45° on right.    Passive hip external rotation to 45° on left and 45° on right.    Passive hip abduction to 45° on left and 45° on right.    No pain with any of the above hip motions.    Tenderness To Palpation:  No tenderness at the " ASIS, AIIS, PSIS, PIIS, iliac crest, pubic bones, ischial tuberosity.  No tenderness throughout the lumbar spine, iliolumbar region, or posterior pelvis.  No tenderness throughout the sacrum or piriformis  + tenderness over the greater trochanter  + tenderness at the glut attachments on the greater trochanter  + tenderness over proximal IT band or hip flexor musculature.    Strength Testing:  Hip flexion - 5/5 on left and 5/5 on right  Hip extension - 5/5 on left and 5/5 on right  Hip adduction - 5/5 on left and 5/5 on right  Hip abduction - 5/5 on left and 5/5 on right  Knee flexion - 5/5 on left and 5/5 on right  Knee extension - 5/5 on left and 5/5 on right    Special Tests:  Seated straight leg raise - negative  Supine straight leg raise - negative  Hamstring flexibility tighter on right    Log roll - negative  DARIO - negative  FADIR - negative  Scour test - negative  No pain with posterior hip capsule compression    ASIS compression test - negative  SI drawer test - negative   Thigh thrust test - negative     Posture:  Upright  Gait: Non-antalgic     TART (Tissue texture abnormality, Asymmetry,  Restriction of motion and/or Tenderness) changes:    Head     Cervical Spine  Thoracic Spine  Lumbar Spine   C1  T1  L1 TTAR   C2  T2  L2 Neutral   C3  T3  L3 Neutral   C4  T4  L4 ERSL   C5  T5  L5 FRSL   C6  T6 Neutral     C7  T7 Neutral       T8 Neutral       T9 Neutral       T10 TTAR       T11 TTAR       T12 TTAR       Ribs:    Upper Extremity:    Abdomen:    Pelvis:  Innominate:Right superior shear  Pubic bone:Right superior pubic shear       Sacrum:Left on Left sacral torsion    Lower Extremity:  Myofascial restriction right lateral hip  Internal tibial torsion on the right    Key   F= Flexed   E = Extended   R = Rotated   N = Neutral   S = Sidebent   TTA = tissue texture abnormality   L/R/B =- left/right/bilateral (last letter)       Neurovascular Exam:  Normal gait without Trendelenburg.  2+ femoral, DP, and PT  pulses BL.  No skin changes, no abnormal hair distribution.  Sensation intact to light touch throughout the obturator and medial/lateral/posterior femoral cutaneous nerves.  Capillary refill intact to <2 seconds in all lower limb digits.      IMAGIN. X-ray obtained on 2021 due to right hip pain  2. X-ray images were reviewed personally by me and then directly with patient.  3. FINDINGS: No evidence for acute fracture or dislocation right hip allowing for limitation of the femoral neck on the frogleg lateral view due to overlapping structures.  The bony pelvis is grossly intact allowing for single projection view.  Further evaluation as warranted clinically.  4. IMPRESSION: see above.       ASSESSMENT:      ICD-10-CM ICD-9-CM   1. Right hip pain  M25.551 719.45   2. Somatic dysfunction of lumbar region  M99.03 739.3   3. Somatic dysfunction of pelvis region  M99.05 739.5   4. Somatic dysfunction of lower extremity  M99.06 739.6   5. Somatic dysfunction of sacral region  M99.04 739.4   6. Somatic dysfunction of thoracic region  M99.02 739.2         PLAN:  1. Right hip pain - improved, recent exacerbation    - Sari returns today for follow up evaluation of her right hip. Was doing well for over a year, increased pain over the past 3-4 weeks. Denies radiation into leg. Pain is currently 4/10 with movement, specifically with Mack or yoga. She has done very well with OMT and her HEP in the past.    - Based on her description of pain/body language and somatic dysfunction identified on exam, I discussed osteopathic manipulation as a treatment option today. She consents to evaluation and treatment. See below.    - HEP for core/pelvis strengthening and glute med/max retraining prescribed at visit 06/15/2021.  Continue with these exercises daily.      2-6. Somatic dysfunction lumbar, pelvis, sacral, thoracic, lower extremity regions -     - OMT 5-6 regions. Oral consent obtained. Reviewed benefits and potential  side effects. OMT indicated today due to signs and symptoms as well as local and remote somatic dysfunction findings and their related neurokinetic, lymphatic, fascial and/or arteriovenous body connections. OMT techniques used: Soft Tissue, Myofascial Release, Muscle Energy, and Fascial Distortion Model. Treatment was tolerated well. Improvement noted in segmental mobility post-treatment in dysfunctional regions. There were no adverse events and no complications immediately following treatment. Advised plenty of water to help alleviate soreness.      Future planning includes - possibly more OMT if helpful and if indicated, add physical therapy    All questions were answered to the best of my ability and all concerns were addressed at this time.    Follow up as needed.       This note is dictated using the M*Modal Fluency Direct word recognition program. There are word recognition mistakes that are occasionally missed on review.      Total time spent face-to face with patient counseling or coordinating care including prognosis, differential diagnosis, risks and benefits of treatment, instructions, compliance risk reductions as well as non-face-to-face time personally spent reviewing medial record, medical documentation, and coordination of care.     EST MINUTES X   [id number] 10-19    [id number] 20-29    [id number] 30-39 X   [id number] 40-54    NEW     82398 15-29    15335 30-44    17957 45-59    [id number] 60-74    PHONE      5-10    [id number] 11-20    [id number] 21-30

## 2023-01-12 ENCOUNTER — PATIENT MESSAGE (OUTPATIENT)
Dept: INTERNAL MEDICINE | Facility: CLINIC | Age: 38
End: 2023-01-12
Payer: COMMERCIAL

## 2023-01-12 ENCOUNTER — PATIENT MESSAGE (OUTPATIENT)
Dept: SPORTS MEDICINE | Facility: CLINIC | Age: 38
End: 2023-01-12
Payer: COMMERCIAL

## 2023-01-13 ENCOUNTER — OFFICE VISIT (OUTPATIENT)
Dept: INTERNAL MEDICINE | Facility: CLINIC | Age: 38
End: 2023-01-13
Payer: COMMERCIAL

## 2023-01-13 DIAGNOSIS — N92.0 MENORRHAGIA WITH REGULAR CYCLE: ICD-10-CM

## 2023-01-13 DIAGNOSIS — M25.531 RIGHT WRIST PAIN: Primary | ICD-10-CM

## 2023-01-13 DIAGNOSIS — Z00.00 PREVENTATIVE HEALTH CARE: ICD-10-CM

## 2023-01-13 DIAGNOSIS — R20.2 NUMBNESS AND TINGLING IN RIGHT HAND: ICD-10-CM

## 2023-01-13 DIAGNOSIS — R20.0 NUMBNESS AND TINGLING IN RIGHT HAND: ICD-10-CM

## 2023-01-13 DIAGNOSIS — R25.1 TREMOR: ICD-10-CM

## 2023-01-13 DIAGNOSIS — M25.50 POLYARTHRALGIA: ICD-10-CM

## 2023-01-13 DIAGNOSIS — E55.9 VITAMIN D DEFICIENCY: ICD-10-CM

## 2023-01-13 PROCEDURE — 99214 OFFICE O/P EST MOD 30 MIN: CPT | Mod: 95,,, | Performed by: INTERNAL MEDICINE

## 2023-01-13 PROCEDURE — 99214 PR OFFICE/OUTPT VISIT, EST, LEVL IV, 30-39 MIN: ICD-10-PCS | Mod: 95,,, | Performed by: INTERNAL MEDICINE

## 2023-01-13 PROCEDURE — 1159F PR MEDICATION LIST DOCUMENTED IN MEDICAL RECORD: ICD-10-PCS | Mod: CPTII,95,, | Performed by: INTERNAL MEDICINE

## 2023-01-13 PROCEDURE — 1160F PR REVIEW ALL MEDS BY PRESCRIBER/CLIN PHARMACIST DOCUMENTED: ICD-10-PCS | Mod: CPTII,95,, | Performed by: INTERNAL MEDICINE

## 2023-01-13 PROCEDURE — 1160F RVW MEDS BY RX/DR IN RCRD: CPT | Mod: CPTII,95,, | Performed by: INTERNAL MEDICINE

## 2023-01-13 PROCEDURE — 1159F MED LIST DOCD IN RCRD: CPT | Mod: CPTII,95,, | Performed by: INTERNAL MEDICINE

## 2023-01-18 NOTE — PROGRESS NOTES
Subjective:       Patient ID: Sari Serna is a 37 y.o. female.    Chief Complaint: No chief complaint on file.    HPI 37-year-old female presents to clinic virtual video today she is had persistent pain and muscle skeletal region in her joints involving her hip wrist.  She also reports some numbness and tingling in her right hand she is undergone previous nerve conduction study and seen a couple different orthopedic providers 1st 1 thought she had tendinitis 2nd 1 thought she might have hyperflexibility syndrome or earlier Danlos.  She has a well established relationship with physical therapy and also has been seeing osteopathic medicine for her hip pain and at no time has there been any mention of hyperflexibility of her joints.  The orthopedic specialist only evaluate her hand which she has some evidence of some hyper extension.  No joint laxity noted otherwise and no skin complaints otherwise she does report some hair thinning.  She also has generalized food sensitivities.  She has had a history of iron deficiency due to menorrhagia.  She is having some clinical response to the osteopathic manipulations for her hip pain she is just trying to search N/C if there is anything that ties all this together.  She also does report some hand tremors.  Review of Systems    Otherwise negative  Objective:      Physical Exam  General: Well-appearing, well-nourished.  No distress  HEENT: conjunctivae are normal.  Pupils are equal and reative to light.  TM's are clear and intact bilaterally.  Hearing is grossly normal.  Nasopharynx is clear.  Oropharynx is clear.  Neck: Supple.  No thyroid megaly.  No bruits.  Lymph: No cervical or supraclavicular adenopathy.  Heart: Regular rate and rhythm, without murmur, rub or gallop.  Lungs: Clear to auscultation; respiratory effort normal.  Abdomen: Soft, nontender, nondistended.  Normoactive bowel sounds.  No hepatomegaly.  No masses.  Extremities: Good distal pulses.  No  edema.  Psych: Oriented to time person place.  Judgment and insight seem unimpaired.  Mood and affect are appropriate.  Assessment:       Problem List Items Addressed This Visit       Tremor    Relevant Orders    TSH    T4, Free    Ferritin    T3    Right wrist pain - Primary     Other Visit Diagnoses       Preventative health care        Relevant Orders    CBC Auto Differential    LIPID PANEL    Comprehensive Metabolic Panel    Polyarthralgia        Relevant Orders    Sedimentation rate    C-REACTIVE PROTEIN    Rheumatoid Factor    URI    Numbness and tingling in right hand        Relevant Orders    Vitamin B12    Folate    Vitamin D deficiency        Relevant Orders    Vitamin D    Menorrhagia with regular cycle        Relevant Orders    Ferritin              Plan:       Diagnoses and all orders for this visit:    Right wrist pain  Status post orthopedic consultations x2 and hand imaging x-rays and nerve conduction studies negative for carpal tunnel  Preventative health care  -     CBC Auto Differential; Future  -     LIPID PANEL; Future  -     Comprehensive Metabolic Panel; Future  Scheduled  Polyarthralgia  -     Sedimentation rate; Future  -     C-REACTIVE PROTEIN; Future  -     Rheumatoid Factor; Future  -     URI; Future    Tremor  -     TSH; Future  -     T4, Free; Future  -     Ferritin; Future  -     T3; Future    Numbness and tingling in right hand  -     Vitamin B12; Future  -     Folate; Future    Vitamin D deficiency  -     Vitamin D; Future    Menorrhagia with regular cycle  -     Ferritin; Future         The patient location is:  [facility]  The chief complaint leading to consultation is:  Polyarthralgia iron and vitamin-D deficiency tingling and numbness tremors    Visit type: audiovisual    Face to Face time with patient:  40  Forty minutes of total time spent on the encounter, which includes face to face time and non-face to face time preparing to see the patient (eg, review of tests), Obtaining  and/or reviewing separately obtained history, Documenting clinical information in the electronic or other health record, Independently interpreting results (not separately reported) and communicating results to the patient/family/caregiver, or Care coordination (not separately reported).         Each patient to whom he or she provides medical services by telemedicine is:  (1) informed of the relationship between the physician and patient and the respective role of any other health care provider with respect to management of the patient; and (2) notified that he or she may decline to receive medical services by telemedicine and may withdraw from such care at any time.    Notes:

## 2023-01-21 ENCOUNTER — LAB VISIT (OUTPATIENT)
Dept: LAB | Facility: HOSPITAL | Age: 38
End: 2023-01-21
Attending: INTERNAL MEDICINE
Payer: COMMERCIAL

## 2023-01-21 DIAGNOSIS — N92.0 MENORRHAGIA WITH REGULAR CYCLE: ICD-10-CM

## 2023-01-21 DIAGNOSIS — R20.2 NUMBNESS AND TINGLING IN RIGHT HAND: ICD-10-CM

## 2023-01-21 DIAGNOSIS — R25.1 TREMOR: ICD-10-CM

## 2023-01-21 DIAGNOSIS — M25.50 POLYARTHRALGIA: ICD-10-CM

## 2023-01-21 DIAGNOSIS — E55.9 VITAMIN D DEFICIENCY: ICD-10-CM

## 2023-01-21 DIAGNOSIS — Z00.00 PREVENTATIVE HEALTH CARE: ICD-10-CM

## 2023-01-21 DIAGNOSIS — R20.0 NUMBNESS AND TINGLING IN RIGHT HAND: ICD-10-CM

## 2023-01-21 LAB
25(OH)D3+25(OH)D2 SERPL-MCNC: 33 NG/ML (ref 30–96)
ALBUMIN SERPL BCP-MCNC: 4.3 G/DL (ref 3.5–5.2)
ALP SERPL-CCNC: 72 U/L (ref 55–135)
ALT SERPL W/O P-5'-P-CCNC: 16 U/L (ref 10–44)
ANION GAP SERPL CALC-SCNC: 13 MMOL/L (ref 8–16)
AST SERPL-CCNC: 24 U/L (ref 10–40)
BASOPHILS # BLD AUTO: 0.04 K/UL (ref 0–0.2)
BASOPHILS NFR BLD: 0.8 % (ref 0–1.9)
BILIRUB SERPL-MCNC: 0.5 MG/DL (ref 0.1–1)
BUN SERPL-MCNC: 8 MG/DL (ref 6–20)
CALCIUM SERPL-MCNC: 9.9 MG/DL (ref 8.7–10.5)
CHLORIDE SERPL-SCNC: 103 MMOL/L (ref 95–110)
CHOLEST SERPL-MCNC: 245 MG/DL (ref 120–199)
CHOLEST/HDLC SERPL: 3.4 {RATIO} (ref 2–5)
CO2 SERPL-SCNC: 21 MMOL/L (ref 23–29)
CREAT SERPL-MCNC: 0.7 MG/DL (ref 0.5–1.4)
CRP SERPL-MCNC: 2.2 MG/L (ref 0–8.2)
DIFFERENTIAL METHOD: NORMAL
EOSINOPHIL # BLD AUTO: 0.2 K/UL (ref 0–0.5)
EOSINOPHIL NFR BLD: 3.8 % (ref 0–8)
ERYTHROCYTE [DISTWIDTH] IN BLOOD BY AUTOMATED COUNT: 12.8 % (ref 11.5–14.5)
ERYTHROCYTE [SEDIMENTATION RATE] IN BLOOD BY PHOTOMETRIC METHOD: 34 MM/HR (ref 0–36)
EST. GFR  (NO RACE VARIABLE): >60 ML/MIN/1.73 M^2
FERRITIN SERPL-MCNC: 110 NG/ML (ref 20–300)
FOLATE SERPL-MCNC: 9 NG/ML (ref 4–24)
GLUCOSE SERPL-MCNC: 82 MG/DL (ref 70–110)
HCT VFR BLD AUTO: 46.4 % (ref 37–48.5)
HDLC SERPL-MCNC: 72 MG/DL (ref 40–75)
HDLC SERPL: 29.4 % (ref 20–50)
HGB BLD-MCNC: 15 G/DL (ref 12–16)
IMM GRANULOCYTES # BLD AUTO: 0 K/UL (ref 0–0.04)
IMM GRANULOCYTES NFR BLD AUTO: 0 % (ref 0–0.5)
LDLC SERPL CALC-MCNC: 160.4 MG/DL (ref 63–159)
LYMPHOCYTES # BLD AUTO: 2.5 K/UL (ref 1–4.8)
LYMPHOCYTES NFR BLD: 46.7 % (ref 18–48)
MCH RBC QN AUTO: 28.8 PG (ref 27–31)
MCHC RBC AUTO-ENTMCNC: 32.3 G/DL (ref 32–36)
MCV RBC AUTO: 89 FL (ref 82–98)
MONOCYTES # BLD AUTO: 0.3 K/UL (ref 0.3–1)
MONOCYTES NFR BLD: 6.2 % (ref 4–15)
NEUTROPHILS # BLD AUTO: 2.3 K/UL (ref 1.8–7.7)
NEUTROPHILS NFR BLD: 42.5 % (ref 38–73)
NONHDLC SERPL-MCNC: 173 MG/DL
NRBC BLD-RTO: 0 /100 WBC
PLATELET # BLD AUTO: 338 K/UL (ref 150–450)
PMV BLD AUTO: 11.4 FL (ref 9.2–12.9)
POTASSIUM SERPL-SCNC: 4 MMOL/L (ref 3.5–5.1)
PROT SERPL-MCNC: 8.1 G/DL (ref 6–8.4)
RBC # BLD AUTO: 5.21 M/UL (ref 4–5.4)
SODIUM SERPL-SCNC: 137 MMOL/L (ref 136–145)
T3 SERPL-MCNC: 118 NG/DL (ref 60–180)
T4 FREE SERPL-MCNC: 1.12 NG/DL (ref 0.71–1.51)
TRIGL SERPL-MCNC: 63 MG/DL (ref 30–150)
TSH SERPL DL<=0.005 MIU/L-ACNC: 0.45 UIU/ML (ref 0.4–4)
VIT B12 SERPL-MCNC: 499 PG/ML (ref 210–950)
WBC # BLD AUTO: 5.33 K/UL (ref 3.9–12.7)

## 2023-01-21 PROCEDURE — 82728 ASSAY OF FERRITIN: CPT | Performed by: INTERNAL MEDICINE

## 2023-01-21 PROCEDURE — 86140 C-REACTIVE PROTEIN: CPT | Performed by: INTERNAL MEDICINE

## 2023-01-21 PROCEDURE — 85025 COMPLETE CBC W/AUTO DIFF WBC: CPT | Performed by: INTERNAL MEDICINE

## 2023-01-21 PROCEDURE — 82306 VITAMIN D 25 HYDROXY: CPT | Performed by: INTERNAL MEDICINE

## 2023-01-21 PROCEDURE — 84439 ASSAY OF FREE THYROXINE: CPT | Performed by: INTERNAL MEDICINE

## 2023-01-21 PROCEDURE — 84480 ASSAY TRIIODOTHYRONINE (T3): CPT | Performed by: INTERNAL MEDICINE

## 2023-01-21 PROCEDURE — 80061 LIPID PANEL: CPT | Performed by: INTERNAL MEDICINE

## 2023-01-21 PROCEDURE — 82746 ASSAY OF FOLIC ACID SERUM: CPT | Performed by: INTERNAL MEDICINE

## 2023-01-21 PROCEDURE — 86038 ANTINUCLEAR ANTIBODIES: CPT | Performed by: INTERNAL MEDICINE

## 2023-01-21 PROCEDURE — 86431 RHEUMATOID FACTOR QUANT: CPT | Performed by: INTERNAL MEDICINE

## 2023-01-21 PROCEDURE — 85652 RBC SED RATE AUTOMATED: CPT | Performed by: INTERNAL MEDICINE

## 2023-01-21 PROCEDURE — 82607 VITAMIN B-12: CPT | Performed by: INTERNAL MEDICINE

## 2023-01-21 PROCEDURE — 36415 COLL VENOUS BLD VENIPUNCTURE: CPT | Mod: PO | Performed by: INTERNAL MEDICINE

## 2023-01-21 PROCEDURE — 80053 COMPREHEN METABOLIC PANEL: CPT | Performed by: INTERNAL MEDICINE

## 2023-01-21 PROCEDURE — 86235 NUCLEAR ANTIGEN ANTIBODY: CPT | Performed by: INTERNAL MEDICINE

## 2023-01-21 PROCEDURE — 86039 ANTINUCLEAR ANTIBODIES (ANA): CPT | Performed by: INTERNAL MEDICINE

## 2023-01-21 PROCEDURE — 84443 ASSAY THYROID STIM HORMONE: CPT | Performed by: INTERNAL MEDICINE

## 2023-01-23 ENCOUNTER — PATIENT MESSAGE (OUTPATIENT)
Dept: INTERNAL MEDICINE | Facility: CLINIC | Age: 38
End: 2023-01-23
Payer: COMMERCIAL

## 2023-01-23 LAB
ANA PATTERN 1: NORMAL
ANA PATTERN 2: NORMAL
ANA SER QL IF: POSITIVE
ANA TITER 2: NORMAL
ANA TITR SER IF: NORMAL {TITER}
RHEUMATOID FACT SERPL-ACNC: <13 IU/ML (ref 0–15)

## 2023-01-25 ENCOUNTER — PATIENT MESSAGE (OUTPATIENT)
Dept: INTERNAL MEDICINE | Facility: CLINIC | Age: 38
End: 2023-01-25
Payer: COMMERCIAL

## 2023-01-25 LAB
ANTI SM ANTIBODY: 0.09 RATIO (ref 0–0.99)
ANTI SM/RNP ANTIBODY: 0.07 RATIO (ref 0–0.99)
ANTI-SM INTERPRETATION: NEGATIVE
ANTI-SM/RNP INTERPRETATION: NEGATIVE
ANTI-SSA ANTIBODY: 0.07 RATIO (ref 0–0.99)
ANTI-SSA INTERPRETATION: NEGATIVE
ANTI-SSB ANTIBODY: 0.07 RATIO (ref 0–0.99)
ANTI-SSB INTERPRETATION: NEGATIVE
DSDNA AB SER-ACNC: NORMAL [IU]/ML

## 2023-01-26 ENCOUNTER — TELEPHONE (OUTPATIENT)
Dept: INTERNAL MEDICINE | Facility: CLINIC | Age: 38
End: 2023-01-26
Payer: COMMERCIAL

## 2023-01-26 DIAGNOSIS — R76.8 ANA POSITIVE: ICD-10-CM

## 2023-01-26 DIAGNOSIS — M25.50 POLYARTHRALGIA: Primary | ICD-10-CM

## 2023-01-27 NOTE — PROGRESS NOTES
Your URI is higher than previous levels.  It remains nonspecific for an autoimmune disease.  However, given your multiple symptoms and family medical histroy of autoimmune disease in your mother, I want you to see a rheumatologist.  I will have our referral coordinator set this up.

## 2023-01-29 ENCOUNTER — PATIENT MESSAGE (OUTPATIENT)
Dept: NEUROLOGY | Facility: CLINIC | Age: 38
End: 2023-01-29
Payer: COMMERCIAL

## 2023-01-30 ENCOUNTER — TELEPHONE (OUTPATIENT)
Dept: RHEUMATOLOGY | Facility: CLINIC | Age: 38
End: 2023-01-30

## 2023-01-30 NOTE — TELEPHONE ENCOUNTER
At this time there isn't any sooner visits open, she has been added to the waitlist .   Additional Progress Note...

## 2023-01-30 NOTE — TELEPHONE ENCOUNTER
----- Message from Adina Hanson sent at 1/30/2023  1:02 PM CST -----  Good afternoon  By any chance do you have something sooner for this patient.  Patient with [employer], but I was not able to find anything before May.  Thanks in advance    Adina

## 2023-02-16 ENCOUNTER — OFFICE VISIT (OUTPATIENT)
Dept: GASTROENTEROLOGY | Facility: CLINIC | Age: 38
End: 2023-02-16
Payer: COMMERCIAL

## 2023-02-16 ENCOUNTER — PATIENT MESSAGE (OUTPATIENT)
Dept: GASTROENTEROLOGY | Facility: CLINIC | Age: 38
End: 2023-02-16

## 2023-02-16 VITALS
SYSTOLIC BLOOD PRESSURE: 132 MMHG | HEART RATE: 93 BPM | WEIGHT: 131.31 LBS | HEIGHT: 63 IN | BODY MASS INDEX: 23.27 KG/M2 | DIASTOLIC BLOOD PRESSURE: 78 MMHG

## 2023-02-16 DIAGNOSIS — K21.9 GASTROESOPHAGEAL REFLUX DISEASE, UNSPECIFIED WHETHER ESOPHAGITIS PRESENT: Primary | ICD-10-CM

## 2023-02-16 PROCEDURE — 99999 PR PBB SHADOW E&M-EST. PATIENT-LVL V: ICD-10-PCS | Mod: PBBFAC,,, | Performed by: INTERNAL MEDICINE

## 2023-02-16 PROCEDURE — 3008F PR BODY MASS INDEX (BMI) DOCUMENTED: ICD-10-PCS | Mod: CPTII,S$GLB,, | Performed by: INTERNAL MEDICINE

## 2023-02-16 PROCEDURE — 3078F PR MOST RECENT DIASTOLIC BLOOD PRESSURE < 80 MM HG: ICD-10-PCS | Mod: CPTII,S$GLB,, | Performed by: INTERNAL MEDICINE

## 2023-02-16 PROCEDURE — 99203 OFFICE O/P NEW LOW 30 MIN: CPT | Mod: S$GLB,,, | Performed by: INTERNAL MEDICINE

## 2023-02-16 PROCEDURE — 99203 PR OFFICE/OUTPT VISIT, NEW, LEVL III, 30-44 MIN: ICD-10-PCS | Mod: S$GLB,,, | Performed by: INTERNAL MEDICINE

## 2023-02-16 PROCEDURE — 3078F DIAST BP <80 MM HG: CPT | Mod: CPTII,S$GLB,, | Performed by: INTERNAL MEDICINE

## 2023-02-16 PROCEDURE — 3075F PR MOST RECENT SYSTOLIC BLOOD PRESS GE 130-139MM HG: ICD-10-PCS | Mod: CPTII,S$GLB,, | Performed by: INTERNAL MEDICINE

## 2023-02-16 PROCEDURE — 99999 PR PBB SHADOW E&M-EST. PATIENT-LVL V: CPT | Mod: PBBFAC,,, | Performed by: INTERNAL MEDICINE

## 2023-02-16 PROCEDURE — 1159F MED LIST DOCD IN RCRD: CPT | Mod: CPTII,S$GLB,, | Performed by: INTERNAL MEDICINE

## 2023-02-16 PROCEDURE — 3075F SYST BP GE 130 - 139MM HG: CPT | Mod: CPTII,S$GLB,, | Performed by: INTERNAL MEDICINE

## 2023-02-16 PROCEDURE — 3008F BODY MASS INDEX DOCD: CPT | Mod: CPTII,S$GLB,, | Performed by: INTERNAL MEDICINE

## 2023-02-16 PROCEDURE — 1159F PR MEDICATION LIST DOCUMENTED IN MEDICAL RECORD: ICD-10-PCS | Mod: CPTII,S$GLB,, | Performed by: INTERNAL MEDICINE

## 2023-02-16 NOTE — Clinical Note
Ruslan please schedule Sari for endoscopy schedulers appointment to schedule EGD with a 96 hour esophageal Bravo pH probe study off PPIs and off H2 blockers.  Return to GI clinic telemedicine video visit with me 8 weeks for follow-up  Thank you

## 2023-02-16 NOTE — PROGRESS NOTES
[facility] Consultation Note    Reason for Consult:  The encounter diagnosis was Gastroesophageal reflux disease, unspecified whether esophagitis present.    PCP:   Nhi Shearer   [address] [facility] [address]    Referring MD:  Francoise Redmond Pa-c  [address] [place] [address]    Initial History of Present Illness (HPI):  This is a 37 y.o. female here for evaluation of suspected GERD symptoms.  Patient denies any chest pain no exertional symptoms no shoulder pain no back pain no diaphoresis no shortness of breath she seen ENT who thinks she may be having silent reflux she did take pantoprazole in the past but it made her feel kind of depressed so she stopped it she did for about 30 days that was quite some time ago no dysphagia no odynophagia no early satiety no abdominal pain no blood in her stool no family history of esophageal or stomach cancer no family history of liver cancer or gallbladder cancer no family history of pancreatitis or pancreatic cancer no family history of colon cancer or advanced colon adenomas polyps no family history of uterine or ovarian or kidney or bladder cancer no FAP no attenuated FAP no MA P no Villalobos syndrome nobody with celiac sprue or inflammatory bowel disease patient celiac sprue serologies have been negative recent blood work looks good.  She is being evaluated for autoimmune diseases as well as her right wrist pain.  She has an MRI scheduled in about a week of her right wrist she knows that if she has a esophageal Bravo pH probe study within 30 days after placement she can not get an MRI scan without having chest x-ray and abdominal pelvic x-ray to ensure Bravo pH probe study has exited the body.  No weight loss no fever chills or shortness of breath.  Never had an EGD before never had a colonoscopy before.  Not anemic.  One time hep C screen was negative liver enzymes normal.  Ferritin not elevated.    Abdominal pain -  no  Reflux - as above  Dysphagia - no   Bowel habits - no blood in stool  GI bleeding - none  NSAID usage - occasional    Interval HPI 02/16/2023:  The patient's last visit with me was on Visit date not found.      ROS:  Constitutional: No fevers, chills, No weight loss  ENT:  As above heartburn no dysphagia no odynophagia no hoarseness  CV: No chest pain, no palpitation  Pulm: No cough, No shortness of breath, no wheezing  Ophtho: No vision changes  GI: see HPI  Derm: No rash, no itching  Heme: No lymphadenopathy, No easy bruising  MSK:  Having some issues that she is followed by orthopedics in her right hand and wrist and forearm  : No dysuria, No hematuria  Endo: No hot or cold intolerance  Neuro: No syncope, No seizure, no strokes  Psych: No uncontrolled anxiety, No uncontrolled depression    Medical History:  has a past medical history of Acne, ALLERGIC RHINITIS, Allergy, Anxiety, Dysmenorrhea, Low back pain, Migraine headache, and Recurrent upper respiratory infection (URI).    Surgical History:  has a past surgical history that includes Facial cosmetic surgery.    Family History: family history includes Allergic rhinitis in her brother and father; Breast cancer in her paternal aunt; Cirrhosis in her mother; Endometriosis in her mother; Stroke in her paternal grandfather..     Social History:  reports that she has never smoked. She has never used smokeless tobacco. She reports current alcohol use. She reports that she does not use drugs.    Review of patient's allergies indicates:   Allergen Reactions    Doxycycline      Other reaction(s): Stomach upset       Medication List with Changes/Refills   Current Medications    AMOXICILLIN (AMOXIL) 875 MG TABLET        AZELASTINE 205.5 MCG (0.15 %) SPRY        CLONAZEPAM (KLONOPIN) 0.5 MG TABLET    TAKE 1/2-1 TABLET TWICE A DAY AS NEEDED FOR ANXIETY    DICLOFENAC (VOLTAREN) 75 MG EC TABLET        DIPHENHYDRAMINE (BENADRYL) 25 MG CAPSULE    Take 25 mg by mouth every  "6 (six) hours as needed for Itching.    ESCITALOPRAM OXALATE (LEXAPRO) 20 MG TABLET    TAKE 1 TABLET BY MOUTH EVERY DAY    ESTARYLLA 0.25-35 MG-MCG PER TABLET    TAKE 1 TABLET BY MOUTH EVERY DAY    FLUTICASONE (FLONASE) 50 MCG/ACTUATION NASAL SPRAY    2 sprays (100 mcg total) by Each Nare route once daily.    HYDROXYZINE HCL (ATARAX) 25 MG TABLET    Take 25 mg by mouth once daily.    IBUPROFEN (ADVIL,MOTRIN) 600 MG TABLET        IPRATROPIUM (ATROVENT) 42 MCG (0.06 %) NASAL SPRAY        KETOCONAZOLE (NIZORAL) 2 % SHAMPOO    Wash scalp with medicated shampoo at least 2x/week. Let sit on scalp at least 5 minutes prior to rinsing    LORATADINE (CLARITIN) 10 MG TABLET        PANTOPRAZOLE (PROTONIX) 40 MG TABLET    Take 1 tablet (40 mg total) by mouth once daily.    PROMETHAZINE-CODEINE 6.25-10 MG/5 ML (PHENERGAN WITH CODEINE) 6.25-10 MG/5 ML SYRUP        SPIRONOLACTONE (ALDACTONE) 50 MG TABLET    Take 3 tablets (150 mg total) by mouth once daily.    SULFAMETHOXAZOLE-TRIMETHOPRIM 800-160MG (BACTRIM DS) 800-160 MG TAB    Take 1 tablet by mouth 2 (two) times daily.    TRIAMCINOLONE (NASACORT) 55 MCG NASAL INHALER    2 sprays once daily.    UREA (CARMOL) 40 % CREA    AAA BID   Discontinued Medications    ALBUTEROL (PROVENTIL/VENTOLIN HFA) 90 MCG/ACTUATION INHALER    Inhale 1-2 puffs into the lungs every 6 (six) hours as needed for Wheezing. Rescue    ONDANSETRON (ZOFRAN-ODT) 4 MG TBDL    Take 1 tablet (4 mg total) by mouth every 12 (twelve) hours as needed (Nausea).    PANTOPRAZOLE (PROTONIX) 40 MG TABLET    Take 1 tablet (40 mg total) by mouth once daily.    PROPRANOLOL (INDERAL) 10 MG TABLET    Take 1 tablet (10 mg total) by mouth 3 (three) times daily.         Objective Findings:    Vital Signs:  /78 (BP Location: Left arm, Patient Position: Sitting, BP Method: Medium (Automatic))   Pulse 93   Ht 5' 3" (1.6 m)   Wt 59.6 kg (131 lb 4.8 oz)   BMI 23.26 kg/m²   Body mass index is 23.26 kg/m².    Physical " Exam:  General Appearance: Well appearing in no acute distress  Eyes:    No scleral icterus  ENT:  No lesions or masses   Lungs: CTA bilaterally, no wheezes, no rhonchi, no rales  Heart:  S1, S2 normal, no murmurs heard  Abdomen:  Non distended, soft, no guarding, no rebound, no tenderness, no appreciated ascites, no bruits, no hepatosplenomegaly,  No CVA tenderness, no appreciated hernias, no Betancourt sign, no McBurney point tenderness  Musculoskeletal:  Right wrist splint  Skin: No petechiae or rash on exposed skin areas  Neurologic:  Alert and oriented x4  Psychiatric:  Normal speech mentation and affect    Labs:  Lab Results   Component Value Date    WBC 5.33 01/21/2023    HGB 15.0 01/21/2023    HCT 46.4 01/21/2023     01/21/2023    CHOL 245 (H) 01/21/2023    TRIG 63 01/21/2023    HDL 72 01/21/2023    ALT 16 01/21/2023    AST 24 01/21/2023     01/21/2023    K 4.0 01/21/2023     01/21/2023    CREATININE 0.7 01/21/2023    BUN 8 01/21/2023    CO2 21 (L) 01/21/2023    TSH 0.446 01/21/2023    HGBA1C 5.0 01/26/2019               Medical Decision Making:  EGD with esophageal Bravo pH probe study talk given  Restrictions for MRI within 30 days talk given      Assessment:  1. Gastroesophageal reflux disease, unspecified whether esophagitis present         Recommendations:  1. Referral to endoscopy schedulers for EGD with a 96 hour esophageal Bravo pH probe study to be done off PPIs and off all H2 blockers for at least 8 weeks patient has not taking a PPI or H2 blocker in at least 3 months so she can be schedule next available with me.  2. Return GI clinic 8 weeks for follow-up telemedicine video visit      Follow up in about 8 weeks (around 4/13/2023).      Order summary:  Orders Placed This Encounter    Ambulatory referral/consult to Endo Procedure          Thank you so much for allowing me to participate in the care of Sari Serna    Sameer Cedeño MD    DISCLAIMER: This note was  prepared with [employer] voice recognition transcription software. Garbled syntax, mangled or inadvertent pronouns, and other bizarre constructions may be attributed to that software system. While efforts were made to correct any mistakes made by this voice recognition program, some errors and/or omissions may remain in the note that were missed when the note was originally created.

## 2023-03-08 ENCOUNTER — OFFICE VISIT (OUTPATIENT)
Dept: INTERNAL MEDICINE | Facility: CLINIC | Age: 38
End: 2023-03-08
Payer: COMMERCIAL

## 2023-03-08 VITALS
OXYGEN SATURATION: 99 % | HEART RATE: 79 BPM | BODY MASS INDEX: 23.24 KG/M2 | WEIGHT: 131.19 LBS | DIASTOLIC BLOOD PRESSURE: 78 MMHG | SYSTOLIC BLOOD PRESSURE: 124 MMHG

## 2023-03-08 DIAGNOSIS — R76.8 ANA POSITIVE: ICD-10-CM

## 2023-03-08 DIAGNOSIS — Z00.00 PREVENTATIVE HEALTH CARE: Primary | ICD-10-CM

## 2023-03-08 DIAGNOSIS — M25.531 RIGHT WRIST PAIN: ICD-10-CM

## 2023-03-08 DIAGNOSIS — M25.50 POLYARTHRALGIA: ICD-10-CM

## 2023-03-08 DIAGNOSIS — M25.559 HIP PAIN: ICD-10-CM

## 2023-03-08 PROCEDURE — 1159F MED LIST DOCD IN RCRD: CPT | Mod: CPTII,S$GLB,, | Performed by: INTERNAL MEDICINE

## 2023-03-08 PROCEDURE — 99395 PREV VISIT EST AGE 18-39: CPT | Mod: S$GLB,,, | Performed by: INTERNAL MEDICINE

## 2023-03-08 PROCEDURE — 3074F SYST BP LT 130 MM HG: CPT | Mod: CPTII,S$GLB,, | Performed by: INTERNAL MEDICINE

## 2023-03-08 PROCEDURE — 1160F RVW MEDS BY RX/DR IN RCRD: CPT | Mod: CPTII,S$GLB,, | Performed by: INTERNAL MEDICINE

## 2023-03-08 PROCEDURE — 1159F PR MEDICATION LIST DOCUMENTED IN MEDICAL RECORD: ICD-10-PCS | Mod: CPTII,S$GLB,, | Performed by: INTERNAL MEDICINE

## 2023-03-08 PROCEDURE — 99999 PR PBB SHADOW E&M-EST. PATIENT-LVL V: ICD-10-PCS | Mod: PBBFAC,,, | Performed by: INTERNAL MEDICINE

## 2023-03-08 PROCEDURE — 3074F PR MOST RECENT SYSTOLIC BLOOD PRESSURE < 130 MM HG: ICD-10-PCS | Mod: CPTII,S$GLB,, | Performed by: INTERNAL MEDICINE

## 2023-03-08 PROCEDURE — 3008F BODY MASS INDEX DOCD: CPT | Mod: CPTII,S$GLB,, | Performed by: INTERNAL MEDICINE

## 2023-03-08 PROCEDURE — 3008F PR BODY MASS INDEX (BMI) DOCUMENTED: ICD-10-PCS | Mod: CPTII,S$GLB,, | Performed by: INTERNAL MEDICINE

## 2023-03-08 PROCEDURE — 3078F DIAST BP <80 MM HG: CPT | Mod: CPTII,S$GLB,, | Performed by: INTERNAL MEDICINE

## 2023-03-08 PROCEDURE — 99395 PR PREVENTIVE VISIT,EST,18-39: ICD-10-PCS | Mod: S$GLB,,, | Performed by: INTERNAL MEDICINE

## 2023-03-08 PROCEDURE — 99999 PR PBB SHADOW E&M-EST. PATIENT-LVL V: CPT | Mod: PBBFAC,,, | Performed by: INTERNAL MEDICINE

## 2023-03-08 PROCEDURE — 3078F PR MOST RECENT DIASTOLIC BLOOD PRESSURE < 80 MM HG: ICD-10-PCS | Mod: CPTII,S$GLB,, | Performed by: INTERNAL MEDICINE

## 2023-03-08 PROCEDURE — 1160F PR REVIEW ALL MEDS BY PRESCRIBER/CLIN PHARMACIST DOCUMENTED: ICD-10-PCS | Mod: CPTII,S$GLB,, | Performed by: INTERNAL MEDICINE

## 2023-03-09 NOTE — PROGRESS NOTES
Subjective:       Patient ID: Sari Serna is a 37 y.o. female.    Chief Complaint: Annual Exam    HPI 37-year-old female presents to clinic today for annual physical patient has upcoming consultation scheduled with Rheumatology to address her recent joint related complaints and nonspecific positive URI.  Review of Systems    Otherwise negative  Objective:      Physical Exam  General: Well-appearing, well-nourished.  No distress  HEENT: conjunctivae are normal.  Pupils are equal and reative to light.  TM's are clear and intact bilaterally.  Hearing is grossly normal.  Nasopharynx is clear.  Oropharynx is clear.  Neck: Supple.  No thyroid megaly.  No bruits.  Lymph: No cervical or supraclavicular adenopathy.  Heart: Regular rate and rhythm, without murmur, rub or gallop.  Lungs: Clear to auscultation; respiratory effort normal.  Abdomen: Soft, nontender, nondistended.  Normoactive bowel sounds.  No hepatomegaly.  No masses.  Extremities: Good distal pulses.  No edema.  Psych: Oriented to time person place.  Judgment and insight seem unimpaired.  Mood and affect are appropriate.  Assessment:       Problem List Items Addressed This Visit    None      Plan:

## 2023-03-18 DIAGNOSIS — F41.1 GAD (GENERALIZED ANXIETY DISORDER): ICD-10-CM

## 2023-03-18 RX ORDER — ESCITALOPRAM OXALATE 20 MG/1
TABLET ORAL
Qty: 90 TABLET | Refills: 3 | Status: SHIPPED | OUTPATIENT
Start: 2023-03-18 | End: 2023-03-20

## 2023-03-18 NOTE — TELEPHONE ENCOUNTER
Refill Decision Note   Sari Strong Daphne  is requesting a refill authorization.  Brief Assessment and Rationale for Refill:  Approve     Medication Therapy Plan:       Medication Reconciliation Completed: No   Comments:     No Care Gaps recommended.     Note composed:5:24 AM 03/18/2023

## 2023-03-18 NOTE — TELEPHONE ENCOUNTER
No new care gaps identified.  [facility] Embedded Care Gaps. Reference number: [id number]. 3/18/2023   12:44:47 AM BARONT

## 2023-05-02 ENCOUNTER — LAB VISIT (OUTPATIENT)
Dept: LAB | Facility: HOSPITAL | Age: 38
End: 2023-05-02
Attending: INTERNAL MEDICINE
Payer: COMMERCIAL

## 2023-05-02 ENCOUNTER — OFFICE VISIT (OUTPATIENT)
Dept: RHEUMATOLOGY | Facility: CLINIC | Age: 38
End: 2023-05-02
Payer: COMMERCIAL

## 2023-05-02 VITALS
WEIGHT: 128.19 LBS | DIASTOLIC BLOOD PRESSURE: 79 MMHG | OXYGEN SATURATION: 98 % | BODY MASS INDEX: 22.71 KG/M2 | SYSTOLIC BLOOD PRESSURE: 121 MMHG | HEART RATE: 81 BPM | HEIGHT: 63 IN

## 2023-05-02 DIAGNOSIS — R76.8 ANA POSITIVE: ICD-10-CM

## 2023-05-02 DIAGNOSIS — R76.8 ANA POSITIVE: Primary | ICD-10-CM

## 2023-05-02 DIAGNOSIS — Z71.89 COUNSELING AND COORDINATION OF CARE: ICD-10-CM

## 2023-05-02 DIAGNOSIS — M25.50 POLYARTHRALGIA: ICD-10-CM

## 2023-05-02 LAB
ALBUMIN SERPL BCP-MCNC: 3.9 G/DL (ref 3.5–5.2)
ALP SERPL-CCNC: 55 U/L (ref 55–135)
ALT SERPL W/O P-5'-P-CCNC: 11 U/L (ref 10–44)
ANION GAP SERPL CALC-SCNC: 9 MMOL/L (ref 8–16)
AST SERPL-CCNC: 17 U/L (ref 10–40)
BASOPHILS # BLD AUTO: 0.02 K/UL (ref 0–0.2)
BASOPHILS NFR BLD: 0.3 % (ref 0–1.9)
BILIRUB SERPL-MCNC: 0.4 MG/DL (ref 0.1–1)
BUN SERPL-MCNC: 11 MG/DL (ref 6–20)
CALCIUM SERPL-MCNC: 8.9 MG/DL (ref 8.7–10.5)
CCP AB SER IA-ACNC: <0.5 U/ML
CHLORIDE SERPL-SCNC: 107 MMOL/L (ref 95–110)
CO2 SERPL-SCNC: 19 MMOL/L (ref 23–29)
CREAT SERPL-MCNC: 0.6 MG/DL (ref 0.5–1.4)
DIFFERENTIAL METHOD: NORMAL
EOSINOPHIL # BLD AUTO: 0.1 K/UL (ref 0–0.5)
EOSINOPHIL NFR BLD: 0.9 % (ref 0–8)
ERYTHROCYTE [DISTWIDTH] IN BLOOD BY AUTOMATED COUNT: 12.8 % (ref 11.5–14.5)
EST. GFR  (NO RACE VARIABLE): >60 ML/MIN/1.73 M^2
GLUCOSE SERPL-MCNC: 87 MG/DL (ref 70–110)
HBV SURFACE AG SERPL QL IA: NORMAL
HCT VFR BLD AUTO: 41 % (ref 37–48.5)
HCV AB SERPL QL IA: NORMAL
HGB BLD-MCNC: 13.7 G/DL (ref 12–16)
HIV 1+2 AB+HIV1 P24 AG SERPL QL IA: NORMAL
IMM GRANULOCYTES # BLD AUTO: 0.02 K/UL (ref 0–0.04)
IMM GRANULOCYTES NFR BLD AUTO: 0.3 % (ref 0–0.5)
LYMPHOCYTES # BLD AUTO: 1.7 K/UL (ref 1–4.8)
LYMPHOCYTES NFR BLD: 28.6 % (ref 18–48)
MCH RBC QN AUTO: 29.3 PG (ref 27–31)
MCHC RBC AUTO-ENTMCNC: 33.4 G/DL (ref 32–36)
MCV RBC AUTO: 88 FL (ref 82–98)
MONOCYTES # BLD AUTO: 0.3 K/UL (ref 0.3–1)
MONOCYTES NFR BLD: 5.3 % (ref 4–15)
NEUTROPHILS # BLD AUTO: 3.8 K/UL (ref 1.8–7.7)
NEUTROPHILS NFR BLD: 64.6 % (ref 38–73)
NRBC BLD-RTO: 0 /100 WBC
PLATELET # BLD AUTO: 321 K/UL (ref 150–450)
PMV BLD AUTO: 10.7 FL (ref 9.2–12.9)
POTASSIUM SERPL-SCNC: 3.9 MMOL/L (ref 3.5–5.1)
PROT SERPL-MCNC: 7.2 G/DL (ref 6–8.4)
RBC # BLD AUTO: 4.68 M/UL (ref 4–5.4)
SODIUM SERPL-SCNC: 135 MMOL/L (ref 136–145)
THYROPEROXIDASE IGG SERPL-ACNC: <6 IU/ML
WBC # BLD AUTO: 5.81 K/UL (ref 3.9–12.7)

## 2023-05-02 PROCEDURE — 87340 HEPATITIS B SURFACE AG IA: CPT | Performed by: INTERNAL MEDICINE

## 2023-05-02 PROCEDURE — 3074F PR MOST RECENT SYSTOLIC BLOOD PRESSURE < 130 MM HG: ICD-10-PCS | Mod: CPTII,S$GLB,, | Performed by: INTERNAL MEDICINE

## 2023-05-02 PROCEDURE — 86376 MICROSOMAL ANTIBODY EACH: CPT | Mod: 91 | Performed by: INTERNAL MEDICINE

## 2023-05-02 PROCEDURE — 86803 HEPATITIS C AB TEST: CPT | Performed by: INTERNAL MEDICINE

## 2023-05-02 PROCEDURE — 99999 PR PBB SHADOW E&M-EST. PATIENT-LVL V: CPT | Mod: PBBFAC,,, | Performed by: INTERNAL MEDICINE

## 2023-05-02 PROCEDURE — 3008F PR BODY MASS INDEX (BMI) DOCUMENTED: ICD-10-PCS | Mod: CPTII,S$GLB,, | Performed by: INTERNAL MEDICINE

## 2023-05-02 PROCEDURE — 86381 MITOCHONDRIAL ANTIBODY EACH: CPT | Performed by: INTERNAL MEDICINE

## 2023-05-02 PROCEDURE — 80053 COMPREHEN METABOLIC PANEL: CPT | Performed by: INTERNAL MEDICINE

## 2023-05-02 PROCEDURE — 3078F PR MOST RECENT DIASTOLIC BLOOD PRESSURE < 80 MM HG: ICD-10-PCS | Mod: CPTII,S$GLB,, | Performed by: INTERNAL MEDICINE

## 2023-05-02 PROCEDURE — 83520 IMMUNOASSAY QUANT NOS NONAB: CPT | Performed by: INTERNAL MEDICINE

## 2023-05-02 PROCEDURE — 86376 MICROSOMAL ANTIBODY EACH: CPT | Performed by: INTERNAL MEDICINE

## 2023-05-02 PROCEDURE — 99205 OFFICE O/P NEW HI 60 MIN: CPT | Mod: S$GLB,,, | Performed by: INTERNAL MEDICINE

## 2023-05-02 PROCEDURE — 86235 NUCLEAR ANTIGEN ANTIBODY: CPT | Performed by: INTERNAL MEDICINE

## 2023-05-02 PROCEDURE — 86200 CCP ANTIBODY: CPT | Performed by: INTERNAL MEDICINE

## 2023-05-02 PROCEDURE — 99205 PR OFFICE/OUTPT VISIT, NEW, LEVL V, 60-74 MIN: ICD-10-PCS | Mod: S$GLB,,, | Performed by: INTERNAL MEDICINE

## 2023-05-02 PROCEDURE — 1159F MED LIST DOCD IN RCRD: CPT | Mod: CPTII,S$GLB,, | Performed by: INTERNAL MEDICINE

## 2023-05-02 PROCEDURE — 3008F BODY MASS INDEX DOCD: CPT | Mod: CPTII,S$GLB,, | Performed by: INTERNAL MEDICINE

## 2023-05-02 PROCEDURE — 1159F PR MEDICATION LIST DOCUMENTED IN MEDICAL RECORD: ICD-10-PCS | Mod: CPTII,S$GLB,, | Performed by: INTERNAL MEDICINE

## 2023-05-02 PROCEDURE — 99999 PR PBB SHADOW E&M-EST. PATIENT-LVL V: ICD-10-PCS | Mod: PBBFAC,,, | Performed by: INTERNAL MEDICINE

## 2023-05-02 PROCEDURE — 83516 IMMUNOASSAY NONANTIBODY: CPT | Performed by: INTERNAL MEDICINE

## 2023-05-02 PROCEDURE — 3074F SYST BP LT 130 MM HG: CPT | Mod: CPTII,S$GLB,, | Performed by: INTERNAL MEDICINE

## 2023-05-02 PROCEDURE — 86480 TB TEST CELL IMMUN MEASURE: CPT | Performed by: INTERNAL MEDICINE

## 2023-05-02 PROCEDURE — 86015 ACTIN ANTIBODY EACH: CPT | Performed by: INTERNAL MEDICINE

## 2023-05-02 PROCEDURE — 85025 COMPLETE CBC W/AUTO DIFF WBC: CPT | Performed by: INTERNAL MEDICINE

## 2023-05-02 PROCEDURE — 3078F DIAST BP <80 MM HG: CPT | Mod: CPTII,S$GLB,, | Performed by: INTERNAL MEDICINE

## 2023-05-02 PROCEDURE — 87389 HIV-1 AG W/HIV-1&-2 AB AG IA: CPT | Performed by: INTERNAL MEDICINE

## 2023-05-02 PROCEDURE — 86706 HEP B SURFACE ANTIBODY: CPT | Mod: 91 | Performed by: INTERNAL MEDICINE

## 2023-05-02 PROCEDURE — 84445 ASSAY OF TSI GLOBULIN: CPT | Performed by: INTERNAL MEDICINE

## 2023-05-02 RX ORDER — MELOXICAM 15 MG/1
15 TABLET ORAL
COMMUNITY
Start: 2023-03-02 | End: 2023-07-03

## 2023-05-02 NOTE — PROGRESS NOTES
RHEUMATOLOGY OUTPATIENT CLINIC NOTE    5/2/2023    Attending Rheumatologist: Isiah Harvey  Primary Care Provider: Nhi Shearer MD   Physician Requesting Consultation: Nhi Shearer MD  [address] [facility]  [place] [address]  Chief Complaint/Reason For Consultation:  abnormal blood test      Subjective:       HPI  Sari Serna is a 38 y.o. White female with medical history noted below who presents for evaluation of +URI.    Patient presents for evaluation of +URI. She notes it was checked in the setting of recurrent pain. Reports the onset of pain 1/2021 with the right hip, then progressed to right hand around 9/2021, and for the last year her neck. Has had trouble opening the hand, especially in the mornings. No swelling, stiffness in the morning lasting about 10 minutes. She notes repetitive use worsens her pain, as well as applying to much pressure. Overuse aggravates hip. Neck no clear aggravating factors. She notes a support brace helps, NSAIDs help, rests. +Night sweats, palpitations, hot/flush, anxiety/depression, HA, Allergies, IBS, fatigue, tremors, hair thinning, flaky scalp, photosensitivity, easy bruising, GERD, . No Alopecia, Oral/Nasal Ulcers, Rash, Photosensitivity, Dry Eyes, Dry Mouth, Pleuritis/serositis, LAD, Raynaud's, Miscarriages/Blood clots, Skin tightening, SOB, chest pain, fever, wt loss, menstrual irregularities. FHX- Mom PBC, Cousin with AS/SLE.     Review of Systems   Constitutional:  Positive for fatigue. Negative for chills, fever and unexpected weight change.   HENT:  Negative for mouth sores.    Eyes:  Negative for redness and eye dryness.   Respiratory:  Negative for cough and shortness of breath.    Cardiovascular:  Negative for chest pain.   Gastrointestinal:  Positive for constipation and diarrhea. Negative for abdominal distention, nausea and vomiting.   Genitourinary:  Negative for vaginal dryness.   Musculoskeletal:  Positive for arthralgias and neck  pain. Negative for back pain, gait problem, joint swelling, leg pain, myalgias, neck stiffness and joint deformity.   Integumentary:  Negative for rash.   Neurological:  Positive for tremors and headaches. Negative for weakness and numbness.   Hematological:  Negative for adenopathy. Bruises/bleeds easily.   Psychiatric/Behavioral:  Negative for confusion, decreased concentration and sleep disturbance. The patient is not nervous/anxious.    All other systems reviewed and are negative.     Chronic comorbid conditions affecting medical decision making today:  Past Medical History:   Diagnosis Date    Acne     ALLERGIC RHINITIS     Allergy     Anxiety     Dysmenorrhea     Low back pain     Migraine headache     Recurrent upper respiratory infection (URI)      Past Surgical History:   Procedure Laterality Date    FACIAL COSMETIC SURGERY      2013     Family History   Problem Relation Age of Onset    Endometriosis Mother     Cirrhosis Mother     Allergic rhinitis Father     Allergic rhinitis Brother     Breast cancer Paternal Aunt     Stroke Paternal Grandfather     Amblyopia Neg Hx     Blindness Neg Hx     Cataracts Neg Hx     Glaucoma Neg Hx     Macular degeneration Neg Hx     Retinal detachment Neg Hx     Strabismus Neg Hx     Colon cancer Neg Hx     Ovarian cancer Neg Hx     Thyroid disease Neg Hx     Cancer Neg Hx     Melanoma Neg Hx     Allergies Neg Hx     Angioedema Neg Hx     Asthma Neg Hx     Atopy Neg Hx     Eczema Neg Hx     Immunodeficiency Neg Hx     Rhinitis Neg Hx     Urticaria Neg Hx     Celiac disease Neg Hx     Colon polyps Neg Hx     Crohn's disease Neg Hx     Esophageal cancer Neg Hx     Hemochromatosis Neg Hx     Inflammatory bowel disease Neg Hx     Rectal cancer Neg Hx     Ulcerative colitis Neg Hx     Stomach cancer Neg Hx     Pancreatic cancer Neg Hx     Carlos's esophagus Neg Hx     Pancreatitis Neg Hx     Uterine cancer Neg Hx     Bladder Cancer Neg Hx     Kidney cancer Neg Hx      Social  History     Substance and Sexual Activity   Alcohol Use Yes    Comment: occasion     Social History     Tobacco Use   Smoking Status Never   Smokeless Tobacco Never     Social History     Substance and Sexual Activity   Drug Use No       Current Outpatient Medications:     azelastine 205.5 mcg (0.15 %) Kareem, , Disp: , Rfl:     clonazePAM (KLONOPIN) 0.5 MG tablet, TAKE 1/2-1 TABLET TWICE A DAY AS NEEDED FOR ANXIETY, Disp: 30 tablet, Rfl: 3    diphenhydrAMINE (BENADRYL) 25 mg capsule, Take 25 mg by mouth every 6 (six) hours as needed for Itching., Disp: , Rfl:     EScitalopram oxalate (LEXAPRO) 10 MG tablet, Take 1 tablet (10 mg total) by mouth once daily., Disp: 90 tablet, Rfl: 1    ESTARYLLA 0.25-35 mg-mcg per tablet, TAKE 1 TABLET BY MOUTH EVERY DAY, Disp: 84 tablet, Rfl: 0    fluticasone (FLONASE) 50 mcg/actuation nasal spray, 2 sprays (100 mcg total) by Each Nare route once daily., Disp: 1 Bottle, Rfl: 0    ipratropium (ATROVENT) 42 mcg (0.06 %) nasal spray, , Disp: , Rfl:     ketoconazole (NIZORAL) 2 % shampoo, Wash scalp with medicated shampoo at least 2x/week. Let sit on scalp at least 5 minutes prior to rinsing, Disp: 240 mL, Rfl: 5    loratadine (CLARITIN) 10 mg tablet, , Disp: , Rfl:     meloxicam (MOBIC) 15 MG tablet, Take 15 mg by mouth., Disp: , Rfl:     urea (CARMOL) 40 % Crea, AAA BID, Disp: 28 g, Rfl: 1    amoxicillin (AMOXIL) 875 MG tablet, , Disp: , Rfl:     diclofenac (VOLTAREN) 75 MG EC tablet, , Disp: , Rfl:     ibuprofen (ADVIL,MOTRIN) 600 MG tablet, , Disp: , Rfl:     promethazine-codeine 6.25-10 mg/5 ml (PHENERGAN WITH CODEINE) 6.25-10 mg/5 mL syrup, , Disp: , Rfl:     sulfamethoxazole-trimethoprim 800-160mg (BACTRIM DS) 800-160 mg Tab, Take 1 tablet by mouth 2 (two) times daily., Disp: , Rfl:     triamcinolone (NASACORT) 55 mcg nasal inhaler, 2 sprays once daily., Disp: , Rfl:      Objective:         Vitals:    05/02/23 0905   BP: 121/79   Pulse: 81     Physical Exam  Can make fist, no  synovitis  Hyperextension of PIP, no other evidence of hypermobility   Wrists, Elbows, Shoulder ROM ok  Hip ROM ok  Knee Crepitus, no TTP  Negative ankle/MTP  No tender points  Rash on back of scalp and lower back     Reviewed old and all outside pertinent medical records available.    All lab results personally reviewed and interpreted by me.  Lab Results   Component Value Date    WBC 5.33 01/21/2023    HGB 15.0 01/21/2023    HCT 46.4 01/21/2023    MCV 89 01/21/2023    MCH 28.8 01/21/2023    MCHC 32.3 01/21/2023    RDW 12.8 01/21/2023     01/21/2023    MPV 11.4 01/21/2023       Lab Results   Component Value Date     01/21/2023    K 4.0 01/21/2023     01/21/2023    CO2 21 (L) 01/21/2023    GLU 82 01/21/2023    BUN 8 01/21/2023    CALCIUM 9.9 01/21/2023    PROT 8.1 01/21/2023    ALBUMIN 4.3 01/21/2023    BILITOT 0.5 01/21/2023    AST 24 01/21/2023    ALKPHOS 72 01/21/2023    ALT 16 01/21/2023       Lab Results   Component Value Date    COLORU Yellow 01/31/2014    APPEARANCEUA Cloudy (A) 01/31/2014    SPECGRAV 1.010 01/31/2014    PHUR 7.5 11/06/2017    PROTEINUA Trace (A) 01/31/2014    KETONESU Negative 01/31/2014    LEUKOCYTESUR Negative 01/31/2014    NITRITE Positive (A) 01/31/2014    UROBILINOGEN Negative 01/31/2014       Lab Results   Component Value Date    CRP 2.2 01/21/2023       Lab Results   Component Value Date    SEDRATE 34 01/21/2023       Lab Results   Component Value Date    RF <13.0 01/21/2023    SEDRATE 34 01/21/2023       No components found for: 25OHVITDTOT, 29BZZKSG1, 93OOKIKA2, METHODNOTE    No results found for: URICACID    No components found for: TSPOTTB        Imaging:  All imaging reviewed and independently interpreted by me.         ASSESSMENT / PLAN:     Sari Serna is a 38 y.o. White female with:      1. URI positive  - URI 1:1280, Homogenous/Speckled, Profile Negative   - discussed results  - has FHX of PBC, SLE   - at this moment I do not believe she has CTD  and we discussed symptoms to monitor for  - for completeness will get work up as below   - reassurance   - Ambulatory referral/consult to Rheumatology  - CBC Auto Differential; Future  - Comprehensive Metabolic Panel; Future  - Cyclic Citrullinated Peptide Antibody, IgG; Future  - Anti-Histone Antibody; Future  - Hepatitis C Antibody; Future  - Hepatitis B Surface Antigen; Future  - Hepatitis B Surface Ab, Qualitative; Future  - Anti-Scleroderma Antibody; Future  - Anti-Liver, Kidney, Microsome Ab; Future  - Antimitochondrial Antibody; Future  - Anti-Smooth Muscle Antibody; Future  - Quantiferon Gold TB; Future  - HIV 1/2 Ag/Ab (4th Gen); Future  - THYROID PEROXIDASE ANTIBODY; Future  - THYROID STIMULATING IMMUNOGLOBULIN; Future  - THYROTROPIN RECEPTOR ANTIBODY; Future    2. Polyarthralgia  - does have some arthritis in her neck and hypermobile joints of the hands which is likely the cause of pain  - discussed likely diagnosis and management  - went over strengthen techniques  - Tylenol/NSAIDs PRN  - reassurance and exercise   - Ambulatory referral/consult to Rheumatology    3. Other specified counseling  - over 10 minutes spent regarding below topics:  - Immunization counseling done.  - Weight loss counseling done.  - Nutrition and exercise counseling.  - Limitation of alcohol consumption.  - Regular exercise:  Aerobic and resistance.  - Medication counseling provided.        Follow up in about 9 months (around 2/2/2024).    Method of contact with patient concerns: [facility]    Disclaimer:  This note is prepared using voice recognition software and as such is likely to have errors and has not been proof read. Please contact me for questions.     Time spent: 60 minutes in face to face discussion concerning diagnosis, prognosis, review of lab and test results, benefits of treatment as well as management of disease, counseling of patient and coordination of care between various health care providers.   Greater than half the time spent was used for coordination of care and counseling of patient.    Isiah Harevy M.D.  Rheumatology Department   [facility]

## 2023-05-03 LAB
GAMMA INTERFERON BACKGROUND BLD IA-ACNC: 0.08 IU/ML
HBV SURFACE AB SER-ACNC: 7.61 MIU/ML
HBV SURFACE AB SER-ACNC: NORMAL M[IU]/ML
M TB IFN-G CD4+ BCKGRND COR BLD-ACNC: -0.01 IU/ML
MITOGEN IGNF BCKGRD COR BLD-ACNC: 9.91 IU/ML
TB GOLD PLUS: NEGATIVE
TB2 - NIL: 0.01 IU/ML
TSH RECEP AB SER-ACNC: <1.1 IU/L (ref 0–1.75)

## 2023-05-04 LAB
MITOCHONDRIA AB TITR SER IF: NORMAL {TITER}
SMOOTH MUSCLE AB TITR SER IF: NORMAL {TITER}

## 2023-05-05 LAB
ENA SCL70 AB SER-ACNC: <0.6 U/ML
HISTONE IGG SER IA-ACNC: 0.2 UNITS (ref 0–0.9)
LKM AB SER-ACNC: 1.1 UNITS
TSI SER-ACNC: <0.1 IU/L

## 2023-05-09 DIAGNOSIS — Z76.0 ENCOUNTER FOR MEDICATION REFILL: ICD-10-CM

## 2023-05-09 RX ORDER — NORGESTIMATE AND ETHINYL ESTRADIOL 0.25-0.035
1 KIT ORAL DAILY
Qty: 84 TABLET | Refills: 0 | Status: SHIPPED | OUTPATIENT
Start: 2023-05-09 | End: 2023-08-01

## 2023-05-16 ENCOUNTER — OFFICE VISIT (OUTPATIENT)
Dept: URGENT CARE | Facility: CLINIC | Age: 38
End: 2023-05-16
Payer: COMMERCIAL

## 2023-05-16 VITALS
OXYGEN SATURATION: 98 % | SYSTOLIC BLOOD PRESSURE: 111 MMHG | TEMPERATURE: 98 F | HEART RATE: 73 BPM | BODY MASS INDEX: 22.68 KG/M2 | DIASTOLIC BLOOD PRESSURE: 72 MMHG | WEIGHT: 128 LBS | HEIGHT: 63 IN

## 2023-05-16 DIAGNOSIS — Z02.6 ENCOUNTER RELATED TO WORKER'S COMPENSATION CLAIM: ICD-10-CM

## 2023-05-16 DIAGNOSIS — S90.31XA CONTUSION OF RIGHT FOOT, INITIAL ENCOUNTER: Primary | ICD-10-CM

## 2023-05-16 PROCEDURE — 99212 OFFICE O/P EST SF 10 MIN: CPT | Mod: S$GLB,,,

## 2023-05-16 PROCEDURE — 99212 PR OFFICE/OUTPT VISIT, EST, LEVL II, 10-19 MIN: ICD-10-PCS | Mod: S$GLB,,,

## 2023-05-16 NOTE — LETTER
[facility]  [address] [address]  Phone: [phone]  Fax: [phone]  [facility]: [email or web address]    Pt Name: Sari Serna  Injury Date: 05/16/2023   Employee ID: [id number] Date of First Treatment: 05/16/2023   Company: [employer]      Appointment Time:  Arrived: 11:32 AM    Provider: Perez Mcneal, DO Time Out: 12:56 PM      Office Treatment:   1. Contusion of right foot, initial encounter    2. Encounter related to worker's compensation claim               Restrictions: Regular Duty, Discharged from Occupational Health     Return As needed. EMY

## 2023-05-16 NOTE — PROGRESS NOTES
Subjective:      Patient ID: Sari Serna is a 38 y.o. female.    Chief Complaint: Foot Injury (Right)    See MA note. 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  She estimates the student weighs about 65-70 lb in his wearing a hiking boot type of shoe.  She was wearing a sandal and the impact was to the right distal mid foot area.  She was able to walk around on her foot albeit with some discomfort.  She denies any pain to the area.  She did notice some small area of swelling and bruising after the incident.  Her employer asked that she come in for an evaluation.    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  What they did immediately after - Reported   Pain scale right now - 1/10    EC     Foot Injury   Pertinent negatives include no numbness.     Constitution: Negative. Negative for activity change and generalized weakness.   HENT: Negative.     Neck: neck negative.   Cardiovascular: Negative.    Eyes: Negative.    Respiratory: Negative.     Gastrointestinal: Negative.    Endocrine: negative.   Genitourinary: Negative.    Musculoskeletal:  Positive for pain. Negative for abnormal ROM of joint, muscle cramps and muscle ache.   Skin:  Positive for color change and bruising. Negative for wound, laceration and erythema.   Neurological: Negative.  Negative for numbness and tingling.   Objective:     Physical Exam  Vitals and nursing note reviewed.   Constitutional:       General: She is not in acute distress.     Appearance: Normal appearance.   HENT:      Head: Normocephalic.   Eyes:      General: Lids are normal.      Conjunctiva/sclera: Conjunctivae normal.    Musculoskeletal:      Cervical back: No pain with movement.      Right ankle: No swelling, deformity or ecchymosis. No tenderness. Normal range of motion. Anterior drawer test negative. Normal pulse.      Right foot: Normal range of motion and normal capillary refill. Swelling and tenderness present. No deformity, foot drop, prominent metatarsal heads or laceration. Normal pulse.        Feet:       Comments: There is approximately 1 cm area of swelling and subtle bruising noted just proximal to the 2nd metatarsal head.  She describes some discomfort but not pain to the same area on palpation.  She is able to stand up from a seated position and ambulate without any difficulty or pain.  Heel and toe walking is normal.  She is able to squat without any difficulty.  She is full painless range of motion of the right ankle. N/V intact   Skin:     General: Skin is warm.      Capillary Refill: Capillary refill takes less than 2 seconds.      Findings: Bruising present. No abrasion, erythema, laceration or wound.   Neurological:      General: No focal deficit present.      Mental Status: She is alert and oriented to person, place, and time.      Gait: Gait is intact.   Psychiatric:         Attention and Perception: Attention normal.         Mood and Affect: Mood and affect normal.         Speech: Speech normal.         Behavior: Behavior normal. Behavior is cooperative.      Assessment:      1. Contusion of right foot, initial encounter    2. Encounter related to worker's compensation claim      Plan:     Although she has some mild swelling and early bruising noted on exam, there is no functional deficits.  I do not feel x-rays are ended at this time and she is in agreement.  I have asked her to continue to monitor her symptoms and she may use over-the-counter ibuprofen or Tylenol as directed on label and/or use of cold packs or ice to help with any inflammation.  She is aware to monitor her symptoms and return for  re-evaluation should her symptoms unexpectedly worsen.  At this time I will discharge her from Occupational Health           Restrictions: Regular Duty, Discharged from Occupational Health  Follow up if symptoms worsen or fail to improve.

## 2023-06-02 ENCOUNTER — PATIENT MESSAGE (OUTPATIENT)
Dept: INTERNAL MEDICINE | Facility: CLINIC | Age: 38
End: 2023-06-02
Payer: COMMERCIAL

## 2023-06-19 ENCOUNTER — OFFICE VISIT (OUTPATIENT)
Dept: URGENT CARE | Facility: CLINIC | Age: 38
End: 2023-06-19
Payer: COMMERCIAL

## 2023-06-19 VITALS
DIASTOLIC BLOOD PRESSURE: 77 MMHG | HEART RATE: 72 BPM | BODY MASS INDEX: 22.5 KG/M2 | WEIGHT: 127 LBS | RESPIRATION RATE: 16 BRPM | OXYGEN SATURATION: 97 % | HEIGHT: 63 IN | SYSTOLIC BLOOD PRESSURE: 128 MMHG

## 2023-06-19 DIAGNOSIS — S90.31XD CONTUSION OF RIGHT FOOT, SUBSEQUENT ENCOUNTER: Primary | ICD-10-CM

## 2023-06-19 PROCEDURE — 99213 OFFICE O/P EST LOW 20 MIN: CPT | Mod: S$GLB,,,

## 2023-06-19 PROCEDURE — 73630 XR FOOT COMPLETE 3 VIEW RIGHT: ICD-10-PCS | Mod: RT,S$GLB,, | Performed by: RADIOLOGY

## 2023-06-19 PROCEDURE — 99213 PR OFFICE/OUTPT VISIT, EST, LEVL III, 20-29 MIN: ICD-10-PCS | Mod: S$GLB,,,

## 2023-06-19 PROCEDURE — 73630 X-RAY EXAM OF FOOT: CPT | Mod: RT,S$GLB,, | Performed by: RADIOLOGY

## 2023-06-19 NOTE — PROGRESS NOTES
Subjective:      Patient ID: Sari Serna is a 38 y.o. female.    Chief Complaint: Foot Injury    See MA note.  She acknowledges improved symptoms to the dorsal aspect of her right foot but does experience some increased pain when wearing shoes that applies pressure to that area.  She is been performing exercises such as yoga and Mack without difficulty.  However she thought it best to have her right foot evaluated again.  Denies any numbness or tingling.    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 Negative for activity change and generalized weakness.   HENT: Negative.     Neck: neck negative.   Cardiovascular: Negative.    Eyes: Negative.    Respiratory: Negative.     Gastrointestinal: Negative.    Endocrine: negative.   Genitourinary: Negative.    Musculoskeletal:  Positive for pain. Negative for abnormal ROM of joint, muscle cramps and muscle ache.   Skin:  Positive for color change and bruising. Negative for wound, laceration and erythema.   Neurological: Negative.  Negative for tingling.   Objective:     Physical Exam  Vitals and nursing note reviewed.   Constitutional:       General: She is not in acute distress.     Appearance: Normal appearance.   HENT:      Head: Normocephalic.   Eyes:      General: Lids are normal.      Conjunctiva/sclera: Conjunctivae normal.   Musculoskeletal:      Cervical back: No pain with movement.      Right ankle: No swelling, deformity or ecchymosis. No tenderness. Normal range of motion. Normal pulse.      Right foot: Normal range of motion. No swelling or deformity. Normal pulse.        Feet:       Comments: Small area of very mild bruising noted between the right 3rd  and 4th metatarsal space and corresponding metatarsal heads.  Areas nontender on palpation.  This bruising is in a different location than the last visit.  No increased pain on palpation or with heel and toe walking or squatting.  She is full painless range of motion of her toes and her right ankle.   Skin:     General: Skin is warm.      Capillary Refill: Capillary refill takes less than 2 seconds.      Findings: Bruising present. No abrasion, abscess, erythema, laceration, petechiae or wound.   Neurological:      Mental Status: She is alert and oriented to person, place, and time.      Gait: Gait is intact.   Psychiatric:         Attention and Perception: Attention normal.         Mood and Affect: Mood and affect normal.         Speech: Speech normal.         Behavior: Behavior normal. Behavior is cooperative.      Assessment:      1. Contusion of right foot, subsequent encounter      Plan:   Given the ongoing symptoms, I obtained radiographs of her right foot.  I personally reviewed the images.  No acute findings noted to the imaging.  Incidental finding of a bipartite sesamoid bone to the right great toe.  Images were reviewed with the patient.  I did advise her that I would contact her if the radiologist identifies any other findings.    I did discussed considering giving her injury more time to recover or starting physical therapy.  She states she has a therapist friend that she may ask the individual for some exercises for her injury.  I suggested follow up in approximally 3-4 weeks to re-evaluate her foot status and may consider more formal order for physical therapy her potential CT scan to assess for occult fracture if she continues have any symptoms.         Restrictions: Regular Duty  Follow up in about 3 weeks (around 7/10/2023).

## 2023-06-19 NOTE — LETTER
[facility] - Occupational Health  [address] [place] [address]  Phone: [phone]  Fax: [phone]  [facility]: [phone]    Pt Name: Sari Serna  Injury Date: 05/16/2023   Employee ID: [id number] Date of Treatment: 06/19/2023   Company: [employer]      Appointment Time: 10:30 AM Arrived: 10:16 AM    Provider: Perez Mcneal DO Time Out: 12:30 PM     Office Treatment:   1. Contusion of right foot, subsequent encounter               Restrictions: Regular Duty     Return Appointment: 7/10/2023 at 8:45 AM EMY

## 2023-06-20 ENCOUNTER — CLINICAL SUPPORT (OUTPATIENT)
Dept: ENDOSCOPY | Facility: HOSPITAL | Age: 38
End: 2023-06-20
Attending: INTERNAL MEDICINE
Payer: COMMERCIAL

## 2023-06-20 ENCOUNTER — TELEPHONE (OUTPATIENT)
Dept: ENDOSCOPY | Facility: HOSPITAL | Age: 38
End: 2023-06-20

## 2023-06-20 DIAGNOSIS — K21.9 GASTROESOPHAGEAL REFLUX DISEASE, UNSPECIFIED WHETHER ESOPHAGITIS PRESENT: ICD-10-CM

## 2023-06-20 NOTE — TELEPHONE ENCOUNTER
Spoke to patient to schedule procedure(s) Upper Endoscopy (EGD) with [facility]       Physician to perform procedure(s) Dr. RENZO Cedeño  Date of Procedure (s) 7/28/23  Arrival Time 11:30 AM  Time of Procedure(s) 12:30 PM   Location of Procedure(s) [address]  Type of Rx Prep sent to patient: N/A  Instructions provided to patient via [facility]    Patient was informed on the following information and verbalized understanding. Screening questionnaire reviewed with patient and complete. If procedure requires anesthesia, a responsible adult needs to be present to accompany the patient home, patient cannot drive after receiving anesthesia. Appointment details are tentative, especially check-in time. Patient will receive a prep-op call 4 days prior to confirm check-in time for procedure. If applicable the patient should contact their pharmacy to verify Rx for procedure prep is ready for pick-up. Patient was advised to call the scheduling department at [phone] if pharmacy states no Rx is available. Patient was advised to call the endoscopy scheduling department if any questions or concerns arise.      [facility] Scheduling Department

## 2023-07-03 ENCOUNTER — OFFICE VISIT (OUTPATIENT)
Dept: OBSTETRICS AND GYNECOLOGY | Facility: CLINIC | Age: 38
End: 2023-07-03
Payer: COMMERCIAL

## 2023-07-03 VITALS — HEIGHT: 63 IN | BODY MASS INDEX: 24.14 KG/M2 | WEIGHT: 136.25 LBS

## 2023-07-03 DIAGNOSIS — Z01.419 ENCOUNTER FOR GYNECOLOGICAL EXAMINATION (GENERAL) (ROUTINE) WITHOUT ABNORMAL FINDINGS: ICD-10-CM

## 2023-07-03 DIAGNOSIS — N63.11 MASS OF UPPER OUTER QUADRANT OF RIGHT BREAST: Primary | ICD-10-CM

## 2023-07-03 DIAGNOSIS — R10.2 PELVIC PAIN: ICD-10-CM

## 2023-07-03 PROCEDURE — 99999 PR PBB SHADOW E&M-EST. PATIENT-LVL III: CPT | Mod: PBBFAC,,, | Performed by: OBSTETRICS & GYNECOLOGY

## 2023-07-03 PROCEDURE — 99395 PREV VISIT EST AGE 18-39: CPT | Mod: S$GLB,,, | Performed by: OBSTETRICS & GYNECOLOGY

## 2023-07-03 PROCEDURE — 3008F PR BODY MASS INDEX (BMI) DOCUMENTED: ICD-10-PCS | Mod: CPTII,S$GLB,, | Performed by: OBSTETRICS & GYNECOLOGY

## 2023-07-03 PROCEDURE — 1159F PR MEDICATION LIST DOCUMENTED IN MEDICAL RECORD: ICD-10-PCS | Mod: CPTII,S$GLB,, | Performed by: OBSTETRICS & GYNECOLOGY

## 2023-07-03 PROCEDURE — 3008F BODY MASS INDEX DOCD: CPT | Mod: CPTII,S$GLB,, | Performed by: OBSTETRICS & GYNECOLOGY

## 2023-07-03 PROCEDURE — 99395 PR PREVENTIVE VISIT,EST,18-39: ICD-10-PCS | Mod: S$GLB,,, | Performed by: OBSTETRICS & GYNECOLOGY

## 2023-07-03 PROCEDURE — 99999 PR PBB SHADOW E&M-EST. PATIENT-LVL III: ICD-10-PCS | Mod: PBBFAC,,, | Performed by: OBSTETRICS & GYNECOLOGY

## 2023-07-03 PROCEDURE — 1159F MED LIST DOCD IN RCRD: CPT | Mod: CPTII,S$GLB,, | Performed by: OBSTETRICS & GYNECOLOGY

## 2023-07-03 NOTE — PROGRESS NOTES
Subjective:       Patient ID: Sari Serna is a 38 y.o. female.    Chief Complaint:  Annual Exam (Last pap/hpv  normal/Mammo  birads*-1)        History of Present Illness  Sari Serna is a 38 y.o. female  who presents for annual. Patient reports worsening pain with sex. Mom had complete hysterectomy for endometriosis. Patient has been on OCP. Had normal u/s years ago with previous MD. Not interested in pregnancy. Needs contraception. Discussed options. Will start with GYN u/s and go from there. Consider dx scope since failed medical therapy with OCP. Explained in detail.     Patient's last menstrual period was 2023 (approximate).   Date of Last Pap: 2022    Review of Systems  Review of Systems   Constitutional:  Negative for chills and fever.      Objective:   Physical Exam:   Constitutional: She is oriented to person, place, and time. Vital signs are normal. She appears well-developed and well-nourished. No distress.        Pulmonary/Chest: She exhibits no mass. Right breast exhibits mass. Right breast exhibits no nipple discharge, no skin change, no tenderness, no bleeding and no swelling. Left breast exhibits no mass, no nipple discharge, no skin change, no tenderness, no bleeding and no swelling. Breasts are symmetrical.   Small firm non tender mass at 8 o'clock c/w cyst vs fibroadenoma. Superficial. 8 mm            Abdominal: Soft. Bowel sounds are normal. She exhibits no distension and no mass. There is no abdominal tenderness. There is no rebound.     Genitourinary:    Vagina and uterus normal.   There is no rash, tenderness, lesion or injury on the right labia. There is no rash, tenderness, lesion or injury on the left labia. Cervix is normal. Right adnexum displays no mass, no tenderness and no fullness. Left adnexum displays no mass, no tenderness and no fullness. No erythema,  no vaginal discharge, tenderness, rectocele, cystocele or unspecified prolapse of  vaginal walls in the vagina. Cervix exhibits no motion tenderness, no discharge and no friability. Uterus is not deviated, not enlarged, not fixed, not tender and not hosting fibroids.           Musculoskeletal: Normal range of motion and moves all extremeties.      Lymphadenopathy:        Right: No supraclavicular adenopathy present.        Left: No supraclavicular adenopathy present.    Neurological: She is alert and oriented to person, place, and time.    Skin: Skin is warm and dry.    Psychiatric: She has a normal mood and affect. Her behavior is normal. Judgment normal.      Assessment/ Plan:     1. Mass of upper outer quadrant of right breast  Mammo Digital Diagnostic Right with [facility]    US Breast Right Limited      2. Pelvic pain  US Pelvis Comp with Transvag NON-OB (xpd      3. Encounter for gynecological examination (general) (routine) without abnormal findings            No follow-ups on file.    As of April 1, 2021, the Cures Act has been passed nationally. This new law requires that all doctors progress notes, lab results, pathology reports and radiology reports be released IMMEDIATELY to the patient in the patient portal. That means that the results are released to you at the EXACT same time they are released to me. Therefore, with all of the patients that I have I am not able to reply to each patient exactly when the results come in. So there will be a delay from when you see the results to when I see them and have time to come up with a response to send you. Also I only see these results when I am on the computer at work. So if the results come in over the weekend or after 5 pm of a work day, I will not see them until the next business day. As you can tell, this is a challenge as a physician to give every patient the quick response they hope for and deserve. So please be patient! Thanks for understanding, Dr. Monaco

## 2023-07-05 ENCOUNTER — TELEPHONE (OUTPATIENT)
Dept: RADIOLOGY | Facility: HOSPITAL | Age: 38
End: 2023-07-05
Payer: COMMERCIAL

## 2023-07-11 ENCOUNTER — OFFICE VISIT (OUTPATIENT)
Dept: URGENT CARE | Facility: CLINIC | Age: 38
End: 2023-07-11
Payer: COMMERCIAL

## 2023-07-11 VITALS
HEART RATE: 87 BPM | SYSTOLIC BLOOD PRESSURE: 125 MMHG | OXYGEN SATURATION: 98 % | RESPIRATION RATE: 18 BRPM | DIASTOLIC BLOOD PRESSURE: 64 MMHG | WEIGHT: 136 LBS | BODY MASS INDEX: 24.1 KG/M2 | HEIGHT: 63 IN

## 2023-07-11 DIAGNOSIS — S90.31XD CONTUSION OF RIGHT FOOT, SUBSEQUENT ENCOUNTER: Primary | ICD-10-CM

## 2023-07-11 PROCEDURE — 99213 OFFICE O/P EST LOW 20 MIN: CPT | Mod: S$GLB,,,

## 2023-07-11 PROCEDURE — 99213 PR OFFICE/OUTPT VISIT, EST, LEVL III, 20-29 MIN: ICD-10-PCS | Mod: S$GLB,,,

## 2023-07-11 NOTE — LETTER
[facility] - Occupational Health  [address] [address]  Phone: [phone]  Fax: [phone]  [facility]: [email or web address]    Pt Name: Sari Serna  Injury Date: 05/16/2023   Employee ID:  Date of Treatment: 07/11/2023   Company: [employer]      Appointment Time: 11:30 AM Arrived: 1154   Provider: Perez Mcneal DO Time Out:1345     Office Treatment:   1. Contusion of right foot, subsequent encounter          Patient Instructions: PT to be scheduled once authorized      Restrictions: Regular Duty     Return Appointment: 8/8/2023 at 3PM    CT

## 2023-07-11 NOTE — PROGRESS NOTES
Subjective:      Patient ID: Sari Serna is a 38 y.o. female.    Chief Complaint: Foot Injury (RT)    Patient's place of employment - [employer]  Patient's job title - Teacher  Date of Injury - 05-16-23  Body part injured - RT Foot  Current work status per last visit - Regular Duty  Improved, same, or worse - Improved  Pain Scale right now (1-10) -  4/10    Foot Injury   Pertinent negatives include no numbness.   Constitution: Negative.   HENT: Negative.     Neck: neck negative.   Cardiovascular: Negative.    Eyes: Negative.    Respiratory: Negative.     Gastrointestinal: Negative.    Endocrine: negative.   Genitourinary: Negative.    Musculoskeletal:  Positive for pain and muscle ache. Negative for trauma, joint pain, joint swelling, abnormal ROM of joint and muscle cramps.   Skin: Negative.    Neurological:  Negative for numbness and tingling.   Objective:       Physical Exam  Vitals and nursing note reviewed.   Constitutional:       General: She is not in acute distress.     Appearance: Normal appearance.   HENT:      Head: Normocephalic.   Eyes:      General: Lids are normal.      Conjunctiva/sclera: Conjunctivae normal.   Musculoskeletal:      Cervical back: No pain with movement.      Right ankle: No swelling, deformity or ecchymosis. No tenderness. Normal range of motion. Normal pulse.      Right foot: Normal range of motion. No swelling or deformity. Normal pulse.        Feet:       Comments: She describes discomfort, but not pain between the right 4th and 5th metatarsal space and corresponding metatarsal headson palpation  She has full painless range of motion of her toes and her right ankle.   Skin:     General: Skin is warm.      Capillary Refill: Capillary refill takes less than 2 seconds.      Findings: No abrasion, abscess, erythema, laceration, petechiae, bruising, or wound.   Neurological:      Mental Status: She is alert and oriented to person, place, and time.      Gait: Gait is intact.    Psychiatric:         Attention and Perception: Attention normal.         Mood and Affect: Mood and affect normal.         Speech: Speech normal.         Behavior: Behavior normal. Behavior is cooperative.     Assessment:      1. Contusion of right foot, subsequent encounter      Plan:   Sari has attended 2 sessions of therapy through [facility]. she feels sessions are helpful and would like to continue treatments under their care.  Order for therapy will be placed today.  School is still out for the summer therefore she will remain at regular duty status for now.  Follow up in approximately 4 weeks which should allow enough time for approval of the order and to attend a few sessions of therapy.    I spent a total of 20 minutes on the day of the visit.This includes face to face time and non-face to face time preparing to see the patient (eg, review of tests), obtaining and/or reviewing separately obtained history, documenting clinical information in the electronic or other health record, independently interpreting results and communicating results to the patient/family/caregiver, or care coordinator.        Patient Instructions: PT to be scheduled once authorized   Restrictions: Regular Duty  Follow up in about 4 weeks (around 8/8/2023).

## 2023-07-18 ENCOUNTER — TELEPHONE (OUTPATIENT)
Dept: GASTROENTEROLOGY | Facility: CLINIC | Age: 38
End: 2023-07-18
Payer: COMMERCIAL

## 2023-07-18 NOTE — TELEPHONE ENCOUNTER
Informed pt of arrival time 11:30 am. Pt verbalize understand and thank me.  ----- Message from Shani Grant sent at 7/18/2023  1:02 PM CDT -----  Who Called: Pt    What is the request in detail: Requesting call back to discuss arrival time and instructions for 07/28 procedure. Please advise    Can the clinic reply by [facility]? No    Best Call Back Number: [phone]      Additional Information:

## 2023-07-19 ENCOUNTER — HOSPITAL ENCOUNTER (OUTPATIENT)
Dept: RADIOLOGY | Facility: HOSPITAL | Age: 38
Discharge: HOME | End: 2023-07-19
Attending: OBSTETRICS & GYNECOLOGY
Payer: COMMERCIAL

## 2023-07-19 DIAGNOSIS — N63.11 MASS OF UPPER OUTER QUADRANT OF RIGHT BREAST: ICD-10-CM

## 2023-07-19 PROCEDURE — 77066 MAMMO DIGITAL DIAGNOSTIC BILAT WITH TOMO: ICD-10-PCS | Mod: 26,,, | Performed by: RADIOLOGY

## 2023-07-19 PROCEDURE — 77066 DX MAMMO INCL CAD BI: CPT | Mod: TC

## 2023-07-19 PROCEDURE — 76642 ULTRASOUND BREAST LIMITED: CPT | Mod: 26,RT,, | Performed by: RADIOLOGY

## 2023-07-19 PROCEDURE — 76642 US BREAST RIGHT LIMITED: ICD-10-PCS | Mod: 26,RT,, | Performed by: RADIOLOGY

## 2023-07-19 PROCEDURE — 77066 DX MAMMO INCL CAD BI: CPT | Mod: 26,,, | Performed by: RADIOLOGY

## 2023-07-19 PROCEDURE — 77062 MAMMO DIGITAL DIAGNOSTIC BILAT WITH TOMO: ICD-10-PCS | Mod: 26,,, | Performed by: RADIOLOGY

## 2023-07-19 PROCEDURE — 76642 ULTRASOUND BREAST LIMITED: CPT | Mod: TC,RT

## 2023-07-19 PROCEDURE — 77062 BREAST TOMOSYNTHESIS BI: CPT | Mod: 26,,, | Performed by: RADIOLOGY

## 2023-07-24 ENCOUNTER — TELEPHONE (OUTPATIENT)
Dept: OBSTETRICS AND GYNECOLOGY | Facility: CLINIC | Age: 38
End: 2023-07-24
Payer: COMMERCIAL

## 2023-07-28 ENCOUNTER — HOSPITAL ENCOUNTER (OUTPATIENT)
Facility: HOSPITAL | Age: 38
Discharge: HOME | End: 2023-07-28
Attending: INTERNAL MEDICINE | Admitting: INTERNAL MEDICINE
Payer: COMMERCIAL

## 2023-07-28 ENCOUNTER — ANESTHESIA EVENT (OUTPATIENT)
Dept: ENDOSCOPY | Facility: HOSPITAL | Age: 38
End: 2023-07-28
Payer: COMMERCIAL

## 2023-07-28 ENCOUNTER — ANESTHESIA (OUTPATIENT)
Dept: ENDOSCOPY | Facility: HOSPITAL | Age: 38
End: 2023-07-28
Payer: COMMERCIAL

## 2023-07-28 VITALS
BODY MASS INDEX: 23.04 KG/M2 | SYSTOLIC BLOOD PRESSURE: 118 MMHG | HEART RATE: 68 BPM | TEMPERATURE: 98 F | HEIGHT: 63 IN | DIASTOLIC BLOOD PRESSURE: 68 MMHG | OXYGEN SATURATION: 98 % | RESPIRATION RATE: 16 BRPM | WEIGHT: 130 LBS

## 2023-07-28 DIAGNOSIS — K21.9 GERD (GASTROESOPHAGEAL REFLUX DISEASE): ICD-10-CM

## 2023-07-28 LAB
B-HCG UR QL: NEGATIVE
CTP QC/QA: YES

## 2023-07-28 PROCEDURE — 27200942: Performed by: INTERNAL MEDICINE

## 2023-07-28 PROCEDURE — 37000009 HC ANESTHESIA EA ADD 15 MINS: Performed by: INTERNAL MEDICINE

## 2023-07-28 PROCEDURE — 88305 TISSUE EXAM BY PATHOLOGIST: ICD-10-PCS | Mod: 26,,, | Performed by: PATHOLOGY

## 2023-07-28 PROCEDURE — 81025 URINE PREGNANCY TEST: CPT | Performed by: INTERNAL MEDICINE

## 2023-07-28 PROCEDURE — 37000008 HC ANESTHESIA 1ST 15 MINUTES: Performed by: INTERNAL MEDICINE

## 2023-07-28 PROCEDURE — 88305 TISSUE EXAM BY PATHOLOGIST: CPT | Performed by: PATHOLOGY

## 2023-07-28 PROCEDURE — E9220 PRA ENDO ANESTHESIA: ICD-10-PCS | Mod: ,,, | Performed by: NURSE ANESTHETIST, CERTIFIED REGISTERED

## 2023-07-28 PROCEDURE — 88342 CHG IMMUNOCYTOCHEMISTRY: ICD-10-PCS | Mod: 26,,, | Performed by: PATHOLOGY

## 2023-07-28 PROCEDURE — 88342 IMHCHEM/IMCYTCHM 1ST ANTB: CPT | Mod: 26,,, | Performed by: PATHOLOGY

## 2023-07-28 PROCEDURE — 88342 IMHCHEM/IMCYTCHM 1ST ANTB: CPT | Performed by: PATHOLOGY

## 2023-07-28 PROCEDURE — 25000003 PHARM REV CODE 250: Performed by: INTERNAL MEDICINE

## 2023-07-28 PROCEDURE — 43239 EGD BIOPSY SINGLE/MULTIPLE: CPT | Mod: ,,, | Performed by: INTERNAL MEDICINE

## 2023-07-28 PROCEDURE — 43239 PR EGD, FLEX, W/BIOPSY, SGL/MULTI: ICD-10-PCS | Mod: ,,, | Performed by: INTERNAL MEDICINE

## 2023-07-28 PROCEDURE — 88305 TISSUE EXAM BY PATHOLOGIST: CPT | Mod: 26,,, | Performed by: PATHOLOGY

## 2023-07-28 PROCEDURE — E9220 PRA ENDO ANESTHESIA: HCPCS | Mod: ,,, | Performed by: NURSE ANESTHETIST, CERTIFIED REGISTERED

## 2023-07-28 PROCEDURE — 25000003 PHARM REV CODE 250: Performed by: NURSE ANESTHETIST, CERTIFIED REGISTERED

## 2023-07-28 PROCEDURE — 43239 EGD BIOPSY SINGLE/MULTIPLE: CPT | Performed by: INTERNAL MEDICINE

## 2023-07-28 PROCEDURE — 27201012 HC FORCEPS, HOT/COLD, DISP: Performed by: INTERNAL MEDICINE

## 2023-07-28 PROCEDURE — 63600175 PHARM REV CODE 636 W HCPCS: Performed by: NURSE ANESTHETIST, CERTIFIED REGISTERED

## 2023-07-28 PROCEDURE — 91035 G-ESOPH REFLX TST W/ELECTROD: CPT | Mod: TC | Performed by: INTERNAL MEDICINE

## 2023-07-28 RX ORDER — PROPOFOL 10 MG/ML
VIAL (ML) INTRAVENOUS CONTINUOUS PRN
Status: DISCONTINUED | OUTPATIENT
Start: 2023-07-28 | End: 2023-07-28

## 2023-07-28 RX ORDER — PROPOFOL 10 MG/ML
VIAL (ML) INTRAVENOUS
Status: DISCONTINUED | OUTPATIENT
Start: 2023-07-28 | End: 2023-07-28

## 2023-07-28 RX ORDER — LIDOCAINE HYDROCHLORIDE 20 MG/ML
INJECTION INTRAVENOUS
Status: DISCONTINUED | OUTPATIENT
Start: 2023-07-28 | End: 2023-07-28

## 2023-07-28 RX ORDER — SODIUM CHLORIDE 9 MG/ML
INJECTION, SOLUTION INTRAVENOUS CONTINUOUS
Status: DISCONTINUED | OUTPATIENT
Start: 2023-07-28 | End: 2023-07-28 | Stop reason: HOSPADM

## 2023-07-28 RX ADMIN — SODIUM CHLORIDE: 0.9 INJECTION, SOLUTION INTRAVENOUS at 12:07

## 2023-07-28 RX ADMIN — PROPOFOL 100 MG: 10 INJECTION, EMULSION INTRAVENOUS at 12:07

## 2023-07-28 RX ADMIN — PROPOFOL 40 MG: 10 INJECTION, EMULSION INTRAVENOUS at 12:07

## 2023-07-28 RX ADMIN — LIDOCAINE HYDROCHLORIDE 100 MG: 20 INJECTION INTRAVENOUS at 12:07

## 2023-07-28 RX ADMIN — Medication 250 MCG/KG/MIN: at 12:07

## 2023-07-28 RX ADMIN — PROPOFOL 30 MG: 10 INJECTION, EMULSION INTRAVENOUS at 12:07

## 2023-07-28 NOTE — PROVATION PATIENT INSTRUCTIONS
Discharge Summary/Instructions after an Endoscopic Procedure  Patient Name: Sari Serna  Patient MRN: [id number]  Patient   YOB: 1985 Friday, July 28, 2023  Sameer Cedeño MD  Dear patient,  As a result of recent federal legislation (The Federal Cures Act), you may   receive lab or pathology results from your procedure in your [facility]   account before your physician is able to contact you. Your physician or   their representative will relay the results to you with their   recommendations at their soonest availability.  Thank you,  RESTRICTIONS:  During your procedure today, you received medications for sedation.  These   medications may affect your judgment, balance and coordination.  Therefore,   for 24 hours, you have the following restrictions:   - DO NOT drive a car, operate machinery, make legal/financial decisions,   sign important papers or drink alcohol.    ACTIVITY:  Today: no heavy lifting, straining or running due to procedural   sedation/anesthesia.  The following day: return to full activity including work.  DIET:  Eat and drink normally unless instructed otherwise.     TREATMENT FOR COMMON SIDE EFFECTS:  - Mild abdominal pain, nausea, belching, bloating or excessive gas:  rest,   eat lightly and use a heating pad.  - Sore Throat: treat with throat lozenges and/or gargle with warm salt   water.  - Because air was used during the procedure, expelling large amounts of air   from your rectum or belching is normal.  - If a bowel prep was taken, you may not have a bowel movement for 1-3 days.    This is normal.  SYMPTOMS TO WATCH FOR AND REPORT TO YOUR PHYSICIAN:  1. Abdominal pain or bloating, other than gas cramps.  2. Chest pain.  3. Back pain.  4. Signs of infection such as: chills or fever occurring within 24 hours   after the procedure.  5. Rectal bleeding, which would show as bright red, maroon, or black stools.   (A tablespoon of blood from the rectum is not serious, especially  if   hemorrhoids are present.)  6. Vomiting.  7. Weakness or dizziness.  GO DIRECTLY TO THE NEAREST EMERGENCY ROOM IF YOU HAVE ANY OF THE FOLLOWING:      Difficulty breathing              Chills and/or fever over 101 F   Persistent vomiting and/or vomiting blood   Severe abdominal pain   Severe chest pain   Black, tarry stools   Bleeding- more than one tablespoon   Any other symptom or condition that you feel may need urgent attention  Your doctor recommends these additional instructions:  If any biopsies were taken, your doctors clinic will contact you in 1 to 2   weeks with any results.  - Discharge patient to home.   - Await pathology results.   - Return to GI clinic at the next available appointment.   - Telephone endoscopist for pathology results in 3 weeks.   - The findings and recommendations were discussed with the patient.  For questions, problems or results please call your physician - Sameer Cedeño MD at Work:  [phone].  [facility] [place], EMERGENCY ROOM PHONE NUMBER: [phone]  IF A COMPLICATION OR EMERGENCY SITUATION ARISES AND YOU ARE UNABLE TO REACH   YOUR PHYSICIAN - GO DIRECTLY TO THE EMERGENCY ROOM.  Sameer Cedeño MD  7/28/2023 1:14:57 PM  This report has been verified and signed electronically.  Dear patient,  As a result of recent federal legislation (The Federal Cures Act), you may   receive lab or pathology results from your procedure in your [facility]   account before your physician is able to contact you. Your physician or   their representative will relay the results to you with their   recommendations at their soonest availability.  Thank you,  PROVATION

## 2023-07-28 NOTE — H&P
Short Stay Endoscopy History and Physical    PCP - Nhi Shearer MD     Procedure - EGD/BRAVO  ASA - per anesthesia  Mallampati - per anesthesia  History of Anesthesia problems - no  Family history Anesthesia problems -  no   Plan of anesthesia - General    HPI:  This is a 38 y.o. female here for evaluation of :     gerd      ROS:  Constitutional: No fevers, chills, No weight loss  CV: No chest pain  Pulm: No cough, No shortness of breath  Ophtho: No vision changes  GI: see HPI  Derm: No rash    Medical History:  has a past medical history of Acne, ALLERGIC RHINITIS, Allergy, Anxiety, Dysmenorrhea, Low back pain, Migraine headache, and Recurrent upper respiratory infection (URI).    Surgical History:  has a past surgical history that includes Facial cosmetic surgery.    Family History: family history includes Allergic rhinitis in her brother and father; Breast cancer in her paternal aunt; Cirrhosis in her mother; Endometriosis in her mother; Stroke in her paternal grandfather.. Otherwise no colon cancer, inflammatory bowel disease, or GI malignancies.    Social History:  reports that she has never smoked. She has never been exposed to tobacco smoke. She has never used smokeless tobacco. She reports current alcohol use. She reports that she does not use drugs.    Review of patient's allergies indicates:  No Known Allergies    Medications:   Medications Prior to Admission   Medication Sig Dispense Refill Last Dose    azelastine 205.5 mcg (0.15 %) Spry    7/27/2023    EScitalopram oxalate (LEXAPRO) 10 MG tablet Take 1 tablet (10 mg total) by mouth once daily. 90 tablet 1 7/27/2023    fluticasone (FLONASE) 50 mcg/actuation nasal spray 2 sprays (100 mcg total) by Each Nare route once daily. 1 Bottle 0 7/27/2023    loratadine (CLARITIN) 10 mg tablet    7/28/2023    norgestimate-ethinyl estradioL (ESTARYLLA) 0.25-35 mg-mcg per tablet Take 1 tablet by mouth once daily. 84 tablet 0 7/28/2023    triamcinolone (NASACORT)  55 mcg nasal inhaler 2 sprays once daily.   7/27/2023    clonazePAM (KLONOPIN) 0.5 MG tablet TAKE 1/2-1 TABLET TWICE A DAY AS NEEDED FOR ANXIETY 30 tablet 3     diclofenac (VOLTAREN) 75 MG EC tablet        diphenhydrAMINE (BENADRYL) 25 mg capsule Take 25 mg by mouth every 6 (six) hours as needed for Itching.       ipratropium (ATROVENT) 42 mcg (0.06 %) nasal spray        ketoconazole (NIZORAL) 2 % shampoo Wash scalp with medicated shampoo at least 2x/week. Let sit on scalp at least 5 minutes prior to rinsing 240 mL 5     urea (CARMOL) 40 % Crea AAA BID 28 g 1        Physical Exam:    Vital Signs:   Vitals:    07/28/23 1210   BP: 136/63   Pulse: (!) 16   Resp: 16   Temp: 98.1 °F (36.7 °C)       General Appearance: Well appearing in no acute distress  Eyes:    No scleral icterus  ENT: Neck supple, Lips, mucosa, and tongue normal; teeth and gums normal  Abdomen: Soft, non tender, non distended with normal bowel sounds. No hepatosplenomegaly, ascites, or mass.  Extremities: No edema  Skin: No rash    Labs:  Lab Results   Component Value Date    WBC 5.81 05/02/2023    HGB 13.7 05/02/2023    HCT 41.0 05/02/2023     05/02/2023    CHOL 245 (H) 01/21/2023    TRIG 63 01/21/2023    HDL 72 01/21/2023    ALT 11 05/02/2023    AST 17 05/02/2023     (L) 05/02/2023    K 3.9 05/02/2023     05/02/2023    CREATININE 0.6 05/02/2023    BUN 11 05/02/2023    CO2 19 (L) 05/02/2023    TSH 0.446 01/21/2023    HGBA1C 5.0 01/26/2019       I have explained the risks and benefits of endoscopy procedures to the patient including but not limited to bleeding, perforation, infection, and death.  The patient was asked if they understand and allowed to ask any further questions to their satisfaction.      Jennifer Alberts MD

## 2023-07-28 NOTE — ANESTHESIA POSTPROCEDURE EVALUATION
Anesthesia Post Evaluation    Patient: Sari Serna    Procedure(s) Performed: Procedure(s) (LRB):  EGD (ESOPHAGOGASTRODUODENOSCOPY) (N/A)  PH MONITORING, ESOPHAGUS, WIRELESS, (OFF REFLUX MEDS) (N/A)    Final Anesthesia Type: general      Patient location during evaluation: PACU  Patient participation: Yes- Able to Participate  Level of consciousness: awake and alert and oriented  Post-procedure vital signs: reviewed and stable  Pain management: adequate  Airway patency: patent    PONV status at discharge: No PONV  Anesthetic complications: no      Cardiovascular status: stable  Respiratory status: unassisted, spontaneous ventilation and room air  Hydration status: euvolemic  Follow-up not needed.          Vitals Value Taken Time   /58 07/28/23 1315   Temp 36.7 °C (98.1 °F) 07/28/23 1315   Pulse 76 07/28/23 1315   Resp 16 07/28/23 1315   SpO2 99 % 07/28/23 1315         No case tracking events are documented in the log.      Pain/Aurea Score: Aurea Score: 10 (7/28/2023  1:15 PM)

## 2023-07-28 NOTE — TRANSFER OF CARE
"Anesthesia Transfer of Care Note    Patient: Sari Serna    Procedure(s) Performed: Procedure(s) (LRB):  EGD (ESOPHAGOGASTRODUODENOSCOPY) (N/A)  PH MONITORING, ESOPHAGUS, WIRELESS, (OFF REFLUX MEDS) (N/A)    Patient location: PACU    Anesthesia Type: general    Transport from OR: Transported from OR on room air with adequate spontaneous ventilation    Post pain: adequate analgesia    Post assessment: no apparent anesthetic complications    Post vital signs: stable    Level of consciousness: awake, alert and oriented    Nausea/Vomiting: no nausea/vomiting    Complications: none    Transfer of care protocol was followed      Last vitals:   Visit Vitals  BP (!) 113/58 (BP Location: Left arm, Patient Position: Lying)   Pulse 76   Temp 36.7 °C (98.1 °F)   Resp 16   Ht 5' 3" (1.6 m)   Wt 59 kg (130 lb)   LMP 07/07/2023   SpO2 99%   Breastfeeding No   BMI 23.03 kg/m²     "

## 2023-07-28 NOTE — ANESTHESIA PREPROCEDURE EVALUATION
07/28/2023  Sari Serna is a 38 y.o., female.    Patient Active Problem List   Diagnosis    Eye refraction disorder - Both Eyes    ADAM (generalized anxiety disorder)    Recurrent major depressive disorder    Tremor    Right wrist pain     Past Medical History:   Diagnosis Date    Acne     ALLERGIC RHINITIS     Allergy     Anxiety     Dysmenorrhea     Low back pain     Migraine headache     Recurrent upper respiratory infection (URI)      Past Surgical History:   Procedure Laterality Date    FACIAL COSMETIC SURGERY      2013           Pre-op Assessment    I have reviewed the Patient Summary Reports.     I have reviewed the Nursing Notes. I have reviewed the NPO Status.   I have reviewed the Medications.     Review of Systems  Anesthesia Hx:  No problems with previous Anesthesia        Physical Exam  General: Well nourished, Cooperative, Alert and Oriented    Airway:  Mallampati: II / II  Mouth Opening: Normal  TM Distance: Normal  Tongue: Normal  Neck ROM: Normal ROM    Dental:  Intact    Chest/Lungs:  Clear to auscultation, Normal Respiratory Rate    Heart:  Rate: Normal  Rhythm: Regular Rhythm        Anesthesia Plan  Type of Anesthesia, risks & benefits discussed:    Anesthesia Type: Gen Natural Airway  Intra-op Monitoring Plan: Standard ASA Monitors  Post Op Pain Control Plan: multimodal analgesia  Induction:  IV  Informed Consent: Informed consent signed with the Patient and all parties understand the risks and agree with anesthesia plan.  All questions answered.   ASA Score: 1  Day of Surgery Review of History & Physical: H&P Update referred to the surgeon/provider.    Ready For Surgery From Anesthesia Perspective.     .

## 2023-07-29 ENCOUNTER — PATIENT MESSAGE (OUTPATIENT)
Dept: OBSTETRICS AND GYNECOLOGY | Facility: CLINIC | Age: 38
End: 2023-07-29
Payer: COMMERCIAL

## 2023-07-29 ENCOUNTER — PATIENT MESSAGE (OUTPATIENT)
Dept: GASTROENTEROLOGY | Facility: CLINIC | Age: 38
End: 2023-07-29
Payer: COMMERCIAL

## 2023-07-31 ENCOUNTER — PATIENT MESSAGE (OUTPATIENT)
Dept: INTERNAL MEDICINE | Facility: CLINIC | Age: 38
End: 2023-07-31
Payer: COMMERCIAL

## 2023-08-01 DIAGNOSIS — Z76.0 ENCOUNTER FOR MEDICATION REFILL: ICD-10-CM

## 2023-08-01 RX ORDER — NORGESTIMATE AND ETHINYL ESTRADIOL 0.25-0.035
1 KIT ORAL
Qty: 84 TABLET | Refills: 3 | Status: SHIPPED | OUTPATIENT
Start: 2023-08-01

## 2023-08-01 NOTE — TELEPHONE ENCOUNTER
Refill Decision Note   Sari Serna  is requesting a refill authorization.  Brief Assessment and Rationale for Refill:  Approve     Medication Therapy Plan:         Comments:     Note composed:8:20 AM 08/01/2023

## 2023-08-02 LAB
FINAL PATHOLOGIC DIAGNOSIS: NORMAL
GROSS: NORMAL
Lab: NORMAL

## 2023-08-02 PROCEDURE — 91035 G-ESOPH REFLX TST W/ELECTROD: CPT | Mod: 26,,, | Performed by: INTERNAL MEDICINE

## 2023-08-02 PROCEDURE — 91035 PR GERD TST W/ MUCOS PH ELECTROD: ICD-10-PCS | Mod: 26,,, | Performed by: INTERNAL MEDICINE

## 2023-08-08 ENCOUNTER — DOCUMENTATION ONLY (OUTPATIENT)
Dept: GASTROENTEROLOGY | Facility: CLINIC | Age: 38
End: 2023-08-08
Payer: COMMERCIAL

## 2023-08-08 ENCOUNTER — OFFICE VISIT (OUTPATIENT)
Dept: URGENT CARE | Facility: CLINIC | Age: 38
End: 2023-08-08
Payer: COMMERCIAL

## 2023-08-08 VITALS
BODY MASS INDEX: 23.04 KG/M2 | WEIGHT: 130 LBS | HEIGHT: 63 IN | SYSTOLIC BLOOD PRESSURE: 115 MMHG | DIASTOLIC BLOOD PRESSURE: 75 MMHG | RESPIRATION RATE: 16 BRPM | TEMPERATURE: 98 F | HEART RATE: 73 BPM | OXYGEN SATURATION: 98 %

## 2023-08-08 DIAGNOSIS — S90.31XD CONTUSION OF RIGHT FOOT, SUBSEQUENT ENCOUNTER: Primary | ICD-10-CM

## 2023-08-08 DIAGNOSIS — Z02.6 ENCOUNTER RELATED TO WORKER'S COMPENSATION CLAIM: ICD-10-CM

## 2023-08-08 PROCEDURE — 99213 OFFICE O/P EST LOW 20 MIN: CPT | Mod: S$GLB,,, | Performed by: STUDENT IN AN ORGANIZED HEALTH CARE EDUCATION/TRAINING PROGRAM

## 2023-08-08 PROCEDURE — 99213 PR OFFICE/OUTPT VISIT, EST, LEVL III, 20-29 MIN: ICD-10-PCS | Mod: S$GLB,,, | Performed by: STUDENT IN AN ORGANIZED HEALTH CARE EDUCATION/TRAINING PROGRAM

## 2023-08-08 NOTE — PROGRESS NOTES
Sari your esophageal Bravo pH probe study was read as normal no increase esophageal acid exposure in no symptom correlation with your cough.  Recommend you consider following back up with your primary care doctor for your cough and ENT if needed

## 2023-08-08 NOTE — PROVATION PATIENT INSTRUCTIONS
Discharge Summary/Instructions after an Endoscopic Procedure  Patient Name: Sari Serna  Patient MRN: [id number]  Patient   YOB: 1985 Friday, July 28, 2023  Sameer Cedeño MD  Dear patient,  As a result of recent federal legislation (The Federal Cures Act), you may   receive lab or pathology results from your procedure in your [facility]   account before your physician is able to contact you. Your physician or   their representative will relay the results to you with their   recommendations at their soonest availability.  Thank you,  RESTRICTIONS:  During your procedure today, you received medications for sedation.  These   medications may affect your judgment, balance and coordination.  Therefore,   for 24 hours, you have the following restrictions:   - DO NOT drive a car, operate machinery, make legal/financial decisions,   sign important papers or drink alcohol.    ACTIVITY:  Today: no heavy lifting, straining or running due to procedural   sedation/anesthesia.  The following day: return to full activity including work.  DIET:  Eat and drink normally unless instructed otherwise.     TREATMENT FOR COMMON SIDE EFFECTS:  - Mild abdominal pain, nausea, belching, bloating or excessive gas:  rest,   eat lightly and use a heating pad.  - Sore Throat: treat with throat lozenges and/or gargle with warm salt   water.  - Because air was used during the procedure, expelling large amounts of air   from your rectum or belching is normal.  - If a bowel prep was taken, you may not have a bowel movement for 1-3 days.    This is normal.  SYMPTOMS TO WATCH FOR AND REPORT TO YOUR PHYSICIAN:  1. Abdominal pain or bloating, other than gas cramps.  2. Chest pain.  3. Back pain.  4. Signs of infection such as: chills or fever occurring within 24 hours   after the procedure.  5. Rectal bleeding, which would show as bright red, maroon, or black stools.   (A tablespoon of blood from the rectum is not serious, especially  if   hemorrhoids are present.)  6. Vomiting.  7. Weakness or dizziness.  GO DIRECTLY TO THE NEAREST EMERGENCY ROOM IF YOU HAVE ANY OF THE FOLLOWING:      Difficulty breathing              Chills and/or fever over 101 F   Persistent vomiting and/or vomiting blood   Severe abdominal pain   Severe chest pain   Black, tarry stools   Bleeding- more than one tablespoon   Any other symptom or condition that you feel may need urgent attention  Your doctor recommends these additional instructions:  If any biopsies were taken, your doctors clinic will contact you in 1 to 2   weeks with any results.  - Discharge patient to home.   - Return to referring physician.   - The findings and recommendations were discussed with the patient.  For questions, problems or results please call your physician - Sameer Cedeño MD at Work:  [phone].  [facility], EMERGENCY ROOM PHONE NUMBER: [phone]  IF A COMPLICATION OR EMERGENCY SITUATION ARISES AND YOU ARE UNABLE TO REACH   YOUR PHYSICIAN - GO DIRECTLY TO THE EMERGENCY ROOM.  Sameer Cedeño MD  8/8/2023 5:30:15 PM  This report has been verified and signed electronically.  Dear patient,  As a result of recent federal legislation (The Federal Cures Act), you may   receive lab or pathology results from your procedure in your [facility]   account before your physician is able to contact you. Your physician or   their representative will relay the results to you with their   recommendations at their soonest availability.  Thank you,  PROVATION

## 2023-08-08 NOTE — PROGRESS NOTES
Subjective:      Patient ID: Sari Serna is a 38 y.o. female.    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 Kofi MUSE note:  She has been participating fully with PT and is on her third session. Her pain has now resolved. She would like to complete PT to be sure her foot has fully healed. She has started back at school for the past week. The increased standing and walking did not cause her symptoms to flare. No new complaints or concerns at this time.     Foot Injury   Pertinent negatives include no numbness.       Constitution: Negative.   HENT: Negative.     Neck: neck negative.   Cardiovascular: Negative.    Eyes: Negative.    Respiratory: Negative.     Gastrointestinal: Negative.    Endocrine: negative.   Genitourinary: Negative.    Musculoskeletal:  Negative for pain, trauma, joint pain, joint swelling, abnormal ROM of joint, muscle cramps and muscle ache.   Skin: Negative.  Negative for erythema and bruising.   Neurological:  Negative for numbness and tingling.     Objective:     Physical Exam  Vitals and nursing note reviewed.   Constitutional:       General: She is not in acute distress.     Appearance: She is not ill-appearing.   HENT:      Head: Normocephalic.   Eyes:      Conjunctiva/sclera: Conjunctivae normal.   Pulmonary:      Effort: No respiratory distress.   Musculoskeletal:      Right ankle: Normal range of motion.      Right foot: Normal range of motion and normal capillary refill. No swelling, deformity, tenderness or bony tenderness. Normal pulse.   Skin:     General: Skin is warm and dry.      Findings: No erythema.   Neurological:      Mental Status: She is alert and oriented to person, place, and time.       Coordination: Coordination normal.      Gait: Gait normal.   Psychiatric:         Attention and Perception: Attention normal.         Mood and Affect: Mood normal.         Behavior: Behavior normal.        Assessment:      1. Contusion of right foot, subsequent encounter    2. Encounter related to worker's compensation claim      Plan:     Complete PT and RTC to post therapy evaluation. Ok to return sooner if needed for acutely worsening symptoms. Also ok to adjust appointment based on therapy completion. I am unable to see and patient unaware of how many approved sessions she has but she is going once per week.         Patient Instructions: Continue Physical Therapy   Restrictions: Regular Duty  Follow up in about 6 weeks (around 9/19/2023).

## 2023-08-08 NOTE — LETTER
[facility]  [address] [address]  Phone: [phone]  Fax: [phone]  [facility]: [id number]    Pt Name: Sari Serna  Injury Date: 05/16/2023   Employee ID: [id number] Date of Treatment: 08/08/2023   Company: [id number]      Appointment Time: 03:00 PM Arrived: 2:56 AM    Provider: Cici Velasquez MD Time Out:3:     Office Treatment:   1. Contusion of right foot, subsequent encounter    2. Encounter related to worker's compensation claim          Patient Instructions: Continue Physical Therapy      Restrictions: Regular Duty     Return Appointment: 9/19/2023 at 3:00 PM EMY

## 2023-08-27 NOTE — PROGRESS NOTES
Sari your EGD pathology was benign no evidence of H pylori no dysplasia     Stomach, biopsy:   - Antral and oxyntic mucosa with no significant histologic abnormality.     - Immunostain for H.pylori is negative, see comment.       COMMENT: Appropriate positive controls are examined.   Comment: Interp By Carmen Ivey MD, Signed on 08/02/2023

## 2023-09-23 ENCOUNTER — PATIENT MESSAGE (OUTPATIENT)
Dept: INTERNAL MEDICINE | Facility: CLINIC | Age: 38
End: 2023-09-23
Payer: COMMERCIAL

## 2023-09-25 ENCOUNTER — PATIENT MESSAGE (OUTPATIENT)
Dept: INTERNAL MEDICINE | Facility: CLINIC | Age: 38
End: 2023-09-25
Payer: COMMERCIAL

## 2023-09-25 ENCOUNTER — TELEPHONE (OUTPATIENT)
Dept: FAMILY MEDICINE | Facility: CLINIC | Age: 38
End: 2023-09-25
Payer: COMMERCIAL

## 2023-09-25 NOTE — TELEPHONE ENCOUNTER
I returned patients call. She stated that she has been having a cough since the end of July and wanted to see if she could see Dr. Shearer today. I told her the next appointment would be Monday Oct. 2 and she stated that she would go to urgent care. I told her if someone cancels I would give her a call.

## 2023-09-26 ENCOUNTER — PATIENT MESSAGE (OUTPATIENT)
Dept: INTERNAL MEDICINE | Facility: CLINIC | Age: 38
End: 2023-09-26
Payer: COMMERCIAL

## 2023-09-28 ENCOUNTER — TELEPHONE (OUTPATIENT)
Dept: URGENT CARE | Facility: CLINIC | Age: 38
End: 2023-09-28
Payer: COMMERCIAL

## 2023-09-28 NOTE — TELEPHONE ENCOUNTER
Patient called in reference to her missed [facility] Appointment and I have rescheduled her new [facility]  Appt, date & time. AFG

## 2023-09-29 ENCOUNTER — OFFICE VISIT (OUTPATIENT)
Dept: INTERNAL MEDICINE | Facility: CLINIC | Age: 38
End: 2023-09-29
Payer: COMMERCIAL

## 2023-09-29 VITALS
OXYGEN SATURATION: 98 % | HEART RATE: 102 BPM | WEIGHT: 134.94 LBS | SYSTOLIC BLOOD PRESSURE: 126 MMHG | BODY MASS INDEX: 23.91 KG/M2 | DIASTOLIC BLOOD PRESSURE: 78 MMHG | HEIGHT: 63 IN

## 2023-09-29 DIAGNOSIS — R05.3 CHRONIC COUGH: Primary | ICD-10-CM

## 2023-09-29 LAB
CTP QC/QA: YES
CTP QC/QA: YES
POC MOLECULAR INFLUENZA A AGN: NEGATIVE
POC MOLECULAR INFLUENZA B AGN: NEGATIVE
SARS-COV-2 RDRP RESP QL NAA+PROBE: NEGATIVE

## 2023-09-29 PROCEDURE — 1160F PR REVIEW ALL MEDS BY PRESCRIBER/CLIN PHARMACIST DOCUMENTED: ICD-10-PCS | Mod: CPTII,S$GLB,, | Performed by: INTERNAL MEDICINE

## 2023-09-29 PROCEDURE — 87502 INFLUENZA DNA AMP PROBE: CPT | Mod: QW,S$GLB,, | Performed by: INTERNAL MEDICINE

## 2023-09-29 PROCEDURE — 1159F PR MEDICATION LIST DOCUMENTED IN MEDICAL RECORD: ICD-10-PCS | Mod: CPTII,S$GLB,, | Performed by: INTERNAL MEDICINE

## 2023-09-29 PROCEDURE — 3078F PR MOST RECENT DIASTOLIC BLOOD PRESSURE < 80 MM HG: ICD-10-PCS | Mod: CPTII,S$GLB,, | Performed by: INTERNAL MEDICINE

## 2023-09-29 PROCEDURE — 3074F SYST BP LT 130 MM HG: CPT | Mod: CPTII,S$GLB,, | Performed by: INTERNAL MEDICINE

## 2023-09-29 PROCEDURE — 99214 OFFICE O/P EST MOD 30 MIN: CPT | Mod: S$GLB,,, | Performed by: INTERNAL MEDICINE

## 2023-09-29 PROCEDURE — 87502 POCT INFLUENZA A/B MOLECULAR: ICD-10-PCS | Mod: QW,S$GLB,, | Performed by: INTERNAL MEDICINE

## 2023-09-29 PROCEDURE — 99214 PR OFFICE/OUTPT VISIT, EST, LEVL IV, 30-39 MIN: ICD-10-PCS | Mod: S$GLB,,, | Performed by: INTERNAL MEDICINE

## 2023-09-29 PROCEDURE — 3008F BODY MASS INDEX DOCD: CPT | Mod: CPTII,S$GLB,, | Performed by: INTERNAL MEDICINE

## 2023-09-29 PROCEDURE — 87635: ICD-10-PCS | Mod: QW,S$GLB,, | Performed by: INTERNAL MEDICINE

## 2023-09-29 PROCEDURE — 1160F RVW MEDS BY RX/DR IN RCRD: CPT | Mod: CPTII,S$GLB,, | Performed by: INTERNAL MEDICINE

## 2023-09-29 PROCEDURE — 3008F PR BODY MASS INDEX (BMI) DOCUMENTED: ICD-10-PCS | Mod: CPTII,S$GLB,, | Performed by: INTERNAL MEDICINE

## 2023-09-29 PROCEDURE — 3078F DIAST BP <80 MM HG: CPT | Mod: CPTII,S$GLB,, | Performed by: INTERNAL MEDICINE

## 2023-09-29 PROCEDURE — 99999 PR PBB SHADOW E&M-EST. PATIENT-LVL IV: ICD-10-PCS | Mod: PBBFAC,,, | Performed by: INTERNAL MEDICINE

## 2023-09-29 PROCEDURE — 3074F PR MOST RECENT SYSTOLIC BLOOD PRESSURE < 130 MM HG: ICD-10-PCS | Mod: CPTII,S$GLB,, | Performed by: INTERNAL MEDICINE

## 2023-09-29 PROCEDURE — 99999 PR PBB SHADOW E&M-EST. PATIENT-LVL IV: CPT | Mod: PBBFAC,,, | Performed by: INTERNAL MEDICINE

## 2023-09-29 PROCEDURE — 87635 SARS-COV-2 COVID-19 AMP PRB: CPT | Mod: QW,S$GLB,, | Performed by: INTERNAL MEDICINE

## 2023-09-29 PROCEDURE — 1159F MED LIST DOCD IN RCRD: CPT | Mod: CPTII,S$GLB,, | Performed by: INTERNAL MEDICINE

## 2023-09-29 RX ORDER — ALBUTEROL SULFATE 0.83 MG/ML
2.5 SOLUTION RESPIRATORY (INHALATION) EVERY 6 HOURS PRN
Qty: 90 ML | Refills: 1 | Status: SHIPPED | OUTPATIENT
Start: 2023-09-29 | End: 2024-09-28

## 2023-09-29 NOTE — LETTER
September 29, 2023      [facility]  [address] [address]  Phone: [phone]  Fax: [phone]       Patient: Sari Serna   YOB: 1985  Date of Visit: 09/29/2023    To Whom It May Concern:    Citlali Serna  was at [facility] on 09/29/2023. The patient may return to work on 10/02/2023 with no restrictions. If you have any questions or concerns, or if I can be of further assistance, please do not hesitate to contact me.    Sincerely,    Darline Merrill MA

## 2023-09-29 NOTE — PROGRESS NOTES
Subjective     Patient ID: Sari Serna is a 38 y.o. female.    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  The school shows evidence of significant dust dirt and concerns about possible previous water damage.  Patient is concerned about mold exposure.  She is seen urgent care prescribed antibiotics and steroids then saw an ENT prescribed additional steroids then went back to urgent care got another course of antibiotics and now has scheduled appointment with her ENT next week.  She feels like she can not get well.  Coughing is the most bothersome.  Patient reports previous chest x-ray negative she is scheduled to see her ENT and get a sinus CT.  She also has plans to see a homeopathic physician  Review of Systems  Otherwise negative     Objective     Physical Exam  General: Well-appearing, well-nourished.  No distress  HEENT: conjunctivae are normal.  Pupils are equal and reative to light.  TM's are clear and intact bilaterally.  Hearing is grossly normal.  Nasopharynx is erythematous  Oropharynx is clear.  Neck: Supple.  No thyroid megaly.  No bruits.  Lymph: No cervical or supraclavicular adenopathy.  Heart: Regular rate and rhythm, without murmur, rub or gallop.  Lungs: Clear to auscultation; respiratory effort normal.  No wheezing  Abdomen: Soft, nontender, nondistended.  Normoactive bowel sounds.  No hepatomegaly.  No masses.  Extremities: Good distal pulses.  No edema.  Psych: Oriented to time person place.  Judgment and insight seem unimpaired.  Mood and affect are appropriate.     Assessment and Plan     1. Chronic cough  -     NEBULIZER FOR HOME USE  -     albuterol (PROVENTIL) 2.5 mg /3 mL (0.083 %) nebulizer solution; Take 3 mLs (2.5 mg total) by nebulization every 6 (six) hours  as needed for Wheezing. 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  She will let me know after her ENT visit status.    Sari Strong was seen today for cough and headache.    Diagnoses and all orders for this visit:    Chronic cough  -     NEBULIZER FOR HOME USE  -     albuterol (PROVENTIL) 2.5 mg /3 mL (0.083 %) nebulizer solution; Take 3 mLs (2.5 mg total) by nebulization every 6 (six) hours as needed for Wheezing. Rescue  -     POCT COVID-19 Rapid Screening  -     POCT Influenza A/B Molecular              No follow-ups on file.

## 2023-10-02 ENCOUNTER — OFFICE VISIT (OUTPATIENT)
Dept: URGENT CARE | Facility: CLINIC | Age: 38
End: 2023-10-02
Payer: COMMERCIAL

## 2023-10-02 VITALS
SYSTOLIC BLOOD PRESSURE: 123 MMHG | HEIGHT: 63 IN | TEMPERATURE: 99 F | HEART RATE: 88 BPM | BODY MASS INDEX: 23.74 KG/M2 | WEIGHT: 134 LBS | OXYGEN SATURATION: 97 % | DIASTOLIC BLOOD PRESSURE: 68 MMHG

## 2023-10-02 DIAGNOSIS — S90.31XD CONTUSION OF RIGHT FOOT, SUBSEQUENT ENCOUNTER: Primary | ICD-10-CM

## 2023-10-02 PROCEDURE — 99212 PR OFFICE/OUTPT VISIT, EST, LEVL II, 10-19 MIN: ICD-10-PCS | Mod: S$GLB,,, | Performed by: SURGERY

## 2023-10-02 PROCEDURE — 99212 OFFICE O/P EST SF 10 MIN: CPT | Mod: S$GLB,,, | Performed by: SURGERY

## 2023-10-02 NOTE — PROGRESS NOTES
Subjective:      Patient ID: Sari Serna is a 38 y.o. female.    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       Constitution: Negative.   HENT: Negative.     Neck: neck negative.   Cardiovascular: Negative.    Eyes: Negative.    Respiratory: Negative.     Gastrointestinal: Negative.    Endocrine: negative.   Genitourinary: Negative.    Musculoskeletal:  Negative for pain, trauma, joint pain, joint swelling, abnormal ROM of joint, muscle cramps and muscle ache.   Skin: Negative.  Negative for erythema and bruising.   Neurological:  Negative for numbness and tingling.       See MA note. Begin MD note:  Sari presents for post-therapy evaluation of right foot injury. No pain at baseline, only experiences pain with use of metal tool in PT. Has returned to regular recreational exercise activities.     Objective:     Physical Exam  Vitals and nursing note reviewed.   HENT:      Head: Normocephalic.   Eyes:      Conjunctiva/sclera: Conjunctivae normal.   Cardiovascular:      Pulses:           Dorsalis pedis pulses are 2+ on the right side.   Pulmonary:      Effort: Pulmonary effort is normal.   Musculoskeletal:         General: Normal range of motion.      Right foot: Normal.      Left foot: Normal.      Comments: FAROM of right foot without pain. NTTP. DP 2+. Normal gait.    Feet:      Right foot:      Skin integrity: Skin integrity normal.   Skin:     Findings: No erythema.   Neurological:      Mental Status: She is alert.      Sensory: Sensation is intact.      Motor: Motor function is intact.      Coordination: Coordination is intact.      Gait: Gait is intact.   Psychiatric:         Attention  and Perception: Attention normal.         Mood and Affect: Mood normal.         Speech: Speech normal.         Behavior: Behavior normal. Behavior is cooperative.        Assessment:      1. Contusion of right foot, subsequent encounter      Plan:     Sari has full resolution of her right foot injury symptoms. Regular Duty. Discharge from [facility].     Diagnoses and plan discussed with the patient, as well as the expected course and duration of symptoms. All questions and concerns were addressed prior to discharge.  Clinic/Emergency department return precautions were given.  Patient verbalized understanding of and agreement with the plan of care.              Restrictions: Regular Duty, Discharged from Occupational Health  Follow up if symptoms worsen or fail to improve.

## 2023-10-02 NOTE — LETTER
[facility]  [address] [address]  Phone: [phone]  Fax: [phone]  [facility]: [id number]    Pt Name: Sari Serna  Injury Date: 05/16/2023   Employee ID: [id number] Date of Treatment: 10/02/2023   Company: [employer]      Appointment Time: 03:45 PM Arrived: 3:37 PM    Provider: Saroj Doss MD Time Out:4:19 PM      Office Treatment:   1. Contusion of right foot, subsequent encounter               Restrictions: Regular Duty, Discharged from Occupational Health     Return As needed. EMY

## 2023-10-31 ENCOUNTER — PATIENT MESSAGE (OUTPATIENT)
Dept: INTERNAL MEDICINE | Facility: CLINIC | Age: 38
End: 2023-10-31
Payer: COMMERCIAL

## 2023-11-07 ENCOUNTER — OFFICE VISIT (OUTPATIENT)
Dept: ALLERGY | Facility: CLINIC | Age: 38
End: 2023-11-07
Payer: COMMERCIAL

## 2023-11-07 VITALS
WEIGHT: 136.25 LBS | OXYGEN SATURATION: 97 % | SYSTOLIC BLOOD PRESSURE: 124 MMHG | BODY MASS INDEX: 24.13 KG/M2 | HEART RATE: 84 BPM | DIASTOLIC BLOOD PRESSURE: 77 MMHG

## 2023-11-07 DIAGNOSIS — R68.89 THROAT SYMPTOM: ICD-10-CM

## 2023-11-07 DIAGNOSIS — J30.89 ALLERGIC RHINITIS DUE TO HOUSE DUST MITE: ICD-10-CM

## 2023-11-07 DIAGNOSIS — R05.9 COUGH IN ADULT PATIENT: Primary | ICD-10-CM

## 2023-11-07 DIAGNOSIS — J32.9 RECURRENT SINUSITIS: ICD-10-CM

## 2023-11-07 DIAGNOSIS — J30.9 CHRONIC ALLERGIC RHINITIS: ICD-10-CM

## 2023-11-07 PROCEDURE — 3008F BODY MASS INDEX DOCD: CPT | Mod: CPTII,S$GLB,, | Performed by: STUDENT IN AN ORGANIZED HEALTH CARE EDUCATION/TRAINING PROGRAM

## 2023-11-07 PROCEDURE — 3078F PR MOST RECENT DIASTOLIC BLOOD PRESSURE < 80 MM HG: ICD-10-PCS | Mod: CPTII,S$GLB,, | Performed by: STUDENT IN AN ORGANIZED HEALTH CARE EDUCATION/TRAINING PROGRAM

## 2023-11-07 PROCEDURE — 3074F SYST BP LT 130 MM HG: CPT | Mod: CPTII,S$GLB,, | Performed by: STUDENT IN AN ORGANIZED HEALTH CARE EDUCATION/TRAINING PROGRAM

## 2023-11-07 PROCEDURE — 3008F PR BODY MASS INDEX (BMI) DOCUMENTED: ICD-10-PCS | Mod: CPTII,S$GLB,, | Performed by: STUDENT IN AN ORGANIZED HEALTH CARE EDUCATION/TRAINING PROGRAM

## 2023-11-07 PROCEDURE — 1160F PR REVIEW ALL MEDS BY PRESCRIBER/CLIN PHARMACIST DOCUMENTED: ICD-10-PCS | Mod: CPTII,S$GLB,, | Performed by: STUDENT IN AN ORGANIZED HEALTH CARE EDUCATION/TRAINING PROGRAM

## 2023-11-07 PROCEDURE — 99205 PR OFFICE/OUTPT VISIT, NEW, LEVL V, 60-74 MIN: ICD-10-PCS | Mod: S$GLB,,, | Performed by: STUDENT IN AN ORGANIZED HEALTH CARE EDUCATION/TRAINING PROGRAM

## 2023-11-07 PROCEDURE — 99999 PR PBB SHADOW E&M-EST. PATIENT-LVL IV: CPT | Mod: PBBFAC,,, | Performed by: STUDENT IN AN ORGANIZED HEALTH CARE EDUCATION/TRAINING PROGRAM

## 2023-11-07 PROCEDURE — 1159F PR MEDICATION LIST DOCUMENTED IN MEDICAL RECORD: ICD-10-PCS | Mod: CPTII,S$GLB,, | Performed by: STUDENT IN AN ORGANIZED HEALTH CARE EDUCATION/TRAINING PROGRAM

## 2023-11-07 PROCEDURE — 1160F RVW MEDS BY RX/DR IN RCRD: CPT | Mod: CPTII,S$GLB,, | Performed by: STUDENT IN AN ORGANIZED HEALTH CARE EDUCATION/TRAINING PROGRAM

## 2023-11-07 PROCEDURE — 99205 OFFICE O/P NEW HI 60 MIN: CPT | Mod: S$GLB,,, | Performed by: STUDENT IN AN ORGANIZED HEALTH CARE EDUCATION/TRAINING PROGRAM

## 2023-11-07 PROCEDURE — 3074F PR MOST RECENT SYSTOLIC BLOOD PRESSURE < 130 MM HG: ICD-10-PCS | Mod: CPTII,S$GLB,, | Performed by: STUDENT IN AN ORGANIZED HEALTH CARE EDUCATION/TRAINING PROGRAM

## 2023-11-07 PROCEDURE — 3078F DIAST BP <80 MM HG: CPT | Mod: CPTII,S$GLB,, | Performed by: STUDENT IN AN ORGANIZED HEALTH CARE EDUCATION/TRAINING PROGRAM

## 2023-11-07 PROCEDURE — 1159F MED LIST DOCD IN RCRD: CPT | Mod: CPTII,S$GLB,, | Performed by: STUDENT IN AN ORGANIZED HEALTH CARE EDUCATION/TRAINING PROGRAM

## 2023-11-07 PROCEDURE — 99999 PR PBB SHADOW E&M-EST. PATIENT-LVL IV: ICD-10-PCS | Mod: PBBFAC,,, | Performed by: STUDENT IN AN ORGANIZED HEALTH CARE EDUCATION/TRAINING PROGRAM

## 2023-11-07 RX ORDER — AMOXICILLIN AND CLAVULANATE POTASSIUM 875; 125 MG/1; MG/1
1 TABLET, FILM COATED ORAL 2 TIMES DAILY
COMMUNITY
Start: 2023-09-25

## 2023-11-07 RX ORDER — BUDESONIDE, GLYCOPYRROLATE, AND FORMOTEROL FUMARATE 160; 9; 4.8 UG/1; UG/1; UG/1
AEROSOL, METERED RESPIRATORY (INHALATION)
COMMUNITY
Start: 2023-08-22

## 2023-11-07 RX ORDER — MONTELUKAST SODIUM 10 MG/1
TABLET ORAL
COMMUNITY
Start: 2023-10-31

## 2023-11-07 NOTE — PATIENT INSTRUCTIONS
Testing  None now        Treatment    Flonase (= fluticasone) nasal spray:  2 squirts each nostril once a day   Remember to aim out toward your ear.   Needs to be used regularly 5-14 days for full effect.    At first sign of scratchy throat....  Neti Pot at least once a day  Nasal spray mix from Dr Aguirre  Continue both until you are well, then return to just Flonase    Return January or sooner if needed (send me a portal message)    Recommend these dust avoidance measures:  Dust proof cover on your pillow  No decorative pillows or stuffed animals on the bed at night.  No feathers or down on the bed  Wash bedding in hot water    In my opinion, the best place to obtain dust proof covers is on-line at [employer]       Optional:  2 week trial of Singulair.

## 2023-11-07 NOTE — PROGRESS NOTES
Allergy Clinic Note  [facility]    This note was created by combination of typed  and M-Modal dictation. Transcription errors may be present.  If there are any questions, please contact me.    Subjective:      Patient ID: Sari Serna is a 38 y.o. female.    Chief Complaint: Allergies, Sinusitis, and Sinus Problem (First consultation for Sinus Issues, presenting as sneezing, congestion, watery eyes, itchy throat and itchy Ears, and coughing. )      Referring Provider:      History of Present Illness: Sari Serna is a 38 y.o. female    Related medications and other interventions  Albuterol   Mix of Flonase, Atrovent and Astelin  Singulair    11/07/2023:  At initial visit, client reported a long history of chronic allergic rhinitis due to dust mite and a history of frequent upper respiratory tract infections with severe cough.  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  She is had 4 episodes since she went back to work at the end of July, compared to 1-2 episodes yearly in the past.  She says symptoms usually start with a scratchy throat and itchy ears and eyes.  After 1-2 days she develops congestion and sneezing followed by cough productive of clear to yellow sputum and yellow discharge from her nose.  She sometimes runs low-grade fever always less than 100.  She is reports exhaustion but attributes this to decreased sleep from her coughing.  She says they albuterol provides partial relief of the coughing after about 5 minutes.  She says episodes typically last 10-14 days and then she has a cough free interval of another 10-14 days.  She has nasal congestion on a near daily basis.  She has no history of wheezing, shortness of breath, or chest tightness.  Other rhinitis symptoms include runny nose, postnasal drip sensation sneezing, itchy eyes, and throat clearing.  In previous years she is had episodes  in November and March.  She is not currently performing any dust avoidance measures.       MEDICAL HISTORY      Significant past medical history:  Anxiety  Active problem list reviewed  ENT surgery:  None but facial surgery    Significant family history:  Exposures: dog, children.  No smoke.  No mold.  SH:  works as 4 grade special   Smoking Hx:  Client  reports that she has never smoked. She has never been exposed to tobacco smoke. She has never used smokeless tobacco.    Meds: MAR reviewed    Asthma: ? Cough partially improved with albuterol  Eczema: No  Rhinitis: Yes  Drug allergy/intolerance:  NKDA  Venom allergy:  No  Latex allergy:  No    Patient Active Problem List   Diagnosis    Eye refraction disorder - Both Eyes    ADAM (generalized anxiety disorder)    Recurrent major depressive disorder    Tremor    Right wrist pain     Medication List with Changes/Refills   Current Medications    ALBUTEROL (PROVENTIL) 2.5 MG /3 ML (0.083 %) NEBULIZER SOLUTION    Take 3 mLs (2.5 mg total) by nebulization every 6 (six) hours as needed for Wheezing. Rescue       Start Date: 2023 End Date: 2024    AMOXICILLIN-CLAVULANATE 875-125MG (AUGMENTIN) 875-125 MG PER TABLET    Take 1 tablet by mouth 2 (two) times daily.       Start Date: 2023 End Date: --    AZELASTINE 205.5 MCG (0.15 %) SPRY           Start Date: 2022 End Date: --    BREZTRI AEROSPHERE 160-9-4.8 MCG/ACTUATION HFAA    SMARTSI-2 Puff(s) By Mouth 1-2 Times Daily       Start Date: 2023 End Date: --    CLONAZEPAM (KLONOPIN) 0.5 MG TABLET    TAKE 1/2-1 TABLET TWICE A DAY AS NEEDED FOR ANXIETY       Start Date: 2023 End Date: --    ESCITALOPRAM OXALATE (LEXAPRO) 10 MG TABLET    Take 1 tablet (10 mg total) by mouth once daily.       Start Date: 3/20/2023 End Date: 3/19/2024    ESTARYLLA 0.25-35 MG-MCG PER TABLET    TAKE 1 TABLET BY MOUTH EVERY DAY       Start Date: 2023  End Date: --    FLUTICASONE (FLONASE) 50 MCG/ACTUATION  NASAL SPRAY    2 sprays (100 mcg total) by Each Nare route once daily.       Start Date: 10/8/2018 End Date: --    IPRATROPIUM (ATROVENT) 42 MCG (0.06 %) NASAL SPRAY           Start Date: 6/22/2022 End Date: --    KETOCONAZOLE (NIZORAL) 2 % SHAMPOO    Wash scalp with medicated shampoo at least 2x/week. Let sit on scalp at least 5 minutes prior to rinsing       Start Date: 12/10/2020End Date: --    LORATADINE (CLARITIN) 10 MG TABLET           Start Date: --        End Date: --    MONTELUKAST (SINGULAIR) 10 MG TABLET           Start Date: 10/31/2023End Date: --    TRIAMCINOLONE (NASACORT) 55 MCG NASAL INHALER    2 sprays once daily.       Start Date: 6/22/2022 End Date: --    UREA (CARMOL) 40 % CREA    AAA BID       Start Date: 9/14/2020 End Date: --         REVIEW OF SYSTEMS      CONST: no F/C/NS, no unintentional weight changes  NEURO:  no tremor, no weakness  EYES: no discharge, no erythema  EARS: no hearing loss, no sensation of fullness  PULM:  no SOB, no wheezing, no cough  CV: no CP, no palpitations  DERM: no rashes, no skin breaks    PHYSICAL EXAM     /77 (BP Location: Left arm, Patient Position: Sitting, BP Method: Medium (Automatic))   Pulse 84   Wt 61.8 kg (136 lb 3.9 oz)   SpO2 97%   BMI 24.13 kg/m²   GEN: Awake and alert, no distress  DERM: No flushing, No rashes  EYE:  No occular discharge  HEENT: No nasal discharge, no hoarseness, TMs are clear bilaterally.  Nares are pink with significant turbinate swelling.  Oropharynx is benign without exudate.  Tongue is not coated.  NECK: No LAD  PULM: Normal work of breathing, no cough, CTA  COR:  RRR, normal pulses  NEURO:  No focal deficit, speech fluent and logical  PSYCH: appropriate affect, normal behavior    MEDICAL DECISION MAKING     Data reviewed (new entries in bold-face)      Allergy Testing      Inhalent Immunocaps positive for Dust mite (class IV, III), 2019      Lab results      EO count 100, 2023      Imaging and other diagnostics             Medical records review   Reviewed allergy evaluation by my [facility] colleague Dr. Jefferson has it from 2019.  Immunocap testing was positive for dust mite only.    MEDICAL DECISION-MAKING     Diagnoses:     Sari Serna is a 38 y.o. female. with  1. Cough in adult patient    2. Recurrent sinusitis ? allergic ? viral    3. Chronic allergic rhinitis    4. Allergic rhinitis due to house dust mite    5. Throat symptom          Plan:   Patient is presenting with cycles of severe coughing lasting 1-2 weeks.  In his not yet clear whether this represents an upper respiratory, lower respiratory, or GI process.  She appears to have a prodrome of rhino conjunctivitis symptoms.  At this point it is not clear if she has allergic sinusitis, viral sinusitis, upper respiratory tract infections.  Need to consider that LPR may be playing a role in both cough and throat symptoms (postnasal drip sensation, throat clearing).  We will need to follow through the next few episodes.  If LPR ruled out, long-term treatment possibilities include sublingual immunotherapy and turbinatectomy      Cough ? Etiology -- associated with both sinusitis and throat symptoms  Watch closely for LPR  Low threshold for ENT endoscopy    Recurrent sinusitis ? allergic ? Viral  Begin Flonase 2 squirts daily  Continue nasal spray mix at first sign of episode  Also begin Neti pot at first sign of episode    Chronic allergic rhinitis due to dust mites  Dust avoidance measures discussed  Flonase as above      Comorbidities  Anxiety --avoid oral decongestants    PATIENT INSTRUCTIONS AND FOLLOW-UP     Patient Instructions   Testing  None now        Treatment    Flonase (= fluticasone) nasal spray:  2 squirts each nostril once a day   Remember to aim out toward your ear.   Needs to be used regularly 5-14 days for full effect.    At first sign of scratchy throat....  Neti Pot at least once a day  Nasal spray mix from Dr Aguirre  Continue both until  you are well, then return to just Flonase    Return January or sooner if needed (send me a portal message)    Recommend these dust avoidance measures:  Dust proof cover on your pillow  No decorative pillows or stuffed animals on the bed at night.  No feathers or down on the bed  Wash bedding in hot water    In my opinion, the best place to obtain dust proof covers is on-line at [employer]       Optional:  2 week trial of Singulair.        Follow up in about 2 months (around 1/7/2024). Or sooner during episode        Rhonda Swanson MD  Allergy, Asthma & Immunology        I spent a total of 62 minutes on the day of the visit.This includes face to face time and non-face to face time preparing to see the patient (eg, review of tests), obtaining and/or reviewing separately obtained history, documenting clinical information in the electronic or other health record, independently interpreting results and communicating results to the patient/family/caregiver, or care coordinator.

## 2024-02-26 ENCOUNTER — OFFICE VISIT (OUTPATIENT)
Dept: SPORTS MEDICINE | Facility: CLINIC | Age: 39
End: 2024-02-26
Payer: COMMERCIAL

## 2024-02-26 VITALS
WEIGHT: 128 LBS | DIASTOLIC BLOOD PRESSURE: 81 MMHG | HEIGHT: 63 IN | BODY MASS INDEX: 22.68 KG/M2 | SYSTOLIC BLOOD PRESSURE: 125 MMHG | HEART RATE: 112 BPM

## 2024-02-26 DIAGNOSIS — M99.08 SOMATIC DYSFUNCTION OF RIB CAGE REGION: ICD-10-CM

## 2024-02-26 DIAGNOSIS — M99.00 CRANIAL SOMATIC DYSFUNCTION: ICD-10-CM

## 2024-02-26 DIAGNOSIS — M99.03 SOMATIC DYSFUNCTION OF LUMBAR REGION: ICD-10-CM

## 2024-02-26 DIAGNOSIS — M79.10 MYALGIA: ICD-10-CM

## 2024-02-26 DIAGNOSIS — M54.9 UPPER BACK PAIN ON RIGHT SIDE: Primary | ICD-10-CM

## 2024-02-26 DIAGNOSIS — M99.01 SOMATIC DYSFUNCTION OF CERVICAL REGION: ICD-10-CM

## 2024-02-26 DIAGNOSIS — M99.07 SOMATIC DYSFUNCTION OF UPPER EXTREMITY: ICD-10-CM

## 2024-02-26 DIAGNOSIS — M99.02 SOMATIC DYSFUNCTION OF THORACIC REGION: ICD-10-CM

## 2024-02-26 DIAGNOSIS — M25.511 ACUTE PAIN OF RIGHT SHOULDER: ICD-10-CM

## 2024-02-26 PROCEDURE — 99999 PR PBB SHADOW E&M-EST. PATIENT-LVL IV: CPT | Mod: PBBFAC,,, | Performed by: ORTHOPAEDIC SURGERY

## 2024-02-26 PROCEDURE — 1159F MED LIST DOCD IN RCRD: CPT | Mod: CPTII,S$GLB,, | Performed by: ORTHOPAEDIC SURGERY

## 2024-02-26 PROCEDURE — 99214 OFFICE O/P EST MOD 30 MIN: CPT | Mod: 25,S$GLB,, | Performed by: ORTHOPAEDIC SURGERY

## 2024-02-26 PROCEDURE — 3079F DIAST BP 80-89 MM HG: CPT | Mod: CPTII,S$GLB,, | Performed by: ORTHOPAEDIC SURGERY

## 2024-02-26 PROCEDURE — 97110 THERAPEUTIC EXERCISES: CPT | Mod: S$GLB,,, | Performed by: ORTHOPAEDIC SURGERY

## 2024-02-26 PROCEDURE — 3074F SYST BP LT 130 MM HG: CPT | Mod: CPTII,S$GLB,, | Performed by: ORTHOPAEDIC SURGERY

## 2024-02-26 PROCEDURE — 3008F BODY MASS INDEX DOCD: CPT | Mod: CPTII,S$GLB,, | Performed by: ORTHOPAEDIC SURGERY

## 2024-02-26 PROCEDURE — 1160F RVW MEDS BY RX/DR IN RCRD: CPT | Mod: CPTII,S$GLB,, | Performed by: ORTHOPAEDIC SURGERY

## 2024-02-26 PROCEDURE — 98927 OSTEOPATH MANJ 5-6 REGIONS: CPT | Mod: S$GLB,,, | Performed by: ORTHOPAEDIC SURGERY

## 2024-02-26 NOTE — PROGRESS NOTES
CC: right shoulder/upper back pain     38 y.o. Female presents today for evaluation of her right shoulder/upper back pain. She admits to appreciating right upper back pain beginning 2 weeks ago she believes may be associated with poor posture due to kitchen renovations. She states her pain was 90% improved until she woke up from a nap over the weekend and appreciated worsening pain of the same quality.   How lon weeks  What makes it better: Heat, massage, stretching, ibuprofen  What makes it worse: Turning her head, worse turning to the left  Does it radiate: No   Attempted treatments: Heat, massage, stretching, ibuprofen    Pain score: 2/10   Any mechanical symptoms: No  Feelings of instability: No  Affecting ADLs: Yes    Occupation: special education - [employer]       PAST MEDICAL HISTORY:   Past Medical History:   Diagnosis Date    Acne     ALLERGIC RHINITIS     Allergy     Anxiety     Dysmenorrhea     Low back pain     Migraine headache     Recurrent upper respiratory infection (URI)        PAST SURGICAL HISTORY:   Past Surgical History:   Procedure Laterality Date    ESOPHAGOGASTRODUODENOSCOPY N/A 2023    Procedure: EGD (ESOPHAGOGASTRODUODENOSCOPY);  Surgeon: Sameer Cedeño MD;  Location: [facility] ([address] [place] [address]);  Service: Endoscopy;  Laterality: N/A;    FACIAL COSMETIC SURGERY          PH MONITORING, ESOPHAGUS, WIRELESS, (OFF REFLUX MEDS) N/A 2023    Procedure: PH MONITORING, ESOPHAGUS, WIRELESS, (OFF REFLUX MEDS);  Surgeon: Sameer Cedeño MD;  Location: [facility] ([address] [place] [address]);  Service: Endoscopy;  Laterality: N/A;  Please schedule Sari for a 96 hour esophageal Bravo pH probe study off all PPIs and off all H2 blockers she is been off of them for over 3 months now so she can get scheduled at any time with me.  Thank you  Ref by Dr RENZO Cedeño       FAMILY HISTORY:   Family History   Problem Relation Age of Onset    Endometriosis Mother     Cirrhosis Mother     Allergic rhinitis Father      Allergic rhinitis Brother     Breast cancer Paternal Aunt     Stroke Paternal Grandfather     Amblyopia Neg Hx     Blindness Neg Hx     Cataracts Neg Hx     Glaucoma Neg Hx     Macular degeneration Neg Hx     Retinal detachment Neg Hx     Strabismus Neg Hx     Colon cancer Neg Hx     Ovarian cancer Neg Hx     Thyroid disease Neg Hx     Cancer Neg Hx     Melanoma Neg Hx     Allergies Neg Hx     Angioedema Neg Hx     Asthma Neg Hx     Atopy Neg Hx     Eczema Neg Hx     Immunodeficiency Neg Hx     Rhinitis Neg Hx     Urticaria Neg Hx     Celiac disease Neg Hx     Colon polyps Neg Hx     Crohn's disease Neg Hx     Esophageal cancer Neg Hx     Hemochromatosis Neg Hx     Inflammatory bowel disease Neg Hx     Rectal cancer Neg Hx     Ulcerative colitis Neg Hx     Stomach cancer Neg Hx     Pancreatic cancer Neg Hx     Carlos's esophagus Neg Hx     Pancreatitis Neg Hx     Uterine cancer Neg Hx     Bladder Cancer Neg Hx     Kidney cancer Neg Hx        SOCIAL HISTORY:   Social History     Socioeconomic History    Marital status: Single   Occupational History    Occupation: therpist   Tobacco Use    Smoking status: Never     Passive exposure: Never    Smokeless tobacco: Never   Substance and Sexual Activity    Alcohol use: Yes     Comment: socially    Drug use: No    Sexual activity: Yes     Partners: Male     Birth control/protection: OCP     Comment: Single    Other Topics Concern    Are you pregnant or think you may be? No    Breast-feeding No   Social History Narrative    She is a special      No children       MEDICATIONS:     Current Outpatient Medications:     albuterol (PROVENTIL) 2.5 mg /3 mL (0.083 %) nebulizer solution, Take 3 mLs (2.5 mg total) by nebulization every 6 (six) hours as needed for Wheezing. Rescue, Disp: 90 mL, Rfl: 1    amoxicillin-clavulanate 875-125mg (AUGMENTIN) 875-125 mg per tablet, Take 1 tablet by mouth 2 (two) times daily., Disp: , Rfl:     azelastine 205.5 mcg (0.15 %) Kareem, ,  "Disp: , Rfl:     BREZTRI AEROSPHERE 160-9-4.8 mcg/actuation HFAA, SMARTSI-2 Puff(s) By Mouth 1-2 Times Daily, Disp: , Rfl:     clonazePAM (KLONOPIN) 0.5 MG tablet, TAKE 1/2-1 TABLET TWICE A DAY AS NEEDED FOR ANXIETY, Disp: 30 tablet, Rfl: 3    EScitalopram oxalate (LEXAPRO) 10 MG tablet, Take 1 tablet (10 mg total) by mouth once daily., Disp: 90 tablet, Rfl: 1    ESTARYLLA 0.25-35 mg-mcg per tablet, TAKE 1 TABLET BY MOUTH EVERY DAY, Disp: 84 tablet, Rfl: 3    fluticasone (FLONASE) 50 mcg/actuation nasal spray, 2 sprays (100 mcg total) by Each Nare route once daily., Disp: 1 Bottle, Rfl: 0    ipratropium (ATROVENT) 42 mcg (0.06 %) nasal spray, , Disp: , Rfl:     ketoconazole (NIZORAL) 2 % shampoo, Wash scalp with medicated shampoo at least 2x/week. Let sit on scalp at least 5 minutes prior to rinsing, Disp: 240 mL, Rfl: 5    loratadine (CLARITIN) 10 mg tablet, , Disp: , Rfl:     montelukast (SINGULAIR) 10 mg tablet, , Disp: , Rfl:     triamcinolone (NASACORT) 55 mcg nasal inhaler, 2 sprays once daily., Disp: , Rfl:     urea (CARMOL) 40 % Crea, AAA BID, Disp: 28 g, Rfl: 1    ALLERGIES:   Review of patient's allergies indicates:  No Known Allergies     PHYSICAL EXAMINATION:  /81   Pulse (!) 112   Ht 5' 3" (1.6 m)   Wt 58 kg (127 lb 15.6 oz)   BMI 22.67 kg/m²   Vitals signs and nursing note have been reviewed.  General: In no acute distress, well developed, well nourished, no diaphoresis  Eyes: EOM full and smooth, no eye redness or discharge  HENT: normocephalic and atraumatic, neck supple, trachea midline, no nasal discharge, no external ear redness or discharge  Cardiovascular: 2+ and symmetric radial bilaterally, no LE edema  Lungs: respirations non-labored, no conversational dyspnea   Abd: non-distended, no rigidity  MSK: no amputation or deformity, no swelling of extremities  Neuro: AAOx3, CN2-12 grossly intact  Skin: No rashes, warm and dry  Psychiatric: cooperative, pleasant, mood and affect " appropriate for age    SHOULDER: RIGHT  The affected shoulder is compared to the contralateral shoulder.    Observation:    CERVICAL SPINE  Normal head carriage. Normal thoracic kyphosis.  Full AROM in flexion, extension, sidebending, and rotation.    SHOULDER  No ecchymosis, edema, or erythema throughout the shoulder girdle.  No sternal, clavicular, or acromial deformities bilaterally.  No atrophy of the pectorals, deltoids, supraspinatus, infraspinatus, or biceps bilaterally.  No asymmetry of shoulders bilaterally.    ROM:  Active flexion to 180° on left and 180° on right.   Active abduction to 180° on left and 180° on right.    Active internal rotation to T7 on left and T7 on right.    Active external rotation to T4 on left and T4 on right.    No scapular dyskinesia or winging.    Tenderness:  No tenderness at the SC or AC joint  No tenderness over the clavicle   No tenderness over biceps tendon in the bicipital groove  No tenderness over subacromial space  + tenderness over the right trapezius muscle and along the cervical paraspinals    Strength Testing: (*pain)  Deltoid - 5/5 on left and 5/5 on right  Biceps - 5/5 on left and 5/5 on right  Triceps - 5/5 on left and 5/5 on right  Wrist extension - 5/5 on left and 5/5 on right  Wrist flexion - 5/5 on left and 5/5 on right   - 5/5 on left and 5/5 on right  Finger extension - 5/5 on left and 5/5 on right  Finger abduction - 5/5 on left and 5/5 on right    Special Tests:  Empty can test - negative  Full can test - negative  Bear hug test - negative  Belly press test - negative  Resisted internal rotation - negative  Resisted external rotation - negative    Neer's test - negative  Hawkin's-Bam test - mild discomfort on the right with internal rotation    ORoosevelts test - negative    Speed's test - negative  Yergason's test - negative    Sulcus sign - none  AP load and shift laxity - none  Anterior apprehension test - negative      Posture:  Upright  Gait:  Non-antalgic     TART (Tissue texture abnormality, Asymmetry,  Restriction of motion and/or Tenderness) changes:    Head:  Myofascial restriction occiput on the right     Cervical Spine  Thoracic Spine  Lumbar Spine   C1 TTAR T1 NSLRR L1 Neutral   C2 TTAR T2 NSLRR L2 FRSL   C3 TTAR T3 NSLRR L3 Neutral   C4 TTAR T4 NSLRR L4 Neutral   C5 TTAR T5 Neutral L5 Neutral   C6 TTAR T6 Neutral     C7 TTAR T7 Neutral       T8 ERSR       T9 ERSR       T10 Neutral       T11 Neutral       T12 Neutral       Ribs:  Superior rib 1 on the right  Exhalation restriction right upper rib cage    Upper Extremity:  Myofascial restriction thoracic outlet on the right  Fascial continuum distortion middle superior right scapula    Abdomen:    Pelvis:    Sacrum:    Lower Extremity:      Key   F= Flexed   E = Extended   R = Rotated   N = Neutral   S = Sidebent   TTA = tissue texture abnormality   L/R/B (last letter) = left/right/bilateral   HTP = fascial herniated trigger point   TB = fascial trigger band   CD = fascial continuum distortion       Neurovascular Exam:  2+ radial pulses BL  Sensation intact to light touch in the distal median, radial, and ulnar nerve distributions bilaterally.  Spurlings test - negative  Lhermittes test - negative  Capillary refill intact <2 seconds in all digits bilaterally      ASSESSMENT:      ICD-10-CM ICD-9-CM   1. Upper back pain on right side  M54.9 724.5   2. Acute pain of right shoulder  M25.511 719.41   3. Myalgia  M79.10 729.1   4. Somatic dysfunction of lumbar region  M99.03 739.3   5. Somatic dysfunction of cervical region  M99.01 739.1   6. Cranial somatic dysfunction  M99.00 739.0   7. Somatic dysfunction of upper extremity  M99.07 739.7   8. Somatic dysfunction of thoracic region  M99.02 739.2   9. Somatic dysfunction of rib cage region  M99.08 739.8         PLAN:  1-3.  Right upper back pain/shoulder pain/myalgia - new issue    - Sari admits to right upper back pain beginning 2 weeks ago she  believes may be associated with poor posture due to kitchen renovation. She denies radicular/red-flag symptoms.     - Symptoms, exam, and imaging are most consistent with myofascial restriction secondary to poor posture during work on recent house renovations.  We discussed the importance of decreasing inflammation and strengthening and stabilizing to help promote and maintain symptom improvement/resolution.  This is commonly accomplished with a short course of an anti-inflammatory and icing in addition to osteopathic manipulation, a home exercise program or physical therapy.    - Based on her description of pain/body language and somatic dysfunction identified on exam, I discussed osteopathic manipulation as a treatment option today. She consents to evaluation and treatment. See below.    - HEP for cervicothoracic mobility prescribed today. Handouts provided, explained, and exercises were demonstrated as needed. Encouraged to do daily. [id number] HOME EXERCISE PROGRAM (HEP):  The patient was taught a homegoing physical therapy regimen as described above by provider with assistance of sports medicine assistant. The patient demonstrated understanding of the exercises and proper technique of their execution. This interaction took 15 minutes.       4-9.  Somatic dysfunction of cervical, cranial, lumbar, thoracic, upper extremity, ribcage regions -     - OMT 5-6 regions. Oral consent obtained. Reviewed benefits and potential side effects. OMT indicated today due to signs and symptoms as well as local and remote somatic dysfunction findings and their related neurokinetic, lymphatic, fascial and/or arteriovenous body connections. OMT techniques used: Soft Tissue, Myofascial Release, Muscle Energy, Still's Technique, and Fascial Distortion Model. Treatment was tolerated well. Improvement noted in segmental mobility post-treatment in dysfunctional regions. There were no adverse events and no complications immediately following  treatment. Advised plenty of water to help alleviate soreness.      Future planning includes - possibly more OMT if helpful and if indicated    All questions were answered to the best of my ability and all concerns were addressed at this time.    Follow up in 3 weeks for above, or sooner if needed.    This note is dictated using the M*Modal Fluency Direct word recognition program. There are word recognition mistakes that are occasionally missed on review.      Total time spent face-to face with patient counseling or coordinating care including prognosis, differential diagnosis, risks and benefits of treatment, instructions, compliance risk reductions as well as non-face-to-face time personally spent reviewing medial record, medical documentation, and coordination of care.     EST MINUTES X   [id number] 10-19    62086 20-29    [id number] 30-39 X   [id number] 40-54    NEW     67529 15-29    07364 30-44    36032 45-59    [id number] 60-74    PHONE      5-10    [id number] 11-20    [id number] 21-30

## 2024-02-29 ENCOUNTER — PATIENT MESSAGE (OUTPATIENT)
Dept: OBSTETRICS AND GYNECOLOGY | Facility: CLINIC | Age: 39
End: 2024-02-29
Payer: COMMERCIAL

## 2024-03-03 ENCOUNTER — PATIENT MESSAGE (OUTPATIENT)
Dept: SPORTS MEDICINE | Facility: CLINIC | Age: 39
End: 2024-03-03
Payer: COMMERCIAL

## 2024-03-18 ENCOUNTER — OFFICE VISIT (OUTPATIENT)
Dept: SPORTS MEDICINE | Facility: CLINIC | Age: 39
End: 2024-03-18
Payer: COMMERCIAL

## 2024-03-18 VITALS
HEART RATE: 84 BPM | SYSTOLIC BLOOD PRESSURE: 132 MMHG | DIASTOLIC BLOOD PRESSURE: 71 MMHG | BODY MASS INDEX: 22.65 KG/M2 | WEIGHT: 127.88 LBS

## 2024-03-18 DIAGNOSIS — M25.511 ACUTE PAIN OF RIGHT SHOULDER: ICD-10-CM

## 2024-03-18 DIAGNOSIS — M99.02 SOMATIC DYSFUNCTION OF THORACIC REGION: ICD-10-CM

## 2024-03-18 DIAGNOSIS — M99.04 SOMATIC DYSFUNCTION OF SACRAL REGION: ICD-10-CM

## 2024-03-18 DIAGNOSIS — M79.10 MYALGIA: ICD-10-CM

## 2024-03-18 DIAGNOSIS — M99.03 SOMATIC DYSFUNCTION OF LUMBAR REGION: ICD-10-CM

## 2024-03-18 DIAGNOSIS — M54.9 UPPER BACK PAIN ON RIGHT SIDE: ICD-10-CM

## 2024-03-18 DIAGNOSIS — M99.05 SOMATIC DYSFUNCTION OF PELVIS REGION: ICD-10-CM

## 2024-03-18 DIAGNOSIS — M25.552 LATERAL PAIN OF LEFT HIP: Primary | ICD-10-CM

## 2024-03-18 DIAGNOSIS — M99.06 SOMATIC DYSFUNCTION OF LOWER EXTREMITY: ICD-10-CM

## 2024-03-18 DIAGNOSIS — M79.641 RIGHT HAND PAIN: ICD-10-CM

## 2024-03-18 PROCEDURE — 1160F RVW MEDS BY RX/DR IN RCRD: CPT | Mod: CPTII,S$GLB,, | Performed by: ORTHOPAEDIC SURGERY

## 2024-03-18 PROCEDURE — 3075F SYST BP GE 130 - 139MM HG: CPT | Mod: CPTII,S$GLB,, | Performed by: ORTHOPAEDIC SURGERY

## 2024-03-18 PROCEDURE — 99999 PR PBB SHADOW E&M-EST. PATIENT-LVL III: CPT | Mod: PBBFAC,,, | Performed by: ORTHOPAEDIC SURGERY

## 2024-03-18 PROCEDURE — 98927 OSTEOPATH MANJ 5-6 REGIONS: CPT | Mod: S$GLB,,, | Performed by: ORTHOPAEDIC SURGERY

## 2024-03-18 PROCEDURE — 3078F DIAST BP <80 MM HG: CPT | Mod: CPTII,S$GLB,, | Performed by: ORTHOPAEDIC SURGERY

## 2024-03-18 PROCEDURE — 99214 OFFICE O/P EST MOD 30 MIN: CPT | Mod: 25,S$GLB,, | Performed by: ORTHOPAEDIC SURGERY

## 2024-03-18 PROCEDURE — 1159F MED LIST DOCD IN RCRD: CPT | Mod: CPTII,S$GLB,, | Performed by: ORTHOPAEDIC SURGERY

## 2024-03-18 PROCEDURE — 3008F BODY MASS INDEX DOCD: CPT | Mod: CPTII,S$GLB,, | Performed by: ORTHOPAEDIC SURGERY

## 2024-03-18 RX ORDER — MELOXICAM 15 MG/1
15 TABLET ORAL DAILY
Qty: 30 TABLET | Refills: 0 | Status: SHIPPED | OUTPATIENT
Start: 2024-03-18

## 2024-03-18 NOTE — PROGRESS NOTES
CC: right shoulder/upper back pain     Sari is here today for follow up evaluation of her right shoulder/upper back pain. Patient reports her shoulder pain is 0/10 today. She admits to appreciating improvement in her pain with OMT and compliance with her HEP. She also admits to left hip pain for the past 3 weeks she appreciated after Mack class.  She states that the pain is reminiscent to her right hip pain that she felt earlier in  that responded very well to OMT and home exercises.  She started doing the home exercises again which she thinks has started to help.    Recall from visit on 2024  38 y.o. Female presents today for evaluation of her right shoulder/upper back pain. She admits to appreciating right upper back pain beginning 2 weeks ago she believes may be associated with poor posture due to kitchen renovations. She states her pain was 90% improved until she woke up from a nap over the weekend and appreciated worsening pain of the same quality.   How lon weeks  What makes it better: Heat, massage, stretching, ibuprofen  What makes it worse: Turning her head, worse turning to the left  Does it radiate: No   Attempted treatments: Heat, massage, stretching, ibuprofen    Pain score: 2/10   Any mechanical symptoms: No  Feelings of instability: No  Affecting ADLs: Yes    Occupation: special education - [employer]       PAST MEDICAL HISTORY:   Past Medical History:   Diagnosis Date    Acne     ALLERGIC RHINITIS     Allergy     Anxiety     Dysmenorrhea     Low back pain     Migraine headache     Recurrent upper respiratory infection (URI)        PAST SURGICAL HISTORY:   Past Surgical History:   Procedure Laterality Date    ESOPHAGOGASTRODUODENOSCOPY N/A 2023    Procedure: EGD (ESOPHAGOGASTRODUODENOSCOPY);  Surgeon: Sameer Cedeño MD;  Location: [address]);  Service: Endoscopy;  Laterality: N/A;    FACIAL COSMETIC SURGERY          PH MONITORING, ESOPHAGUS, WIRELESS, (OFF  REFLUX MEDS) N/A 7/28/2023    Procedure: PH MONITORING, ESOPHAGUS, WIRELESS, (OFF REFLUX MEDS);  Surgeon: Sameer Cedeño MD;  Location: [facility] ([address] [place] [address]);  Service: Endoscopy;  Laterality: N/A;  Please schedule Sari for a 96 hour esophageal Bravo pH probe study off all PPIs and off all H2 blockers she is been off of them for over 3 months now so she can get scheduled at any time with me.  Thank you  Ref by Dr RENZO Cedeño       FAMILY HISTORY:   Family History   Problem Relation Age of Onset    Endometriosis Mother     Cirrhosis Mother     Allergic rhinitis Father     Allergic rhinitis Brother     Breast cancer Paternal Aunt     Stroke Paternal Grandfather     Amblyopia Neg Hx     Blindness Neg Hx     Cataracts Neg Hx     Glaucoma Neg Hx     Macular degeneration Neg Hx     Retinal detachment Neg Hx     Strabismus Neg Hx     Colon cancer Neg Hx     Ovarian cancer Neg Hx     Thyroid disease Neg Hx     Cancer Neg Hx     Melanoma Neg Hx     Allergies Neg Hx     Angioedema Neg Hx     Asthma Neg Hx     Atopy Neg Hx     Eczema Neg Hx     Immunodeficiency Neg Hx     Rhinitis Neg Hx     Urticaria Neg Hx     Celiac disease Neg Hx     Colon polyps Neg Hx     Crohn's disease Neg Hx     Esophageal cancer Neg Hx     Hemochromatosis Neg Hx     Inflammatory bowel disease Neg Hx     Rectal cancer Neg Hx     Ulcerative colitis Neg Hx     Stomach cancer Neg Hx     Pancreatic cancer Neg Hx     Carlos's esophagus Neg Hx     Pancreatitis Neg Hx     Uterine cancer Neg Hx     Bladder Cancer Neg Hx     Kidney cancer Neg Hx        SOCIAL HISTORY:   Social History     Socioeconomic History    Marital status: Single   Occupational History    Occupation: therpist   Tobacco Use    Smoking status: Never     Passive exposure: Never    Smokeless tobacco: Never   Substance and Sexual Activity    Alcohol use: Yes     Comment: socially    Drug use: No    Sexual activity: Yes     Partners: Male     Birth control/protection: OCP     Comment:  Single    Other Topics Concern    Are you pregnant or think you may be? No    Breast-feeding No   Social History Narrative    She is a special      No children       MEDICATIONS:     Current Outpatient Medications:     albuterol (PROVENTIL) 2.5 mg /3 mL (0.083 %) nebulizer solution, Take 3 mLs (2.5 mg total) by nebulization every 6 (six) hours as needed for Wheezing. Rescue, Disp: 90 mL, Rfl: 1    azelastine 205.5 mcg (0.15 %) Terminous, , Disp: , Rfl:     BREZTRI AEROSPHERE 160-9-4.8 mcg/actuation HFAA, SMARTSI-2 Puff(s) By Mouth 1-2 Times Daily, Disp: , Rfl:     clonazePAM (KLONOPIN) 0.5 MG tablet, TAKE 1/2-1 TABLET TWICE A DAY AS NEEDED FOR ANXIETY, Disp: 30 tablet, Rfl: 3    EScitalopram oxalate (LEXAPRO) 10 MG tablet, Take 1 tablet (10 mg total) by mouth once daily., Disp: 90 tablet, Rfl: 1    ESTARYLLA 0.25-35 mg-mcg per tablet, TAKE 1 TABLET BY MOUTH EVERY DAY, Disp: 84 tablet, Rfl: 3    fluticasone (FLONASE) 50 mcg/actuation nasal spray, 2 sprays (100 mcg total) by Each Nare route once daily., Disp: 1 Bottle, Rfl: 0    ipratropium (ATROVENT) 42 mcg (0.06 %) nasal spray, , Disp: , Rfl:     ketoconazole (NIZORAL) 2 % shampoo, Wash scalp with medicated shampoo at least 2x/week. Let sit on scalp at least 5 minutes prior to rinsing, Disp: 240 mL, Rfl: 5    loratadine (CLARITIN) 10 mg tablet, , Disp: , Rfl:     amoxicillin-clavulanate 875-125mg (AUGMENTIN) 875-125 mg per tablet, Take 1 tablet by mouth 2 (two) times daily., Disp: , Rfl:     meloxicam (MOBIC) 15 MG tablet, Take 1 tablet (15 mg total) by mouth once daily., Disp: 30 tablet, Rfl: 0    montelukast (SINGULAIR) 10 mg tablet, , Disp: , Rfl:     triamcinolone (NASACORT) 55 mcg nasal inhaler, 2 sprays once daily., Disp: , Rfl:     urea (CARMOL) 40 % Crea, AAA BID (Patient not taking: Reported on 3/18/2024), Disp: 28 g, Rfl: 1    ALLERGIES:   Review of patient's allergies indicates:  No Known Allergies     PHYSICAL EXAMINATION:  /71   Pulse 84    Wt 58 kg (127 lb 13.9 oz)   BMI 22.65 kg/m²   Vitals signs and nursing note have been reviewed.  General: In no acute distress, well developed, well nourished, no diaphoresis  Eyes: EOM full and smooth, no eye redness or discharge  HENT: normocephalic and atraumatic, neck supple, trachea midline, no nasal discharge, no external ear redness or discharge  Cardiovascular: 2+ and symmetric radial bilaterally, no LE edema  Lungs: respirations non-labored, no conversational dyspnea   Abd: non-distended, no rigidity  MSK: no amputation or deformity, no swelling of extremities  Neuro: AAOx3, CN2-12 grossly intact  Skin: No rashes, warm and dry  Psychiatric: cooperative, pleasant, mood and affect appropriate for age    SHOULDER: RIGHT  The affected shoulder is compared to the contralateral shoulder.    Observation:    CERVICAL SPINE  Normal head carriage. Normal thoracic kyphosis.  Full AROM in flexion, extension, sidebending, and rotation.    SHOULDER  No ecchymosis, edema, or erythema throughout the shoulder girdle.  No sternal, clavicular, or acromial deformities bilaterally.  No atrophy of the pectorals, deltoids, supraspinatus, infraspinatus, or biceps bilaterally.  No asymmetry of shoulders bilaterally.    ROM:  Active flexion to 180° on left and 180° on right.   Active abduction to 180° on left and 180° on right.    Active internal rotation to T7 on left and T7 on right.    Active external rotation to T4 on left and T4 on right.    No scapular dyskinesia or winging.    Tenderness:  No tenderness at the SC or AC joint  No tenderness over the clavicle   No tenderness over biceps tendon in the bicipital groove  No tenderness over subacromial space  No tenderness over the right trapezius muscle    Strength Testing: (*pain)  Deltoid - 5/5 on left and 5/5 on right  Biceps - 5/5 on left and 5/5 on right  Triceps - 5/5 on left and 5/5 on right  Wrist extension - 5/5 on left and 5/5 on right  Wrist flexion - 5/5 on left and  5/5 on right   - 5/5 on left and 5/5 on right  Finger extension - 5/5 on left and 5/5 on right  Finger abduction - 5/5 on left and 5/5 on right    Special Tests:  Empty can test - negative  Full can test - negative  Bear hug test - negative  Belly press test - negative  Resisted internal rotation - negative  Resisted external rotation - negative    Neer's test - negative  Hawkin's-Bam test - negative    ORoosevelts test - negative    Speed's test - negative  Yergason's test - negative    Sulcus sign - none  AP load and shift laxity - none  Anterior apprehension test - negative      Posture:  Upright  Gait: Non-antalgic     TART (Tissue texture abnormality, Asymmetry,  Restriction of motion and/or Tenderness) changes:    Head:      Cervical Spine  Thoracic Spine  Lumbar Spine   C1 Neutral T1 Neutral L1 Neutral   C2 Neutral T2 Neutral L2 Neutral   C3 Neutral T3 Neutral L3 Neutral   C4 Neutral T4 NSLRR L4 ERSR   C5 Neutral T5 NSLRR L5 FRSR   C6 Neutral T6 NSLRR     C7 Neutral T7 Neutral       T8 Neutral       T9 Neutral       T10 Neutral       T11 FRSR       T12 FRSR       Ribs:    Upper Extremity:    Abdomen:    Pelvis:  Left anterior pelvic innominate rotation    Sacrum:  Right and left sacral torsion    Lower Extremity:  External tibial torsion on the left    Key   F= Flexed   E = Extended   R = Rotated   N = Neutral   S = Sidebent   TTA = tissue texture abnormality   L/R/B (last letter) = left/right/bilateral   HTP = fascial herniated trigger point   TB = fascial trigger band   CD = fascial continuum distortion       Neurovascular Exam:  2+ radial pulses BL  Sensation intact to light touch in the distal median, radial, and ulnar nerve distributions bilaterally.  Spurlings test - negative  Lhermittes test - negative  Capillary refill intact <2 seconds in all digits bilaterally      ASSESSMENT:      ICD-10-CM ICD-9-CM   1. Lateral pain of left hip  M25.552 719.45   2. Upper back pain on right side  M54.9 724.5    3. Acute pain of right shoulder  M25.511 719.41   4. Myalgia  M79.10 729.1   5. Right hand pain  M79.641 729.5   6. Somatic dysfunction of lumbar region  M99.03 739.3   7. Somatic dysfunction of pelvis region  M99.05 739.5   8. Somatic dysfunction of lower extremity  M99.06 739.6   9. Somatic dysfunction of sacral region  M99.04 739.4   10. Somatic dysfunction of thoracic region  M99.02 739.2           PLAN:  Left hip pain - new complaint to physician  2-4.  Right upper back pain/shoulder pain/myalgia - improved    - Sari admits to right upper back pain beginning several weeks ago she believes may be associated with poor posture due to kitchen renovation. She denies radicular/red-flag symptoms.     - She feels as though her upper back has improved since her last visit which she attributes to OMT and compliance with her HEP. She also notes left hip pain for the past 3 weeks she appreciated after Mack class.     - Based on her description of pain/body language and somatic dysfunction identified on exam, I discussed osteopathic manipulation as a treatment option today. She consents to evaluation and treatment. See below.    - Continue with HEP for cervicothoracic mobility prescribed today at last visit. Added Thera-Putty today. Handout provided.     - Meloxicam 15 mg daily for 2 weeks followed by as needed.      5. Right hand pain -     - referral placed to Dr. White today for evaluation.      6-10. Somatic dysfunction of lumbar, pelvis, sacral, thoracic, lower extremity regions -     - OMT 5-6 regions. Oral consent obtained. Reviewed benefits and potential side effects. OMT indicated today due to signs and symptoms as well as local and remote somatic dysfunction findings and their related neurokinetic, lymphatic, fascial and/or arteriovenous body connections. OMT techniques used: Soft Tissue, Myofascial Release, and Muscle Energy. Treatment was tolerated well. Improvement noted in segmental mobility  Health Care Proxy (HCP) post-treatment in dysfunctional regions. There were no adverse events and no complications immediately following treatment. Advised plenty of water to help alleviate soreness.      Future planning includes - possibly more OMT if helpful and if indicated    All questions were answered to the best of my ability and all concerns were addressed at this time.    Follow up in 4 weeks for above, or sooner if needed.    This note is dictated using the M*Modal Fluency Direct word recognition program. There are word recognition mistakes that are occasionally missed on review.      Total time spent face-to face with patient counseling or coordinating care including prognosis, differential diagnosis, risks and benefits of treatment, instructions, compliance risk reductions as well as non-face-to-face time personally spent reviewing medial record, medical documentation, and coordination of care.     EST MINUTES X   [id number] 10-19    [id number] 20-29    [id number] 30-39 X   [id number] 40-54    NEW     [id number] 15-29    40609 30-44    39808 45-59    [id number] 60-74    PHONE      5-10    [id number] 11-20    38898 21-30

## 2024-03-24 ENCOUNTER — PATIENT MESSAGE (OUTPATIENT)
Dept: SPORTS MEDICINE | Facility: CLINIC | Age: 39
End: 2024-03-24
Payer: COMMERCIAL

## 2024-04-04 ENCOUNTER — PATIENT MESSAGE (OUTPATIENT)
Dept: DERMATOLOGY | Facility: CLINIC | Age: 39
End: 2024-04-04
Payer: COMMERCIAL

## 2024-04-04 DIAGNOSIS — M79.641 RIGHT HAND PAIN: Primary | ICD-10-CM

## 2024-04-06 ENCOUNTER — HOSPITAL ENCOUNTER (EMERGENCY)
Facility: HOSPITAL | Age: 39
Discharge: HOME | End: 2024-04-06
Attending: EMERGENCY MEDICINE
Payer: COMMERCIAL

## 2024-04-06 VITALS
SYSTOLIC BLOOD PRESSURE: 126 MMHG | BODY MASS INDEX: 22.14 KG/M2 | HEART RATE: 84 BPM | OXYGEN SATURATION: 100 % | DIASTOLIC BLOOD PRESSURE: 72 MMHG | TEMPERATURE: 98 F | WEIGHT: 125 LBS | RESPIRATION RATE: 18 BRPM

## 2024-04-06 DIAGNOSIS — M62.838 NECK MUSCLE SPASM: Primary | ICD-10-CM

## 2024-04-06 DIAGNOSIS — S16.1XXA STRAIN OF NECK MUSCLE, INITIAL ENCOUNTER: ICD-10-CM

## 2024-04-06 LAB
B-HCG UR QL: NEGATIVE
CTP QC/QA: YES

## 2024-04-06 PROCEDURE — 63600175 PHARM REV CODE 636 W HCPCS: Performed by: EMERGENCY MEDICINE

## 2024-04-06 PROCEDURE — 96372 THER/PROPH/DIAG INJ SC/IM: CPT | Mod: 59 | Performed by: EMERGENCY MEDICINE

## 2024-04-06 PROCEDURE — 20552 NJX 1/MLT TRIGGER POINT 1/2: CPT

## 2024-04-06 PROCEDURE — 25000003 PHARM REV CODE 250: Performed by: EMERGENCY MEDICINE

## 2024-04-06 PROCEDURE — 99284 EMERGENCY DEPT VISIT MOD MDM: CPT | Mod: 25

## 2024-04-06 PROCEDURE — 81025 URINE PREGNANCY TEST: CPT | Performed by: EMERGENCY MEDICINE

## 2024-04-06 RX ORDER — CYCLOBENZAPRINE HCL 10 MG
10 TABLET ORAL 3 TIMES DAILY PRN
Qty: 15 TABLET | Refills: 0 | Status: SHIPPED | OUTPATIENT
Start: 2024-04-06 | End: 2024-04-08

## 2024-04-06 RX ORDER — LIDOCAINE 50 MG/G
1 PATCH TOPICAL DAILY
Qty: 30 PATCH | Refills: 0 | Status: SHIPPED | OUTPATIENT
Start: 2024-04-06

## 2024-04-06 RX ORDER — ACETAMINOPHEN 500 MG
1000 TABLET ORAL
Status: COMPLETED | OUTPATIENT
Start: 2024-04-06 | End: 2024-04-06

## 2024-04-06 RX ORDER — LIDOCAINE HYDROCHLORIDE AND EPINEPHRINE 10; 10 MG/ML; UG/ML
10 INJECTION, SOLUTION INFILTRATION; PERINEURAL ONCE
Status: COMPLETED | OUTPATIENT
Start: 2024-04-06 | End: 2024-04-06

## 2024-04-06 RX ORDER — KETOROLAC TROMETHAMINE 30 MG/ML
15 INJECTION, SOLUTION INTRAMUSCULAR; INTRAVENOUS
Status: COMPLETED | OUTPATIENT
Start: 2024-04-06 | End: 2024-04-06

## 2024-04-06 RX ORDER — ORPHENADRINE CITRATE 30 MG/ML
60 INJECTION INTRAMUSCULAR; INTRAVENOUS
Status: COMPLETED | OUTPATIENT
Start: 2024-04-06 | End: 2024-04-06

## 2024-04-06 RX ORDER — LIDOCAINE 50 MG/G
1 PATCH TOPICAL
Status: DISCONTINUED | OUTPATIENT
Start: 2024-04-06 | End: 2024-04-06 | Stop reason: HOSPADM

## 2024-04-06 RX ADMIN — ACETAMINOPHEN 1000 MG: 500 TABLET ORAL at 08:04

## 2024-04-06 RX ADMIN — KETOROLAC TROMETHAMINE 15 MG: 30 INJECTION, SOLUTION INTRAMUSCULAR; INTRAVENOUS at 08:04

## 2024-04-06 RX ADMIN — LIDOCAINE HYDROCHLORIDE,EPINEPHRINE BITARTRATE 10 ML: 10; .01 INJECTION, SOLUTION INFILTRATION; PERINEURAL at 08:04

## 2024-04-06 RX ADMIN — ORPHENADRINE CITRATE 60 MG: 60 INJECTION INTRAMUSCULAR; INTRAVENOUS at 08:04

## 2024-04-06 RX ADMIN — LIDOCAINE HYDROCHLORIDE 18 ML: 10 INJECTION INFILTRATION; PERINEURAL at 08:04

## 2024-04-06 RX ADMIN — LIDOCAINE 1 PATCH: 50 PATCH CUTANEOUS at 08:04

## 2024-04-06 NOTE — Clinical Note
"Sari Bradenanda" [facility] was seen and treated in our emergency department on 4/6/2024.  She may return to work on 04/09/2024.       If you have any questions or concerns, please don't hesitate to call.      Georgina Holden MD"

## 2024-04-07 RX ORDER — LIDOCAINE HYDROCHLORIDE 10 MG/ML
18 INJECTION INFILTRATION; PERINEURAL
Status: SHIPPED | OUTPATIENT
Start: 2024-04-06

## 2024-04-07 NOTE — DISCHARGE INSTRUCTIONS
Thank you for coming in to see us at [facility] It was nice to meet you, and I hope you feel better soon. Please feel free to return to the ER at any time should your symptoms get worse, or if you have different emergent concerns.    Our goal in the emergency department is to always give you outstanding care and exceptional service. You may receive a survey by mail or e-mail in the next week regarding your experience in our ED. We would greatly appreciate your completing and returning the survey. Your feedback provides us with a way to recognize our staff who give very good care and it helps us learn how to improve when your experience was below our aspiration of excellence.      It is important to remember that some problems or medical conditions are difficult to diagnose and may not be found or addressed during your Emergency Department visit.  These conditions often start with non-specific symptoms and can only be diagnosed on follow up visits with your primary care physician or specialist when the symptoms continue or change. Please remember that all medical conditions can change, and we cannot predict how you will be feeling tomorrow or the next day.    Return to the ER with any questions/concerns, new/concerning symptoms, worsening, failure to improve or inability to obtain follow-up.       Be sure to follow up with your primary care doctor and review all labs/imaging/tests that were performed during your ER visit with them. It is very common for us to identify non-emergent incidental findings which must be followed up with your primary care physician.  Some labs/imaging/tests may be outside of the normal range, and require non-emergent follow-up and/or further investigation/treatment/procedures/testing to help diagnose/exclude/prevent complications or other potentially serious medical conditions. Some abnormalities may not have been discussed or addressed during your ER visit. Some lab  results may not return during your ER visit but can be accessible by downloading the free [facility] vicky or by visiting [email or web address] . It is important for you to review all labs/imaging/tests which are outside of the normal range with your physician.    An ER visit does not replace a primary care visit, and many screening tests or follow-up tests cannot be ordered by an ER doctor or performed by the ER. Some tests may even require pre-approval.    If you do not have a primary care doctor, you may contact the one listed on your discharge paperwork or you may also call the [facility] Appointment Desk at [phone] , or [facility] at  [phone] to schedule an appointment, or establish care with a primary care doctor or even a specialist and to obtain information about local resources. It is important to your health that you have a primary care doctor.    Please take all medications as directed. We have done our best to select a medication for you that will treat your condition however, all medications may potentially have side-effects and it is impossible to predict which medications may give you side-effects or what those side-effects (if any) those medications may give you.  If you feel that you are having a negative effect or side-effect of any medication you should stop taking those medications immediately and seek medical attention. If you feel that you are having a life-threatening reaction call 911.        Do not drive, swim, climb to height, take a bath, operate heavy machinery, drink alcohol or take potentially sedating medications, sign any legal documents or make any important decisions for 24 hours if you have received any pain medications, sedatives or mood altering drugs during your ER visit or within 24 hours of taking them if they have been prescribed to you.     You can find additional resources for Dentists, hearing aids, durable medical equipment, low cost pharmacies and  other resources at [email or web address]

## 2024-04-07 NOTE — ED PROVIDER NOTES
Encounter Date: 4/6/2024       History     Chief Complaint   Patient presents with    Neck Pain     Patient states she woke yesterday morning with posterior neck pain that travels down to her left shoulder and arm. Denies trauma, strenuous activity, fever.      HPI    Pleasant 38-year-old female history anxiety, low back pain migraine headache presents the evaluation 1 posterior pain.  She has had this pain previously, but never this bad.  Normally improved Tylenol, but today it has been persistent.  She reports that she woke up with the pain yesterday improved with Tylenol, she went to bed and woke back up with significant pain.  She reports since then progressively worsening, posterior upper radiating down arms.  Worse with movement.  No trauma, no other complaints.  Came the ER for further evaluation.    Review of patient's allergies indicates:  No Known Allergies  Past Medical History:   Diagnosis Date    Acne     ALLERGIC RHINITIS     Allergy     Anxiety     Dysmenorrhea     Low back pain     Migraine headache     Recurrent upper respiratory infection (URI)      Past Surgical History:   Procedure Laterality Date    ESOPHAGOGASTRODUODENOSCOPY N/A 7/28/2023    Procedure: EGD (ESOPHAGOGASTRODUODENOSCOPY);  Surgeon: Sameer Cedeño MD;  Location: [facility] ([address] [place] [address]);  Service: Endoscopy;  Laterality: N/A;    FACIAL COSMETIC SURGERY      2013    PH MONITORING, ESOPHAGUS, WIRELESS, (OFF REFLUX MEDS) N/A 7/28/2023    Procedure: PH MONITORING, ESOPHAGUS, WIRELESS, (OFF REFLUX MEDS);  Surgeon: Sameer Cedeño MD;  Location: [facility] ([address] [place] [address]);  Service: Endoscopy;  Laterality: N/A;  Please schedule Sari for a 96 hour esophageal Bravo pH probe study off all PPIs and off all H2 blockers she is been off of them for over 3 months now so she can get scheduled at any time with me.  Thank you  Ref by Dr RENZO Cedeño     Family History   Problem Relation Age of Onset    Endometriosis Mother     Cirrhosis Mother      Allergic rhinitis Father     Allergic rhinitis Brother     Breast cancer Paternal Aunt     Stroke Paternal Grandfather     Amblyopia Neg Hx     Blindness Neg Hx     Cataracts Neg Hx     Glaucoma Neg Hx     Macular degeneration Neg Hx     Retinal detachment Neg Hx     Strabismus Neg Hx     Colon cancer Neg Hx     Ovarian cancer Neg Hx     Thyroid disease Neg Hx     Cancer Neg Hx     Melanoma Neg Hx     Allergies Neg Hx     Angioedema Neg Hx     Asthma Neg Hx     Atopy Neg Hx     Eczema Neg Hx     Immunodeficiency Neg Hx     Rhinitis Neg Hx     Urticaria Neg Hx     Celiac disease Neg Hx     Colon polyps Neg Hx     Crohn's disease Neg Hx     Esophageal cancer Neg Hx     Hemochromatosis Neg Hx     Inflammatory bowel disease Neg Hx     Rectal cancer Neg Hx     Ulcerative colitis Neg Hx     Stomach cancer Neg Hx     Pancreatic cancer Neg Hx     Carlos's esophagus Neg Hx     Pancreatitis Neg Hx     Uterine cancer Neg Hx     Bladder Cancer Neg Hx     Kidney cancer Neg Hx      Social History     Tobacco Use    Smoking status: Never     Passive exposure: Never    Smokeless tobacco: Never   Substance Use Topics    Alcohol use: Yes     Comment: socially    Drug use: No     Review of Systems   Musculoskeletal:  Positive for neck pain.   All other systems reviewed and are negative.      Physical Exam     Initial Vitals [04/06/24 2009]   BP Pulse Resp Temp SpO2   136/68 94 18 98.4 °F (36.9 °C) 100 %      MAP       --         Physical Exam    Nursing note and vitals reviewed.  Constitutional: She appears well-developed and well-nourished.   HENT:   Head: Normocephalic and atraumatic.   Eyes: EOM are normal. Pupils are equal, round, and reactive to light.   Neck:   Diminished range of motion secondary to pain, no spinal step-offs deformities or skin changes, reproducible superior cervical point tenderness noted   Pulmonary/Chest: No respiratory distress.   Musculoskeletal:         General: Normal range of motion.     Neurological:  She is alert and oriented to person, place, and time. She has normal strength. GCS score is 15. GCS eye subscore is 4. GCS verbal subscore is 5. GCS motor subscore is 6.   Skin: Skin is warm and dry. Capillary refill takes less than 2 seconds.   Psychiatric: She has a normal mood and affect. Thought content normal.         ED Course   Trigger Point Injection    Performed by: Georgina Holden MD  Authorized by: Georgina Holden MD    Cervical Paraspinal:  Right    Consent Done?:  Yes (Verbal)    Pre-Procedure:   Indications:  Pain and pain relief  Site marked: the procedure site was marked     Timeout: prior to procedure the correct patient, procedure, and site was verified      Local anesthesia used?: Yes    Anesthesia:  Local infiltration  Medications: 18 mL LIDOcaine HCL 10 mg/ml (1%) 10 mg/mL (1 %)    Labs Reviewed   POCT URINE PREGNANCY          Imaging Results    None          Medications   orphenadrine injection 60 mg (60 mg Intramuscular Given 4/6/24 2052)   ketorolac injection 15 mg (15 mg Intramuscular Given 4/6/24 2052)   LIDOcaine-EPINEPHrine 1%-1:100,000 injection 10 mL (10 mLs Intradermal Given by Other 4/6/24 2052)   acetaminophen tablet 1,000 mg (1,000 mg Oral Given 4/6/24 2051)     Medical Decision Making  Pleasant 38-year-old female presents the ER for evaluation of 1 day of worsening neck pain.  No red flag symptoms.  Has been taking Tylenol with some improvement but now appears to be intractable to that.  She has significant point tenderness on her upper cervical region, concerning for spasm.  I discussed with patient, differential includes muscle spasm, neck strain, cervical radiculopathy other cause.  We discussed symptomatic support with muscle relaxant Toradol lidocaine and trigger point injection, patient agreed for trigger point injection.  Risks benefits discussed.  Will proceed.    Amount and/or Complexity of Data Reviewed  Independent Historian: friend  Labs: ordered.    Risk  OTC  drugs.  Prescription drug management.               ED Course as of 04/07/24 0624   Sat Apr 06, 2024   2142 Status post trigger point injection x2 with some improvement in symptoms.  Patient no longer crying with improved range of motion still having pain.  Discussed with patient diagnosis of neck strain/spasm discussed plan discharge home, symptomatic support muscle relaxants return precautions.  Patient was PCP appointment on Monday.  Return precautions discussed patient to be discharged [SE]      ED Course User Index  [SE] Georgina Holden MD                           Clinical Impression:  Final diagnoses:  [S16.1XXA] Strain of neck muscle, initial encounter  [M62.838] Neck muscle spasm (Primary)          ED Disposition Condition    Discharge Stable          ED Prescriptions       Medication Sig Dispense Start Date End Date Auth. Provider    cyclobenzaprine (FLEXERIL) 10 MG tablet Take 1 tablet (10 mg total) by mouth 3 (three) times daily as needed for Muscle spasms. 15 tablet 4/6/2024 4/11/2024 Georgina Holden MD    LIDOcaine (LIDODERM) 5 % Place 1 patch onto the skin once daily. Remove & Discard patch within 12 hours or as directed by MD 30 patch 4/6/2024 -- Georgina Holden MD          Follow-up Information       Follow up With Specialties Details Why Contact Info    Nhi Shearer MD Internal Medicine Schedule an appointment as soon as possible for a visit  As needed [address] [facility] [place] [address]  [phone]               Georgina Holden MD  04/07/24 0624

## 2024-04-08 ENCOUNTER — OFFICE VISIT (OUTPATIENT)
Dept: INTERNAL MEDICINE | Facility: CLINIC | Age: 39
End: 2024-04-08
Payer: COMMERCIAL

## 2024-04-08 ENCOUNTER — PATIENT MESSAGE (OUTPATIENT)
Dept: INTERNAL MEDICINE | Facility: CLINIC | Age: 39
End: 2024-04-08

## 2024-04-08 ENCOUNTER — LAB VISIT (OUTPATIENT)
Dept: LAB | Facility: HOSPITAL | Age: 39
End: 2024-04-08
Attending: INTERNAL MEDICINE
Payer: COMMERCIAL

## 2024-04-08 VITALS
SYSTOLIC BLOOD PRESSURE: 120 MMHG | HEIGHT: 63 IN | WEIGHT: 130.75 LBS | OXYGEN SATURATION: 99 % | HEART RATE: 120 BPM | DIASTOLIC BLOOD PRESSURE: 70 MMHG | BODY MASS INDEX: 23.17 KG/M2

## 2024-04-08 DIAGNOSIS — Z00.00 PREVENTATIVE HEALTH CARE: ICD-10-CM

## 2024-04-08 DIAGNOSIS — M79.641 PAIN IN BOTH HANDS: ICD-10-CM

## 2024-04-08 DIAGNOSIS — R76.8 POSITIVE ANA (ANTINUCLEAR ANTIBODY): ICD-10-CM

## 2024-04-08 DIAGNOSIS — M54.12 CERVICAL RADICULOPATHY: ICD-10-CM

## 2024-04-08 DIAGNOSIS — M79.642 PAIN IN BOTH HANDS: ICD-10-CM

## 2024-04-08 DIAGNOSIS — R21 RASH: ICD-10-CM

## 2024-04-08 DIAGNOSIS — R21 RASH: Primary | ICD-10-CM

## 2024-04-08 DIAGNOSIS — M54.2 NECK PAIN: ICD-10-CM

## 2024-04-08 LAB
ALBUMIN SERPL BCP-MCNC: 3.4 G/DL (ref 3.5–5.2)
ALP SERPL-CCNC: 62 U/L (ref 55–135)
ALT SERPL W/O P-5'-P-CCNC: 37 U/L (ref 10–44)
ANION GAP SERPL CALC-SCNC: 8 MMOL/L (ref 8–16)
AST SERPL-CCNC: 29 U/L (ref 10–40)
BASOPHILS # BLD AUTO: 0.05 K/UL (ref 0–0.2)
BASOPHILS NFR BLD: 1.2 % (ref 0–1.9)
BILIRUB SERPL-MCNC: 0.2 MG/DL (ref 0.1–1)
BUN SERPL-MCNC: 15 MG/DL (ref 6–20)
CALCIUM SERPL-MCNC: 8.9 MG/DL (ref 8.7–10.5)
CHLORIDE SERPL-SCNC: 109 MMOL/L (ref 95–110)
CHOLEST SERPL-MCNC: 184 MG/DL (ref 120–199)
CHOLEST/HDLC SERPL: 3.2 {RATIO} (ref 2–5)
CO2 SERPL-SCNC: 24 MMOL/L (ref 23–29)
CREAT SERPL-MCNC: 0.8 MG/DL (ref 0.5–1.4)
CRP SERPL-MCNC: 17.7 MG/L (ref 0–8.2)
DIFFERENTIAL METHOD BLD: NORMAL
EOSINOPHIL # BLD AUTO: 0.1 K/UL (ref 0–0.5)
EOSINOPHIL NFR BLD: 3.1 % (ref 0–8)
ERYTHROCYTE [DISTWIDTH] IN BLOOD BY AUTOMATED COUNT: 12.7 % (ref 11.5–14.5)
EST. GFR  (NO RACE VARIABLE): >60 ML/MIN/1.73 M^2
GLUCOSE SERPL-MCNC: 114 MG/DL (ref 70–110)
HCT VFR BLD AUTO: 38.3 % (ref 37–48.5)
HDLC SERPL-MCNC: 58 MG/DL (ref 40–75)
HDLC SERPL: 31.5 % (ref 20–50)
HGB BLD-MCNC: 12.7 G/DL (ref 12–16)
IMM GRANULOCYTES # BLD AUTO: 0.02 K/UL (ref 0–0.04)
IMM GRANULOCYTES NFR BLD AUTO: 0.5 % (ref 0–0.5)
LDLC SERPL CALC-MCNC: 98.8 MG/DL (ref 63–159)
LYMPHOCYTES # BLD AUTO: 1.3 K/UL (ref 1–4.8)
LYMPHOCYTES NFR BLD: 31.1 % (ref 18–48)
MCH RBC QN AUTO: 29.5 PG (ref 27–31)
MCHC RBC AUTO-ENTMCNC: 33.2 G/DL (ref 32–36)
MCV RBC AUTO: 89 FL (ref 82–98)
MONOCYTES # BLD AUTO: 0.3 K/UL (ref 0.3–1)
MONOCYTES NFR BLD: 7.1 % (ref 4–15)
NEUTROPHILS # BLD AUTO: 2.4 K/UL (ref 1.8–7.7)
NEUTROPHILS NFR BLD: 57 % (ref 38–73)
NONHDLC SERPL-MCNC: 126 MG/DL
NRBC BLD-RTO: 0 /100 WBC
PLATELET # BLD AUTO: 446 K/UL (ref 150–450)
PMV BLD AUTO: 9.5 FL (ref 9.2–12.9)
POTASSIUM SERPL-SCNC: 4 MMOL/L (ref 3.5–5.1)
PROT SERPL-MCNC: 6.4 G/DL (ref 6–8.4)
RBC # BLD AUTO: 4.31 M/UL (ref 4–5.4)
SODIUM SERPL-SCNC: 141 MMOL/L (ref 136–145)
TRIGL SERPL-MCNC: 136 MG/DL (ref 30–150)
TSH SERPL DL<=0.005 MIU/L-ACNC: 1.12 UIU/ML (ref 0.4–4)
WBC # BLD AUTO: 4.21 K/UL (ref 3.9–12.7)

## 2024-04-08 PROCEDURE — 85652 RBC SED RATE AUTOMATED: CPT | Performed by: INTERNAL MEDICINE

## 2024-04-08 PROCEDURE — 99999 PR PBB SHADOW E&M-EST. PATIENT-LVL IV: CPT | Mod: PBBFAC,,, | Performed by: INTERNAL MEDICINE

## 2024-04-08 PROCEDURE — 1159F MED LIST DOCD IN RCRD: CPT | Mod: CPTII,S$GLB,, | Performed by: INTERNAL MEDICINE

## 2024-04-08 PROCEDURE — 3074F SYST BP LT 130 MM HG: CPT | Mod: CPTII,S$GLB,, | Performed by: INTERNAL MEDICINE

## 2024-04-08 PROCEDURE — 99395 PREV VISIT EST AGE 18-39: CPT | Mod: S$GLB,,, | Performed by: INTERNAL MEDICINE

## 2024-04-08 PROCEDURE — 1160F RVW MEDS BY RX/DR IN RCRD: CPT | Mod: CPTII,S$GLB,, | Performed by: INTERNAL MEDICINE

## 2024-04-08 PROCEDURE — 84443 ASSAY THYROID STIM HORMONE: CPT | Performed by: INTERNAL MEDICINE

## 2024-04-08 PROCEDURE — 80053 COMPREHEN METABOLIC PANEL: CPT | Performed by: INTERNAL MEDICINE

## 2024-04-08 PROCEDURE — 80061 LIPID PANEL: CPT | Performed by: INTERNAL MEDICINE

## 2024-04-08 PROCEDURE — 3008F BODY MASS INDEX DOCD: CPT | Mod: CPTII,S$GLB,, | Performed by: INTERNAL MEDICINE

## 2024-04-08 PROCEDURE — 86038 ANTINUCLEAR ANTIBODIES: CPT | Performed by: INTERNAL MEDICINE

## 2024-04-08 PROCEDURE — 3078F DIAST BP <80 MM HG: CPT | Mod: CPTII,S$GLB,, | Performed by: INTERNAL MEDICINE

## 2024-04-08 PROCEDURE — 36415 COLL VENOUS BLD VENIPUNCTURE: CPT | Mod: PO | Performed by: INTERNAL MEDICINE

## 2024-04-08 PROCEDURE — 86039 ANTINUCLEAR ANTIBODIES (ANA): CPT | Performed by: INTERNAL MEDICINE

## 2024-04-08 PROCEDURE — 85025 COMPLETE CBC W/AUTO DIFF WBC: CPT | Performed by: INTERNAL MEDICINE

## 2024-04-08 PROCEDURE — 86235 NUCLEAR ANTIGEN ANTIBODY: CPT | Mod: 59 | Performed by: INTERNAL MEDICINE

## 2024-04-08 PROCEDURE — 86140 C-REACTIVE PROTEIN: CPT | Performed by: INTERNAL MEDICINE

## 2024-04-08 PROCEDURE — 86431 RHEUMATOID FACTOR QUANT: CPT | Performed by: INTERNAL MEDICINE

## 2024-04-08 RX ORDER — PREDNISONE 20 MG/1
TABLET ORAL
Qty: 20 TABLET | Refills: 0 | Status: SHIPPED | OUTPATIENT
Start: 2024-04-08

## 2024-04-08 RX ORDER — TIZANIDINE 4 MG/1
4 TABLET ORAL EVERY 8 HOURS PRN
Qty: 30 TABLET | Refills: 1 | Status: SHIPPED | OUTPATIENT
Start: 2024-04-08 | End: 2024-04-18

## 2024-04-08 NOTE — PROGRESS NOTES
Patient ID: Sari Serna is a 39 y.o. female    Chief Complaint: Annual Exam and Hospital Follow Up    History of Present Illness  The patient is coming in for annual physical.    The patient reported experiencing severe pain in her neck, arm, and both hands upon awakening on Friday. Despite taking Tylenol, which initially provided relief at work, the pain recurred around midnight. Subsequently, she took Tylenol and subsequently went to bed. However, upon awakening, she continued to experience pain in her neck, arm, and hands, which persisted even during attempts to rise from the bathtub. The pain subsided after 3 to 4 hours, after which she took additional Tylenol and took a nap. Upon awakening, she experienced severe pain and was unable to move her arm. She sought medical attention at the [facility], where she received two local lidocaine injections in her neck, morphine, Toradol, and a muscle relaxer. Despite these interventions, her neck and arm range of motion improved, but she continued to experience severe pain in her hands upon waking. The pain has somewhat subsided throughout the day, but she continues to experience pain when making a fist and opening medicine bottles. She has a history of baseline arthritis and is currently undergoing physical therapy for her neck. She was prescribed Mobic for her hip, which provides some relief, but she is unsure if it significantly alleviates her pain. She was also prescribed Flexeril, which induces fatigue.  She has not had an x-ray of her neck. She denies any redness or swelling in her hands.  She reported redness in her legs and keratosis pilaris.   Her mother had inflammatory liver disease. She has 2 cousins with lupus.    ROS: Otherwise Negative     Physical Exam  Gen. well-appearing, well-nourished, no apparent distress  HEENT conjunctiva is normal pupils equal reactive to light TMs are clear and intact bilaterally hearing is grossly normal nasopharynx  clear oropharynx is clear  Neck supple no thyromegaly no bruit  Lymph no cervical or supraclavicular adenopathy  Heart regular rate and rhythm without murmur rub or gallop  Lungs clear to auscultation respiratory effort normal  Abdomen soft nontender nondistended normoactive bowel sounds no hepatosplenomegaly.  No masses  Extremities good distal pulses no edema  Psychiatric oriented to time person place.  Judgment and insight seem unimpaired mood and affect are appropriate.  muscle skeletal some pain in the occipital cervical spine region C-spine    Results      Assessment & Plan  1. Cervicalgia.  The patient's cervicalgia is likely attributable to postural and cervical radiculopathy. A regimen of anti-inflammatory and muscle relaxants, including ibuprofen thrice daily or prednisone, will be initiated. The patient will also commence a regimen of tizanidine, taking 0.5 to 1 tablet every 8 hours as required. A referral for dry needling has been made. A cervical spine x-ray will be ordered. A prescription for prednisone will be provided, to be taken post-breakfast or Lunch, and tizanidine will be provided. The patient is advised to continue taking Mobic once daily, but not concurrently with prednisone. The patient is advised to report any finger swelling after 2 hours.    2. Annual physical examination.  The patient's last blood work was conducted a year ago. A comprehensive blood work, including sedimentation rate and CRP, will be conducted. The patient's URI test will be repeated.      No follow-ups on file.    Sari Strong was seen today for annual exam and hospital follow up.    Diagnoses and all orders for this visit:    Rash  -     URI; Future    Cervical radiculopathy  -     predniSONE (DELTASONE) 20 MG tablet; Take 3 pills daily for 3 days, then 2 pills daily for 3 days, then 1 pill daily for 3 days, then 1/2 pill daily for 4 days.  -     X-Ray Cervical Spine AP And Lateral; Future  -     tiZANidine (ZANAFLEX)  4 MG tablet; Take 1 tablet (4 mg total) by mouth every 8 (eight) hours as needed.  Discuss use benefit as well as potential adverse side effects she will follow-up with her physical therapist  Preventative health care  -     CBC Auto Differential; Future  -     Comprehensive Metabolic Panel; Future  -     Lipid Panel; Future  -     TSH; Future  Healthcare maintenance performed, reviewed, updated and counseled today.  Pain in both hands  -     Sedimentation rate; Future  -     C-REACTIVE PROTEIN; Future  -     Rheumatoid Factor; Future    Neck pain  -     URI; Future    Positive URI (antinuclear antibody)  -     URI; Future         This note was generated with the assistance of ambient listening technology. Verbal consent was obtained by the patient and accompanying visitor(s) for the recording of patient appointment to facilitate this note. I attest to having reviewed and edited the generated note for accuracy, though some syntax or spelling errors may persist. Please contact the author of this note for any clarification.

## 2024-04-09 ENCOUNTER — HOSPITAL ENCOUNTER (OUTPATIENT)
Dept: RADIOLOGY | Facility: HOSPITAL | Age: 39
Discharge: HOME | End: 2024-04-09
Attending: INTERNAL MEDICINE
Payer: COMMERCIAL

## 2024-04-09 ENCOUNTER — PATIENT MESSAGE (OUTPATIENT)
Dept: INTERNAL MEDICINE | Facility: CLINIC | Age: 39
End: 2024-04-09
Payer: COMMERCIAL

## 2024-04-09 DIAGNOSIS — M54.12 CERVICAL RADICULOPATHY: ICD-10-CM

## 2024-04-09 LAB
ANA PATTERN 1: NORMAL
ANA SER QL IF: POSITIVE
ANA TITR SER IF: NORMAL {TITER}
ERYTHROCYTE [SEDIMENTATION RATE] IN BLOOD BY PHOTOMETRIC METHOD: 31 MM/HR (ref 0–36)
RHEUMATOID FACT SERPL-ACNC: <13 IU/ML (ref 0–15)

## 2024-04-09 PROCEDURE — 72040 X-RAY EXAM NECK SPINE 2-3 VW: CPT | Mod: 26,,, | Performed by: RADIOLOGY

## 2024-04-09 PROCEDURE — 72040 X-RAY EXAM NECK SPINE 2-3 VW: CPT | Mod: TC

## 2024-04-10 ENCOUNTER — PATIENT MESSAGE (OUTPATIENT)
Dept: SPORTS MEDICINE | Facility: CLINIC | Age: 39
End: 2024-04-10
Payer: COMMERCIAL

## 2024-04-10 LAB
ANTI SM ANTIBODY: 0.12 RATIO (ref 0–0.99)
ANTI SM/RNP ANTIBODY: 0.08 RATIO (ref 0–0.99)
ANTI-SM INTERPRETATION: NEGATIVE
ANTI-SM/RNP INTERPRETATION: NEGATIVE
ANTI-SSA ANTIBODY: 0.08 RATIO (ref 0–0.99)
ANTI-SSA INTERPRETATION: NEGATIVE
ANTI-SSB ANTIBODY: 0.1 RATIO (ref 0–0.99)
ANTI-SSB INTERPRETATION: NEGATIVE
DSDNA AB SER-ACNC: NORMAL [IU]/ML

## 2024-04-25 ENCOUNTER — PATIENT MESSAGE (OUTPATIENT)
Dept: INTERNAL MEDICINE | Facility: CLINIC | Age: 39
End: 2024-04-25
Payer: COMMERCIAL

## 2024-04-26 ENCOUNTER — PATIENT MESSAGE (OUTPATIENT)
Dept: OBSTETRICS AND GYNECOLOGY | Facility: CLINIC | Age: 39
End: 2024-04-26
Payer: COMMERCIAL

## 2024-04-26 DIAGNOSIS — R10.2 PELVIC PAIN: Primary | ICD-10-CM

## 2024-04-29 ENCOUNTER — HOSPITAL ENCOUNTER (OUTPATIENT)
Dept: RADIOLOGY | Facility: HOSPITAL | Age: 39
Discharge: HOME | End: 2024-04-29
Attending: OBSTETRICS & GYNECOLOGY
Payer: COMMERCIAL

## 2024-04-29 DIAGNOSIS — R10.2 PELVIC PAIN: ICD-10-CM

## 2024-04-29 PROCEDURE — 76856 US EXAM PELVIC COMPLETE: CPT | Mod: 26,,, | Performed by: RADIOLOGY

## 2024-04-29 PROCEDURE — 76856 US EXAM PELVIC COMPLETE: CPT | Mod: TC

## 2024-04-29 PROCEDURE — 76830 TRANSVAGINAL US NON-OB: CPT | Mod: TC

## 2024-04-29 PROCEDURE — 76830 TRANSVAGINAL US NON-OB: CPT | Mod: 26,,, | Performed by: RADIOLOGY

## 2024-05-02 ENCOUNTER — PATIENT MESSAGE (OUTPATIENT)
Dept: OBSTETRICS AND GYNECOLOGY | Facility: CLINIC | Age: 39
End: 2024-05-02

## 2024-05-02 ENCOUNTER — OFFICE VISIT (OUTPATIENT)
Dept: OBSTETRICS AND GYNECOLOGY | Facility: CLINIC | Age: 39
End: 2024-05-02
Payer: COMMERCIAL

## 2024-05-02 VITALS — BODY MASS INDEX: 22.66 KG/M2 | WEIGHT: 127.88 LBS | HEIGHT: 63 IN

## 2024-05-02 DIAGNOSIS — R10.2 PELVIC PAIN: Primary | ICD-10-CM

## 2024-05-02 PROCEDURE — 1159F MED LIST DOCD IN RCRD: CPT | Mod: CPTII,S$GLB,, | Performed by: OBSTETRICS & GYNECOLOGY

## 2024-05-02 PROCEDURE — 3008F BODY MASS INDEX DOCD: CPT | Mod: CPTII,S$GLB,, | Performed by: OBSTETRICS & GYNECOLOGY

## 2024-05-02 PROCEDURE — 99214 OFFICE O/P EST MOD 30 MIN: CPT | Mod: S$GLB,,, | Performed by: OBSTETRICS & GYNECOLOGY

## 2024-05-02 PROCEDURE — 99999 PR PBB SHADOW E&M-EST. PATIENT-LVL III: CPT | Mod: PBBFAC,,, | Performed by: OBSTETRICS & GYNECOLOGY

## 2024-05-02 NOTE — PROGRESS NOTES
Subjective:       Patient ID: Sari Serna is a 39 y.o. female.    Chief Complaint:  No chief complaint on file.        History of Present Illness  Sari Serna is a 39 y.o. female  who presents for evaluation of pelvic pain, painful periods. Pain with intercourse. Mom had complete hyst for endometriosis. She is currently on OCP. Not interested in future fertility. Last vist we discussed dx scope for evaluation for endometriosis. Patient says she is interested in scheduling. Pain is about the same but she is on OCP. All questions answered. We also discussed pelvic floor PT as another option.     Patient's last menstrual period was 2024 (approximate).   Date of Last Pap: 2022    Review of Systems  Review of Systems   Constitutional:  Negative for chills and fever.        Objective:   Physical Exam:   Constitutional: She appears well-developed and well-nourished.               Genitourinary:    Vagina, uterus, right adnexa and left adnexa normal.   The external female genitalia was normal.   Cervix is normal. Right adnexum displays no mass and no tenderness. Left adnexum displays no mass and no tenderness. No erythema or vaginal discharge in the vagina.                      Assessment/ Plan:     1. Pelvic pain          Suspicious for endometriosis based on history and FHx.  Considering dx scope   Will look at dates and let me know.     No follow-ups on file.    As of 2021, the Cures Act has been passed nationally. This new law requires that all doctors progress notes, lab results, pathology reports and radiology reports be released IMMEDIATELY to the patient in the patient portal. That means that the results are released to you at the EXACT same time they are released to me. Therefore, with all of the patients that I have I am not able to reply to each patient exactly when the results come in. So there will be a delay from when you see the results to when I see them and have  time to come up with a response to send you. Also I only see these results when I am on the computer at work. So if the results come in over the weekend or after 5 pm of a work day, I will not see them until the next business day. As you can tell, this is a challenge as a physician to give every patient the quick response they hope for and deserve. So please be patient! Thanks for understanding, Dr. Monaco

## 2024-05-20 ENCOUNTER — PATIENT MESSAGE (OUTPATIENT)
Dept: OBSTETRICS AND GYNECOLOGY | Facility: CLINIC | Age: 39
End: 2024-05-20
Payer: COMMERCIAL

## 2024-05-20 DIAGNOSIS — R10.2 PELVIC PAIN: Primary | ICD-10-CM

## 2024-05-26 DIAGNOSIS — F41.1 GAD (GENERALIZED ANXIETY DISORDER): ICD-10-CM

## 2024-05-26 RX ORDER — ESCITALOPRAM OXALATE 10 MG/1
10 TABLET ORAL DAILY
Qty: 90 TABLET | Refills: 3 | Status: SHIPPED | OUTPATIENT
Start: 2024-05-26

## 2024-05-26 NOTE — TELEPHONE ENCOUNTER
Refill Decision Note   Sari Serna  is requesting a refill authorization.  Brief Assessment and Rationale for Refill:  Approve     Medication Therapy Plan:         Comments:     Note composed:4:15 PM 05/26/2024

## 2024-05-26 NOTE — TELEPHONE ENCOUNTER
No care due was identified.  [facility] Embedded Care Due Messages. Reference number: [id number].   5/26/2024 7:08:31 AM CDT

## 2024-05-29 NOTE — TELEPHONE ENCOUNTER
Requested Date:  July 18    Requested Time:  10 am    Case Length:60 minutes    Surgeon: Liudmila Monaco      Assistant Surgeon: Sari Hernandez   Visit Type: Outpatient    LOCATION: [facility]    PROCEDURE:Diagnostic laparoscopy  Diagnosis:pelvic pain  Anesthesia type: General   Comments/Special Equipment Needed:  Rep needed: No  Does the patient need a PreOp appointment with MD: No  U/S needed at pre-op: No  PCP clearance: Not Needed  When does she need her Post op appointment: 2 weeks in person  Do you need additional time blocked out from clinic? Yes  If so, what time do you want to be back in clinic? Out all day for surgery  When can the patient go back to work? one week      Can you move Alanna to 11 am please?

## 2024-06-12 NOTE — PROGRESS NOTES
CC: right shoulder/upper back pain     Sari is here today for follow up evaluation of her right shoulder and upper back pain. Patient reports her pain is 2/10 today. She admits to a severe increase in her pain in 2024 which caused her to seek care from the ER. She does initially recall appreciating improvement with OMT and has been compliant with her HEP. Pain in April was at the base of her neck and into the bilateral shoulders with radiation into the bilateral hands. She reports it responded well to a steroid taper as well as a muscle relaxer. Pain today is consistent with previous complaints of myalgias in the right upper trapezius/posterior scalenes. Worse with turning to the right. Improves with rest, heat, Ibuprofen, and Klonopin. Reports significant stress recently secondary to a death in the family.     Recall from visit on 2024  Sari is here today for follow up evaluation of her right shoulder/upper back pain. Patient reports her shoulder pain is 0/10 today. She admits to appreciating improvement in her pain with OMT and compliance with her HEP. She also admits to left hip pain for the past 3 weeks she appreciated after Mack class.  She states that the pain is reminiscent to her right hip pain that she felt earlier in  that responded very well to OMT and home exercises.  She started doing the home exercises again which she thinks has started to help.    Recall from visit on 2024  39 y.o. Female presents today for evaluation of her right shoulder/upper back pain. She admits to appreciating right upper back pain beginning 2 weeks ago she believes may be associated with poor posture due to kitchen renovations. She states her pain was 90% improved until she woke up from a nap over the weekend and appreciated worsening pain of the same quality.   How lon weeks  What makes it better: Heat, massage, stretching, ibuprofen  What makes it worse: Turning her head, worse turning to the  left  Does it radiate: No   Attempted treatments: Heat, massage, stretching, ibuprofen    Pain score: 2/10   Any mechanical symptoms: No  Feelings of instability: No  Affecting ADLs: Yes    Occupation: special education - [employer]       PAST MEDICAL HISTORY:   Past Medical History:   Diagnosis Date    Acne     ALLERGIC RHINITIS     Allergy     Anxiety     Dysmenorrhea     Low back pain     Migraine headache     Recurrent upper respiratory infection (URI)        PAST SURGICAL HISTORY:   Past Surgical History:   Procedure Laterality Date    ESOPHAGOGASTRODUODENOSCOPY N/A 7/28/2023    Procedure: EGD (ESOPHAGOGASTRODUODENOSCOPY);  Surgeon: Sameer Cedeño MD;  [facility]  Service: Endoscopy;  Laterality: N/A;    FACIAL COSMETIC SURGERY      2013    PH MONITORING, ESOPHAGUS, WIRELESS, (OFF REFLUX MEDS) N/A 7/28/2023    Procedure: PH MONITORING, ESOPHAGUS, WIRELESS, (OFF REFLUX MEDS);  Surgeon: Sameer Cedeño MD;  Location: [facility] ([address] [place] [address]);  Service: Endoscopy;  Laterality: N/A;  Please schedule Sari for a 96 hour esophageal Bravo pH probe study off all PPIs and off all H2 blockers she is been off of them for over 3 months now so she can get scheduled at any time with me.  Thank you  Ref by Dr RENZO Cedeño       FAMILY HISTORY:   Family History   Problem Relation Name Age of Onset    Endometriosis Mother      Cirrhosis Mother      Allergic rhinitis Father Stan     Allergic rhinitis Brother Jesús     Breast cancer Paternal Aunt      Stroke Paternal Grandfather      Amblyopia Neg Hx      Blindness Neg Hx      Cataracts Neg Hx      Glaucoma Neg Hx      Macular degeneration Neg Hx      Retinal detachment Neg Hx      Strabismus Neg Hx      Colon cancer Neg Hx      Ovarian cancer Neg Hx      Thyroid disease Neg Hx      Cancer Neg Hx      Melanoma Neg Hx      Allergies Neg Hx      Angioedema Neg Hx      Asthma Neg Hx      Atopy Neg Hx      Eczema Neg Hx      Immunodeficiency Neg Hx       Rhinitis Neg Hx      Urticaria Neg Hx      Celiac disease Neg Hx      Colon polyps Neg Hx      Crohn's disease Neg Hx      Esophageal cancer Neg Hx      Hemochromatosis Neg Hx      Inflammatory bowel disease Neg Hx      Rectal cancer Neg Hx      Ulcerative colitis Neg Hx      Stomach cancer Neg Hx      Pancreatic cancer Neg Hx      Carlos's esophagus Neg Hx      Pancreatitis Neg Hx      Uterine cancer Neg Hx      Bladder Cancer Neg Hx      Kidney cancer Neg Hx         SOCIAL HISTORY:   Social History     Socioeconomic History    Marital status: Single   Occupational History    Occupation: therpist   Tobacco Use    Smoking status: Never     Passive exposure: Never    Smokeless tobacco: Never   Substance and Sexual Activity    Alcohol use: Yes     Comment: socially    Drug use: No    Sexual activity: Yes     Partners: Male     Birth control/protection: OCP     Comment: Single    Other Topics Concern    Are you pregnant or think you may be? No    Breast-feeding No   Social History Narrative    She is a special      No children       MEDICATIONS:     Current Outpatient Medications:     albuterol (PROVENTIL) 2.5 mg /3 mL (0.083 %) nebulizer solution, Take 3 mLs (2.5 mg total) by nebulization every 6 (six) hours as needed for Wheezing. Rescue, Disp: 90 mL, Rfl: 1    amoxicillin-clavulanate 875-125mg (AUGMENTIN) 875-125 mg per tablet, Take 1 tablet by mouth 2 (two) times daily., Disp: , Rfl:     azelastine 205.5 mcg (0.15 %) Kareem, , Disp: , Rfl:     BREZTRI AEROSPHERE 160-9-4.8 mcg/actuation HFAA, SMARTSI-2 Puff(s) By Mouth 1-2 Times Daily, Disp: , Rfl:     clonazePAM (KLONOPIN) 0.5 MG tablet, TAKE 1/2-1 TABLET TWICE A DAY AS NEEDED FOR ANXIETY, Disp: 30 tablet, Rfl: 3    EScitalopram oxalate (LEXAPRO) 10 MG tablet, Take 1 tablet (10 mg total) by mouth once daily., Disp: 90 tablet, Rfl: 3    ESTARYLLA 0.25-35 mg-mcg per tablet, TAKE 1 TABLET BY MOUTH EVERY DAY, Disp: 84 tablet, Rfl: 3    fluticasone (FLONASE)  "50 mcg/actuation nasal spray, 2 sprays (100 mcg total) by Each Nare route once daily., Disp: 1 Bottle, Rfl: 0    ipratropium (ATROVENT) 42 mcg (0.06 %) nasal spray, , Disp: , Rfl:     ketoconazole (NIZORAL) 2 % shampoo, Wash scalp with medicated shampoo at least 2x/week. Let sit on scalp at least 5 minutes prior to rinsing, Disp: 240 mL, Rfl: 5    LIDOcaine (LIDODERM) 5 %, Place 1 patch onto the skin once daily. Remove & Discard patch within 12 hours or as directed by MD, Disp: 30 patch, Rfl: 0    loratadine (CLARITIN) 10 mg tablet, , Disp: , Rfl:     meloxicam (MOBIC) 15 MG tablet, Take 1 tablet (15 mg total) by mouth once daily., Disp: 30 tablet, Rfl: 0    montelukast (SINGULAIR) 10 mg tablet, , Disp: , Rfl:     predniSONE (DELTASONE) 20 MG tablet, Take 3 pills daily for 3 days, then 2 pills daily for 3 days, then 1 pill daily for 3 days, then 1/2 pill daily for 4 days., Disp: 20 tablet, Rfl: 0    triamcinolone (NASACORT) 55 mcg nasal inhaler, 2 sprays once daily., Disp: , Rfl:     urea (CARMOL) 40 % Crea, AAA BID, Disp: 28 g, Rfl: 1  No current facility-administered medications for this visit.    Facility-Administered Medications Ordered in Other Visits:     LIDOcaine HCL 10 mg/ml (1%) injection 18 mL, 18 mL, Intramuscular, , Georgina Holden MD, 18 mL at 04/06/24 2030    ALLERGIES:   Review of patient's allergies indicates:  No Known Allergies     PHYSICAL EXAMINATION:  /73   Pulse 99   Ht 5' 3" (1.6 m)   Wt 58.1 kg (128 lb 2.1 oz)   BMI 22.70 kg/m²   Vitals signs and nursing note have been reviewed.  General: In no acute distress, well developed, well nourished, no diaphoresis  Eyes: EOM full and smooth, no eye redness or discharge  HENT: normocephalic and atraumatic, neck supple, trachea midline, no nasal discharge, no external ear redness or discharge  Cardiovascular: 2+ and symmetric radial bilaterally, no LE edema  Lungs: respirations non-labored, no conversational dyspnea   Abd: non-distended, " no rigidity  MSK: no amputation or deformity, no swelling of extremities  Neuro: AAOx3, CN2-12 grossly intact  Skin: No rashes, warm and dry  Psychiatric: cooperative, pleasant, mood and affect appropriate for age    SHOULDER: RIGHT  The affected shoulder is compared to the contralateral shoulder.    Observation:    CERVICAL SPINE  Normal head carriage. Normal thoracic kyphosis.  Full AROM in flexion, extension, sidebending, and rotation - pain with rightward rotation.    SHOULDER  No ecchymosis, edema, or erythema throughout the shoulder girdle.  No sternal, clavicular, or acromial deformities bilaterally.  No atrophy of the pectorals, deltoids, supraspinatus, infraspinatus, or biceps bilaterally.  No asymmetry of shoulders bilaterally.    ROM:  Active flexion to 180° on left and 180° on right.   Active abduction to 180° on left and 180° on right.    Active internal rotation to T7 on left and T7 on right.    Active external rotation to T4 on left and T4 on right.    No scapular dyskinesia or winging.    Tenderness:  No tenderness at the SC or AC joint  No tenderness over the clavicle   No tenderness over biceps tendon in the bicipital groove  No tenderness over subacromial space  + tenderness over the right upper trapezius muscle  + tenderness over the right scalene muscles    Strength Testing: (*pain)  Deltoid - 5/5 on left and 5/5 on right  Biceps - 5/5 on left and 5/5 on right  Triceps - 5/5 on left and 5/5 on right  Wrist extension - 5/5 on left and 5/5 on right  Wrist flexion - 5/5 on left and 5/5 on right   - 5/5 on left and 5/5 on right  Finger extension - 5/5 on left and 5/5 on right  Finger abduction - 5/5 on left and 5/5 on right    Special Tests:  Cervical facet loading - negative  Spurling's - negative    Resisted internal rotation - negative  Resisted external rotation - negative    Neer's test - negative  Hawkin's-Bam test - negative    Posture:  Upright  Gait: Non-antalgic     TART (Tissue  texture abnormality, Asymmetry,  Restriction of motion and/or Tenderness) changes:    Head: OA sidebent left, rotated right     Cervical Spine  Thoracic Spine  Lumbar Spine   C1 Neutral T1 Neutral L1 Neutral   C2 FRSL T2 Neutral L2 Neutral   C3 Neutral T3 NSLRR L3 Neutral   C4 ERSR T4 NSLRR L4 Neutral   C5 ERSR T5 NSLRR L5 Neutral   C6 Neutral T6 NSLRR     C7 Neutral T7 Neutral       T8 Neutral       T9 Neutral       T10 Neutral       T11 Neutral       T12 Neutral     TB right cervical paraspinal muscles from T1-C2    Ribs:  Superior rib 1 on the right  External torsion rib 4 on the right    Upper Extremity:  Periscapular myofascial restriction on the right  HTP right lateral trapezius muscle    Abdomen:    Pelvis:    Sacrum:    Lower Extremity:    Key   F= Flexed   E = Extended   R = Rotated   N = Neutral   S = Sidebent   TTA = tissue texture abnormality   L/R/B (last letter) = left/right/bilateral   HTP = fascial herniated trigger point   TB = fascial trigger band   CD = fascial continuum distortion       Neurovascular Exam:  2+ radial pulses BL  Sensation intact to light touch in the distal median, radial, and ulnar nerve distributions bilaterally.  Spurlings test - negative  Lhermittes test - negative  Capillary refill intact <2 seconds in all digits bilaterally      ASSESSMENT:      ICD-10-CM ICD-9-CM   1. Upper back pain on right side  M54.9 724.5   2. Myalgia  M79.10 729.1   3. Somatic dysfunction of upper extremity  M99.07 739.7   4. Somatic dysfunction of cervical region  M99.01 739.1   5. Cranial somatic dysfunction  M99.00 739.0   6. Somatic dysfunction of thoracic region  M99.02 739.2   7. Somatic dysfunction of rib cage region  M99.08 739.8       PLAN:  1-2.  Right upper back pain/myalgia - improved, recent exacerbation    - Sari admits to an exacerbation of previously improving upper back pain/myalgias. Symptom exacerbation is likely stress related (death in the family). She denies  radicular/red-flag symptoms.     - She appreciates improvement previously with OMT and is interested in being evaluated for this again today.     - Based on her description of pain/body language and somatic dysfunction identified on exam, I discussed osteopathic manipulation as a treatment option today. She consents to evaluation and treatment. See below.    - Continue with HEP for cervicothoracic mobility prescribed at prior visits. Added scalene stretches today. Handouts provided and explained.    - Continue with exercise activity as tolerated.       3-7. Somatic dysfunction of cervical, cranial, thoracic, upper extremity, ribcage regions -     - OMT 5-6 regions. Oral consent obtained. Reviewed benefits and potential side effects. OMT indicated today due to signs and symptoms as well as local and remote somatic dysfunction findings and their related neurokinetic, lymphatic, fascial and/or arteriovenous body connections. OMT techniques used: Soft Tissue, Myofascial Release, Muscle Energy, Still's Technique, and Fascial Distortion Model. Treatment was tolerated well. Improvement noted in segmental mobility post-treatment in dysfunctional regions. There were no adverse events and no complications immediately following treatment. Advised plenty of water to help alleviate soreness.      Future planning includes - possibly more OMT if helpful and if indicated    All questions were answered to the best of my ability and all concerns were addressed at this time.    Follow up as needed.     This note is dictated using the M*Modal Fluency Direct word recognition program. There are word recognition mistakes that are occasionally missed on review.      Total time spent face-to face with patient counseling or coordinating care including prognosis, differential diagnosis, risks and benefits of treatment, instructions, compliance risk reductions as well as non-face-to-face time personally spent reviewing medial record, medical  documentation, and coordination of care.     EST MINUTES X   87178 10-19    89273 20-29    01345 30-39 X   99215 40-54    NEW     02904 15-29    28004 30-44    57768 45-59    [id number] 60-74    PHONE      5-10    [id number] 11-20    44504 21-30

## 2024-06-18 ENCOUNTER — OFFICE VISIT (OUTPATIENT)
Dept: SPORTS MEDICINE | Facility: CLINIC | Age: 39
End: 2024-06-18
Payer: COMMERCIAL

## 2024-06-18 VITALS
SYSTOLIC BLOOD PRESSURE: 121 MMHG | DIASTOLIC BLOOD PRESSURE: 73 MMHG | BODY MASS INDEX: 22.7 KG/M2 | HEART RATE: 99 BPM | HEIGHT: 63 IN | WEIGHT: 128.13 LBS

## 2024-06-18 DIAGNOSIS — M99.01 SOMATIC DYSFUNCTION OF CERVICAL REGION: ICD-10-CM

## 2024-06-18 DIAGNOSIS — M99.08 SOMATIC DYSFUNCTION OF RIB CAGE REGION: ICD-10-CM

## 2024-06-18 DIAGNOSIS — M99.00 CRANIAL SOMATIC DYSFUNCTION: ICD-10-CM

## 2024-06-18 DIAGNOSIS — M99.02 SOMATIC DYSFUNCTION OF THORACIC REGION: ICD-10-CM

## 2024-06-18 DIAGNOSIS — M54.9 UPPER BACK PAIN ON RIGHT SIDE: Primary | ICD-10-CM

## 2024-06-18 DIAGNOSIS — M79.10 MYALGIA: ICD-10-CM

## 2024-06-18 DIAGNOSIS — M99.07 SOMATIC DYSFUNCTION OF UPPER EXTREMITY: ICD-10-CM

## 2024-06-18 PROCEDURE — 3078F DIAST BP <80 MM HG: CPT | Mod: CPTII,S$GLB,, | Performed by: ORTHOPAEDIC SURGERY

## 2024-06-18 PROCEDURE — 99214 OFFICE O/P EST MOD 30 MIN: CPT | Mod: 25,S$GLB,, | Performed by: ORTHOPAEDIC SURGERY

## 2024-06-18 PROCEDURE — 1159F MED LIST DOCD IN RCRD: CPT | Mod: CPTII,S$GLB,, | Performed by: ORTHOPAEDIC SURGERY

## 2024-06-18 PROCEDURE — 98927 OSTEOPATH MANJ 5-6 REGIONS: CPT | Mod: S$GLB,,, | Performed by: ORTHOPAEDIC SURGERY

## 2024-06-18 PROCEDURE — 3074F SYST BP LT 130 MM HG: CPT | Mod: CPTII,S$GLB,, | Performed by: ORTHOPAEDIC SURGERY

## 2024-06-18 PROCEDURE — 3008F BODY MASS INDEX DOCD: CPT | Mod: CPTII,S$GLB,, | Performed by: ORTHOPAEDIC SURGERY

## 2024-06-18 PROCEDURE — 1160F RVW MEDS BY RX/DR IN RCRD: CPT | Mod: CPTII,S$GLB,, | Performed by: ORTHOPAEDIC SURGERY

## 2024-06-18 PROCEDURE — 99999 PR PBB SHADOW E&M-EST. PATIENT-LVL IV: CPT | Mod: PBBFAC,,, | Performed by: ORTHOPAEDIC SURGERY

## 2024-06-27 DIAGNOSIS — Z76.0 ENCOUNTER FOR MEDICATION REFILL: ICD-10-CM

## 2024-06-27 RX ORDER — NORGESTIMATE AND ETHINYL ESTRADIOL 0.25-0.035
1 KIT ORAL
Qty: 84 TABLET | Refills: 3 | Status: SHIPPED | OUTPATIENT
Start: 2024-06-27

## 2024-06-27 NOTE — TELEPHONE ENCOUNTER
Refill Decision Note   Sari Serna  is requesting a refill authorization.  Brief Assessment and Rationale for Refill:  Approve     Medication Therapy Plan:         Comments:     Note composed:6:49 AM 06/27/2024

## 2024-07-05 NOTE — PRE-PROCEDURE INSTRUCTIONS
Pt reviewed by DANNY RN on 7/5/24. OK to proceed at [facility].

## 2024-07-17 ENCOUNTER — ANESTHESIA EVENT (OUTPATIENT)
Dept: SURGERY | Facility: HOSPITAL | Age: 39
End: 2024-07-17
Payer: COMMERCIAL

## 2024-07-17 RX ORDER — FAMOTIDINE 20 MG/1
20 TABLET, FILM COATED ORAL
Status: DISCONTINUED | OUTPATIENT
Start: 2024-07-17 | End: 2024-07-18

## 2024-07-17 NOTE — H&P
[facility] ([facility])  History & Physical  Gynecology    SUBJECTIVE:     Chief Complaint/Reason for Admission: pelvic pain    History of Present Illness:  Sari Serna is a 39 y.o. female  who presents for evaluation of pelvic pain, painful periods. Pain with intercourse. Mom had complete hyst for endometriosis. She is currently on OCP. Not interested in future fertility. Last vist we discussed dx scope for evaluation for endometriosis. Patient says she is interested in scheduling. Pain is about the same but she is on OCP. All questions answered.   No medications prior to admission.       Review of patient's allergies indicates:  No Known Allergies    Past Medical History:   Diagnosis Date    Acne     ALLERGIC RHINITIS     Allergy     Anxiety     Dysmenorrhea     Low back pain     Migraine headache     Recurrent upper respiratory infection (URI)      Past Surgical History:   Procedure Laterality Date    ESOPHAGOGASTRODUODENOSCOPY N/A 2023    Procedure: EGD (ESOPHAGOGASTRODUODENOSCOPY);  Surgeon: Sameer Cedeño MD;  Location: [facility] ([address] [place] [address]);  Service: Endoscopy;  Laterality: N/A;    FACIAL COSMETIC SURGERY          PH MONITORING, ESOPHAGUS, WIRELESS, (OFF REFLUX MEDS) N/A 2023    Procedure: PH MONITORING, ESOPHAGUS, WIRELESS, (OFF REFLUX MEDS);  Surgeon: Sameer Cedeño MD;  Location: [facility] ([address] [place] [address]);  Service: Endoscopy;  Laterality: N/A;  Please schedule Sari for a 96 hour esophageal Bravo pH probe study off all PPIs and off all H2 blockers she is been off of them for over 3 months now so she can get scheduled at any time with me.  Thank you  Ref by Dr RENZO Cedeño     Family History   Problem Relation Name Age of Onset    Endometriosis Mother      Cirrhosis Mother      Allergic rhinitis Father Stan     Allergic rhinitis Brother Jesús     Breast cancer Paternal Aunt      Stroke Paternal Grandfather      Amblyopia Neg Hx      Blindness Neg Hx       Cataracts Neg Hx      Glaucoma Neg Hx      Macular degeneration Neg Hx      Retinal detachment Neg Hx      Strabismus Neg Hx      Colon cancer Neg Hx      Ovarian cancer Neg Hx      Thyroid disease Neg Hx      Cancer Neg Hx      Melanoma Neg Hx      Allergies Neg Hx      Angioedema Neg Hx      Asthma Neg Hx      Atopy Neg Hx      Eczema Neg Hx      Immunodeficiency Neg Hx      Rhinitis Neg Hx      Urticaria Neg Hx      Celiac disease Neg Hx      Colon polyps Neg Hx      Crohn's disease Neg Hx      Esophageal cancer Neg Hx      Hemochromatosis Neg Hx      Inflammatory bowel disease Neg Hx      Rectal cancer Neg Hx      Ulcerative colitis Neg Hx      Stomach cancer Neg Hx      Pancreatic cancer Neg Hx      Carlos's esophagus Neg Hx      Pancreatitis Neg Hx      Uterine cancer Neg Hx      Bladder Cancer Neg Hx      Kidney cancer Neg Hx       Social History     Tobacco Use    Smoking status: Never     Passive exposure: Never    Smokeless tobacco: Never   Substance Use Topics    Alcohol use: Yes     Comment: socially    Drug use: No       Review of Systems:  Constitutional: no fever or chills  Respiratory: no cough or shortness of breath  Cardiovascular: no chest pain or palpitations  Genitourinary: no hematuria or dysuria     OBJECTIVE:     Vital Signs (Most Recent):       Physical Exam:  General: well developed, well nourished      Laboratory:  Lab Results   Component Value Date    WBC 4.21 04/08/2024    HGB 12.7 04/08/2024    HCT 38.3 04/08/2024    MCV 89 04/08/2024     04/08/2024       Ultrasound:  Results for orders placed during the hospital encounter of 04/29/24    US Pelvis Comp with Transvag NON-OB (xpd    Narrative  EXAMINATION:  US PELVIS COMP WITH TRANSVAG NON-OB (XPD)    CLINICAL HISTORY:  Pelvic and perineal pain    TECHNIQUE:  Transabdominal sonography of the pelvis was performed, followed by transvaginal sonography to better evaluate the uterus and ovaries.    COMPARISON:  Pelvic ultrasound dated  07/07/2023    FINDINGS:  Uterus:    Size: 6.7 x 3.6 x 4.9 cm    Masses: None    Endometrium: Normal range thickness in this reported pre-menopausal patient, measuring 5 mm.    Right ovary:    Size: 1.9 x 2.4 x 1.3 cm    Appearance: Anechoic follicle or cyst measuring up to 1.7 cm.    Vascular flow: Present    Left ovary:    Size: 2.6 x 2.1 x 1.5 cm    Appearance: Unremarkable    Vascular Flow: Present    Free Fluid:    Trace free fluid, likely physiologic.    Impression  No significant abnormality.      Electronically signed by: Junito Canada  Date:    04/29/2024  Time:    16:23          ASSESSMENT/PLAN:     There are no hospital problems to display for this patient.      To OR for Diagnostic laparoscopy  Risks and benefits explained in detail to patient, including but not limited to damage to internal organs, including but not limited to bowel, bladder, uterus, tubes, ovaries, nerves, arteries, veins, possible need for blood transfusion. Patient is aware of all risks and desires surgery. All questions answered. Consents signed.

## 2024-07-17 NOTE — PRE-PROCEDURE INSTRUCTIONS
Unable to reach pt via phone. Left message with instructions along with a request for a call back. In the message, RN also stated that pt will need to be accompanied by a responsible adult on day of surgery. The following was sent to pt portal.    Dear Sari ,    You are scheduled for a procedure with Dr. Monaco on 7/18/2024. Your scheduled arrival time is 5:15am.  This arrival time is roughly 2 hours before your anticipated procedure time to allow sufficient time for pre-op.  Please wear comfortable clothes.  This procedure will take place at the [facility] at the corner of [facility] and [facility].  [facility] [place] [facility] [place] [employer].  The address is:    [address] [place] [address].  [place] [id number]    After entering the building, you will proceed to the second floor where you can check in with registration. You should take any medications that you routinely take for blood pressure (other than those listed below), heart medications, thyroid, cholesterol, etc.     If you wear contact lenses, please wear glasses to your procedure.    Your fasting instructions are as follow:  Nothing to eat after midnight the evening before your surgery. You may drink clear liquids up until 2 hours prior to your arrival time. You MUST have a responsible adult to bring you home.      The evening before and morning of your procedure, please hold the following medications:  -Aspirin and Aspirin-containing products (Goody's powder, Excedrin)  -NSAIDs (Advil, Ibuprofen, Aleve, Diclofenac)  -Vitamins/Supplements  -Herbal remedies/Teas  -Stimulants (Adderall, Vyvanse, Adipex)  -Diabetic medication (Please bring with you day of procedure)  -Prescription creams/gels/lotions  -LIDODERM PATCH    -May take Tylenol      The evening before and morning of your procedure, take a shower using antibacterial soap (ex: Hibiclens or Dial antibacterial soap). DO NOT apply deodorant, lotion, cologne, or  anything else to the skin. Wear loose, comfortable fitting clothing. Do not wear jewelry or bring any valuables with you. If you wear dentures or contacts, please bring your case with you or leave them at home. Use and bring any inhalers that you may have.    If you have any procedure-specific questions, please call your surgeon's office. Any other questions, don't hesitate to call at [phone].    Thanks,  LEN Mcfadden  Pre-Admit Testing  Anesthesia Dept [facility]

## 2024-07-18 ENCOUNTER — HOSPITAL ENCOUNTER (OUTPATIENT)
Facility: HOSPITAL | Age: 39
Discharge: HOME | End: 2024-07-18
Attending: OBSTETRICS & GYNECOLOGY | Admitting: OBSTETRICS & GYNECOLOGY
Payer: COMMERCIAL

## 2024-07-18 ENCOUNTER — ANESTHESIA (OUTPATIENT)
Dept: SURGERY | Facility: HOSPITAL | Age: 39
End: 2024-07-18
Payer: COMMERCIAL

## 2024-07-18 ENCOUNTER — TELEPHONE (OUTPATIENT)
Dept: OBSTETRICS AND GYNECOLOGY | Facility: CLINIC | Age: 39
End: 2024-07-18
Payer: COMMERCIAL

## 2024-07-18 VITALS
OXYGEN SATURATION: 98 % | HEART RATE: 93 BPM | RESPIRATION RATE: 18 BRPM | SYSTOLIC BLOOD PRESSURE: 113 MMHG | TEMPERATURE: 97 F | DIASTOLIC BLOOD PRESSURE: 58 MMHG

## 2024-07-18 DIAGNOSIS — N80.9 ENDOMETRIOSIS DETERMINED BY LAPAROSCOPY: ICD-10-CM

## 2024-07-18 DIAGNOSIS — R10.2 PELVIC PAIN: Primary | ICD-10-CM

## 2024-07-18 LAB
B-HCG UR QL: NEGATIVE
CTP QC/QA: YES

## 2024-07-18 PROCEDURE — 27201423 OPTIME MED/SURG SUP & DEVICES STERILE SUPPLY: Performed by: OBSTETRICS & GYNECOLOGY

## 2024-07-18 PROCEDURE — 71000016 HC POSTOP RECOV ADDL HR: Performed by: OBSTETRICS & GYNECOLOGY

## 2024-07-18 PROCEDURE — 36000708 HC OR TIME LEV III 1ST 15 MIN: Performed by: OBSTETRICS & GYNECOLOGY

## 2024-07-18 PROCEDURE — 25000003 PHARM REV CODE 250: Performed by: OBSTETRICS & GYNECOLOGY

## 2024-07-18 PROCEDURE — 63600175 PHARM REV CODE 636 W HCPCS: Performed by: NURSE ANESTHETIST, CERTIFIED REGISTERED

## 2024-07-18 PROCEDURE — 25000003 PHARM REV CODE 250: Performed by: STUDENT IN AN ORGANIZED HEALTH CARE EDUCATION/TRAINING PROGRAM

## 2024-07-18 PROCEDURE — 94761 N-INVAS EAR/PLS OXIMETRY MLT: CPT

## 2024-07-18 PROCEDURE — 63600175 PHARM REV CODE 636 W HCPCS: Performed by: STUDENT IN AN ORGANIZED HEALTH CARE EDUCATION/TRAINING PROGRAM

## 2024-07-18 PROCEDURE — 99900035 HC TECH TIME PER 15 MIN (STAT)

## 2024-07-18 PROCEDURE — 58662 LAPAROSCOPY EXCISE LESIONS: CPT | Mod: AS,,, | Performed by: NURSE PRACTITIONER

## 2024-07-18 PROCEDURE — 88305 TISSUE EXAM BY PATHOLOGIST: CPT | Performed by: STUDENT IN AN ORGANIZED HEALTH CARE EDUCATION/TRAINING PROGRAM

## 2024-07-18 PROCEDURE — 71000015 HC POSTOP RECOV 1ST HR: Performed by: OBSTETRICS & GYNECOLOGY

## 2024-07-18 PROCEDURE — 25000003 PHARM REV CODE 250: Performed by: NURSE ANESTHETIST, CERTIFIED REGISTERED

## 2024-07-18 PROCEDURE — 37000008 HC ANESTHESIA 1ST 15 MINUTES: Performed by: OBSTETRICS & GYNECOLOGY

## 2024-07-18 PROCEDURE — 58662 LAPAROSCOPY EXCISE LESIONS: CPT | Mod: ,,, | Performed by: OBSTETRICS & GYNECOLOGY

## 2024-07-18 PROCEDURE — 81025 URINE PREGNANCY TEST: CPT | Performed by: OBSTETRICS & GYNECOLOGY

## 2024-07-18 PROCEDURE — 71000033 HC RECOVERY, INTIAL HOUR: Performed by: OBSTETRICS & GYNECOLOGY

## 2024-07-18 PROCEDURE — 37000009 HC ANESTHESIA EA ADD 15 MINS: Performed by: OBSTETRICS & GYNECOLOGY

## 2024-07-18 PROCEDURE — 36000709 HC OR TIME LEV III EA ADD 15 MIN: Performed by: OBSTETRICS & GYNECOLOGY

## 2024-07-18 RX ORDER — AMITRIPTYLINE HYDROCHLORIDE 25 MG/1
25 TABLET, FILM COATED ORAL NIGHTLY
COMMUNITY
Start: 2024-04-27

## 2024-07-18 RX ORDER — HYDROMORPHONE HYDROCHLORIDE 1 MG/ML
0.2 INJECTION, SOLUTION INTRAMUSCULAR; INTRAVENOUS; SUBCUTANEOUS EVERY 5 MIN PRN
Status: DISCONTINUED | OUTPATIENT
Start: 2024-07-18 | End: 2024-07-18 | Stop reason: HOSPADM

## 2024-07-18 RX ORDER — OXYCODONE HYDROCHLORIDE 5 MG/1
5 TABLET ORAL
Status: DISCONTINUED | OUTPATIENT
Start: 2024-07-18 | End: 2024-07-18 | Stop reason: HOSPADM

## 2024-07-18 RX ORDER — FAMOTIDINE 20 MG/1
20 TABLET, FILM COATED ORAL
Status: COMPLETED | OUTPATIENT
Start: 2024-07-18 | End: 2024-07-18

## 2024-07-18 RX ORDER — DIPHENHYDRAMINE HCL 25 MG
25 CAPSULE ORAL EVERY 4 HOURS PRN
Status: DISCONTINUED | OUTPATIENT
Start: 2024-07-18 | End: 2024-07-18 | Stop reason: HOSPADM

## 2024-07-18 RX ORDER — FENTANYL CITRATE 50 UG/ML
INJECTION, SOLUTION INTRAMUSCULAR; INTRAVENOUS
Status: DISCONTINUED | OUTPATIENT
Start: 2024-07-18 | End: 2024-07-18

## 2024-07-18 RX ORDER — IPRATROPIUM BROMIDE AND ALBUTEROL SULFATE 2.5; .5 MG/3ML; MG/3ML
3 SOLUTION RESPIRATORY (INHALATION) EVERY 4 HOURS PRN
Status: DISCONTINUED | OUTPATIENT
Start: 2024-07-18 | End: 2024-07-18 | Stop reason: HOSPADM

## 2024-07-18 RX ORDER — LIDOCAINE HYDROCHLORIDE 20 MG/ML
INJECTION INTRAVENOUS
Status: DISCONTINUED | OUTPATIENT
Start: 2024-07-18 | End: 2024-07-18

## 2024-07-18 RX ORDER — OXYCODONE AND ACETAMINOPHEN 5; 325 MG/1; MG/1
1 TABLET ORAL EVERY 4 HOURS PRN
Qty: 20 TABLET | Refills: 0 | Status: SHIPPED | OUTPATIENT
Start: 2024-07-18

## 2024-07-18 RX ORDER — CEFAZOLIN SODIUM 1 G/3ML
INJECTION, POWDER, FOR SOLUTION INTRAMUSCULAR; INTRAVENOUS
Status: DISCONTINUED | OUTPATIENT
Start: 2024-07-18 | End: 2024-07-18

## 2024-07-18 RX ORDER — ONDANSETRON HYDROCHLORIDE 2 MG/ML
INJECTION, SOLUTION INTRAVENOUS
Status: DISCONTINUED | OUTPATIENT
Start: 2024-07-18 | End: 2024-07-18

## 2024-07-18 RX ORDER — MIDAZOLAM HYDROCHLORIDE 1 MG/ML
INJECTION INTRAMUSCULAR; INTRAVENOUS
Status: DISCONTINUED | OUTPATIENT
Start: 2024-07-18 | End: 2024-07-18

## 2024-07-18 RX ORDER — OXYCODONE HYDROCHLORIDE 5 MG/1
10 TABLET ORAL EVERY 4 HOURS PRN
Status: DISCONTINUED | OUTPATIENT
Start: 2024-07-18 | End: 2024-07-18 | Stop reason: HOSPADM

## 2024-07-18 RX ORDER — IBUPROFEN 800 MG/1
800 TABLET ORAL EVERY 8 HOURS PRN
Qty: 30 TABLET | Refills: 0 | Status: SHIPPED | OUTPATIENT
Start: 2024-07-18

## 2024-07-18 RX ORDER — ACETAMINOPHEN 500 MG
1000 TABLET ORAL ONCE
Status: COMPLETED | OUTPATIENT
Start: 2024-07-18 | End: 2024-07-18

## 2024-07-18 RX ORDER — DEXTROSE MONOHYDRATE AND SODIUM CHLORIDE 5; .45 G/100ML; G/100ML
INJECTION, SOLUTION INTRAVENOUS CONTINUOUS
Status: DISCONTINUED | OUTPATIENT
Start: 2024-07-18 | End: 2024-07-18 | Stop reason: HOSPADM

## 2024-07-18 RX ORDER — PROCHLORPERAZINE EDISYLATE 5 MG/ML
5 INJECTION INTRAMUSCULAR; INTRAVENOUS EVERY 30 MIN PRN
Status: DISCONTINUED | OUTPATIENT
Start: 2024-07-18 | End: 2024-07-18 | Stop reason: HOSPADM

## 2024-07-18 RX ORDER — SCOLOPAMINE TRANSDERMAL SYSTEM 1 MG/1
1 PATCH, EXTENDED RELEASE TRANSDERMAL
Status: DISCONTINUED | OUTPATIENT
Start: 2024-07-18 | End: 2024-07-18 | Stop reason: HOSPADM

## 2024-07-18 RX ORDER — DEXTROSE, SODIUM CHLORIDE, SODIUM LACTATE, POTASSIUM CHLORIDE, AND CALCIUM CHLORIDE 5; .6; .31; .03; .02 G/100ML; G/100ML; G/100ML; G/100ML; G/100ML
INJECTION, SOLUTION INTRAVENOUS CONTINUOUS
Status: DISCONTINUED | OUTPATIENT
Start: 2024-07-18 | End: 2024-07-18 | Stop reason: HOSPADM

## 2024-07-18 RX ORDER — KETOROLAC TROMETHAMINE 30 MG/ML
INJECTION, SOLUTION INTRAMUSCULAR; INTRAVENOUS
Status: DISCONTINUED | OUTPATIENT
Start: 2024-07-18 | End: 2024-07-18

## 2024-07-18 RX ORDER — DEXMEDETOMIDINE HYDROCHLORIDE 100 UG/ML
INJECTION, SOLUTION INTRAVENOUS
Status: DISCONTINUED | OUTPATIENT
Start: 2024-07-18 | End: 2024-07-18

## 2024-07-18 RX ORDER — IBUPROFEN 200 MG
600 TABLET ORAL EVERY 6 HOURS PRN
Status: DISCONTINUED | OUTPATIENT
Start: 2024-07-18 | End: 2024-07-18

## 2024-07-18 RX ORDER — ONDANSETRON 8 MG/1
8 TABLET, ORALLY DISINTEGRATING ORAL EVERY 8 HOURS PRN
Status: DISCONTINUED | OUTPATIENT
Start: 2024-07-18 | End: 2024-07-18 | Stop reason: HOSPADM

## 2024-07-18 RX ORDER — OXYCODONE HYDROCHLORIDE 5 MG/1
5 TABLET ORAL EVERY 4 HOURS PRN
Status: DISCONTINUED | OUTPATIENT
Start: 2024-07-18 | End: 2024-07-18

## 2024-07-18 RX ORDER — OXYCODONE HYDROCHLORIDE 5 MG/1
10 TABLET ORAL EVERY 4 HOURS PRN
Status: DISCONTINUED | OUTPATIENT
Start: 2024-07-18 | End: 2024-07-18

## 2024-07-18 RX ORDER — ROCURONIUM BROMIDE 10 MG/ML
INJECTION, SOLUTION INTRAVENOUS
Status: DISCONTINUED | OUTPATIENT
Start: 2024-07-18 | End: 2024-07-18

## 2024-07-18 RX ORDER — AMOXICILLIN 250 MG
1 CAPSULE ORAL 2 TIMES DAILY
Status: DISCONTINUED | OUTPATIENT
Start: 2024-07-18 | End: 2024-07-18 | Stop reason: HOSPADM

## 2024-07-18 RX ORDER — DEXAMETHASONE SODIUM PHOSPHATE 4 MG/ML
INJECTION, SOLUTION INTRA-ARTICULAR; INTRALESIONAL; INTRAMUSCULAR; INTRAVENOUS; SOFT TISSUE
Status: DISCONTINUED | OUTPATIENT
Start: 2024-07-18 | End: 2024-07-18

## 2024-07-18 RX ORDER — ONDANSETRON 4 MG/1
4 TABLET, ORALLY DISINTEGRATING ORAL EVERY 8 HOURS PRN
Qty: 10 TABLET | Refills: 0 | Status: SHIPPED | OUTPATIENT
Start: 2024-07-18

## 2024-07-18 RX ORDER — PROPOFOL 10 MG/ML
VIAL (ML) INTRAVENOUS
Status: DISCONTINUED | OUTPATIENT
Start: 2024-07-18 | End: 2024-07-18

## 2024-07-18 RX ADMIN — HYDROMORPHONE HYDROCHLORIDE 0.2 MG: 1 INJECTION, SOLUTION INTRAMUSCULAR; INTRAVENOUS; SUBCUTANEOUS at 09:07

## 2024-07-18 RX ADMIN — ROCURONIUM BROMIDE 40 MG: 10 INJECTION, SOLUTION INTRAVENOUS at 07:07

## 2024-07-18 RX ADMIN — MIDAZOLAM 2 MG: 1 INJECTION INTRAMUSCULAR; INTRAVENOUS at 07:07

## 2024-07-18 RX ADMIN — ONDANSETRON 4 MG: 2 INJECTION INTRAMUSCULAR; INTRAVENOUS at 07:07

## 2024-07-18 RX ADMIN — ROCURONIUM BROMIDE 5 MG: 10 INJECTION, SOLUTION INTRAVENOUS at 08:07

## 2024-07-18 RX ADMIN — GLYCOPYRROLATE 0.2 MG: 0.2 INJECTION, SOLUTION INTRAMUSCULAR; INTRAVENOUS at 07:07

## 2024-07-18 RX ADMIN — KETOROLAC TROMETHAMINE 30 MG: 30 INJECTION, SOLUTION INTRAMUSCULAR; INTRAVENOUS at 08:07

## 2024-07-18 RX ADMIN — DEXAMETHASONE SODIUM PHOSPHATE 4 MG: 4 INJECTION, SOLUTION INTRA-ARTICULAR; INTRALESIONAL; INTRAMUSCULAR; INTRAVENOUS; SOFT TISSUE at 07:07

## 2024-07-18 RX ADMIN — DEXMEDETOMIDINE 10 MCG: 200 INJECTION, SOLUTION INTRAVENOUS at 07:07

## 2024-07-18 RX ADMIN — ACETAMINOPHEN 1000 MG: 500 TABLET ORAL at 05:07

## 2024-07-18 RX ADMIN — FENTANYL CITRATE 50 MCG: 50 INJECTION INTRAMUSCULAR; INTRAVENOUS at 07:07

## 2024-07-18 RX ADMIN — LIDOCAINE HYDROCHLORIDE 80 MG: 20 INJECTION INTRAVENOUS at 07:07

## 2024-07-18 RX ADMIN — SUGAMMADEX 150 MG: 100 INJECTION, SOLUTION INTRAVENOUS at 08:07

## 2024-07-18 RX ADMIN — FAMOTIDINE 20 MG: 20 TABLET ORAL at 07:07

## 2024-07-18 RX ADMIN — PROPOFOL 170 MG: 10 INJECTION, EMULSION INTRAVENOUS at 07:07

## 2024-07-18 RX ADMIN — SCOPOLAMINE 1 PATCH: 1.5 PATCH, EXTENDED RELEASE TRANSDERMAL at 05:07

## 2024-07-18 RX ADMIN — DEXMEDETOMIDINE 10 MCG: 200 INJECTION, SOLUTION INTRAVENOUS at 08:07

## 2024-07-18 RX ADMIN — SODIUM CHLORIDE: 0.9 INJECTION, SOLUTION INTRAVENOUS at 06:07

## 2024-07-18 RX ADMIN — CEFAZOLIN 2 G: 330 INJECTION, POWDER, FOR SOLUTION INTRAMUSCULAR; INTRAVENOUS at 07:07

## 2024-07-18 NOTE — TELEPHONE ENCOUNTER
I called pt. Explained there was a uterine manipulator in the uterus for the surgery and that bleeding sounds appropriate and normal to me. It is not heavy per pt. Continue to monitor. All questions answered.

## 2024-07-18 NOTE — ANESTHESIA POSTPROCEDURE EVALUATION
Anesthesia Post Evaluation    Patient: Sari Serna    Procedure(s) Performed: Procedure(s) (LRB):  LAPAROSCOPY, DIAGNOSTIC (N/A)  DESTRUCTION, ENDOMETRIOSIS (N/A)    Final Anesthesia Type: general      Patient location during evaluation: PACU  Patient participation: Yes- Able to Participate  Level of consciousness: awake  Post-procedure vital signs: reviewed and stable  Pain management: adequate  Airway patency: patent    PONV status at discharge: No PONV  Anesthetic complications: no      Cardiovascular status: blood pressure returned to baseline  Respiratory status: unassisted  Hydration status: euvolemic  Follow-up not needed.              Vitals Value Taken Time   /58 07/18/24 1016   Temp 36.1 °C (97 °F) 07/18/24 0853   Pulse 97 07/18/24 1029   Resp 18 07/18/24 1015   SpO2 98 % 07/18/24 1029   Vitals shown include unfiled device data.      Event Time   Out of Recovery 09:15:00         Pain/Aurea Score: Pain Rating Prior to Med Admin: 4 (7/18/2024  9:59 AM)  Aurea Score: 10 (7/18/2024  9:15 AM)

## 2024-07-18 NOTE — PLAN OF CARE
Pt arrived to recovery dosc via stretcher per OR team. Bedside report received. Pt attached to bedside monitor. VSS. Pt sedated___ post procedure, awoke after 1 minute. . Pt on _6___L of oxygen via simple face mask___; oxygen sats 100____%. Pt IV access infusing with NS. Will continue to monitor.

## 2024-07-18 NOTE — TRANSFER OF CARE
Anesthesia Transfer of Care Note    Patient: Sari Serna    Procedure(s) Performed: Procedure(s) (LRB):  LAPAROSCOPY, DIAGNOSTIC (N/A)  DESTRUCTION, ENDOMETRIOSIS (N/A)    Patient location: PACU    Anesthesia Type: general    Transport from OR: Transported from OR on 6-10 L/min O2 by face mask with adequate spontaneous ventilation    Post pain: adequate analgesia    Post assessment: no apparent anesthetic complications    Post vital signs: stable    Level of consciousness: sedated    Nausea/Vomiting: no nausea/vomiting    Complications: none    Transfer of care protocol was followed      Last vitals: Visit Vitals  BP (!) 148/77 (Patient Position: Sitting)   Pulse 109   Temp 37.2 °C (98.9 °F) (Temporal)   Resp 16   SpO2 100%   Breastfeeding No

## 2024-07-18 NOTE — PLAN OF CARE
Pt in preop bay 32, VSS, meds given and IV inserted. Pt denies any open wounds on body or the use of any weight loss injections. Pt needs surgical consent signed, and anesthesia consent signed, otherwise ready to roll.

## 2024-07-18 NOTE — ANESTHESIA PROCEDURE NOTES
Intubation    Date/Time: 7/18/2024 7:23 AM    Performed by: Michael Floyd CRNA  Authorized by: Jarocho Fatima MD    Intubation:     Induction:  Intravenous    Intubated:  Postinduction    Mask Ventilation:  Easy mask    Attempts:  1    Attempted By:  HUY    Blade:  Munoz 3    Laryngeal View Grade: Grade I - full view of cords      Difficult Airway Encountered?: No      Airway Device:  Oral endotracheal tube    Airway Device Size:  7.0    Style/Cuff Inflation:  Cuffed (inflated to minimal occlusive pressure)    Inflation Amount (mL):  6    Tube secured:  21    Secured at:  The lips    Placement Verified By:  Capnometry    Complicating Factors:  Small mouth    Findings Post-Intubation:  BS equal bilateral and atraumatic/condition of teeth unchanged

## 2024-07-18 NOTE — DISCHARGE SUMMARY
[facility] ([facility])  Discharge Summary  Gynecology      Admit Date: 7/18/2024    Discharge Date and Time: 7/18/2024    Attending Physician: Liudmila Monaco MD     Discharge Provider: Liudmila Monaco    Reason for Admission: Laparoscopy    Procedures Performed: Procedure(s) (LRB):  LAPAROSCOPY, DIAGNOSTIC (N/A)  DESTRUCTION, ENDOMETRIOSIS (N/A)    Hospital Course (synopsis of major diagnoses, care, treatment, and services provided during the course of the hospital stay): Patient was admitted for surgery. Following surgery she was transferred to recovery and underwent routine postoperative care. She tolerated po, ambulated without difficulty, and pain was well controlled. She was discharged to home in stable condition.      Consults: none    Significant Diagnostic Studies: Labs: CBC   Lab Results   Component Value Date    WBC 4.21 04/08/2024    HGB 12.7 04/08/2024    HCT 38.3 04/08/2024    MCV 89 04/08/2024     04/08/2024       Final Diagnoses:   Principal Problem: Endometriosis determined by laparoscopy   Secondary Diagnoses:   Active Hospital Problems    Diagnosis  POA    *Endometriosis determined by laparoscopy [N80.9]  No    Pelvic pain [R10.2]  Yes      Resolved Hospital Problems   No resolved problems to display.       Discharged Condition: stable    Disposition: Home    Follow Up/Patient Instructions: 2 weeks    Medications:  Reconciled Home Medications:   Current Discharge Medication List        START taking these medications    Details   ibuprofen (ADVIL,MOTRIN) 800 MG tablet Take 1 tablet (800 mg total) by mouth every 8 (eight) hours as needed for Pain.  Qty: 30 tablet, Refills: 0      ondansetron (ZOFRAN-ODT) 4 MG TbDL DISSOLVE 1 tablet (4 mg total) by mouth every 8 (eight) hours as needed (nausea).  Qty: 10 tablet, Refills: 0      oxyCODONE-acetaminophen (PERCOCET) 5-325 mg per tablet Take 1 tablet by mouth every 4 (four) hours as needed for Pain.  Qty: 20 tablet, Refills: 0     Comments: n/a            CONTINUE these medications which have NOT CHANGED    Details   amitriptyline (ELAVIL) 25 MG tablet Take 25 mg by mouth every evening.      EScitalopram oxalate (LEXAPRO) 10 MG tablet Take 1 tablet (10 mg total) by mouth once daily.  Qty: 90 tablet, Refills: 3    Associated Diagnoses: ADAM (generalized anxiety disorder)      ESTARYLLA 0.25-35 mg-mcg per tablet TAKE 1 TABLET BY MOUTH EVERY DAY  Qty: 84 tablet, Refills: 3    Associated Diagnoses: Encounter for medication refill      loratadine (CLARITIN) 10 mg tablet       albuterol (PROVENTIL) 2.5 mg /3 mL (0.083 %) nebulizer solution Take 3 mLs (2.5 mg total) by nebulization every 6 (six) hours as needed for Wheezing. Rescue  Qty: 90 mL, Refills: 1    Associated Diagnoses: Chronic cough      amoxicillin-clavulanate 875-125mg (AUGMENTIN) 875-125 mg per tablet Take 1 tablet by mouth 2 (two) times daily.      azelastine 205.5 mcg (0.15 %) [place]       BREZTRI AEROSPHERE 160-9-4.8 mcg/actuation HFAA SMARTSI-2 Puff(s) By Mouth 1-2 Times Daily      clonazePAM (KLONOPIN) 0.5 MG tablet TAKE 1/2-1 TABLET TWICE A DAY AS NEEDED FOR ANXIETY  Qty: 30 tablet, Refills: 3    Comments: This request is for a new prescription for a controlled substance as required by Federal/State law.  Associated Diagnoses: ADAM (generalized anxiety disorder)      fluticasone (FLONASE) 50 mcg/actuation nasal spray 2 sprays (100 mcg total) by Each Nare route once daily.  Qty: 1 Bottle, Refills: 0    Associated Diagnoses: Sinus congestion; Postnasal drip      ipratropium (ATROVENT) 42 mcg (0.06 %) nasal spray       ketoconazole (NIZORAL) 2 % shampoo Wash scalp with medicated shampoo at least 2x/week. Let sit on scalp at least 5 minutes prior to rinsing  Qty: 240 mL, Refills: 5    Associated Diagnoses: Seborrheic dermatitis      LIDOcaine (LIDODERM) 5 % Place 1 patch onto the skin once daily. Remove & Discard patch within 12 hours or as directed by MD  Qty: 30 patch, Refills: 0       meloxicam (MOBIC) 15 MG tablet Take 1 tablet (15 mg total) by mouth once daily.  Qty: 30 tablet, Refills: 0    Associated Diagnoses: Lateral pain of left hip      montelukast (SINGULAIR) 10 mg tablet       predniSONE (DELTASONE) 20 MG tablet Take 3 pills daily for 3 days, then 2 pills daily for 3 days, then 1 pill daily for 3 days, then 1/2 pill daily for 4 days.  Qty: 20 tablet, Refills: 0    Associated Diagnoses: Cervical radiculopathy      triamcinolone (NASACORT) 55 mcg nasal inhaler 2 sprays once daily.      urea (CARMOL) 40 % Crea AAA BID  Qty: 28 g, Refills: 1           Discharge Procedure Orders   Diet general     No dressing needed     Call MD for:  temperature >100.4     Call MD for:  persistent nausea and vomiting     Call MD for:  severe uncontrolled pain     Call MD for:  difficulty breathing, headache or visual disturbances     Call MD for:  redness, tenderness, or signs of infection (pain, swelling, redness, odor or green/yellow discharge around incision site)     Call MD for:  hives     Call MD for:  persistent dizziness or light-headedness     Call MD for:  extreme fatigue     Call MD for:     Activity as tolerated

## 2024-07-18 NOTE — DISCHARGE INSTRUCTIONS
Because you had anesthesia today:  - No driving for 24hrs; don't make important decisions or sign important papers for 24hrs.  - Be careful getting up from sitting or lying positions as you may still experience dizziness/lightheadedness.  - Rest until you feel completely rested.  Anesthesia medications usually wear off in 24hrs.   - You may eat/drink what you like today, but no alcohol for 24hrs.     Notify MD for:  -Fever over 101  -Excessive bleeding  -Foul discharge  -Inability to urinate  -Pain not relieved by medication prescribed   -Excessive nausea and/or vomiting     Go to the nearest ED if you experience chest pain or shortness of breath.

## 2024-07-18 NOTE — OP NOTE
[facility] ([facility])  OBGYN  Operative Note    SUMMARY     Date of Procedure: 7/18/2024     Procedure: Procedure(s) (LRB):  LAPAROSCOPY, DIAGNOSTIC (N/A)  DESTRUCTION, ENDOMETRIOSIS (N/A)       Surgeons and Role:     * Liudmila Monaco MD - Primary    Assisting Surgeon: Sari Hernandez NP    Pre-Operative Diagnosis: Pelvic pain [R10.2]    Post-Operative Diagnosis: Post-Op Diagnosis Codes:     * Pelvic pain [R10.2], Endometriosis determined by laparoscopy    Anesthesia: General    Technical Procedures Used: Excision of endometriosis    Description of the Findings of the Procedure: normal uterus, bilateral tubes and ovaries, normal appendix, normal liver, one large endometriosis implant in the posterior cul de sac at the top of the uterosacral ligaments at the upper level of the rectum.     Complications: No    Estimated Blood Loss (EBL): * No values recorded between 7/18/2024  7:45 AM and 7/18/2024  8:49 AM * Minimal    Specimens:   Specimen (24h ago, onward)       Start     Ordered    07/18/24 0838  Specimen to Pathology, Surgery Gynecology and Obstetrics  Once        Comments: Pre-op Diagnosis: Pelvic pain [R10.2]Procedure(s):LAPAROSCOPY, DIAGNOSTICDESTRUCTION, ENDOMETRIOSIS Number of specimens: 1Name of specimens: 1. Endometriosis posterior cul-de-sac     References:    Click here for ordering Quick Tip   Question Answer Comment   Procedure Type: Gynecology and Obstetrics    Release to patient Immediate        07/18/24 0838                            Condition: Good    Disposition: PACU - hemodynamically stable.    Attestation: A qualified resident physician was not available    Procedure in detail:    Patient was taken to the operating room where general anesthesia was performed. She was then prepped and draped in the normal sterile fashion. She was placed in the dorsal lithotomy position in Anthony stirrups. Her arms were tucked in the usual fashion. A duron was placed to gravity. A uterine  manipulator was placed in the usual fashion.  Attention was then turned abdominally and a 5 mm skin incision was made with a knife. Towel clamps were used to elevate the abdomen. The veress needle was used to enter the abdomen without difficulty with a starting pressure of 3 mm Hg. The abdomen was then insufflated to 15 mmHg. A 5 mm bladeless trocar was then placed using the visiport without difficulty. The patient was then placed in trendelenburg position. A 5 mm bladeless trocar was placed in the LLQ in the usual fashion. A 5 mm  bladeless trocar was placed in the RLQ in the usual fashion.   The abdomen was the evaluated and the findings were as above. Normal pelvis except for one large area of endometriosis at the posterior cul de sac at the level of the uterosacral ligaments.   The endoshears on lower power were used to excise the peritoneal reflection off of the cul de sac. Care was taken to be far from bilateral ureters. A large dilator was placed into the rectum to better delineate where the rectum was in relationship to the endometriosis. The lesion was excised and sent to pathology. No active bleeding was noted even with brining the pressure down to 5 mmHg.   The trocars were then removed. The skin incisions were closed using 4-0 Monocryl. The instruments were removed from the vagina with excellent hemostasis and no active bleeding was noted. The patient tolerated the procedure well. Sponge lap and needle counts correct x 2.

## 2024-07-18 NOTE — PLAN OF CARE
Discharge instructions given to patient, spouse and _parents_ with verbalization of understanding all instructions.  Prescription medications in pharmacy ready for .   VSS, denies n/v and tolerating PO, rates pain level tolerable, IV removed, and family available for patient discharge home.

## 2024-07-18 NOTE — OPERATIVE NOTE ADDENDUM
Certification of Assistant at Surgery       Surgery Date: 7/18/2024     Participating Surgeons:  Surgeons and Role:     * Liudmila Monaco MD - Primary    Procedures:  Procedure(s) (LRB):  LAPAROSCOPY, DIAGNOSTIC (N/A)  DESTRUCTION, ENDOMETRIOSIS (N/A)    Assistant Surgeon's Certification of Necessity:  I understand that section 1842 (b) (6) (d) of the Social Security Act generally prohibits Medicare Part B reasonable charge payment for the services of assistants at surgery in teaching hospitals when qualified residents are available to furnish such services. I certify that the services for which payment is claimed were medically necessary, and that no qualified resident was available to perform the services. I further understand that these services are subject to post-payment review by the Medicare carrier.      BRINDA Sandoval    07/18/2024  8:49 AM

## 2024-07-18 NOTE — TELEPHONE ENCOUNTER
PO pt c/o bright red blood in toilet after voiding.  Thinks its vaginal bleeding, not rectal. Not time for period.  In middle of 2nd week of pill pack.  Hasn't missed any pills.  Hasn't had a BM since surgery but is passing gas.  Feels a little cramping, tolerable, hasn't taken anything for pain since surgery.  Reassurance give, advised as long as bleeding is not the flow of a period OK to monitor.  She put on a pad after voiding, no blood on pad since (original call came in at 2:05pm).

## 2024-07-18 NOTE — ANESTHESIA PREPROCEDURE EVALUATION
[facility]  Anesthesia Pre-Operative Evaluation         Patient Name: Sari Serna  YOB: 1985  MRN: [id number]    SUBJECTIVE:     2024    Procedure(s) (LRB):  LAPAROSCOPY, DIAGNOSTIC (N/A)    Sari Serna is a 39 y.o. female here for Procedure(s) (LRB):  LAPAROSCOPY, DIAGNOSTIC (N/A)    Drips:    dextrose 5% lactated ringers   Intravenous Continuous           Patient Active Problem List   Diagnosis    Eye refraction disorder - Both Eyes    ADAM (generalized anxiety disorder)    Recurrent major depressive disorder    Tremor    Right wrist pain       Review of patient's allergies indicates:  No Known Allergies    Current Facility-Administered Medications on File Prior to Encounter   Medication Dose Route Frequency Provider Last Rate Last Admin    LIDOcaine HCL 10 mg/ml (1%) injection 18 mL  18 mL Intramuscular  Georgina Holden MD   18 mL at 24     Current Outpatient Medications on File Prior to Encounter   Medication Sig Dispense Refill    amitriptyline (ELAVIL) 25 MG tablet Take 25 mg by mouth every evening.      EScitalopram oxalate (LEXAPRO) 10 MG tablet Take 1 tablet (10 mg total) by mouth once daily. 90 tablet 3    loratadine (CLARITIN) 10 mg tablet       albuterol (PROVENTIL) 2.5 mg /3 mL (0.083 %) nebulizer solution Take 3 mLs (2.5 mg total) by nebulization every 6 (six) hours as needed for Wheezing. Rescue 90 mL 1    amoxicillin-clavulanate 875-125mg (AUGMENTIN) 875-125 mg per tablet Take 1 tablet by mouth 2 (two) times daily.      azelastine 205.5 mcg (0.15 %) [place]       BREZTRI AEROSPHERE 160-9-4.8 mcg/actuation HFAA SMARTSI-2 Puff(s) By Mouth 1-2 Times Daily      clonazePAM (KLONOPIN) 0.5 MG tablet TAKE 1/2-1 TABLET TWICE A DAY AS NEEDED FOR ANXIETY 30 tablet 3    fluticasone (FLONASE) 50 mcg/actuation nasal spray 2 sprays (100 mcg total) by Each Nare route once daily. 1 Bottle 0    ipratropium (ATROVENT) 42 mcg (0.06 %) nasal spray        ketoconazole (NIZORAL) 2 % shampoo Wash scalp with medicated shampoo at least 2x/week. Let sit on scalp at least 5 minutes prior to rinsing 240 mL 5    LIDOcaine (LIDODERM) 5 % Place 1 patch onto the skin once daily. Remove & Discard patch within 12 hours or as directed by MD 30 patch 0    meloxicam (MOBIC) 15 MG tablet Take 1 tablet (15 mg total) by mouth once daily. 30 tablet 0    montelukast (SINGULAIR) 10 mg tablet       predniSONE (DELTASONE) 20 MG tablet Take 3 pills daily for 3 days, then 2 pills daily for 3 days, then 1 pill daily for 3 days, then 1/2 pill daily for 4 days. 20 tablet 0    triamcinolone (NASACORT) 55 mcg nasal inhaler 2 sprays once daily.      urea (CARMOL) 40 % Crea AAA BID 28 g 1       Past Surgical History:   Procedure Laterality Date    ESOPHAGOGASTRODUODENOSCOPY N/A 7/28/2023    Procedure: EGD (ESOPHAGOGASTRODUODENOSCOPY);  Surgeon: Sameer Cedeño MD;  Location: [facility] ([address] [place] [address]);  Service: Endoscopy;  Laterality: N/A;    FACIAL COSMETIC SURGERY      2013    PH MONITORING, ESOPHAGUS, WIRELESS, (OFF REFLUX MEDS) N/A 7/28/2023    Procedure: PH MONITORING, ESOPHAGUS, WIRELESS, (OFF REFLUX MEDS);  Surgeon: Sameer Cedeño MD;  Location: [facility] ([address] [place] [address]);  Service: Endoscopy;  Laterality: N/A;  Please schedule Sari for a 96 hour esophageal Bravo pH probe study off all PPIs and off all H2 blockers she is been off of them for over 3 months now so she can get scheduled at any time with me.  Thank you  Ref by Dr RENZO Cedeño       Social History     Socioeconomic History    Marital status: Single   Occupational History    Occupation: therpist   Tobacco Use    Smoking status: Never     Passive exposure: Never    Smokeless tobacco: Never   Substance and Sexual Activity    Alcohol use: Yes     Comment: socially    Drug use: No    Sexual activity: Yes     Partners: Male     Birth control/protection: OCP     Comment: Single    Other Topics Concern    Are you pregnant or think you may be? No     Breast-feeding No   Social History Narrative    She is a special      No children         OBJECTIVE:     Vital Signs Range (Last 24H):  Temp:  [37.2 °C (98.9 °F)] 37.2 °C (98.9 °F)  Pulse:  [109] 109  Resp:  [16] 16  SpO2:  [100 %] 100 %  BP: (148)/(77) 148/77    Significant Labs:  Lab Results   Component Value Date    WBC 4.21 04/08/2024    HGB 12.7 04/08/2024    HCT 38.3 04/08/2024     04/08/2024    CHOL 184 04/08/2024    TRIG 136 04/08/2024    HDL 58 04/08/2024    ALT 37 04/08/2024    AST 29 04/08/2024     04/08/2024    K 4.0 04/08/2024     04/08/2024    CREATININE 0.8 04/08/2024    BUN 15 04/08/2024    CO2 24 04/08/2024    TSH 1.119 04/08/2024    HGBA1C 5.0 01/26/2019       Diagnostic Studies:    EKG:   Results for orders placed or performed in visit on 08/29/22   IN OFFICE EKG 12-LEAD (to [facility])    Collection Time: 08/29/22 10:58 AM    Narrative    Test Reason : R07.9,    Vent. Rate : 071 BPM     Atrial Rate : 071 BPM     P-R Int : 144 ms          QRS Dur : 090 ms      QT Int : 388 ms       P-R-T Axes : 064 106 047 degrees     QTc Int : 421 ms    Normal sinus rhythm  Rightward axis  Borderline Abnormal ECG  When compared with ECG of 14-MAR-2018 14:10,  Criteria for Septal infarct are no longer Present  Confirmed by Neil Hernandez MD ([id number]) on 8/30/2022 9:29:07 AM    Referred By: AALIYAH CADENA           Confirmed By:Neil Hernandez MD         Pre-op Assessment    I have reviewed the Patient Summary Reports.     I have reviewed the Nursing Notes. I have reviewed the NPO Status.   I have reviewed the Medications.   Steroids Taken In Past Year: Prednisone    Review of Systems  Anesthesia Hx:  No problems with previous Anesthesia   History of prior surgery of interest to airway management or planning:          Denies Family Hx of Anesthesia complications.    Denies Personal Hx of Anesthesia complications.                    Social:  Non-Smoker, Social Alcohol Use       EENT/Dental:  chronic  allergic rhinitis           Cardiovascular:  Cardiovascular Normal     Denies Hypertension.  Denies Valvular problems/Murmurs.  Denies MI.                                         Pulmonary:    Asthma (REactive from allergies; no recent albuterol use) mild  Recent URI, resolved                 Hepatic/GI:        Abdominal pain, no nausea or vomiting           Neurological:  Denies TIA.  Denies CVA.   Headaches Denies Seizures.                                Endocrine:  Endocrine Normal Denies Diabetes. Denies Hypothyroidism.  Denies Hyperthyroidism.         Psych:   anxiety depression                Physical Exam  General: Well nourished, Cooperative, Alert, Oriented and Anxious    Airway:  Mallampati: II / II  Mouth Opening: Small, but > 3cm  TM Distance: Normal  Tongue: Normal    Dental:  Intact        Anesthesia Plan  Type of Anesthesia, risks & benefits discussed:    Anesthesia Type: Gen ETT  Intra-op Monitoring Plan: Standard ASA Monitors  Post Op Pain Control Plan: multimodal analgesia and IV/PO Opioids PRN  Induction:  IV  Airway Plan: Video, Post-Induction  Informed Consent: Informed consent signed with the Patient and all parties understand the risks and agree with anesthesia plan.  All questions answered.   ASA Score: 2  Day of Surgery Review of History & Physical: H&P Update referred to the surgeon/provider.    Ready For Surgery From Anesthesia Perspective.     .

## 2024-07-21 ENCOUNTER — PATIENT MESSAGE (OUTPATIENT)
Dept: OBSTETRICS AND GYNECOLOGY | Facility: CLINIC | Age: 39
End: 2024-07-21
Payer: COMMERCIAL

## 2024-07-23 LAB
FINAL PATHOLOGIC DIAGNOSIS: NORMAL
GROSS: NORMAL
Lab: NORMAL

## 2024-07-29 ENCOUNTER — OFFICE VISIT (OUTPATIENT)
Dept: OBSTETRICS AND GYNECOLOGY | Facility: CLINIC | Age: 39
End: 2024-07-29
Payer: COMMERCIAL

## 2024-07-29 ENCOUNTER — PATIENT MESSAGE (OUTPATIENT)
Dept: OBSTETRICS AND GYNECOLOGY | Facility: CLINIC | Age: 39
End: 2024-07-29

## 2024-07-29 VITALS
WEIGHT: 132.25 LBS | DIASTOLIC BLOOD PRESSURE: 73 MMHG | BODY MASS INDEX: 23.43 KG/M2 | HEIGHT: 63 IN | SYSTOLIC BLOOD PRESSURE: 121 MMHG

## 2024-07-29 DIAGNOSIS — N80.9 ENDOMETRIOSIS DETERMINED BY LAPAROSCOPY: Primary | ICD-10-CM

## 2024-07-29 DIAGNOSIS — N80.511: ICD-10-CM

## 2024-07-29 DIAGNOSIS — Z76.0 ENCOUNTER FOR MEDICATION REFILL: ICD-10-CM

## 2024-07-29 PROCEDURE — 99999 PR PBB SHADOW E&M-EST. PATIENT-LVL IV: CPT | Mod: PBBFAC,,, | Performed by: OBSTETRICS & GYNECOLOGY

## 2024-07-29 PROCEDURE — 1159F MED LIST DOCD IN RCRD: CPT | Mod: CPTII,S$GLB,, | Performed by: OBSTETRICS & GYNECOLOGY

## 2024-07-29 PROCEDURE — 3078F DIAST BP <80 MM HG: CPT | Mod: CPTII,S$GLB,, | Performed by: OBSTETRICS & GYNECOLOGY

## 2024-07-29 PROCEDURE — 99024 POSTOP FOLLOW-UP VISIT: CPT | Mod: S$GLB,,, | Performed by: OBSTETRICS & GYNECOLOGY

## 2024-07-29 PROCEDURE — 3074F SYST BP LT 130 MM HG: CPT | Mod: CPTII,S$GLB,, | Performed by: OBSTETRICS & GYNECOLOGY

## 2024-07-29 RX ORDER — NORGESTIMATE AND ETHINYL ESTRADIOL 0.25-0.035
1 KIT ORAL DAILY
Qty: 112 TABLET | Refills: 3 | Status: SHIPPED | OUTPATIENT
Start: 2024-07-29

## 2024-07-29 NOTE — PROGRESS NOTES
"Subjective:       Sari Serna is a 39 y.o. female who presents to the clinic 2 weeks status post laparoscopy for pelvic pain. Eating a regular diet without difficulty. Bowel movements are normal. The patient is not having any pain.    Had endometriosis in one area near the posterior cu de sac at the level of the uterosacral ligaments where the rectum meets the posterior vaginal wall.     Objective:      /73   Ht 5' 3" (1.6 m)   Wt 60 kg (132 lb 4.4 oz)   BMI 23.43 kg/m²   General:  alert and cooperative   Abdomen: soft, bowel sounds active, non-tender   Incision:       healing well, no drainage, no erythema, no hernia, no seroma, no swelling, no dehiscence, incision well approximated         Assessment:      Doing well postoperatively.  Operative findings again reviewed. Pathology report discussed.      Plan:      1. Continue any current medications.  2. Wound care discussed.  3. Activity restrictions: none  4. Anticipated return to work: now.  5. Follow up: . Annually    1. Endometriosis determined by laparoscopy  norgestimate-ethinyl estradioL (ESTARYLLA) 0.25-35 mg-mcg per tablet      2. Encounter for medication refill        3. Superficial endometriosis of rectum  norgestimate-ethinyl estradioL (ESTARYLLA) 0.25-35 mg-mcg per tablet      Start OCP CONTINUOUSLY due to endometriosis with bowel involvement.       "

## 2024-08-01 ENCOUNTER — PATIENT MESSAGE (OUTPATIENT)
Dept: SPORTS MEDICINE | Facility: CLINIC | Age: 39
End: 2024-08-01
Payer: COMMERCIAL

## 2024-08-02 ENCOUNTER — PATIENT MESSAGE (OUTPATIENT)
Dept: OBSTETRICS AND GYNECOLOGY | Facility: CLINIC | Age: 39
End: 2024-08-02
Payer: COMMERCIAL

## 2024-08-05 NOTE — PROGRESS NOTES
CC: right shoulder/upper back pain     Sari is here today for follow up evaluation of her right shoulder and upper back pain. Patient reports her pain is 3/10 today. She is unsure if she appreciated significant improvement in her pain following OMT at her most recent visit. She has remained compliant with her HEP. She gestures to the lateral border of the scapula on the right when asked where she hurts and admits to her pain also affecting her upper trapezius muscle. She also gestures up the right side of her neck and to the base of her skull which the pain contributes to headaches. She denies new falls or trauma since her last visit.     Recall from visit on 06/18/2024  Sari is here today for follow up evaluation of her right shoulder and upper back pain. Patient reports her pain is 2/10 today. She admits to a severe increase in her pain in April of 2024 which caused her to seek care from the ER. She does initially recall appreciating improvement with OMT and has been compliant with her HEP. Pain in April was at the base of her neck and into the bilateral shoulders with radiation into the bilateral hands. She reports it responded well to a steroid taper as well as a muscle relaxer. Pain today is consistent with previous complaints of myalgias in the right upper trapezius/posterior scalenes. Worse with turning to the right. Improves with rest, heat, Ibuprofen, and Klonopin. Reports significant stress recently secondary to a death in the family.     Recall from visit on 03/18/2024  Sari is here today for follow up evaluation of her right shoulder/upper back pain. Patient reports her shoulder pain is 0/10 today. She admits to appreciating improvement in her pain with OMT and compliance with her HEP. She also admits to left hip pain for the past 3 weeks she appreciated after [employer] class.  She states that the pain is reminiscent to her right hip pain that she felt earlier in 2023 that responded very well to OMT  and home exercises.  She started doing the home exercises again which she thinks has started to help.    Recall from visit on 2024  39 y.o. Female presents today for evaluation of her right shoulder/upper back pain. She admits to appreciating right upper back pain beginning 2 weeks ago she believes may be associated with poor posture due to kitchen renovations. She states her pain was 90% improved until she woke up from a nap over the weekend and appreciated worsening pain of the same quality.   How lon weeks  What makes it better: Heat, massage, stretching, ibuprofen  What makes it worse: Turning her head, worse turning to the left  Does it radiate: No   Attempted treatments: Heat, massage, stretching, ibuprofen    Pain score: 2/10   Any mechanical symptoms: No  Feelings of instability: No  Affecting ADLs: Yes    Occupation: special education - [employer]       PAST MEDICAL HISTORY:   Past Medical History:   Diagnosis Date    Acne     ALLERGIC RHINITIS     Allergy     Anxiety     Dysmenorrhea     Low back pain     Migraine headache     Recurrent upper respiratory infection (URI)        PAST SURGICAL HISTORY:   Past Surgical History:   Procedure Laterality Date    DIAGNOSTIC LAPAROSCOPY N/A 2024    Procedure: LAPAROSCOPY, DIAGNOSTIC;  Surgeon: Liudmila Monaco MD;  Location: [facility];  Service: OB/GYN;  Laterality: N/A;    ESOPHAGOGASTRODUODENOSCOPY N/A 2023    Procedure: EGD (ESOPHAGOGASTRODUODENOSCOPY);  Surgeon: Sameer Cedeño MD;  Location: [address]);  Service: Endoscopy;  Laterality: N/A;    FACIAL COSMETIC SURGERY          PH MONITORING, ESOPHAGUS, WIRELESS, (OFF REFLUX MEDS) N/A 2023    Procedure: PH MONITORING, ESOPHAGUS, WIRELESS, (OFF REFLUX MEDS);  Surgeon: Sameer Cedeño MD;  Location: [facility] ([address] [place] [address]);  Service: Endoscopy;  Laterality: N/A;  Please schedule Sari for a 96 hour esophageal Bravo pH probe study off all PPIs and off all H2 blockers she is  been off of them for over 3 months now so she can get scheduled at any time with me.  Thank you  Ref by Dr RENZO Cedeño    SURGICAL REMOVAL OF ENDOMETRIOSIS N/A 7/18/2024    Procedure: DESTRUCTION, ENDOMETRIOSIS;  Surgeon: Liudmila Monaco MD;  Location: [facility] OR;  Service: OB/GYN;  Laterality: N/A;       FAMILY HISTORY:   Family History   Problem Relation Name Age of Onset    Endometriosis Mother      Cirrhosis Mother      Allergic rhinitis Father Stan     Allergic rhinitis Brother Jesús     Breast cancer Paternal Aunt      Stroke Paternal Grandfather      Amblyopia Neg Hx      Blindness Neg Hx      Cataracts Neg Hx      Glaucoma Neg Hx      Macular degeneration Neg Hx      Retinal detachment Neg Hx      Strabismus Neg Hx      Colon cancer Neg Hx      Ovarian cancer Neg Hx      Thyroid disease Neg Hx      Cancer Neg Hx      Melanoma Neg Hx      Allergies Neg Hx      Angioedema Neg Hx      Asthma Neg Hx      Atopy Neg Hx      Eczema Neg Hx      Immunodeficiency Neg Hx      Rhinitis Neg Hx      Urticaria Neg Hx      Celiac disease Neg Hx      Colon polyps Neg Hx      Crohn's disease Neg Hx      Esophageal cancer Neg Hx      Hemochromatosis Neg Hx      Inflammatory bowel disease Neg Hx      Rectal cancer Neg Hx      Ulcerative colitis Neg Hx      Stomach cancer Neg Hx      Pancreatic cancer Neg Hx      Carlos's esophagus Neg Hx      Pancreatitis Neg Hx      Uterine cancer Neg Hx      Bladder Cancer Neg Hx      Kidney cancer Neg Hx         SOCIAL HISTORY:   Social History     Socioeconomic History    Marital status: Single   Occupational History    Occupation: therpist   Tobacco Use    Smoking status: Never     Passive exposure: Never    Smokeless tobacco: Never   Substance and Sexual Activity    Alcohol use: Yes     Comment: socially    Drug use: No    Sexual activity: Yes     Partners: Male     Birth control/protection: OCP     Comment: Single    Other Topics Concern    Are you pregnant or think you may be? No     Breast-feeding No   Social History Narrative    She is a special      No children       MEDICATIONS:     Current Outpatient Medications:     albuterol (PROVENTIL) 2.5 mg /3 mL (0.083 %) nebulizer solution, Take 3 mLs (2.5 mg total) by nebulization every 6 (six) hours as needed for Wheezing. Rescue, Disp: 90 mL, Rfl: 1    amitriptyline (ELAVIL) 25 MG tablet, Take 25 mg by mouth every evening., Disp: , Rfl:     amoxicillin-clavulanate 875-125mg (AUGMENTIN) 875-125 mg per tablet, Take 1 tablet by mouth 2 (two) times daily., Disp: , Rfl:     azelastine 205.5 mcg (0.15 %) [place], , Disp: , Rfl:     BREZTRI AEROSPHERE 160-9-4.8 mcg/actuation HFAA, SMARTSI-2 Puff(s) By Mouth 1-2 Times Daily, Disp: , Rfl:     clonazePAM (KLONOPIN) 0.5 MG tablet, TAKE 1/2-1 TABLET TWICE A DAY AS NEEDED FOR ANXIETY, Disp: 30 tablet, Rfl: 3    EScitalopram oxalate (LEXAPRO) 10 MG tablet, Take 1 tablet (10 mg total) by mouth once daily., Disp: 90 tablet, Rfl: 3    fluticasone (FLONASE) 50 mcg/actuation nasal spray, 2 sprays (100 mcg total) by Each Nare route once daily., Disp: 1 Bottle, Rfl: 0    ibuprofen (ADVIL,MOTRIN) 800 MG tablet, Take 1 tablet (800 mg total) by mouth every 8 (eight) hours as needed for Pain., Disp: 30 tablet, Rfl: 0    ipratropium (ATROVENT) 42 mcg (0.06 %) nasal spray, , Disp: , Rfl:     ketoconazole (NIZORAL) 2 % shampoo, Wash scalp with medicated shampoo at least 2x/week. Let sit on scalp at least 5 minutes prior to rinsing, Disp: 240 mL, Rfl: 5    LIDOcaine (LIDODERM) 5 %, Place 1 patch onto the skin once daily. Remove & Discard patch within 12 hours or as directed by MD, Disp: 30 patch, Rfl: 0    loratadine (CLARITIN) 10 mg tablet, , Disp: , Rfl:     meloxicam (MOBIC) 15 MG tablet, Take 1 tablet (15 mg total) by mouth once daily., Disp: 30 tablet, Rfl: 0    montelukast (SINGULAIR) 10 mg tablet, , Disp: , Rfl:     norgestimate-ethinyl estradioL (ESTARYLLA) 0.25-35 mg-mcg per tablet, Take 1 tablet by mouth  "once daily., Disp: 112 tablet, Rfl: 3    ondansetron (ZOFRAN-ODT) 4 MG TbDL, DISSOLVE 1 tablet (4 mg total) by mouth every 8 (eight) hours as needed (nausea)., Disp: 10 tablet, Rfl: 0    oxyCODONE-acetaminophen (PERCOCET) 5-325 mg per tablet, Take 1 tablet by mouth every 4 (four) hours as needed for Pain., Disp: 20 tablet, Rfl: 0    predniSONE (DELTASONE) 20 MG tablet, Take 3 pills daily for 3 days, then 2 pills daily for 3 days, then 1 pill daily for 3 days, then 1/2 pill daily for 4 days., Disp: 20 tablet, Rfl: 0    triamcinolone (NASACORT) 55 mcg nasal inhaler, 2 sprays once daily., Disp: , Rfl:     urea (CARMOL) 40 % Crea, AAA BID, Disp: 28 g, Rfl: 1  No current facility-administered medications for this visit.    Facility-Administered Medications Ordered in Other Visits:     LIDOcaine HCL 10 mg/ml (1%) injection 18 mL, 18 mL, Intramuscular, , Georgina Holden MD, 18 mL at 04/06/24 2030    ALLERGIES:   Review of patient's allergies indicates:  No Known Allergies     PHYSICAL EXAMINATION:  /78   Pulse 87   Ht 5' 3" (1.6 m)   Wt 60 kg (132 lb 4.4 oz)   BMI 23.43 kg/m²   Vitals signs and nursing note have been reviewed.  General: In no acute distress, well developed, well nourished, no diaphoresis  Eyes: EOM full and smooth, no eye redness or discharge  HENT: normocephalic and atraumatic, neck supple, trachea midline, no nasal discharge, no external ear redness or discharge  Cardiovascular: 2+ and symmetric radial bilaterally, no LE edema  Lungs: respirations non-labored, no conversational dyspnea   Abd: non-distended, no rigidity  MSK: no amputation or deformity, no swelling of extremities  Neuro: AAOx3, CN2-12 grossly intact  Skin: No rashes, warm and dry  Psychiatric: cooperative, pleasant, mood and affect appropriate for age    SHOULDER: RIGHT  The affected shoulder is compared to the contralateral shoulder.    Observation:    CERVICAL SPINE  Normal head carriage. Normal thoracic kyphosis.  Full " AROM in flexion, extension, sidebending, and rotation - pain with rightward rotation.    SHOULDER  No ecchymosis, edema, or erythema throughout the shoulder girdle.  No sternal, clavicular, or acromial deformities bilaterally.  No atrophy of the pectorals, deltoids, supraspinatus, infraspinatus, or biceps bilaterally.  No asymmetry of shoulders bilaterally.    ROM:  Active flexion to 180° on left and 180° on right.   Active abduction to 180° on left and 180° on right.    Active internal rotation to T7 on left and T7 on right.    Active external rotation to T4 on left and T4 on right.    No scapular dyskinesia or winging.    Tenderness:  No tenderness at the SC or AC joint  No tenderness over the clavicle   No tenderness over biceps tendon in the bicipital groove  No tenderness over subacromial space  + tenderness over the right upper trapezius muscle  + tenderness over the right scalene muscles  + tenderness C7 TP on the right  + tenderness right cervical paraspinal muscles  + tenderness right occiput    Strength Testing: (*pain)  Deltoid - 5/5 on left and 5/5 on right  Biceps - 5/5 on left and 5/5 on right  Triceps - 5/5 on left and 5/5 on right  Wrist extension - 5/5 on left and 5/5 on right  Wrist flexion - 5/5 on left and 5/5 on right   - 5/5 on left and 5/5 on right  Finger extension - 5/5 on left and 5/5 on right  Finger abduction - 5/5 on left and 5/5 on right    Special Tests:  Cervical facet loading - negative  Spurling's - positive right    Resisted internal rotation - negative  Resisted external rotation - negative    Neer's test - negative  Hawkin's-Bam test - negative    Posture:  Upright  Gait: Non-antalgic     TART (Tissue texture abnormality, Asymmetry,  Restriction of motion and/or Tenderness) changes:    Head: Occipitoatlantal (OA) Joint TTA right     Cervical Spine  Thoracic Spine  Lumbar Spine   C1 TTAR T1 ERSR L1    C2 TTAR T2 Neutral L2    C3 TTAR T3 NSRRL L3    C4 TTAR T4 NSRRL L4     C5 TTAR T5 NSRRL L5    C6 ERSR T6 Neutral     C7 ERSR T7 Neutral       T8 Neutral       T9 Neutral       T10 Neutral       T11 Neutral       T12 Neutral       Ribs:  Exhalation restriction of the right ribcage  Superior rib 1 on the right    Upper Extremity:  Periscapular MFR on the right  HTP medial aspect of the right trapezius muscle    Abdomen:    Pelvis:    Sacrum:    Lower Extremity:      Key   F= Flexed   E = Extended   R = Rotated   N = Neutral   S = Sidebent   TTA = tissue texture abnormality   L/R/B (last letter) = left/right/bilateral   HTP = fascial herniated trigger point   TB = fascial trigger band   CD = fascial continuum distortion   MFR = myofascial restriction       Neurovascular Exam:  2+ radial pulses BL  Sensation intact to light touch in the distal median, radial, and ulnar nerve distributions bilaterally.  Spurlings test - negative  Lhermittes test - negative  Capillary refill intact <2 seconds in all digits bilaterally      ASSESSMENT:      ICD-10-CM ICD-9-CM   1. Upper back pain on right side  M54.9 724.5   2. Neck pain on right side  M54.2 723.1   3. Somatic dysfunction of cervical region  M99.01 739.1   4. Cranial somatic dysfunction  M99.00 739.0   5. Somatic dysfunction of thoracic region  M99.02 739.2   6. Somatic dysfunction of rib cage region  M99.08 739.8   7. Somatic dysfunction of upper extremity  M99.07 739.7         PLAN:  1-2. Right upper back pain/myalgia - unchanged     - Sari admits to an exacerbation of previously improving upper back pain/myalgias. Symptom exacerbation is likely stress related (death in the family). She denies radicular/red-flag symptoms.     - She denies significant improvement in her pain following her most recent visit and continues to appreciate right upper back and neck pain that is increased first thing in the morning.     - Based on her description/body language of pain and somatic dysfunction identified on exam, I discussed osteopathic  manipulation as a treatment option today.  She consents to evaluation and treatment.  See below.    - Continue with HEP for cervicothoracic mobility prescribed at prior visits. Added scalene stretches today. Handouts provided and explained.    - Continue with exercise activity as tolerated.     - Due to worsening upper back and neck pain that has failed to improved with OMT and compliance with her HEP, an MRI of the cervical spine has been ordered and scheduled.       3-7. Somatic dysfunction of cervical, cranial, thoracic, upper extremity, ribcage regions -     - OMT 5-6 regions. Oral consent obtained. Reviewed benefits and potential side effects. OMT indicated today due to signs and symptoms as well as local and remote somatic dysfunction findings and their related neurokinetic, lymphatic, fascial and/or arteriovenous body connections. OMT techniques used: Soft Tissue, Myofascial Release, Muscle Energy, and Fascial Distortion Model. Treatment was tolerated well. Improvement noted in segmental mobility post-treatment in dysfunctional regions. There were no adverse events and no complications immediately following treatment. Advised plenty of water to help alleviate soreness.      Future planning includes - next steps pending MRI results, possibly more OMT if helpful and if indicated    All questions were answered to the best of my ability and all concerns were addressed at this time.    Follow up after MRI for results and next steps.     This note is dictated using the M*Modal Fluency Direct word recognition program. There are word recognition mistakes that are occasionally missed on review.

## 2024-08-06 NOTE — TELEPHONE ENCOUNTER
Reports mucous in stool.     Hey, just wanted to give some follow up to our last conversation. I had my first period post surgery and overall pain during bowel moments is significantly decreased. Maybe some slight extra pressure still but none of the sharp pain I was having before. I did notice mucus during one trip to the bathroom. Wasnt sure how significant that was so I wanted to let you know.

## 2024-08-12 ENCOUNTER — OFFICE VISIT (OUTPATIENT)
Dept: SPORTS MEDICINE | Facility: CLINIC | Age: 39
End: 2024-08-12
Payer: COMMERCIAL

## 2024-08-12 VITALS
SYSTOLIC BLOOD PRESSURE: 120 MMHG | HEART RATE: 87 BPM | BODY MASS INDEX: 23.43 KG/M2 | HEIGHT: 63 IN | DIASTOLIC BLOOD PRESSURE: 78 MMHG | WEIGHT: 132.25 LBS

## 2024-08-12 DIAGNOSIS — M99.08 SOMATIC DYSFUNCTION OF RIB CAGE REGION: ICD-10-CM

## 2024-08-12 DIAGNOSIS — M54.2 NECK PAIN ON RIGHT SIDE: ICD-10-CM

## 2024-08-12 DIAGNOSIS — M99.07 SOMATIC DYSFUNCTION OF UPPER EXTREMITY: ICD-10-CM

## 2024-08-12 DIAGNOSIS — M99.00 CRANIAL SOMATIC DYSFUNCTION: ICD-10-CM

## 2024-08-12 DIAGNOSIS — M99.02 SOMATIC DYSFUNCTION OF THORACIC REGION: ICD-10-CM

## 2024-08-12 DIAGNOSIS — M54.9 UPPER BACK PAIN ON RIGHT SIDE: Primary | ICD-10-CM

## 2024-08-12 DIAGNOSIS — M99.01 SOMATIC DYSFUNCTION OF CERVICAL REGION: ICD-10-CM

## 2024-08-12 PROCEDURE — 99999 PR PBB SHADOW E&M-EST. PATIENT-LVL IV: CPT | Mod: PBBFAC,,, | Performed by: ORTHOPAEDIC SURGERY

## 2024-08-12 PROCEDURE — 3078F DIAST BP <80 MM HG: CPT | Mod: CPTII,S$GLB,, | Performed by: ORTHOPAEDIC SURGERY

## 2024-08-12 PROCEDURE — 1160F RVW MEDS BY RX/DR IN RCRD: CPT | Mod: CPTII,S$GLB,, | Performed by: ORTHOPAEDIC SURGERY

## 2024-08-12 PROCEDURE — 3074F SYST BP LT 130 MM HG: CPT | Mod: CPTII,S$GLB,, | Performed by: ORTHOPAEDIC SURGERY

## 2024-08-12 PROCEDURE — 98927 OSTEOPATH MANJ 5-6 REGIONS: CPT | Mod: S$GLB,,, | Performed by: ORTHOPAEDIC SURGERY

## 2024-08-12 PROCEDURE — 1159F MED LIST DOCD IN RCRD: CPT | Mod: CPTII,S$GLB,, | Performed by: ORTHOPAEDIC SURGERY

## 2024-08-12 PROCEDURE — 3008F BODY MASS INDEX DOCD: CPT | Mod: CPTII,S$GLB,, | Performed by: ORTHOPAEDIC SURGERY

## 2024-08-12 PROCEDURE — 99214 OFFICE O/P EST MOD 30 MIN: CPT | Mod: 25,S$GLB,, | Performed by: ORTHOPAEDIC SURGERY

## 2024-08-27 ENCOUNTER — PATIENT MESSAGE (OUTPATIENT)
Dept: INTERNAL MEDICINE | Facility: CLINIC | Age: 39
End: 2024-08-27
Payer: COMMERCIAL

## 2024-08-27 DIAGNOSIS — F41.0 PANIC ATTACK: Primary | ICD-10-CM

## 2024-08-27 DIAGNOSIS — F41.1 GAD (GENERALIZED ANXIETY DISORDER): ICD-10-CM

## 2024-08-27 NOTE — PROGRESS NOTES
Telemedicine/Virtual Visit Documentation:      The patient location is at home in [place], using mobile device, safe     The chief complaint leading to consultation is: see HPI     VISIT TYPE X   Virtual visit with synchronous audio and video X   Telephone E/M service         CC: Right shoulder/upper back pain    HPI: Sari is here today via telemedicine to follow up on her right shoulder/upper back pain and to discuss her MRI results. Recall she has been appreciating shoulder and upper back pain that has failed to improved with OMT and compliance with her HEP.       PAST MEDICAL HISTORY:  Past Medical History:   Diagnosis Date    Acne     ALLERGIC RHINITIS     Allergy     Anxiety     Dysmenorrhea     Low back pain     Migraine headache     Recurrent upper respiratory infection (URI)        FAMILY HISTORY:  Family History   Problem Relation Name Age of Onset    Endometriosis Mother      Cirrhosis Mother      Allergic rhinitis Father Stan     Allergic rhinitis Brother Jesús     Breast cancer Paternal Aunt      Stroke Paternal Grandfather      Amblyopia Neg Hx      Blindness Neg Hx      Cataracts Neg Hx      Glaucoma Neg Hx      Macular degeneration Neg Hx      Retinal detachment Neg Hx      Strabismus Neg Hx      Colon cancer Neg Hx      Ovarian cancer Neg Hx      Thyroid disease Neg Hx      Cancer Neg Hx      Melanoma Neg Hx      Allergies Neg Hx      Angioedema Neg Hx      Asthma Neg Hx      Atopy Neg Hx      Eczema Neg Hx      Immunodeficiency Neg Hx      Rhinitis Neg Hx      Urticaria Neg Hx      Celiac disease Neg Hx      Colon polyps Neg Hx      Crohn's disease Neg Hx      Esophageal cancer Neg Hx      Hemochromatosis Neg Hx      Inflammatory bowel disease Neg Hx      Rectal cancer Neg Hx      Ulcerative colitis Neg Hx      Stomach cancer Neg Hx      Pancreatic cancer Neg Hx      Carlos's esophagus Neg Hx      Pancreatitis Neg Hx      Uterine cancer Neg Hx      Bladder Cancer Neg Hx      Kidney cancer  Neg Hx         SURGICAL HISTORY:  Past Surgical History:   Procedure Laterality Date    DIAGNOSTIC LAPAROSCOPY N/A 7/18/2024    Procedure: LAPAROSCOPY, DIAGNOSTIC;  Surgeon: Liudmila Monaco MD;  Location: [facility] OR;  Service: OB/GYN;  Laterality: N/A;    ESOPHAGOGASTRODUODENOSCOPY N/A 7/28/2023    Procedure: EGD (ESOPHAGOGASTRODUODENOSCOPY);  Surgeon: Sameer Cedeño MD;  Location: [facility] (4TH FLR);  Service: Endoscopy;  Laterality: N/A;    FACIAL COSMETIC SURGERY      2013    PH MONITORING, ESOPHAGUS, WIRELESS, (OFF REFLUX MEDS) N/A 7/28/2023    Procedure: PH MONITORING, ESOPHAGUS, WIRELESS, (OFF REFLUX MEDS);  Surgeon: Sameer Cedeño MD;  Location: [facility] (4TH FLR);  Service: Endoscopy;  Laterality: N/A;  Please schedule Sari for a 96 hour esophageal Bravo pH probe study off all PPIs and off all H2 blockers she is been off of them for over 3 months now so she can get scheduled at any time with me.  Thank you  Ref by Dr RENZO Cedeño    SURGICAL REMOVAL OF ENDOMETRIOSIS N/A 7/18/2024    Procedure: DESTRUCTION, ENDOMETRIOSIS;  Surgeon: Liudmila Monaco MD;  Location: [facility] OR;  Service: OB/GYN;  Laterality: N/A;       SOCIAL HISTORY:  Social History     Socioeconomic History    Marital status: Single   Occupational History    Occupation: therpist   Tobacco Use    Smoking status: Never     Passive exposure: Never    Smokeless tobacco: Never   Substance and Sexual Activity    Alcohol use: Yes     Comment: socially    Drug use: No    Sexual activity: Yes     Partners: Male     Birth control/protection: OCP     Comment: Single    Other Topics Concern    Are you pregnant or think you may be? No    Breast-feeding No   Social History Narrative    She is a special      No children       ALLERGIES:  Review of patient's allergies indicates:  No Known Allergies      PHYSICAL EXAMINATION:  General: In no acute distress, well developed, well nourished, no diaphoresis  Eyes: EOM full and smooth, no eye redness or  discharge  HENT: normocephalic and atraumatic, neck supple, trachea midline, no nasal discharge, no external ear redness or discharge  Lungs: respirations non-labored, no conversational dyspnea   Neuro: AAOx3, CN2-12 grossly intact  Skin: No rashes on face or neck, warm and dry  Psychiatric: cooperative, pleasant, mood and affect appropriate for age    IMAGIN. X-ray obtained 2024 due to neck pain, cervical radiculopathy   2. X-ray images were reviewed personally by me and then directly with patient.  3. FINDINGS: X-ray images obtained demonstrate mild DJD. The C5/C6 disc space is narrowed. The other disc spaces are well maintained. There is a 2 mm a C5/C6 retrolisthesis noted. No fracture or dislocation. No bone destruction identified   4. IMPRESSION: As above.     1. MRI obtained  2024 due to neck pain, cervical radiculopathy   2. MRI images were reviewed personally by me and then directly with patient.  3. FINDINGS: MRI images obtained demonstrate degenerative changes, most advanced at C5-6 where there is moderate canal stenosis, moderate right foraminal stenosis, and extensive endplate edema.   4. IMPRESSION: As above.       ASSESSMENT:  1. Neck pain on right side    2. Upper back pain on right side          PLAN:   1-2. Neck pain/upper back pain -     - Sari is here today via telemedicine to follow up on her right shoulder/upper back pain and to discuss her MRI results. Recall she has been appreciating shoulder and upper back pain that has failed to improved with OMT and compliance with her HEP.     - MRI obtained 2024 and images were personally reviewed with the patient. See above for further detail.    - Based on symptoms, exam, and imaging, PT and NSAIDs were recommended. If not improved, we can consider a referral to interventional pain medicine to discuss injection options.    - Physical therapy referral to [facility] placed today.       Future planning includes -  pending response to PT    All questions were answered to the best of my ability and all concerns were addressed at this time.      This note is dictated using the M*Modal Fluency Direct word recognition program. There are word recognition mistakes that are occasionally missed on review.

## 2024-08-28 ENCOUNTER — HOSPITAL ENCOUNTER (OUTPATIENT)
Dept: RADIOLOGY | Facility: HOSPITAL | Age: 39
Discharge: HOME | End: 2024-08-28
Attending: ORTHOPAEDIC SURGERY
Payer: COMMERCIAL

## 2024-08-28 DIAGNOSIS — M54.9 UPPER BACK PAIN ON RIGHT SIDE: ICD-10-CM

## 2024-08-28 DIAGNOSIS — M54.2 NECK PAIN ON RIGHT SIDE: ICD-10-CM

## 2024-08-28 PROCEDURE — 72141 MRI NECK SPINE W/O DYE: CPT | Mod: 26,,, | Performed by: INTERNAL MEDICINE

## 2024-08-28 PROCEDURE — 72141 MRI NECK SPINE W/O DYE: CPT | Mod: TC

## 2024-08-28 RX ORDER — LORAZEPAM 0.5 MG/1
0.5 TABLET ORAL EVERY 12 HOURS PRN
Qty: 30 TABLET | Refills: 0 | Status: SHIPPED | OUTPATIENT
Start: 2024-08-28

## 2024-08-29 ENCOUNTER — PATIENT MESSAGE (OUTPATIENT)
Dept: SPORTS MEDICINE | Facility: CLINIC | Age: 39
End: 2024-08-29

## 2024-08-29 ENCOUNTER — OFFICE VISIT (OUTPATIENT)
Dept: SPORTS MEDICINE | Facility: CLINIC | Age: 39
End: 2024-08-29
Payer: COMMERCIAL

## 2024-08-29 DIAGNOSIS — M54.2 NECK PAIN ON RIGHT SIDE: Primary | ICD-10-CM

## 2024-08-29 DIAGNOSIS — M54.9 UPPER BACK PAIN ON RIGHT SIDE: ICD-10-CM

## 2024-08-29 PROCEDURE — 1160F RVW MEDS BY RX/DR IN RCRD: CPT | Mod: CPTII,95,, | Performed by: ORTHOPAEDIC SURGERY

## 2024-08-29 PROCEDURE — 99213 OFFICE O/P EST LOW 20 MIN: CPT | Mod: 95,,, | Performed by: ORTHOPAEDIC SURGERY

## 2024-08-29 PROCEDURE — 1159F MED LIST DOCD IN RCRD: CPT | Mod: CPTII,95,, | Performed by: ORTHOPAEDIC SURGERY

## 2024-09-14 ENCOUNTER — PATIENT MESSAGE (OUTPATIENT)
Dept: OBSTETRICS AND GYNECOLOGY | Facility: CLINIC | Age: 39
End: 2024-09-14
Payer: COMMERCIAL

## 2024-09-14 DIAGNOSIS — N80.511: ICD-10-CM

## 2024-09-14 DIAGNOSIS — N80.9 ENDOMETRIOSIS DETERMINED BY LAPAROSCOPY: ICD-10-CM

## 2024-09-16 RX ORDER — NORGESTIMATE AND ETHINYL ESTRADIOL 0.25-0.035
1 KIT ORAL DAILY
Qty: 112 TABLET | Refills: 2 | Status: SHIPPED | OUTPATIENT
Start: 2024-09-16

## 2024-10-07 ENCOUNTER — OFFICE VISIT (OUTPATIENT)
Dept: PSYCHIATRY | Facility: CLINIC | Age: 39
End: 2024-10-07
Payer: COMMERCIAL

## 2024-10-07 VITALS
BODY MASS INDEX: 23.27 KG/M2 | DIASTOLIC BLOOD PRESSURE: 72 MMHG | WEIGHT: 131.31 LBS | HEART RATE: 99 BPM | SYSTOLIC BLOOD PRESSURE: 129 MMHG | HEIGHT: 63 IN

## 2024-10-07 DIAGNOSIS — F33.41 RECURRENT MAJOR DEPRESSIVE DISORDER, IN PARTIAL REMISSION: ICD-10-CM

## 2024-10-07 DIAGNOSIS — F41.1 GAD (GENERALIZED ANXIETY DISORDER): Primary | ICD-10-CM

## 2024-10-07 PROCEDURE — 3074F SYST BP LT 130 MM HG: CPT | Mod: CPTII,S$GLB,,

## 2024-10-07 PROCEDURE — 3008F BODY MASS INDEX DOCD: CPT | Mod: CPTII,S$GLB,,

## 2024-10-07 PROCEDURE — 99205 OFFICE O/P NEW HI 60 MIN: CPT | Mod: S$GLB,,,

## 2024-10-07 PROCEDURE — 3078F DIAST BP <80 MM HG: CPT | Mod: CPTII,S$GLB,,

## 2024-10-07 PROCEDURE — 99999 PR PBB SHADOW E&M-EST. PATIENT-LVL II: CPT | Mod: PBBFAC,,,

## 2024-10-07 RX ORDER — LORAZEPAM 0.5 MG/1
0.5 TABLET ORAL 2 TIMES DAILY PRN
Qty: 30 TABLET | Refills: 0 | Status: SHIPPED | OUTPATIENT
Start: 2024-10-07

## 2024-10-07 RX ORDER — ESCITALOPRAM OXALATE 20 MG/1
20 TABLET ORAL DAILY
Qty: 90 TABLET | Refills: 3 | Status: SHIPPED | OUTPATIENT
Start: 2024-10-07 | End: 2025-10-07
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
## 2024-10-08 NOTE — PROGRESS NOTES
[facility] Psychiatry resident Neil Pham MD  discussed his assessment and plan for this case.  As well, I also went to see pt myself and reviewed and concur with resident note, assessment, and plan. Also note I administered a biopsychosocial rating scale ([place] rating scale) ;  such can be viewed  in [facility]     Albin Thomas MD [facility]  Attending Psychiatrist   [facility] [place]

## 2024-10-11 ENCOUNTER — PATIENT MESSAGE (OUTPATIENT)
Dept: PSYCHIATRY | Facility: CLINIC | Age: 39
End: 2024-10-11
Payer: COMMERCIAL

## 2024-10-22 ENCOUNTER — OFFICE VISIT (OUTPATIENT)
Dept: URGENT CARE | Facility: CLINIC | Age: 39
End: 2024-10-22
Payer: COMMERCIAL

## 2024-10-22 VITALS
WEIGHT: 131 LBS | OXYGEN SATURATION: 98 % | SYSTOLIC BLOOD PRESSURE: 140 MMHG | HEART RATE: 86 BPM | DIASTOLIC BLOOD PRESSURE: 77 MMHG | BODY MASS INDEX: 23.21 KG/M2 | RESPIRATION RATE: 18 BRPM | HEIGHT: 63 IN | TEMPERATURE: 98 F

## 2024-10-22 DIAGNOSIS — N39.0 URINARY TRACT INFECTION WITH HEMATURIA, SITE UNSPECIFIED: Primary | ICD-10-CM

## 2024-10-22 DIAGNOSIS — R31.9 URINARY TRACT INFECTION WITH HEMATURIA, SITE UNSPECIFIED: Primary | ICD-10-CM

## 2024-10-22 LAB
BILIRUBIN, UA POC OHS: NEGATIVE
BLOOD, UA POC OHS: ABNORMAL
CLARITY, UA POC OHS: CLEAR
COLOR, UA POC OHS: YELLOW
GLUCOSE, UA POC OHS: NEGATIVE
KETONES, UA POC OHS: NEGATIVE
LEUKOCYTES, UA POC OHS: ABNORMAL
NITRITE, UA POC OHS: POSITIVE
PH, UA POC OHS: 5.5
PROTEIN, UA POC OHS: NEGATIVE
SPECIFIC GRAVITY, UA POC OHS: 1.02
UROBILINOGEN, UA POC OHS: 0.2

## 2024-10-22 PROCEDURE — 99213 OFFICE O/P EST LOW 20 MIN: CPT | Mod: S$GLB,,, | Performed by: FAMILY MEDICINE

## 2024-10-22 PROCEDURE — 81003 URINALYSIS AUTO W/O SCOPE: CPT | Mod: QW,S$GLB,, | Performed by: FAMILY MEDICINE

## 2024-10-22 RX ORDER — PHENAZOPYRIDINE HYDROCHLORIDE 100 MG/1
100 TABLET, FILM COATED ORAL 3 TIMES DAILY PRN
Qty: 9 TABLET | Refills: 0 | Status: SHIPPED | OUTPATIENT
Start: 2024-10-22 | End: 2024-11-01

## 2024-10-22 RX ORDER — NITROFURANTOIN 25; 75 MG/1; MG/1
100 CAPSULE ORAL 2 TIMES DAILY
Qty: 14 CAPSULE | Refills: 0 | Status: SHIPPED | OUTPATIENT
Start: 2024-10-22 | End: 2024-10-29

## 2024-10-22 NOTE — PROGRESS NOTES
"Subjective:      Patient ID: Sari Serna is a 39 y.o. female.    Vitals:  height is 5' 3" (1.6 m) and weight is 59.4 kg (131 lb). Her temperature is 98.4 °F (36.9 °C). Her blood pressure is 140/77 (abnormal) and her pulse is 86. Her respiration is 18 and oxygen saturation is 98%.     Chief Complaint: Urinary Tract Infection    .This is a 39 y.o. female who presents today with a chief complaint of  burning and frequency with urination     Urinary Tract Infection   This is a new problem. The current episode started today. The problem occurs every urination. The problem has been gradually worsening. The quality of the pain is described as burning. The pain is at a severity of 4/10. The pain is mild. There has been no fever. She is Sexually active. There is No history of pyelonephritis. Associated symptoms include frequency and urgency. Pertinent negatives include no behavior changes, chills, discharge, flank pain, hematuria, hesitancy, nausea, possible pregnancy, sweats, vomiting, weight loss, bubble bath use, constipation, rash or withholding. She has tried nothing for the symptoms. There is no history of catheterization, diabetes insipidus, diabetes mellitus, genitourinary reflux, hypertension, kidney stones, recurrent UTIs, a single kidney, STD, urinary stasis or a urological procedure.       Constitution: Negative for chills.   Gastrointestinal:  Negative for nausea, vomiting and constipation.   Genitourinary:  Positive for frequency and urgency. Negative for flank pain and hematuria.   Skin:  Negative for rash.      Objective:     Physical Exam   Constitutional: She does not appear ill. No distress. normal  HENT:   Head: Normocephalic and atraumatic.   Nose: Nose normal.   Cardiovascular: Normal rate, regular rhythm, normal heart sounds and normal pulses.   Pulmonary/Chest: Effort normal and breath sounds normal.   Abdominal: Normal appearance. Soft. There is no left CVA tenderness and no right CVA " tenderness.   Neurological: She is alert.   Nursing note and vitals reviewed.    Results for orders placed or performed in visit on 10/22/24   POCT Urinalysis(Instrument)    Collection Time: 10/22/24  5:27 PM   Result Value Ref Range    Color, POC UA Yellow Yellow, Straw, Colorless    Clarity, POC UA Clear Clear    Glucose, POC UA Negative Negative    Bilirubin, POC UA Negative Negative    Ketones, POC UA Negative Negative    Spec Grav POC UA 1.020 1.005 - 1.030    Blood, POC UA Moderate (A) Negative    pH, POC UA 5.5 5.0 - 8.0    Protein, POC UA Negative Negative    Urobilinogen, POC UA 0.2 <=1.0    Nitrite, POC UA Positive (A) Negative    WBC, POC UA Small (A) Negative      Assessment:     1. Urinary tract infection with hematuria, site unspecified        Plan:       Urinary tract infection with hematuria, site unspecified  -     POCT Urinalysis(Instrument)  -     nitrofurantoin, macrocrystal-monohydrate, (MACROBID) 100 MG capsule; Take 1 capsule (100 mg total) by mouth 2 (two) times daily. for 7 days  Dispense: 14 capsule; Refill: 0  -     phenazopyridine (PYRIDIUM) 100 MG tablet; Take 1 tablet (100 mg total) by mouth 3 (three) times daily as needed for Pain.  Dispense: 9 tablet; Refill: 0    Increased fluids. recommended

## 2024-10-29 ENCOUNTER — PATIENT MESSAGE (OUTPATIENT)
Dept: PSYCHIATRY | Facility: CLINIC | Age: 39
End: 2024-10-29
Payer: COMMERCIAL

## 2024-10-30 ENCOUNTER — PATIENT MESSAGE (OUTPATIENT)
Dept: INTERNAL MEDICINE | Facility: CLINIC | Age: 39
End: 2024-10-30
Payer: COMMERCIAL

## 2024-11-05 ENCOUNTER — PATIENT MESSAGE (OUTPATIENT)
Dept: PSYCHIATRY | Facility: CLINIC | Age: 39
End: 2024-11-05
Payer: COMMERCIAL

## 2024-11-07 ENCOUNTER — OFFICE VISIT (OUTPATIENT)
Dept: FAMILY MEDICINE | Facility: CLINIC | Age: 39
End: 2024-11-07
Payer: COMMERCIAL

## 2024-11-07 VITALS
WEIGHT: 132.06 LBS | SYSTOLIC BLOOD PRESSURE: 112 MMHG | DIASTOLIC BLOOD PRESSURE: 62 MMHG | OXYGEN SATURATION: 98 % | HEIGHT: 63 IN | BODY MASS INDEX: 23.4 KG/M2 | HEART RATE: 82 BPM

## 2024-11-07 DIAGNOSIS — N39.0 URINARY TRACT INFECTION WITHOUT HEMATURIA, SITE UNSPECIFIED: Primary | ICD-10-CM

## 2024-11-07 LAB
BILIRUB SERPL-MCNC: NEGATIVE MG/DL
BILIRUB UR QL STRIP: NEGATIVE
BLOOD URINE, POC: NEGATIVE
CLARITY UR REFRACT.AUTO: CLEAR
CLARITY, POC UA: CLEAR
COLOR UR AUTO: YELLOW
COLOR, POC UA: NORMAL
GLUCOSE UR QL STRIP: NEGATIVE
GLUCOSE UR QL STRIP: NEGATIVE
HGB UR QL STRIP: NEGATIVE
KETONES UR QL STRIP: NEGATIVE
KETONES UR QL STRIP: NEGATIVE
LEUKOCYTE ESTERASE UR QL STRIP: NEGATIVE
LEUKOCYTE ESTERASE URINE, POC: NEGATIVE
NITRITE UR QL STRIP: NEGATIVE
NITRITE, POC UA: NEGATIVE
PH UR STRIP: 7 [PH] (ref 5–8)
PH, POC UA: 6
PROT UR QL STRIP: NEGATIVE
PROTEIN, POC: NEGATIVE
SP GR UR STRIP: 1.02 (ref 1–1.03)
SPECIFIC GRAVITY, POC UA: 1.02
URN SPEC COLLECT METH UR: NORMAL
UROBILINOGEN, POC UA: 0.2

## 2024-11-07 PROCEDURE — 81002 URINALYSIS NONAUTO W/O SCOPE: CPT | Mod: S$GLB,,,

## 2024-11-07 PROCEDURE — 3078F DIAST BP <80 MM HG: CPT | Mod: CPTII,S$GLB,,

## 2024-11-07 PROCEDURE — 1159F MED LIST DOCD IN RCRD: CPT | Mod: CPTII,S$GLB,,

## 2024-11-07 PROCEDURE — 3074F SYST BP LT 130 MM HG: CPT | Mod: CPTII,S$GLB,,

## 2024-11-07 PROCEDURE — 81003 URINALYSIS AUTO W/O SCOPE: CPT

## 2024-11-07 PROCEDURE — 3008F BODY MASS INDEX DOCD: CPT | Mod: CPTII,S$GLB,,

## 2024-11-07 PROCEDURE — 99999 PR PBB SHADOW E&M-EST. PATIENT-LVL V: CPT | Mod: PBBFAC,,,

## 2024-11-07 PROCEDURE — 99214 OFFICE O/P EST MOD 30 MIN: CPT | Mod: S$GLB,,,

## 2024-11-07 RX ORDER — SULFAMETHOXAZOLE AND TRIMETHOPRIM 800; 160 MG/1; MG/1
1 TABLET ORAL EVERY 12 HOURS
Qty: 6 TABLET | Refills: 0 | Status: SHIPPED | OUTPATIENT
Start: 2024-11-07 | End: 2024-11-10

## 2024-11-07 NOTE — PROGRESS NOTES
[facility]    11/7/2024      Subjective:       Patient ID:  Sari is a 39 y.o. female .  has a past medical history of Acne, ALLERGIC RHINITIS, Allergy, Anxiety, Dysmenorrhea, Low back pain, Migraine headache, and Recurrent upper respiratory infection (URI).          History of Present Illness    CHIEF COMPLAINT:  Ms. Serna presents today for follow-up of urinary tract infection symptoms.    HISTORY OF PRESENT ILLNESS:  She was diagnosed with a UTI at an urgent care facility approximately 2.5 weeks ago and prescribed a 7-day course of antibiotics. She reports inconsistent adherence to the antibiotic regimen, occasionally missing doses and taking them the following day/later in afternoon. While the initial excruciating symptoms have subsided, she still experiences periodic bouts of slight pressure/pain during urination, particularly at the end of urination. She also notes intermittent pain around her belly button. She denies observing blood in her urine or experiencing fever.    MEDICAL HISTORY:  She has a history of UTIs over a decade ago, with a period of experiencing a few UTIs in close succession. Her last documented UTI was in 2014, which was not as responsive to Macrobid as previous infections.     FAMILY HISTORY:  Her mother had a history of recurrent UTIs.      ROS:  General: -fever, -chills, -fatigue, -weight gain, -weight loss  Eyes: -vision changes, -redness, -discharge  ENT: -ear pain, -nasal congestion, -sore throat  Cardiovascular: -chest pain, -palpitations, -lower extremity edema  Respiratory: -cough, -shortness of breath  Gastrointestinal: +abdominal pain, -nausea, -vomiting, -diarrhea, -constipation, -blood in stool  Genitourinary: +dysuria, -hematuria, -frequency  Musculoskeletal: -joint pain, -muscle pain  Skin: -rash, -lesion  Neurological: -headache, -dizziness, -numbness, -tingling  Psychiatric: -anxiety, -depression,  -sleep difficulty         Patient Active Problem List   Diagnosis    Eye refraction disorder - Both Eyes    ADAM (generalized anxiety disorder)    Recurrent major depressive disorder    Tremor    Right wrist pain    Pelvic pain    Endometriosis determined by laparoscopy             Last HgbA1C:    Lab Results   Component Value Date    HGBA1C 5.0 2019         Last Lipid Panel:    Lab Results   Component Value Date    HDL 58 2024    HDL 72 2023    HDL 73 2021       Lab Results   Component Value Date    LDLCALC 98.8 2024    LDLCALC 160.4 (H) 2023    LDLCALC 140.8 2021       Lab Results   Component Value Date    TRIG 136 2024    TRIG 63 2023    TRIG 86 2021       Lab Results   Component Value Date    CHOLHDL 31.5 2024    CHOLHDL 29.4 2023    CHOLHDL 31.6 2021               Review of patient's allergies indicates:  No Known Allergies     Medication List with Changes/Refills   New Medications    SULFAMETHOXAZOLE-TRIMETHOPRIM 800-160MG (BACTRIM DS) 800-160 MG TAB    Take 1 tablet by mouth every 12 (twelve) hours. for 3 days   Current Medications    ALBUTEROL (PROVENTIL) 2.5 MG /3 ML (0.083 %) NEBULIZER SOLUTION    Take 3 mLs (2.5 mg total) by nebulization every 6 (six) hours as needed for Wheezing. Rescue    AMITRIPTYLINE (ELAVIL) 25 MG TABLET    Take 25 mg by mouth every evening.    AMOXICILLIN-CLAVULANATE 875-125MG (AUGMENTIN) 875-125 MG PER TABLET    Take 1 tablet by mouth 2 (two) times daily.    AZELASTINE 205.5 MCG (0.15 %) SPRY        BREZTR AEROSPHERE 160-9-4.8 MCG/ACTUATION HFAA    SMARTSI-2 Puff(s) By Mouth 1-2 Times Daily    ESCITALOPRAM OXALATE (LEXAPRO) 10 MG TABLET    Take 1 tablet (10 mg total) by mouth once daily.    ESCITALOPRAM OXALATE (LEXAPRO) 20 MG TABLET    Take 1 tablet (20 mg total) by mouth once daily.    FLUTICASONE (FLONASE) 50 MCG/ACTUATION NASAL SPRAY    2 sprays (100 mcg total) by Each Nare route once daily.     "IBUPROFEN (ADVIL,MOTRIN) 800 MG TABLET    Take 1 tablet (800 mg total) by mouth every 8 (eight) hours as needed for Pain.    IPRATROPIUM (ATROVENT) 42 MCG (0.06 %) NASAL SPRAY        KETOCONAZOLE (NIZORAL) 2 % SHAMPOO    Wash scalp with medicated shampoo at least 2x/week. Let sit on scalp at least 5 minutes prior to rinsing    LIDOCAINE (LIDODERM) 5 %    Place 1 patch onto the skin once daily. Remove & Discard patch within 12 hours or as directed by MD    LORATADINE (CLARITIN) 10 MG TABLET        LORAZEPAM (ATIVAN) 0.5 MG TABLET    Take 1 tablet (0.5 mg total) by mouth 2 (two) times daily as needed for Anxiety.    MELOXICAM (MOBIC) 15 MG TABLET    Take 1 tablet (15 mg total) by mouth once daily.    MONTELUKAST (SINGULAIR) 10 MG TABLET        NORGESTIMATE-ETHINYL ESTRADIOL (ESTARYLLA) 0.25-35 MG-MCG PER TABLET    Take 1 tablet by mouth once daily. CONTINUOUSLY, NO PLACEBO PILLS    ONDANSETRON (ZOFRAN-ODT) 4 MG TBDL    DISSOLVE 1 tablet (4 mg total) by mouth every 8 (eight) hours as needed (nausea).    OXYCODONE-ACETAMINOPHEN (PERCOCET) 5-325 MG PER TABLET    Take 1 tablet by mouth every 4 (four) hours as needed for Pain.    PREDNISONE (DELTASONE) 20 MG TABLET    Take 3 pills daily for 3 days, then 2 pills daily for 3 days, then 1 pill daily for 3 days, then 1/2 pill daily for 4 days.    TRIAMCINOLONE (NASACORT) 55 MCG NASAL INHALER    2 sprays once daily.    UREA (CARMOL) 40 % CREA    AAA BID               Objective:      /62 (BP Location: Left arm, Patient Position: Sitting)   Pulse 82   Ht 5' 3" (1.6 m)   Wt 59.9 kg (132 lb 0.9 oz)   LMP 09/22/2024 (Exact Date)   SpO2 98%   BMI 23.39 kg/m²   Estimated body mass index is 23.39 kg/m² as calculated from the following:    Height as of this encounter: 5' 3" (1.6 m).    Weight as of this encounter: 59.9 kg (132 lb 0.9 oz).  Physical Exam  Constitutional:       Appearance: Normal appearance.   HENT:      Head: Normocephalic and atraumatic.      Right Ear: " "Tympanic membrane normal.      Left Ear: Tympanic membrane normal.      Mouth/Throat:      Mouth: Mucous membranes are moist.      Pharynx: Oropharynx is clear.   Eyes:      Conjunctiva/sclera: Conjunctivae normal.   Cardiovascular:      Rate and Rhythm: Normal rate and regular rhythm.      Heart sounds: Normal heart sounds.   Pulmonary:      Effort: Pulmonary effort is normal.      Breath sounds: Normal breath sounds.   Abdominal:      Tenderness: There is no abdominal tenderness. There is no right CVA tenderness or left CVA tenderness.      Comments: Mild suprapubic pain.    Musculoskeletal:         General: Normal range of motion.      Cervical back: Normal range of motion.   Neurological:      Mental Status: She is alert.   Psychiatric:         Mood and Affect: Mood normal.                   Assessment and Plan:     Sari Arambula" was seen today for dysuria.    Diagnoses and all orders for this visit:    Urinary tract infection without hematuria, site unspecified  -     POCT URINE DIPSTICK WITHOUT MICROSCOPE  -     Urinalysis, Reflex to Urine Culture Urine, Clean Catch  -     sulfamethoxazole-trimethoprim 800-160mg (BACTRIM DS) 800-160 mg Tab; Take 1 tablet by mouth every 12 (twelve) hours. for 3 days            Assessment & Plan    - Considered patient's history of UTI and recent incomplete antibiotic course  - Urinalysis performed in office was negative, but decided to reflux culture urine due to persistent symptoms and patient history   - Started empiric antibiotic therapy with a different agent due to uncertainty about previous treatment adherence.  - Assessed for signs of upper urinary tract involvement (fever, CVA tenderness) which were absent    URINARY TRACT INFECTION:   Educated on signs of pyelonephritis (fever, upper back pain) that would warrant immediate medical attention.   Explained the process and timeline for urine culture results.   Started Bactrim DS (sulfamethoxazole-trimethoprim) 1 " tablet by mouth every 12 hours for 3 days.   Urinalysis performed in office and sent off due to clinic test coming back negative.    Urine reflex culture ordered.   Contact the office if experiencing fever or significant back pain.   Dr. Shearer will contact patient with urine culture results and any necessary medication changes.     The patient was informed of the following statements    Emergency Care: Seek immediate medical attention in the emergency room if you experience any new or worsening symptoms, or if your current symptoms significantly increase in severity.  Patient Acknowledgment: Patient verbalizes understanding of the plan and agrees to proceed with the recommended care.           Follow Up:       No follow-ups on file.    Other Orders Placed This Visit:  Orders Placed This Encounter   Procedures    Urinalysis, Reflex to Urine Culture Urine, Clean Catch    POCT URINE DIPSTICK WITHOUT MICROSCOPE         This note was generated with the assistance of ambient listening technology. Verbal consent was obtained by the patient and accompanying visitor(s) for the recording of patient appointment to facilitate this note. I attest to having reviewed and edited the generated note for accuracy, though some syntax or spelling errors may persist. Please contact the author of this note for any clarification.    Isiah Perez PA-C

## 2024-11-10 ENCOUNTER — PATIENT MESSAGE (OUTPATIENT)
Dept: SPORTS MEDICINE | Facility: CLINIC | Age: 39
End: 2024-11-10
Payer: COMMERCIAL

## 2024-11-11 DIAGNOSIS — M54.2 NECK PAIN ON RIGHT SIDE: Primary | ICD-10-CM

## 2024-11-11 DIAGNOSIS — M54.9 UPPER BACK PAIN ON RIGHT SIDE: ICD-10-CM

## 2024-11-18 ENCOUNTER — OFFICE VISIT (OUTPATIENT)
Dept: INTERNAL MEDICINE | Facility: CLINIC | Age: 39
End: 2024-11-18
Payer: COMMERCIAL

## 2024-11-18 ENCOUNTER — OFFICE VISIT (OUTPATIENT)
Dept: PSYCHIATRY | Facility: CLINIC | Age: 39
End: 2024-11-18
Payer: COMMERCIAL

## 2024-11-18 ENCOUNTER — TELEPHONE (OUTPATIENT)
Dept: FAMILY MEDICINE | Facility: CLINIC | Age: 39
End: 2024-11-18
Payer: COMMERCIAL

## 2024-11-18 ENCOUNTER — PATIENT MESSAGE (OUTPATIENT)
Dept: FAMILY MEDICINE | Facility: CLINIC | Age: 39
End: 2024-11-18
Payer: COMMERCIAL

## 2024-11-18 VITALS
SYSTOLIC BLOOD PRESSURE: 118 MMHG | WEIGHT: 133.38 LBS | OXYGEN SATURATION: 99 % | BODY MASS INDEX: 23.63 KG/M2 | DIASTOLIC BLOOD PRESSURE: 64 MMHG | HEART RATE: 94 BPM

## 2024-11-18 DIAGNOSIS — R10.33 UMBILICAL PAIN: Primary | ICD-10-CM

## 2024-11-18 DIAGNOSIS — F41.1 GAD (GENERALIZED ANXIETY DISORDER): Primary | ICD-10-CM

## 2024-11-18 DIAGNOSIS — F33.41 RECURRENT MAJOR DEPRESSIVE DISORDER, IN PARTIAL REMISSION: ICD-10-CM

## 2024-11-18 PROCEDURE — 3078F DIAST BP <80 MM HG: CPT | Mod: CPTII,S$GLB,, | Performed by: INTERNAL MEDICINE

## 2024-11-18 PROCEDURE — 99999 PR PBB SHADOW E&M-EST. PATIENT-LVL III: CPT | Mod: PBBFAC,,, | Performed by: INTERNAL MEDICINE

## 2024-11-18 PROCEDURE — 99214 OFFICE O/P EST MOD 30 MIN: CPT | Mod: S$GLB,,, | Performed by: INTERNAL MEDICINE

## 2024-11-18 PROCEDURE — 3008F BODY MASS INDEX DOCD: CPT | Mod: CPTII,S$GLB,, | Performed by: INTERNAL MEDICINE

## 2024-11-18 PROCEDURE — 99214 OFFICE O/P EST MOD 30 MIN: CPT | Mod: S$GLB,,,

## 2024-11-18 PROCEDURE — 1159F MED LIST DOCD IN RCRD: CPT | Mod: CPTII,S$GLB,, | Performed by: INTERNAL MEDICINE

## 2024-11-18 PROCEDURE — 99999 PR PBB SHADOW E&M-EST. PATIENT-LVL I: CPT | Mod: PBBFAC,,,

## 2024-11-18 PROCEDURE — 3074F SYST BP LT 130 MM HG: CPT | Mod: CPTII,S$GLB,, | Performed by: INTERNAL MEDICINE

## 2024-11-18 PROCEDURE — 1160F RVW MEDS BY RX/DR IN RCRD: CPT | Mod: CPTII,S$GLB,, | Performed by: INTERNAL MEDICINE

## 2024-11-18 NOTE — PROGRESS NOTES
"OUTPATIENT PSYCHIATRY FOLLOW UP VISIT    ENCOUNTER DATE:  11/18/2024  SITE:  [facility]  LENGTH OF SESSION:  30 minutes      CHIEF COMPLAINT:  Anxiety, Panic Attacks, Depression      HISTORY OF PRESENTING ILLNESS:  Sari Serna is a 39 y.o. female with history of  anxiety and depression  who presents for follow up appointment.      Plan at last appointment on 10/7/2024:  Increase Lexapro to 20 mg  Will take Lexapro 15 mg for one week, then pending tolerability, will increase to Lexapro 20 mg daily  Discussed diagnosis, risks and benefits of proposed treatment vs alternative treatments vs no treatment, inherent unpredictability of medications and the potential side effects of these treatments specifically for: SSRI's, including but not limited to GI side effects, sexual dysfunction, psychomotor activation vs. somnolence, urinary retention, dry mouth and constipation.    Continue Elavil 25 mg nightly  Continue Ativan 0.5 mg daily as needed for severe anxiety/panic attacks   PDMP reviewed: Ativan last filled 8/28/2024 #30  Given resources for counseling per  Post      Interval history as told by Patient - & or family/friend/spouse/caregiver with pts permission    Patient reports she started leave from work on November 6th. Says this has been helpful in terms of reducing some anxiety. Also states that she has started individual therapy, has had one session and it went well, scheduled to see again December 6th. States that IOP is scheduled to start December 4th. Reports last panic attack was this past Thursday, while she was at Physical Therapy. States that since last appointment, she has had two panic attacks total. Says box breathing has helped with resolving attacks. Does states that she is still "crying a lot" but feels that anxiety overall seems to be improving. With regards to side effects, does report drowsiness, though is not sure if this is related to Lexapro increase vs " fatigue related to ongoing neck pain, for which she is going to PT. Reports good sleep. Reports normal appetite.  Says that sleep initiation has improved since taking leave from work. Does report some ongoing depressive feelings, and continues to worry about the future in terms of her job and whether she will be able to continue. But is also hopeful about getting through current anxious episode. Also states she is finding more emily in activities than she was in the past.  No SI or thoughts of self harm. Otherwise no further complaints.     Generalized Anxiety Disorder 7-item (GAD7)  Over the last 2 weeks, how often have you been bothered by the following problems?    Feeling nervous, anxious or on edge +1 - Several days   Not being able to stop or control worrying +1 - Several days   Worrying too much about different things +1 - Several days   Trouble relaxing +1 - Several days   Being so restless that it is hard to sit still +1 - Several days   Becoming easily annoyed or irritable + 0 - Not at all   Feeling afraid as if something awful might happen + 0 - Not at all       How difficult have these problems made it for you to do your work, take care of things at home, or get along with other people? Somewhat difficult       Total score 5    Mild anxiety (5-9)     Sensitivity 89%, Specificity 82%, Positive likelihood ratio 5.1; when score >10    Source: Primary Care Evaluation of Mental Disorders Patient Health Questionnaire (PRIME-MD-PHQ). The PHQ was developed by Rosanne Jacbos, Colleen Miles, Jesus Mistry, and colleagues. For research information, contact Dr. Jacobs at [email or web address]. PRIME-MD® is a trademark of [employer] Copyright© 1999 [employer] All rights reserved. Reproduced with permission      PSYCHIATRIC REVIEW OF SYSTEMS:(none/ yes- better/worse/stable/& what symptoms)    Symptoms of Depression: better    Symptoms of Anxiety/ panic attacks: better    Symptoms of Sobeida/Hypomania:  none    Symptoms of psychosis: none    Sleep: better    Appetite: stable    Risk Parameters:  Patient reports no suicidal ideation  Patient reports no homicidal ideation  Patient reports no self-injurious behavior  Patient reports no violent behavior    PSYCHIATRIC MED REVIEW    Current psych meds  Medication side effects:  Drowsiness  Medication compliance:  Yes      PAST PSYCHIATRIC, MEDICAL, AND SOCIAL HISTORY REVIEWED  The patient's past medical, family and social history have been reviewed and updated as appropriate within the electronic medical record - see encounter notes.    MEDICAL REVIEW OF SYSTEMS:  Complete review of systems performed covering Constitutional, Musculoskeletal, Neurologic.  All systems negative.    ALL MEDICATIONS:    Current Outpatient Medications:     albuterol (PROVENTIL) 2.5 mg /3 mL (0.083 %) nebulizer solution, Take 3 mLs (2.5 mg total) by nebulization every 6 (six) hours as needed for Wheezing. Rescue, Disp: 90 mL, Rfl: 1    amitriptyline (ELAVIL) 25 MG tablet, Take 25 mg by mouth every evening., Disp: , Rfl:     amoxicillin-clavulanate 875-125mg (AUGMENTIN) 875-125 mg per tablet, Take 1 tablet by mouth 2 (two) times daily. (Patient not taking: Reported on 2024), Disp: , Rfl:     azelastine 205.5 mcg (0.15 %) Kareem, , Disp: , Rfl:     BREZTRI AEROSPHERE 160-9-4.8 mcg/actuation HFAA, SMARTSI-2 Puff(s) By Mouth 1-2 Times Daily, Disp: , Rfl:     EScitalopram oxalate (LEXAPRO) 10 MG tablet, Take 1 tablet (10 mg total) by mouth once daily. (Patient not taking: Reported on 2024), Disp: 90 tablet, Rfl: 3    EScitalopram oxalate (LEXAPRO) 20 MG tablet, Take 1 tablet (20 mg total) by mouth once daily., Disp: 90 tablet, Rfl: 3    fluticasone (FLONASE) 50 mcg/actuation nasal spray, 2 sprays (100 mcg total) by Each Nare route once daily., Disp: 1 Bottle, Rfl: 0    ibuprofen (ADVIL,MOTRIN) 800 MG tablet, Take 1 tablet (800 mg total) by mouth every 8 (eight) hours as needed for Pain.  (Patient not taking: Reported on 11/7/2024), Disp: 30 tablet, Rfl: 0    ipratropium (ATROVENT) 42 mcg (0.06 %) nasal spray, , Disp: , Rfl:     ketoconazole (NIZORAL) 2 % shampoo, Wash scalp with medicated shampoo at least 2x/week. Let sit on scalp at least 5 minutes prior to rinsing, Disp: 240 mL, Rfl: 5    LIDOcaine (LIDODERM) 5 %, Place 1 patch onto the skin once daily. Remove & Discard patch within 12 hours or as directed by MD (Patient not taking: Reported on 11/7/2024), Disp: 30 patch, Rfl: 0    loratadine (CLARITIN) 10 mg tablet, , Disp: , Rfl:     LORazepam (ATIVAN) 0.5 MG tablet, Take 1 tablet (0.5 mg total) by mouth 2 (two) times daily as needed for Anxiety., Disp: 30 tablet, Rfl: 0    meloxicam (MOBIC) 15 MG tablet, Take 1 tablet (15 mg total) by mouth once daily. (Patient not taking: Reported on 11/7/2024), Disp: 30 tablet, Rfl: 0    montelukast (SINGULAIR) 10 mg tablet, , Disp: , Rfl:     norgestimate-ethinyl estradioL (ESTARYLLA) 0.25-35 mg-mcg per tablet, Take 1 tablet by mouth once daily. CONTINUOUSLY, NO PLACEBO PILLS, Disp: 112 tablet, Rfl: 2    ondansetron (ZOFRAN-ODT) 4 MG TbDL, DISSOLVE 1 tablet (4 mg total) by mouth every 8 (eight) hours as needed (nausea)., Disp: 10 tablet, Rfl: 0    oxyCODONE-acetaminophen (PERCOCET) 5-325 mg per tablet, Take 1 tablet by mouth every 4 (four) hours as needed for Pain. (Patient not taking: Reported on 11/7/2024), Disp: 20 tablet, Rfl: 0    predniSONE (DELTASONE) 20 MG tablet, Take 3 pills daily for 3 days, then 2 pills daily for 3 days, then 1 pill daily for 3 days, then 1/2 pill daily for 4 days. (Patient not taking: Reported on 11/7/2024), Disp: 20 tablet, Rfl: 0    triamcinolone (NASACORT) 55 mcg nasal inhaler, 2 sprays once daily., Disp: , Rfl:     urea (CARMOL) 40 % Crea, AAA BID, Disp: 28 g, Rfl: 1  No current facility-administered medications for this visit.    Facility-Administered Medications Ordered in Other Visits:     LIDOcaine HCL 10 mg/ml (1%)  injection 18 mL, 18 mL, Intramuscular, , Georgina Holden MD, 18 mL at 04/06/24 2030    ALLERGIES:  Review of patient's allergies indicates:  No Known Allergies    RELEVANT LABS/STUDIES:    Lab Results   Component Value Date    WBC 4.21 04/08/2024    HGB 12.7 04/08/2024    HCT 38.3 04/08/2024    MCV 89 04/08/2024     04/08/2024     BMP  Lab Results   Component Value Date     04/08/2024    K 4.0 04/08/2024     04/08/2024    CO2 24 04/08/2024    BUN 15 04/08/2024    CREATININE 0.8 04/08/2024    CALCIUM 8.9 04/08/2024    ANIONGAP 8 04/08/2024    ESTGFRAFRICA >60.0 04/10/2021    EGFRNONAA >60.0 04/10/2021     Lab Results   Component Value Date    ALT 37 04/08/2024    AST 29 04/08/2024    ALKPHOS 62 04/08/2024    BILITOT 0.2 04/08/2024     Lab Results   Component Value Date    TSH 1.119 04/08/2024     Lab Results   Component Value Date    HGBA1C 5.0 01/26/2019       VITALS  There were no vitals filed for this visit.    PHYSICAL EXAM  General: well developed, well nourished  Neurologic:   Gait: Normal   Psychomotor signs:  No involuntary movements or tremor  AIMS: none    PSYCHIATRIC EXAM:     Mental Status Exam:  Appearance: unremarkable, age appropriate  Behavior/Cooperation: normal, cooperative  Speech: normal tone, normal rate, normal pitch, normal volume  Language: uses words appropriately; NO aphasia or dysarthria  Mood: anxious  Affect  Thought Process: normal and logical  Thought Content: normal, no suicidality, no homicidality, delusions, or paranoia  Level of Consciousness: Alert and Oriented x3  Memory:  Intact    Attention/concentration: appropriate for age/education.   Fund of Knowledge: appears adequate  Insight:  Impaired/  Limited/ Intact  Judgment: Impaired/  Limited/ Intact       IMPRESSION:    Initial Evaluation 10/7/2024:  Sari Serna is a 39 y.o. female with history of anxiety and depression who presents for initial assessment. Patient reports long history of anxiety,  "on Lexapro and Klonopin for a few years. Recently with exacerbation of anxiety with panic attacks (crying uncontrollably, shaking and hyperventilating) which attributes to stressors at work and switched from Klonopin to Ativan 4 -6 weeks ago. States she has been taking Ativan 1-3 times per week with benefit. With regards to Lexapro, she is currently on 10 mg, and states she believes it was increased to 15 mg temporarily, and then brought back down to 10 mg. Is amenable to increasing to Lexapro 20 mg today. Will trial 15 mg for a week, then increase to 20 mg pending tolerability. Will continue Ativan in the meantime as well as Elavil. If anxiety is not well controlled on max Lexapro, will consider Effexor. Otherwise, patient denies SI/HI/AVH or paranoia.     Interval History 11/18/2024:  Notable improvement in overall anxiety and frequency and severity of panic attacks. Though still with ongoing "crying spells" and ongoing depressive symptoms. Of note, patient also took leave from work, which she states has markedly improved her anxiety. Tolerating increase in Lexapro well, though reports some drowsiness. Of note today, patient's ADAM-7 has decreased from a 15 to a 5. As such, will maintain current medication regimen, and see patient back after completion of IOP. Otherwise no further complaints. No SI/HI/AVH.     Status/Progress:  Based on the examination today, the patient's problem(s) is/are improved.  New problems have not been presented today.     DIAGNOSES:  Generalized Anxiety Disorder with Panic Attacks  Major Depressive Disorder, recurrent, in partial remission     PLAN:  Psych Med:  Continue Lexapro 20 mg  Discussed diagnosis, risks and benefits of proposed treatment vs alternative treatments vs no treatment, inherent unpredictability of medications and the potential side effects of these treatments specifically for: SSRI's, including but not limited to GI side effects, sexual dysfunction, psychomotor " activation vs. somnolence, urinary retention, dry mouth and constipation.    Continue Elavil 25 mg nightly  Continue Ativan 0.5 mg daily as needed for severe anxiety/panic attacks   Continue with therapy and plans to begin IOP in December   [facility] reviewed: Ativan last filled 10/07/2024 #30 - patient using sparingly and does not require refill at this time. Will call provider when she runs out.    Discussed with patient informed consent, risks vs. benefits, alternative treatments, side effect profile and the inherent unpredictability of individual responses to these treatments. Answered any questions patient may have had. The patient expresses understanding of the above and displays the capacity to agree with this current plan     RETURN TO CLINIC:  1.5 months or sooner if needed    Staff Psychiatrist: Albin Pham MD  [facility] PGY3  11/18/2024 4:13 PM

## 2024-11-18 NOTE — PROGRESS NOTES
Patient ID: Sari Serna is a 39 y.o. female    Chief Complaint: Abdominal Pain    History of Present Illness  The patient is a 39-year-old female who presents for evaluation of abdominal pain.    She has been experiencing intermittent abdominal pain for the past few weeks, described as a pressure-like sensation. The pain is localized around the area of her previous laparoscopic surgery incisions, which were made in the summer. The surgery involved three incisions: one through the navel and two lower down. The pain is not constant or severe. She does not report any nausea, vomiting, or changes in bowel movements.    She had a bladder infection and was on antibiotics. She was seen by Jason and had a repeat urine test which was normal. She is not experiencing any UTI symptoms anymore and did not have any complications with the surgery.    She is currently on a leave of absence. She started seeing a psychiatrist at the main campus and is going for a follow-up today. She is scheduled for an intensive outpatient program at [facility] in early December 2024.    ROS: Otherwise Negative     Physical Exam  General: Well-appearing, well-nourished.  No distress  HEENT: conjunctivae are normal.  Pupils are equal and reative to light.  TM's are clear and intact bilaterally.  Hearing is grossly normal.  Nasopharynx is clear.  Oropharynx is clear.  Neck: Supple.  No thyroid megaly.  No bruits.  Lymph: No cervical or supraclavicular adenopathy.  Heart: Regular rate and rhythm, without murmur, rub or gallop.  Lungs: Clear to auscultation; respiratory effort normal.  Abdomen: Soft, slight periumbilical tenderness at previous site of surgical site nondistended.  Normoactive bowel sounds.  No hepatomegaly.  No masses.  Extremities: Good distal pulses.  No edema.  Psych: Oriented to time person place.  Judgment and insight seem unimpaired.  Mood and affect are appropriate.    Results  Laboratory Studies  Urine test showed no  "abnormalities.    Assessment & Plan  1. Abdominal pain.  The abdominal discomfort is likely due to a minor defect in the fibrous band, which may not have fully healed, potentially causing the intestines to exert pressure against it. A CT scan of the abdomen and pelvis with contrast will be ordered to exclude the possibility of a hernia. If the hernia is mild, it may be left untreated. However, if it enlarges or is accompanied by nausea, a reevaluation will be necessary.    2. Urinary Tract Infection (UTI).  The patient previously experienced symptoms of a UTI, which improved after a second round of antibiotics. She reports no current symptoms of burning, urgency, or frequency. No further action is required at this time.    3. Medication Management.  She is currently on Lexapro and has an appointment with psychiatry today for a follow-up and potential increase in dosage.          No follow-ups on file.    Sari Arambula" was seen today for abdominal pain.    Diagnoses and all orders for this visit:    Umbilical pain  -     CT Abdomen Pelvis With IV Contrast Routine Oral Contrast; Future         This note was generated with the assistance of ambient listening technology. Verbal consent was obtained by the patient and accompanying visitor(s) for the recording of patient appointment to facilitate this note. I attest to having reviewed and edited the generated note for accuracy, though some syntax or spelling errors may persist. Please contact the author of this note for any clarification.      "

## 2024-11-19 ENCOUNTER — PATIENT MESSAGE (OUTPATIENT)
Dept: PSYCHIATRY | Facility: CLINIC | Age: 39
End: 2024-11-19
Payer: COMMERCIAL

## 2024-11-19 ENCOUNTER — OFFICE VISIT (OUTPATIENT)
Dept: PAIN MEDICINE | Facility: CLINIC | Age: 39
End: 2024-11-19
Payer: COMMERCIAL

## 2024-11-19 VITALS
SYSTOLIC BLOOD PRESSURE: 120 MMHG | WEIGHT: 132.25 LBS | DIASTOLIC BLOOD PRESSURE: 78 MMHG | HEIGHT: 63 IN | HEART RATE: 81 BPM | BODY MASS INDEX: 23.43 KG/M2

## 2024-11-19 DIAGNOSIS — M54.81 OCCIPITAL NEURALGIA OF RIGHT SIDE: ICD-10-CM

## 2024-11-19 DIAGNOSIS — M54.2 NECK PAIN ON RIGHT SIDE: ICD-10-CM

## 2024-11-19 DIAGNOSIS — G89.29 CHRONIC NECK PAIN: Primary | ICD-10-CM

## 2024-11-19 DIAGNOSIS — M54.2 CHRONIC NECK PAIN: Primary | ICD-10-CM

## 2024-11-19 DIAGNOSIS — M54.9 UPPER BACK PAIN ON RIGHT SIDE: ICD-10-CM

## 2024-11-19 PROCEDURE — 99204 OFFICE O/P NEW MOD 45 MIN: CPT | Mod: S$GLB,,, | Performed by: EMERGENCY MEDICINE

## 2024-11-19 PROCEDURE — 3008F BODY MASS INDEX DOCD: CPT | Mod: CPTII,S$GLB,, | Performed by: EMERGENCY MEDICINE

## 2024-11-19 PROCEDURE — G2211 COMPLEX E/M VISIT ADD ON: HCPCS | Mod: S$GLB,,, | Performed by: EMERGENCY MEDICINE

## 2024-11-19 PROCEDURE — 3078F DIAST BP <80 MM HG: CPT | Mod: CPTII,S$GLB,, | Performed by: EMERGENCY MEDICINE

## 2024-11-19 PROCEDURE — 3074F SYST BP LT 130 MM HG: CPT | Mod: CPTII,S$GLB,, | Performed by: EMERGENCY MEDICINE

## 2024-11-19 PROCEDURE — 1159F MED LIST DOCD IN RCRD: CPT | Mod: CPTII,S$GLB,, | Performed by: EMERGENCY MEDICINE

## 2024-11-19 PROCEDURE — 99999 PR PBB SHADOW E&M-EST. PATIENT-LVL IV: CPT | Mod: PBBFAC,,, | Performed by: EMERGENCY MEDICINE

## 2024-11-19 RX ORDER — BACLOFEN 5 MG/1
5 TABLET ORAL 3 TIMES DAILY
Qty: 90 TABLET | Refills: 0 | Status: SHIPPED | OUTPATIENT
Start: 2024-11-19 | End: 2024-12-19

## 2024-11-19 RX ORDER — GABAPENTIN 300 MG/1
CAPSULE ORAL
Qty: 69 CAPSULE | Refills: 0 | Status: SHIPPED | OUTPATIENT
Start: 2024-11-19 | End: 2024-12-19

## 2024-11-19 NOTE — PROGRESS NOTES
Interventional Pain Management - New Patient Visit    Original HPI  11/1924: Sari Serna is a 39 y.o. year old female patient who has a past medical history of Acne, ALLERGIC RHINITIS, Allergy, Anxiety, Dysmenorrhea, Low back pain, Migraine headache, and Recurrent upper respiratory infection (URI). She presents in referral from Dr. Albin Real for neck pain since 02/2024    Original Pain Description:  The pain is located in the neck and is radiating to the occipital area bilaterally . The pain is described as sharp. Exacerbating factors: Sitting and any neck movement. Mitigating factors heat and medications. Symptoms interfere with daily activity. The patient feels like symptoms have been unchanged. Patient denies significant motor weakness. She went to ER in April and felt pain in her arms, but that resolved. She reports hand stiffness in the morning. She reports that back in February she felt a pain radiating to her right eye, but that resolved. She does get pains that radiate to her right temporal region on the right side.       PAIN SCORES:  Best: Pain is 4  Current: Pain is 6  Worst: Pain is 9    6 weeks of Conservative therapy:  PT: Participating  Chiro:  HEP: Participating    Treatments / Medications:    Elavil 25 mg qHS PRN insomnia    Ibuprofen 800 mg - PRN   Meloxicam 15 mg - not taking   Percocet 5 mg q.4 hours p.r.n - not taking, was pot-op   Ativan 0.5 mg p.r.n.    Antiplatelets/Anticoagulants:   none    Interventional Pain Procedures:   NA    IMAGING:    MRI CERVICAL SPINE WITHOUT CONTRAST  08/29/2024  C3-C4: Small PDOC   C4-C5: PDOC to right w/central AF->mild spinal canal stenosis and mild right neural foraminal narrowing.  C5-C6: PDOC=>mod spinal canal stenosis and mod right and mild left neural foraminal narrowing. 2mm RL C5 on C6. Modic type 1 change     Past Surgical History:   Procedure Laterality Date    DIAGNOSTIC LAPAROSCOPY N/A 7/18/2024    Procedure: LAPAROSCOPY, DIAGNOSTIC;   "Surgeon: Liudmila Monaco MD;  Location: [facility] OR;  Service: OB/GYN;  Laterality: N/A;    ESOPHAGOGASTRODUODENOSCOPY N/A 7/28/2023    Procedure: EGD (ESOPHAGOGASTRODUODENOSCOPY);  Surgeon: Sameer Cedeño MD;  Location: [facility] (4TH FLR);  Service: Endoscopy;  Laterality: N/A;    FACIAL COSMETIC SURGERY      2013    PH MONITORING, ESOPHAGUS, WIRELESS, (OFF REFLUX MEDS) N/A 7/28/2023    Procedure: PH MONITORING, ESOPHAGUS, WIRELESS, (OFF REFLUX MEDS);  Surgeon: Sameer Cedeño MD;  Location: [facility] ENDO (4TH FLR);  Service: Endoscopy;  Laterality: N/A;  Please schedule Sari for a 96 hour esophageal Bravo pH probe study off all PPIs and off all H2 blockers she is been off of them for over 3 months now so she can get scheduled at any time with me.  Thank you  Ref by Dr RENZO Cedeño    SURGICAL REMOVAL OF ENDOMETRIOSIS N/A 7/18/2024    Procedure: DESTRUCTION, ENDOMETRIOSIS;  Surgeon: Liudmila Monaco MD;  Location: [facility] OR;  Service: OB/GYN;  Laterality: N/A;       Social History     Socioeconomic History    Marital status: Single   Occupational History    Occupation: therpist   Tobacco Use    Smoking status: Never     Passive exposure: Never    Smokeless tobacco: Never   Substance and Sexual Activity    Alcohol use: Yes     Comment: socially    Drug use: No    Sexual activity: Yes     Partners: Male     Birth control/protection: OCP     Comment: Single    Other Topics Concern    Are you pregnant or think you may be? No    Breast-feeding No   Social History Narrative    She is a special      No children       Medications/Allergies: See med card    ROS:  GENERAL: No fever. No chills. No fatigue. Denies weight loss. Denies weight gain.  Back / musculoskeletal / neuro : See HPI    VITALS:   Vitals:    11/19/24 1519   BP: 120/78   Pulse: 81   Weight: 60 kg (132 lb 4.4 oz)   Height: 5' 3" (1.6 m)   PainSc:   6   PainLoc: Neck     Body mass index is 23.43 kg/m².      11/19/2024     3:25 PM   Last 3 PDI Scores "   Pain Disability Index (PDI) 35       PHYSICAL EXAM:   GENERAL: Well appearing, in no acute distress, alert and oriented x3.  PSYCH:  Mood and affect appropriate.  SKIN: Skin color, texture, turgor normal, no rashes or lesions.  HEENT:  Normocephalic, atraumatic. Cranial nerves grossly intact.  NECK:  negative Spurling's bilaterally.   Pain with axial loading.  Tenderness to palpation along right occipital ridge.  PULM: No evidence of respiratory difficulty, symmetric chest rise.  GI:  Non-distended  BACK: Normal range of motion. No pain to palpation over the spinous processes. No pain to palpation over facet joints. There is no pain with palpation over the sacroiliac joints bilaterally.   EXTREMITIES: No deformities, edema, or skin discoloration.   MUSCULOSKELETAL: Shoulder, hip, and knee provocative maneuvers are negative. No atrophy is noted.  NEURO: Sensation is equal and appropriate bilaterally. Bilateral upper and lower extremity strength is normal and symmetric. Bilateral upper and lower extremity coordination and muscle stretch reflexes are physiologic and symmetric. Plantar response are downgoing. Straight leg raising in the supine position is negative to radicular pain.   GAIT: normal.      LABS:    Lab Results   Component Value Date    HGBA1C 5.0 01/26/2019       Lab Results   Component Value Date    CREATININE 0.8 04/08/2024         ASSESSMENT: 39 y.o. year old female with pain, consistent with:    Encounter Diagnoses   Name Primary?    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   Will initiate medication therapy and then follow-up in 1 month.  If no improvement we will consider occipital nerve block on the right.      PLAN:  - I have stressed the importance of physical activity and a home exercise plan to help with pain and improve  health.  - Patient can continue with medications for now since they are providing benefits, using them appropriately, and without side effects.  - Counseled patient regarding the importance of activity modification and constant sleeping habits.  - If no benefit with above procedure, consider  right occipital nerve block with steroid .   - Medications:Start Gabapentin 300mg qHS and gradually increasing to TID if tolerating. and Start Baclofen 5mg TID PRN to help with muscular symptoms   - Therapy: Referral to Physical therapy for Cervical ROM, stretching, strengthening, and a home exercise program.  - Will follow up with rheumatology about her hand stiffness  - Imaging: Reviewed available imaging with patient and answered any questions they had regarding study.  - The patient's pathophysiology was explained in detail with reference to x-rays, models, other visual aids as appropriate.   - Follow up visit: return to clinic in 4 weeks      I would like to thank Albin Real DO for the opportunity to assist in the care of this patient. We had a very nice visit and I look forward to continuing their care. Please let me know if I can be of further assistance.     Nick Flannery MD  11/19/2024

## 2024-11-20 ENCOUNTER — HOSPITAL ENCOUNTER (OUTPATIENT)
Dept: RADIOLOGY | Facility: HOSPITAL | Age: 39
Discharge: HOME | End: 2024-11-20
Attending: INTERNAL MEDICINE
Payer: COMMERCIAL

## 2024-11-20 DIAGNOSIS — R10.33 UMBILICAL PAIN: ICD-10-CM

## 2024-11-20 PROCEDURE — 74177 CT ABD & PELVIS W/CONTRAST: CPT | Mod: TC

## 2024-11-20 PROCEDURE — 25500020 PHARM REV CODE 255: Performed by: INTERNAL MEDICINE

## 2024-11-20 PROCEDURE — 74177 CT ABD & PELVIS W/CONTRAST: CPT | Mod: 26,,, | Performed by: RADIOLOGY

## 2024-11-20 RX ADMIN — IOHEXOL 30 ML: 350 INJECTION, SOLUTION INTRAVENOUS at 03:11

## 2024-11-20 RX ADMIN — IOHEXOL 75 ML: 350 INJECTION, SOLUTION INTRAVENOUS at 04:11

## 2024-11-21 ENCOUNTER — PATIENT MESSAGE (OUTPATIENT)
Dept: PSYCHIATRY | Facility: CLINIC | Age: 39
End: 2024-11-21
Payer: COMMERCIAL

## 2024-11-21 ENCOUNTER — PATIENT MESSAGE (OUTPATIENT)
Dept: INTERNAL MEDICINE | Facility: CLINIC | Age: 39
End: 2024-11-21
Payer: COMMERCIAL

## 2024-11-21 DIAGNOSIS — K86.9 ENLARGED PANCREAS: Primary | ICD-10-CM

## 2024-11-22 ENCOUNTER — TELEPHONE (OUTPATIENT)
Dept: INTERNAL MEDICINE | Facility: CLINIC | Age: 39
End: 2024-11-22
Payer: COMMERCIAL

## 2024-11-22 ENCOUNTER — LAB VISIT (OUTPATIENT)
Dept: LAB | Facility: HOSPITAL | Age: 39
End: 2024-11-22
Attending: INTERNAL MEDICINE
Payer: COMMERCIAL

## 2024-11-22 DIAGNOSIS — K86.9 ENLARGED PANCREAS: ICD-10-CM

## 2024-11-22 LAB
AMYLASE SERPL-CCNC: 57 U/L (ref 20–110)
LIPASE SERPL-CCNC: 60 U/L (ref 4–60)

## 2024-11-22 PROCEDURE — 83690 ASSAY OF LIPASE: CPT | Performed by: INTERNAL MEDICINE

## 2024-11-22 PROCEDURE — 82150 ASSAY OF AMYLASE: CPT | Performed by: INTERNAL MEDICINE

## 2024-11-22 PROCEDURE — 82787 IGG 1 2 3 OR 4 EACH: CPT | Performed by: INTERNAL MEDICINE

## 2024-11-22 NOTE — TELEPHONE ENCOUNTER
LVM for Pt to call office back. Called to schedule ordered labs. Please transfer call to [phone] if needed

## 2024-11-25 LAB — IGG4 SER-MCNC: 37 MG/DL (ref 4–86)

## 2024-12-03 ENCOUNTER — PATIENT MESSAGE (OUTPATIENT)
Dept: INTERNAL MEDICINE | Facility: CLINIC | Age: 39
End: 2024-12-03
Payer: COMMERCIAL

## 2024-12-03 DIAGNOSIS — R10.33 UMBILICAL PAIN: Primary | ICD-10-CM

## 2024-12-03 DIAGNOSIS — K86.9 ENLARGED PANCREAS: ICD-10-CM

## 2024-12-04 ENCOUNTER — HOSPITAL ENCOUNTER (OUTPATIENT)
Dept: PSYCHIATRY | Facility: HOSPITAL | Age: 39
Discharge: HOME | End: 2024-12-04
Attending: STUDENT IN AN ORGANIZED HEALTH CARE EDUCATION/TRAINING PROGRAM
Payer: COMMERCIAL

## 2024-12-04 DIAGNOSIS — F41.1 GENERALIZED ANXIETY DISORDER: Primary | ICD-10-CM

## 2024-12-04 DIAGNOSIS — F41.0 PANIC DISORDER: ICD-10-CM

## 2024-12-04 DIAGNOSIS — F33.41 RECURRENT MAJOR DEPRESSIVE DISORDER, IN PARTIAL REMISSION: ICD-10-CM

## 2024-12-04 PROCEDURE — 90853 GROUP PSYCHOTHERAPY: CPT | Mod: ,,,

## 2024-12-04 PROCEDURE — 90853 GROUP PSYCHOTHERAPY: CPT | Mod: ,,, | Performed by: PSYCHOLOGIST

## 2024-12-04 NOTE — H&P
[facility]   DEPARTMENT OF PSYCHIATRY     IDENTIFIERS & DEMOGRAPHICS:     -- PATIENT IDENTIFIERS: Sari Serna  [id number]  1985  39 y.o.  female  -- LOCATION OF PATIENT: [facility]    -- MODE OF ARRIVAL: self-presented  -- PRESENT WITH PATIENT DURING SESSION: ALONE  -- SOURCES OF INFORMATION: PATIENT, EHR/chart  -- ENCOUNTER PROVIDER: Shasta Ambriz MD        PRESENTATION:     OVERVIEW OF THE HPI:    Patient is a 40 yo F with past psychiatric history of MDD, ADAM, and panic disorder. She follows with Dr. Pham at [facility]. Most recently, prescribed Lexapro 20 mg, Elavil 25 mg (discontinued a few weeks ago), and Ativan 0.5 mg PRN panic attacks. Currently established with a therapist. She was referred to [facility] for worsening anxiety.      SUBJECTIVE/CURRENT FINDINGS:    Patient reported presenting due to worsening anxiety and issues with falling asleep. Symptoms started to become more of an issue this past summer. Unable to identify any specific triggers or contributing factors.   Patient shared she has had anxiety since young age, around . Also has history of depression, with 2 past major depressive episodes. Started on Lexapro about 8 years go. Uses Ativan PRN panic attacks, which is less than once per week since on leave. Denied side effects from medication. Was on Elavil for chronic pain, but discontinued after starting gabapentin and baclofen with a specialist a few weeks ago. Other medication trials include Klonopin and hydroxyzine. Currently established with a therapist.   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 Was feeling overwhelmed with work requirements and new administrative/law changes. She was having more panic attacks while working and would dread having to go in. Endorsed a lot of her worry was related to work recently.   Described difficulty falling asleep due to racing thoughts and anxiety. Getting in bed at 9pm and  falling asleep around 10pm. Waking up 5:45am on school days. Denied issues with staying asleep.  Patient denied history of trauma, abuse, SI, zhen, or psychosis. Denied any current or recent SI, HI, AVH. Denied anhedonia. Drinking alcohol once every 1 to 2 weeks. Denied drug use or history.   Patient shared her main goals are to be able to get back to work. She feels her main issues are with anxiety and sleep.     REVIEW OF SYSTEMS:    >> SOURCES: patient     Y   Sleep Disturbance/Disruption  +insomnia, +initial insomnia     N   Appetite/Weight Change   N   Alterations in Energy Level   Y   Impaired Focus/Concentration  +inattentive     Y   Depressive Symptomatology  +depressed mood, +inappropriate/excessive guilt, +tearfulness  no anhedonia     Y   Excessive Anxiety/Worry  +generalized anxiety/worry, +panic attacks    last panic attack about 2 weeks ago   N   Dysregulated Mood/Behavior   N   Manic Symptomatology   N   Psychosis   N   Trauma-Related   N   Impulsivity/Compulsivity/Obsessionality   N   Disordered Eating   N   Dissociation   Y   Pain  chronic pain: hand, neck/shoulder   N   Cardiopulmonary Symptoms   N   Abnormal Involuntary Movements    Regarding the current presentation, no other significant issues or complaints are voiced or known at this time.       ADD-ON PSYCHOTHERAPY:     ADD-ON THERAPY     HISTORY:     >> SOURCES: patient, chart review       PSYCH  SUBSTANCE  FAMILY  SOCIAL  MEDICAL     Y   Previous/Pre-Existing Psychiatric Diagnoses  MDD, ADAM, panic attacks, insomnia   Y   Past Psychotropic Trials  Klonopin, Vistaril, Ativan, Lexapro, Elavil   N   Current Psychiatric Provider   N   Hx of Outpatient Psychiatric Treatment   N   Hx of Psychiatric Hospitalization   N   Hx of Suicidal Ideation/Threats   N   Hx of Suicide Attempts/Gestures   N   Hx of Homicidal Ideation/Threats   N    Hx of Homicidal Behavior   N   Hx of Non-Suicidal Self-Injurious Behavior   N   Hx of Perpetrated Violence   N   Documented Hx of Malingering   N   Hx of Psychosis   N   Hx of Bipolar Diathesis   Y   Hx of Depression   Y   Hx of Anxiety   Y   Hx of Insomnia   N   Hx of Delirium     N   Hx of Formal DREW Treatment   Y   Recent Alcohol Consumption  once ever 1 to 2 weeks   N   Hx of Nicotine Use   N   Hx of Alcohol Misuse/Abuse   N   Hx of Illicit Drug Misuse/Abuse   N   Hx of Prescription Drug Misuse/Abuse   +   Drug Experimentation/Usage  denies     Y   Family Psychiatric History  depression, ADAM   +   Family Members (re: Psych Hx)  +mother        N   Hx of Trauma   N   Hx of Abuse     N   Developmental Delay/Disability   Y   GED/High School Diploma   Y   Post-Secondary Education   +   Degree Program  master   Y   Currently Employed  on leave, 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   N   Currently on Disability   Y   Financially Stable   Y   Functions Independently   Y   Domiciled  live with boyfriend of 11 years   Y   Intact Support System   U   Heterosexual/Cisgender   Y   Currently in a Relationship  boyfriend of 11 years   N   Ever    N   Ever /   N   Children/Dependents   N   Hinduism/Spiritual   Y   Hobbies/Recreational Activities  gym, yoga, viral, reading, cooking   N   Hx of  Service     N   Ever Charged/Convicted   N   Current Probation/[place]/Diversion   N   Hx of Incarceration     N   Hx of Seizures   N   Hx of Head Trauma     Y   Medical Hx & Diagnoses  chronic pain, endometriosis   N   Allergies    >> SCHEDULED AND PRN MEDS: reviewed/reconciled  see MEDCARD      Allergies:  Patient has no known allergies.     EXAMINATION:     VITALS:  There were no vitals taken for this visit.  See nursing note.    MENTAL STATUS EXAMINATION:  Appearance: appears stated age, normal weight  appropriately dressed, adequately groomed, in no apparent distress, well-appearing    Behavior & Attitude: participative, under good behavioral control, able to redirect, appropriate eye contact  calm, engaged, agreeable, cooperative    Movements & Motor Activity: no psychomotor agitation, no psychomotor retardation, normal gait, normal station, ambulates without assistance, not wheelchair bound (able to ambulate), no weakness, no spasticity, no rigidity, no tics, no tremor, no akathisia, no dyskinesia, no ataxia, no parkinsonism    Speech & Language: normal rate, normal volume, normal quantity, normal latency, spontaneous, reciprocal, fluent    Mood: anxious  Affect: reactive, anxious    tearful  Thought Process & Associations: linear, goal-directed, organized, logical, coherent, relevant, abstract  no loosening of associations    Thought Content & Perceptions: no delusions, no paranoid ideation, no ideas of reference, no grandiosity, no hyperreligiosity, no hallucinations, no responding to internal stimuli    Sensorium: awake, alert, clear  no confusion, no delirium    Orientation: grossly intact, oriented to person, oriented to place, oriented to time, oriented to situation    Recent & Remote Memory: intact (recent), intact (remote)    Attention & Concentration: intact  attentive to conversation, not easily distracted    Fund of Knowledge: intact, vocabulary proficient    Insight: intact, good  demonstrates sufficient awareness of condition/situation    Judgment: intact, good  heeds instructions/advice      PSYCHIATRIC SCREENINGS:  > Anxiety (ADAM-7): ADAM-7 Score: 8   > Depression (PHQ-9): PHQ-9 Total Score: 4       RISK & REGULATORY:      RISK PARAMETERS (current to the encounter/episode  NOT inclusive of past history):     N   Suicidal Ideation/Threats   N   Suicide Attempts/Gestures    N   Homicidal Ideation/Threats   N   Homicidal Behavior   N   Non-Suicidal Self-Injurious Behavior   N   Perpetrated Violence     FIREARMS & WEAPONS:     Y   Ready Access to Firearms   Y   Gun Safety Counseled  e.g., proper storage, inherent risk     SAFETY SCREENINGS:    -- FUTURE ORIENTED: YES  -- REMORSE/REGRET: NO     REGULATORY:      INFORMED CONSENT & SHARED DECISION MAKING are the hallmark and bedrock of good clinical care, and as such have been employed and obtained, respectively, to the degree possible.  Discussed, to the extent possible, diagnosis, risks and benefits of proposed treatment (e.g., medication, therapy) vs alternative treatments vs no treatment, potential side effects of these treatments, and the inherent unpredictability of treatment.         - ABILITY TO UNDERSTAND, PARTICIPATE & ENGAGE: present and intact     - AGREEABLE TO TREATMENT (consent/assent): the patient consents to treatment     - RELIABILITY/ACCURACY: the patient is deemed to be a reliable and factually accurate historian      WARNINGS & PRECAUTIONS:  >> In cases of emergencies (e.g. SI/HI resulting in danger to self or others, functioning deteriorating to the level of grave disability), call 911 or [phone], or present to the emergency department for immediate assistance.    >> Individuals should not operate a motor vehicle or heavy machinery if effects of medications or underlying symptoms/condition impair the ability to do so safely.    >> FULLY comply with ANY/ALL medication as prescribed/instructed and report ANY/ALL suspected adverse effects to appropriate health care providers.       ASSESSMENT & PLAN:     DIAGNOSES & PROBLEMS:       1.  Generalized Anxiety Disorder    2.  Major Depressive Disorder, recurrent, in partial remission    3.  Panic Disorder    PSYCHOTROPIC REGIMEN:   (C)=Continue as prescribed  (A)=Adjust as noted  (I)=Iniitate  (D)=Discontinue      1.  Lexapro 20 mg daily (C)    2.  Ativan  0.5 mg daily PRN panic attack (C)    >> Continuing current medications. Defer any changes at this time.   >> Counseled on sleep hygiene.  >> Counseled on firearm safety    CHART REVIEW: available documentation has been reviewed, and pertinent elements of the chart have been incorporated into this evaluation where appropriate.     DIAGNOSTIC TESTING:      Glu 114 (H)  4/8/2024  Li *   *  TSH 1.119  4/8/2024    HgA1c *   *  VPA *   *   FT4 1.12  1/21/2023    Na 141  4/8/2024  CLZ *   *  WBC 4.21  4/8/2024    Cr 0.8  4/8/2024  ANC 2.4; 57.0;   4/8/2024   Hgb 12.7  4/8/2024     BUN 15  4/8/2024  Trop I *   *  HCT 38.3  4/8/2024     GFR >60.0  4/8/2024   CPK *   *    4/8/2024     Alb 3.4 (L)  4/8/2024   PRL *   *  B12 499  1/21/2023     T Bili 0.2  4/8/2024  Chol 184  4/8/2024  B9 9.0  1/21/2023    ALP 62  4/8/2024  TGs 136  4/8/2024  B1 *   *    AST 29  4/8/2024  HDL 58  4/8/2024  Vit D 33  1/21/2023     ALT 37  4/8/2024  LDL 98.8  4/8/2024  HIV Non-reactive  5/2/2023     INR *   *  Debby 57  11/22/2024   Hep C Non-reactive  5/2/2023    GGT *   *  Lip 60  11/22/2024  RPR *   *    MCV 89  4/8/2024   NH4 *   *  UPT Negative  7/18/2024      PETH *   *  THC *   *    ETOH *   *  KARRI *   *    EtG *   *  AMP *   *    ALC *   *  OPI *   *    BZO *   *  MTD *   *     BAR *   *  BUP *   *    PCP *   *  FEN *   *     Results for orders placed or performed in visit on 08/29/22   IN OFFICE EKG 12-LEAD (to [place])    Collection Time: 08/29/22 10:58 AM    Narrative    Test Reason : R07.9,    Vent. Rate : 071 BPM     Atrial Rate : 071 BPM     P-R Int : 144 ms          QRS Dur : 090 ms      QT Int : 388 ms       P-R-T Axes : 064 106 047 degrees     QTc Int : 421 ms    Normal sinus rhythm  Rightward axis  Borderline Abnormal ECG  When compared with ECG of 14-MAR-2018 14:10,  Criteria for Septal infarct are no longer Present  Confirmed by Neil Hernandez MD ([id number]) on 8/30/2022  9:29:07 AM    Referred By: AALIYAH CADENA           Confirmed By:Neil Hernandez MD        BECERRA & LINKS:        Y  = yes/endorses     N  = no/denies     U  = unknown/unable to assess    ADHD   AIMS   AUDIT   AUDIT-C   C-SSRS (Screen)   C-SSRS (Short)   C-SSRS (Full)   DAST   DAST-10   ADAM-7   MoCA   PCL-5   PHQ-9   DREW   YMRS     Consults  [facility] BEHAVIORAL MEDICINE *       Shasta Ambriz MD   [facility], PGY4

## 2024-12-04 NOTE — PROGRESS NOTES
Group Psychotherapy (PhD/LCSW)     Site: [facility]     Clinical status of patient: Intensive Outpatient Program (IOP)     Date: 12/04/2024     Group Focus: Assertive Communication     Length of service: 64002 - 45-50 minutes     Number of patients in attendance: 12    Referred by: ALCIRA     Target symptoms: Anxiety and Depression     Patient's response to treatment: Active Listening and Self-Disclosure     Progress toward goals: Progressing well     Interval History: Participants reviewed Personal Bill of Rights and reflected on rights with which they identified. Participants discussed distinctions among passive, aggressive, passive aggressive, and assertive communication. Participants discussed characteristics of assertive communication and practiced crafting assertive messages and responses.      Diagnosis:     ICD-10-CM ICD-9-CM   1. Generalized anxiety disorder  F41.1 300.02   2. Recurrent major depressive disorder, in partial remission  F33.41 296.35   3. Panic disorder  F41.0 300.01         Plan: Continue treatment on [facility]

## 2024-12-04 NOTE — PROGRESS NOTES
Group Psychotherapy (PhD/LCSW)    Site: [facility]    Clinical status of patient: Intensive Outpatient Program (IOP)    Date: 12/4/2024    Group Focus: Mindfulness    Length of service: 39893 - 45-50 minutes    Number of patients in attendance: 12    Referred by: [facility] Treatment Team    Target symptoms: Depression and Anxiety    Patient's response to treatment: Active Listening    Progress toward goals: Progressing adequately    Interval History: Session focus was Mindfulness: Mindfulness 'How' Skills. Patient's were introduced to mindfulness 'how' skills of non-judgmentally, one-mindfulness, and effectiveness. Patient's identified the value of each skill, how to use each skill, and practiced the use of each skill in session.     Diagnosis:     ICD-10-CM ICD-9-CM   1. Generalized anxiety disorder  F41.1 300.02   2. Recurrent major depressive disorder, in partial remission  F33.41 296.35   3. Panic disorder  F41.0 300.01        Plan: Continue treatment on [facility]

## 2024-12-05 ENCOUNTER — HOSPITAL ENCOUNTER (OUTPATIENT)
Dept: PSYCHIATRY | Facility: HOSPITAL | Age: 39
Discharge: HOME | End: 2024-12-05
Attending: STUDENT IN AN ORGANIZED HEALTH CARE EDUCATION/TRAINING PROGRAM
Payer: COMMERCIAL

## 2024-12-05 DIAGNOSIS — F41.0 PANIC DISORDER: ICD-10-CM

## 2024-12-05 DIAGNOSIS — F33.41 RECURRENT MAJOR DEPRESSIVE DISORDER, IN PARTIAL REMISSION: ICD-10-CM

## 2024-12-05 DIAGNOSIS — F41.1 GENERALIZED ANXIETY DISORDER: Primary | ICD-10-CM

## 2024-12-05 PROCEDURE — 90853 GROUP PSYCHOTHERAPY: CPT

## 2024-12-05 NOTE — TREATMENT PLAN
"[facility]  MENTAL WELLNESS PROGRAM  INTERDISCIPLINARY TREATMENT PLAN  INTENSIVE OUTPATIENT     INTERDISCIPLINARY  TREATMENT TEAM:    Albin Thomas M.D., Psychiatrist    Nikky Lu M.D., Psychiatrist      Toya Pat, Ph.D., Clinical Psychologist    Antoni Turner LPN, Licensed Practical Nurse    Karrie Crews, [facility], Licensed Master Social Worker    Janae Don W, Registered           Resident: Shasta Ambriz MD     (Signatures scanned into record separately).      ESTIMATED LOS:  2 weeks      The patient has reviewed the treatment plan with staff and has signed the "Patient Responsibilities" form.    (Patient signature scanned into record separately).       TREATMENT PLAN    DIAGNOSIS:      1.  Generalized Anxiety Disorder    2.  Major Depressive Disorder, recurrent, in partial remission    3.  Panic Disorder      Patient Education Needs/Barriers to Learning (i.e., Language, Reading, Comprehension): None         Support/Advocacy Services/Needs (i.e., Financial, Transportation, Medications): None         Community Resources (i.e., Alcoholics Anonymous, Al Anon): None     Patients Identified Goals:  I want to learn better coping skills/distress tolerance.  2.   I want to be more assertive and set better boundaries      Coping Skills:  I cry a lot  2.   I take naps during the day just to escape unpleasant feelings       Strengths:  I think I work hard at my job and do meaningful work.   2.   I think I am a nonjudgmental friend and good listener.     Limitations:  Stuck in bad patterns of self judgement , fear of leaving bad situations ( like my job) to try new things   2.   I don't always prioritize making the time to do things for me --prioritize others or just want to zone and do something like watch TV      Goals and Objectives:  1. Goal: Attend and participate in all groups   Objective measure: Progress notes indicating active listening,    self-disclosure, " feedback   Time frame to reach goal: Each day    2. Goal: Medication management   Objective measure: Physician progress note indicating improved medication status   Time frame to reach goal: By discharge    3. Goal: Reduce depression   Objective measure: Physician progress note indicating depression is improved   Time frame to reach goal: By discharge    4.  Goal: Reduce anxiety   Objective measure: Physician progress note indicating anxiety is improved   Time frame to reach goal: By discharge    5. Goal: Develop stress coping skills   Objective measure: Patient self-report of improved coping   Time frame to reach goal: By discharge      Group Interventions:    Psychodynamic Group Psychotherapy - 1 hour, 5 times per week  Goals: 1. Utilize group empathy and support for problem solving; 2. Apply stress management, communication, and assertiveness skills to personal issues; 3. Discuss mental health symptoms and explore strategies for coping; 4. Discuss ways to change lifestyle to maintain better emotional health.    Communication Skills - 1 hour, 2 times per week  Goals: 1. Learn rules of effective communication; 2. Improve listening skills; 3. Practice clear communication.    Nutrition and Health - 1 hour, 1 time per week  Goals: 1. Explore impact that nutrition has on health; 2. Identify positive nutritional choices; 3. Explore how nutrition relates to stress tolerance.    Personal Growth - 1 hour, 2 times per week  Goals: 1. Discuss the development of self; 2. Create personal awareness and insight; 3. Explore a variety of psycho-educational techniques.    Promoting Healthy Lifestyles - 1 hour, 1 time per week  Goals: 1. Understand the Biopsychosocial Model of Health; 2. Develop insight into how mental health can impact other dimensions of health; 3. Develop appropriate health promotion strategies.    Acceptance and Commitment Therapy (ACT)- 1 hour, 1 time per week  Goals: 1. Learn an action-oriented psychotherapy  called ACT; 2. Focus on identifying, challenging, and clarifying values systems and beliefs; 3. Explore how values oriented actions can lead to emotional well-being.    Relationship Dynamics - 1 hour, 1 time per week  Goals: 1. Learn about factors that shape relationships; 2. Understand the central role of relationships in personal well-being; 3. Learn how to improve all relationships.    Relaxation Training - 1 hour, 3 times per week  Goals: 1. Learn about and implement various techniques for releasing physical tension from the body.    Spirituality - 1 hour, 1 time per week  Goals: 1. Discuss and reflect on the process of seeking peace and comfort; 2. Identify healthy ways of exploring spirituality.    Stress Management - 1 hour, 4 times per week  Goals: 1. Identify types and levels of stress; 2. Identify and change maladaptive beliefs and behaviors; 3. Identify and practice techniques of stress management.    DBT- 1 hour, 4 times per week  Goals: 1. Discuss emotion regulation and Interpersonal Effectiveness Skills and practice mindfulness; 2. Identify and change maladaptive beliefs and behaviors; Identify and practice techniques of DBT and mindfulness.

## 2024-12-05 NOTE — PROGRESS NOTES
Group Psychotherapy (PhD/LCSW)    Site: [facility]    Clinical status of patient: Intensive Outpatient Program (IOP)    Date: 12/5/2024    Group Focus: Emotion Regulation     Length of service: 03209 - 45-50 minutes    Number of patients in attendance: 14    Referred by: [facility] Treatment Team    Target symptoms: Depression and Anxiety    Patient's response to treatment: Active Listening    Progress toward goals: Progressing adequately    Interval History: Session focus was Emotion Regulation:  Problem-Solving.  Patients were encouraged to identify problems, check the facts, generate solutions, look at pros/cons, and create action steps to use the solution.    Diagnosis:     ICD-10-CM ICD-9-CM   1. Generalized anxiety disorder  F41.1 300.02   2. Recurrent major depressive disorder, in partial remission  F33.41 296.35   3. Panic disorder  F41.0 300.01        Plan: Continue treatment on [facility]

## 2024-12-05 NOTE — PLAN OF CARE
"   12/05/24 1600   Activity/Group Therapy Checklist   Group Other (Comments)  (Creative Therapy)   Attendance Attended   Follows Direction Followed directions   Group Interactions/Observations Interacted appropriately;Alert;Sharing;Supportive   Affect/Mood Range Normal range   Affect/Mood Display Appropriate   Goal Progression Progressing      facilitated self care creative session and dicussed with patients how creative therapy can be therapeutic to resolving anxiety and other stress-related issues. SW discussed activity: " Dance Therapy". SW discussed "Dance Therapy Movement" and the benefits of it. SW discussed two specific dance movements : Mirroring and Free flow dance. Pts were able to practice both techniques.   "

## 2024-12-05 NOTE — PROGRESS NOTES
Group Psychotherapy (PhD/LCSW)    Site: [facility]    Clinical status of patient: Intensive Outpatient Program (IOP)    Date: 12/5/2024    Group Focus: Psychodynamic Group Processing      Length of service: 03557 - 45-50 minutes    Number of patients in attendance: 6    Referred by: [facility] Treatment Team    Target symptoms: Depression and Anxiety    Patient's response to treatment: Active Listening    Progress toward goals: Progressing adequately    Interval History: Patient reported that they were struggling with the guilt of enjoying their time away from work. Patient engaged in conversation about the use of defusion skills, specifically the process of recognizing thoughts just as thoughts. Patient shared advice with fellow group members about accessing their childhood selves and treating them with kindness.     Diagnosis:     ICD-10-CM ICD-9-CM   1. Generalized anxiety disorder  F41.1 300.02   2. Recurrent major depressive disorder, in partial remission  F33.41 296.35   3. Panic disorder  F41.0 300.01        Plan: Continue treatment on [facility]

## 2024-12-06 ENCOUNTER — TELEPHONE (OUTPATIENT)
Dept: GASTROENTEROLOGY | Facility: CLINIC | Age: 39
End: 2024-12-06
Payer: COMMERCIAL

## 2024-12-06 ENCOUNTER — HOSPITAL ENCOUNTER (OUTPATIENT)
Dept: PSYCHIATRY | Facility: HOSPITAL | Age: 39
Discharge: HOME | End: 2024-12-06
Attending: STUDENT IN AN ORGANIZED HEALTH CARE EDUCATION/TRAINING PROGRAM
Payer: COMMERCIAL

## 2024-12-06 DIAGNOSIS — F41.0 PANIC DISORDER: ICD-10-CM

## 2024-12-06 DIAGNOSIS — F41.1 GENERALIZED ANXIETY DISORDER: Primary | ICD-10-CM

## 2024-12-06 DIAGNOSIS — F33.41 RECURRENT MAJOR DEPRESSIVE DISORDER, IN PARTIAL REMISSION: ICD-10-CM

## 2024-12-06 PROCEDURE — 90853 GROUP PSYCHOTHERAPY: CPT | Mod: ,,, | Performed by: SOCIAL WORKER

## 2024-12-06 PROCEDURE — 90853 GROUP PSYCHOTHERAPY: CPT

## 2024-12-06 NOTE — PROGRESS NOTES
Group Psychotherapy (PhD/LCSW)    Site: [facility]    Clinical status of patient: Intensive Outpatient Program (IOP)    Date: 12/6/2024    Group Focus: Psychodynamic Group Processing      Length of service: 42887 - 45-50 minutes    Number of patients in attendance: 7    Referred by: [facility] Treatment Team    Target symptoms: Depression and Anxiety    Patient's response to treatment: Active Listening, Self disclosure     Progress toward goals: Progressing adequately    Interval History: Today's group focused on discussing the physical impacts of untreated mental health issues and explored adaptive distraction techniques to refocus on problems. Group members adressed the stigma surrounding help-seeking behaviors and participated in a grounding activity to enhance their coping skills. This patient remained engaged and actively participated in group discussion.    Diagnosis:     ICD-10-CM ICD-9-CM   1. Generalized anxiety disorder  F41.1 300.02   2. Recurrent major depressive disorder, in partial remission  F33.41 296.35   3. Panic disorder  F41.0 300.01        Plan: Continue treatment on [facility]

## 2024-12-06 NOTE — PLAN OF CARE
12/06/24 1317   Activity/Group Therapy Checklist   Group Meditation/Relaxation   Attendance Attended   Follows Direction Followed directions   Group Interactions/Observations Interacted appropriately;Sharing;Supportive;Alert   Affect/Mood Range Normal range   Affect/Mood Display Appropriate   Goal Progression Progressing

## 2024-12-06 NOTE — TELEPHONE ENCOUNTER
----- Message from Lisha sent at 12/6/2024 12:34 PM CST -----  Regarding: pt advice  Contact: PT@ [phone]  Pt is returning a missed call from someone in the office and is asking for a return call back soon. Thanks.     Reason for call:MISS CALL      Patient's DX:     Patient requesting call back or [facility] msg: PT@ [phone]

## 2024-12-06 NOTE — TELEPHONE ENCOUNTER
----- Message from Sia Andrade sent at 12/6/2024  8:37 AM CST -----  Regarding: Appt Access  Contact: [phone]  Patient calling regarding Appointment Access (message) for #Scheduling Request     Appt Type: Established      Date/Time Preference: first available     Treating Provider: Dr. Cedeño     Caller Name: Patient      Contact Preference: [phone]     Comments/notes:  Patient is calling to get scheduled w provider for stomach pain and abnormal results from CT per pt. She states that her PCP suggested that she f/u with provider.

## 2024-12-09 ENCOUNTER — HOSPITAL ENCOUNTER (OUTPATIENT)
Dept: PSYCHIATRY | Facility: HOSPITAL | Age: 39
Discharge: HOME | End: 2024-12-09
Attending: STUDENT IN AN ORGANIZED HEALTH CARE EDUCATION/TRAINING PROGRAM
Payer: COMMERCIAL

## 2024-12-09 ENCOUNTER — TELEPHONE (OUTPATIENT)
Dept: GASTROENTEROLOGY | Facility: CLINIC | Age: 39
End: 2024-12-09
Payer: COMMERCIAL

## 2024-12-09 VITALS
TEMPERATURE: 98 F | HEART RATE: 72 BPM | SYSTOLIC BLOOD PRESSURE: 112 MMHG | RESPIRATION RATE: 18 BRPM | DIASTOLIC BLOOD PRESSURE: 58 MMHG

## 2024-12-09 DIAGNOSIS — F41.1 GENERALIZED ANXIETY DISORDER: Primary | ICD-10-CM

## 2024-12-09 PROCEDURE — 99232 SBSQ HOSP IP/OBS MODERATE 35: CPT | Mod: ,,, | Performed by: STUDENT IN AN ORGANIZED HEALTH CARE EDUCATION/TRAINING PROGRAM

## 2024-12-09 PROCEDURE — 90853 GROUP PSYCHOTHERAPY: CPT

## 2024-12-09 PROCEDURE — 90833 PSYTX W PT W E/M 30 MIN: CPT | Mod: ,,, | Performed by: STUDENT IN AN ORGANIZED HEALTH CARE EDUCATION/TRAINING PROGRAM

## 2024-12-09 NOTE — PROGRESS NOTES
Group Psychotherapy (PhD/LCSW)    Site: [facility]    Clinical status of patient: Intensive Outpatient Program (IOP)    Date: 12/9/2024    Group Focus: Sleep 101    Length of service: 40787 - 45-50 minutes    Number of patients in attendance: 14    Referred by: [facility] Treatment Team    Target symptoms: Depression and Anxiety    Patient's response to treatment: Active Listening    Progress toward goals: Progressing adequately    Interval History: SLEEP BASICS and SLEEP DIARY  Session focus was on psychoeducation on basic concepts about sleep including stages of sleep, reasons for sleep, and changes in sleep as we age. Session included discussion of sleep drive, the uses of sleep medication, and the efficacy of CBTi. Clinician introduced group to daily sleep diary.     Diagnosis:     ICD-10-CM ICD-9-CM   1. Generalized anxiety disorder  F41.1 300.02       Plan: Continue treatment on [facility]

## 2024-12-09 NOTE — PROGRESS NOTES
Group Psychotherapy (PhD/LCSW)    Site: [facility]    Clinical status of patient: Intensive Outpatient Program (IOP)    Date: 12/9/2024    Group Focus: Distress Tolerance    Length of service: 92593 - 45-50 minutes    Number of patients in attendance: 14    Referred by: [facility] Treatment Team    Target symptoms: Depression and Anxiety    Patient's response to treatment: Active Listening    Progress toward goals: Progressing adequately    Interval History: Session focus was Distress Tolerance:  STOP.  Patients were encouraged to use crisis survival skills to reduce intensity of distress.  They were taught to STOP in the moment to reduce unhelpful action urges.    Diagnosis:     ICD-10-CM ICD-9-CM   1. Generalized anxiety disorder  F41.1 300.02       Plan: Continue treatment on [facility]

## 2024-12-09 NOTE — TELEPHONE ENCOUNTER
----- Message from Med Assistant Dutton sent at 12/9/2024  2:05 PM CST -----  Yes. Thanks  ----- Message -----  From: Richelle Funes MA  Sent: 12/9/2024  11:18 AM CST  To: CORY Alejandrokobi Schedulers    Ok to schedule with Indra?  ----- Message -----  From: Misa Randolph  Sent: 12/9/2024  11:04 AM CST  To: [facility]    Type :  Needs Medical Advice    Who Called: pt  Symptoms (please be specific): Umbilical pain [R10.33]  Enlarged pancreas      How long has patient had these symptoms:  Umbilical pain [R10.33]  Enlarged pancreas     Pharmacy name and phone #:  no  Would the patient rather a call back or a response via [facility]? call  Best Call Back Number: [phone]  Additional Information:  appt had a missed call to get an appt she say

## 2024-12-09 NOTE — PLAN OF CARE
12/09/24 1442   Activity/Group Therapy Checklist   Group Other (Comments)  (Processing)   Attendance Attended   Follows Direction Followed directions   Group Interactions/Observations Interacted appropriately;Sharing;Supportive;Alert   Affect/Mood Range Normal range   Affect/Mood Display Appropriate   Goal Progression Progressing

## 2024-12-09 NOTE — PROGRESS NOTES
"  [facility]   DEPARTMENT OF PSYCHIATRY     IDENTIFIERS & DEMOGRAPHICS:     -- PATIENT IDENTIFIERS: Sari Serna  [id number]  1985  39 y.o.  female  -- LOCATION OF PATIENT: [facility]    -- MODE OF ARRIVAL: self-presented  -- PRESENT WITH PATIENT DURING SESSION: ALONE  -- SOURCES OF INFORMATION: PATIENT, EHR/chart  -- ENCOUNTER PROVIDER: Shasta Ambriz MD        PRESENTATION:     OVERVIEW OF THE HPI:    Patient is a 38 yo F with past psychiatric history of MDD, ADAM, and panic disorder. She follows with [facility]. Pham at [facility]. Most recently, prescribed Lexapro 20 mg, Elavil 25 mg (discontinued a few weeks ago), and Ativan 0.5 mg PRN panic attacks. Currently established with a therapist. She was referred to [facility] for worsening anxiety.      SUBJECTIVE/CURRENT FINDINGS:    Patient reported taking medications. Denied any side effects or issues with regimen.   She shared her weekend went "ok." Had a frustrating issue concerning work, but otherwise did things she enjoys like Mack and going to the gym. Utilizing grounding techniques when needed.   She reported feeling that the program has been helpful for her and enjoyable. She finds herself paying more attention to things like mindfulness.   Has not tried going to sleep later, as she feels this would not help her insomnia. Discussed option to write down things on her mind when she is having trouble falling asleep.   Denied panic attacks. Denied SI.     REVIEW OF SYSTEMS:    >> SOURCES: patient     Y   Sleep Disturbance/Disruption  +insomnia, +initial insomnia     N   Appetite/Weight Change   N   Alterations in Energy Level   Y   Impaired Focus/Concentration  +inattentive     Y   Depressive Symptomatology  +depressed mood, +inappropriate/excessive guilt, +tearfulness  no anhedonia     Y   Excessive Anxiety/Worry  +generalized anxiety/worry    last panic attack about 2 weeks ago   N   Dysregulated Mood/Behavior   N  " " Manic Symptomatology   N   Psychosis   N   Trauma-Related   N   Impulsivity/Compulsivity/Obsessionality   N   Disordered Eating   N   Dissociation   Y   Pain  chronic pain: hand, neck/shoulder   N   Cardiopulmonary Symptoms   N   Abnormal Involuntary Movements    Regarding the current presentation, no other significant issues or complaints are voiced or known at this time.       ADD-ON PSYCHOTHERAPY:     ADD-ON THERAPY     HISTORY:     See H&P from 12/4/24 for full history.     Allergies:  Patient has no known allergies.     EXAMINATION:     VITALS:  BP (!) 112/58   Pulse 72   Temp 98.2 °F (36.8 °C)   Resp 18      MENTAL STATUS EXAMINATION:  Appearance: appears stated age, normal weight  appropriately dressed, adequately groomed, in no apparent distress, well-appearing    Behavior & Attitude: participative, under good behavioral control, able to redirect, appropriate eye contact  calm, engaged, agreeable, cooperative    Movements & Motor Activity: no psychomotor agitation, no psychomotor retardation, normal gait, normal station, ambulates without assistance, not wheelchair bound (able to ambulate), no weakness, no spasticity, no rigidity, no tics, no tremor, no akathisia, no dyskinesia, no ataxia, no parkinsonism    Speech & Language: normal rate, normal volume, normal quantity, normal latency, spontaneous, reciprocal, fluent    Mood: "ok"  Affect: reactive, anxious    Thought Process & Associations: linear, goal-directed, organized, logical, coherent, relevant, abstract  no loosening of associations    Thought Content & Perceptions: no delusions, no paranoid ideation, no ideas of reference, no grandiosity, no hyperreligiosity, no hallucinations, no responding to internal stimuli    Sensorium: awake, alert, clear  no confusion, no delirium    Orientation: grossly intact, oriented to person, oriented to place, oriented to time, oriented to situation    Recent & Remote " Memory: intact (recent), intact (remote)    Attention & Concentration: intact  attentive to conversation, not easily distracted    Fund of Knowledge: intact, vocabulary proficient    Insight: intact, good  demonstrates sufficient awareness of condition/situation    Judgment: intact, good  heeds instructions/advice      PSYCHIATRIC SCREENINGS:  > Anxiety (ADAM-7):     > Depression (PHQ-9):         RISK & REGULATORY:      RISK PARAMETERS (current to the encounter/episode  NOT inclusive of past history):     N   Suicidal Ideation/Threats   N   Suicide Attempts/Gestures   N   Homicidal Ideation/Threats   N   Homicidal Behavior   N   Non-Suicidal Self-Injurious Behavior   N   Perpetrated Violence     FIREARMS & WEAPONS:     Y   Ready Access to Firearms   Y   Gun Safety Counseled  e.g., proper storage, inherent risk     SAFETY SCREENINGS:    -- FUTURE ORIENTED: YES  -- REMORSE/REGRET: NO     REGULATORY:      INFORMED CONSENT & SHARED DECISION MAKING are the hallmark and bedrock of good clinical care, and as such have been employed and obtained, respectively, to the degree possible.  Discussed, to the extent possible, diagnosis, risks and benefits of proposed treatment (e.g., medication, therapy) vs alternative treatments vs no treatment, potential side effects of these treatments, and the inherent unpredictability of treatment.         - ABILITY TO UNDERSTAND, PARTICIPATE & ENGAGE: present and intact     - AGREEABLE TO TREATMENT (consent/assent): the patient consents to treatment     - RELIABILITY/ACCURACY: the patient is deemed to be a reliable and factually accurate historian      WARNINGS & PRECAUTIONS:  >> In cases of emergencies (e.g. SI/HI resulting in danger to self or others, functioning deteriorating to the level of grave disability), call 911 or [phone], or present to the emergency department for immediate assistance.    >> Individuals should not operate a motor vehicle or  heavy machinery if effects of medications or underlying symptoms/condition impair the ability to do so safely.    >> FULLY comply with ANY/ALL medication as prescribed/instructed and report ANY/ALL suspected adverse effects to appropriate health care providers.       ASSESSMENT & PLAN:     DIAGNOSES & PROBLEMS:       1.  Generalized Anxiety Disorder    2.  Major Depressive Disorder, recurrent, in partial remission    3.  Panic Disorder    PSYCHOTROPIC REGIMEN:   (C)=Continue as prescribed  (A)=Adjust as noted  (I)=Iniitate  (D)=Discontinue      1.  Lexapro 20 mg daily (C)    2.  Ativan 0.5 mg daily PRN panic attack (C)    >> Continuing current medications. Defer any changes at this time.   >> Counseled on sleep hygiene.  >> Counseled on firearm safety.    CHART REVIEW: available documentation has been reviewed, and pertinent elements of the chart have been incorporated into this evaluation where appropriate.       DIAGNOSTIC TESTING:      Glu 114 (H)  4/8/2024  Li *   *  TSH 1.119  4/8/2024    HgA1c *   *  VPA *   *   FT4 1.12  1/21/2023    Na 141  4/8/2024  CLZ *   *  WBC 4.21  4/8/2024    Cr 0.8  4/8/2024  ANC 2.4; 57.0;   4/8/2024   Hgb 12.7  4/8/2024     BUN 15  4/8/2024  Trop I *   *  HCT 38.3  4/8/2024     GFR >60.0  4/8/2024   CPK *   *    4/8/2024     Alb 3.4 (L)  4/8/2024   PRL *   *  B12 499  1/21/2023     T Bili 0.2  4/8/2024  Chol 184  4/8/2024  B9 9.0  1/21/2023    ALP 62  4/8/2024  TGs 136  4/8/2024  B1 *   *    AST 29  4/8/2024  HDL 58  4/8/2024  Vit D 33  1/21/2023     ALT 37  4/8/2024  LDL 98.8  4/8/2024  HIV Non-reactive  5/2/2023     INR *   *  Debby 57  11/22/2024   Hep C Non-reactive  5/2/2023    GGT *   *  Lip 60  11/22/2024  RPR *   *    MCV 89  4/8/2024   NH4 *   *  UPT Negative  7/18/2024      PETH *   *  THC *   *    ETOH *   *  KARRI *   *    EtG *   *  AMP *   *    ALC *   *  OPI *   *    BZO *   *  MTD *   *     BAR *   *  BUP *    *    PCP *   *  FEN *   *     Results for orders placed or performed in visit on 08/29/22   IN OFFICE EKG 12-LEAD (to [place])    Collection Time: 08/29/22 10:58 AM    Narrative    Test Reason : R07.9,    Vent. Rate : 071 BPM     Atrial Rate : 071 BPM     P-R Int : 144 ms          QRS Dur : 090 ms      QT Int : 388 ms       P-R-T Axes : 064 106 047 degrees     QTc Int : 421 ms    Normal sinus rhythm  Rightward axis  Borderline Abnormal ECG  When compared with ECG of 14-MAR-2018 14:10,  Criteria for Septal infarct are no longer Present  Confirmed by David MUSE, Neil ECHEVARRIA ([id number]) on 8/30/2022 9:29:07 AM    Referred By: AALIYAH CADENA           Confirmed By:Neil Hernandez MD        BECERRA & LINKS:        Y  = yes/endorses     N  = no/denies     U  = unknown/unable to assess    ADHD   AIMS   AUDIT   AUDIT-C   C-SSRS (Screen)   C-SSRS (Short)   C-SSRS (Full)   DAST   DAST-10   ADAM-7   MoCA   PCL-5   PHQ-9   DREW   YMRS     Consults  [facility] BEHAVIORAL MEDICINE U*       Shasta Ambriz MD   [facility] [place], PGY4

## 2024-12-10 ENCOUNTER — HOSPITAL ENCOUNTER (OUTPATIENT)
Dept: PSYCHIATRY | Facility: HOSPITAL | Age: 39
Discharge: HOME | End: 2024-12-10
Attending: STUDENT IN AN ORGANIZED HEALTH CARE EDUCATION/TRAINING PROGRAM
Payer: COMMERCIAL

## 2024-12-10 DIAGNOSIS — F41.1 GENERALIZED ANXIETY DISORDER: Primary | ICD-10-CM

## 2024-12-10 PROCEDURE — 90853 GROUP PSYCHOTHERAPY: CPT

## 2024-12-10 PROCEDURE — 90853 GROUP PSYCHOTHERAPY: CPT | Mod: ,,, | Performed by: PSYCHOLOGIST

## 2024-12-10 NOTE — PLAN OF CARE
12/10/24 5412   Activity/Group Therapy Checklist   Group Other (Comments)  (Setting Boundaries)   Attendance Attended   Follows Direction Followed directions   Group Interactions/Observations Interacted appropriately;Sharing;Supportive;Alert   Affect/Mood Range Normal range   Affect/Mood Display Appropriate   Goal Progression Progressing      facilitated group therapy session and discussed personal boundaries. Defined boundaries and discussed the importance of establishing healthy boundaries. Discussed common traits of rigid, porous and healthy boundaries and asked pts to identify where they fell on the spectrum. Patients completed a boundary exploration activity. Pts were to identify someone or a group of people whom they struggle to set a boundary with and identify what type of boundaries were in place currently with each category based on the person/group selected. Pts reflected on questions: What specific actions could be taken to improve current boundaries? How do they think the other person/ group would respond to the changes? And how do they think life would be different once the healthier boundaries were established.

## 2024-12-10 NOTE — PSYCH
[facility]  Psychosocial Assessment    Date: 12/10/2024  Time: 9:36 AM    Name: Sari Serna    Age: 39 y.o.       : 1985         Race: / White     Precipitating Event: adjustment issues, adult ADHD, chronic pain, family conflict, hopelessness/helplessness, nonadherence, sleep disturbance, stress, and supportive counseling    Symptoms: panic attacks, cry often/depressed, worry alot, anxiety/tension, and loss of energy/fatigue    Marital Status: co-habitating    Spouse/Significant Other: Bryant Anum, 36    Quality of Relationship: healthy and supportive     Education: post college graduate work or degree    Occupation: Teacher     Employer/School: [employer]     Medical/Psychiatric History   Past Psychiatric History: No    Currently in treatment with private [facility]; resident Alex     Medical Conditions/including prior head injury: See past medical history    Suicidal Ideation/Homicidal Ideation:    No  Current Medications:   Current Outpatient Medications:     albuterol (PROVENTIL) 2.5 mg /3 mL (0.083 %) nebulizer solution, Take 3 mLs (2.5 mg total) by nebulization every 6 (six) hours as needed for Wheezing. Rescue, Disp: 90 mL, Rfl: 1    amitriptyline (ELAVIL) 25 MG tablet, Take 25 mg by mouth every evening., Disp: , Rfl:     amoxicillin-clavulanate 875-125mg (AUGMENTIN) 875-125 mg per tablet, Take 1 tablet by mouth 2 (two) times daily. (Patient not taking: Reported on 2024), Disp: , Rfl:     azelastine 205.5 mcg (0.15 %) Kareem, , Disp: , Rfl:     baclofen (LIORESAL) 5 mg Tab tablet, Take 1 tablet (5 mg total) by mouth 3 (three) times daily., Disp: 90 tablet, Rfl: 0    BREZTRI AEROSPHERE 160-9-4.8 mcg/actuation HFAA, SMARTSI-2 Puff(s) By Mouth 1-2 Times Daily, Disp: , Rfl:     EScitalopram oxalate (LEXAPRO) 20 MG tablet, Take 1 tablet (20 mg total) by mouth once daily., Disp: 90 tablet, Rfl: 3    fluticasone  (FLONASE) 50 mcg/actuation nasal spray, 2 sprays (100 mcg total) by Each Nare route once daily., Disp: 1 Bottle, Rfl: 0    gabapentin (NEURONTIN) 300 MG capsule, Take 1 capsule (300 mg total) by mouth every evening for 7 days, THEN 1 capsule (300 mg total) 2 (two) times daily for 7 days, THEN 1 capsule (300 mg total) 3 (three) times daily for 16 days., Disp: 69 capsule, Rfl: 0    ibuprofen (ADVIL,MOTRIN) 800 MG tablet, Take 1 tablet (800 mg total) by mouth every 8 (eight) hours as needed for Pain. (Patient not taking: Reported on 11/7/2024), Disp: 30 tablet, Rfl: 0    ipratropium (ATROVENT) 42 mcg (0.06 %) nasal spray, , Disp: , Rfl:     ketoconazole (NIZORAL) 2 % shampoo, Wash scalp with medicated shampoo at least 2x/week. Let sit on scalp at least 5 minutes prior to rinsing, Disp: 240 mL, Rfl: 5    LIDOcaine (LIDODERM) 5 %, Place 1 patch onto the skin once daily. Remove & Discard patch within 12 hours or as directed by MD (Patient not taking: Reported on 11/7/2024), Disp: 30 patch, Rfl: 0    loratadine (CLARITIN) 10 mg tablet, , Disp: , Rfl:     LORazepam (ATIVAN) 0.5 MG tablet, Take 1 tablet (0.5 mg total) by mouth 2 (two) times daily as needed for Anxiety., Disp: 30 tablet, Rfl: 0    norgestimate-ethinyl estradioL (ESTARYLLA) 0.25-35 mg-mcg per tablet, Take 1 tablet by mouth once daily. CONTINUOUSLY, NO PLACEBO PILLS, Disp: 112 tablet, Rfl: 2    ondansetron (ZOFRAN-ODT) 4 MG TbDL, DISSOLVE 1 tablet (4 mg total) by mouth every 8 (eight) hours as needed (nausea)., Disp: 10 tablet, Rfl: 0    predniSONE (DELTASONE) 20 MG tablet, Take 3 pills daily for 3 days, then 2 pills daily for 3 days, then 1 pill daily for 3 days, then 1/2 pill daily for 4 days. (Patient not taking: Reported on 11/7/2024), Disp: 20 tablet, Rfl: 0    triamcinolone (NASACORT) 55 mcg nasal inhaler, 2 sprays once daily., Disp: , Rfl:     urea (CARMOL) 40 % Crea, AAA BID, Disp: 28 g, Rfl: 1  No current facility-administered medications for this  encounter.    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   Medical/Psychiatric History: No     Siblings and present ages: 2: brothers; Agustín, 38; Jesús, 35  Medical or Psychiatric Problems: Agustín- Depression/ Anxiety     Relationships with parents and siblings: complex with mother/ supportive relationship with dad/ good relationship with brothers     Developmental History  [place]     Developmental Milestones: Patient reports all met    History of Physical/Sexual Abuse: No    Childhood Behavioral Problems: No     Children  Names/Ages: No     Medical or Psychiatric Problems: N/A    Quality of Parent/Child Relationship: N/A    Criminal History  Arrests:  No    Miscellaneous:  Financial Issues: No    Leisure Activities: read, cook, go to [employer] and yoga classes     Owns a gun? Yes Secured? No-  belongs to , will place in safe     [employer] History:  No    Comments:    Patient was cooperative during psychosocial assessment. Patient's location: with SW in conference room.       Discharge Plans:  Will follow-up with outpatient therapist for psychotherapy, outpatient psychiatrist for medication management and after care group with Dr. Pat or Presley , pending availability.

## 2024-12-10 NOTE — PROGRESS NOTES
Group Psychotherapy (PhD/LCSW)    Site: [facility]    Clinical status of patient: Intensive Outpatient Program (IOP)    Date: 12/10/2024    Group Focus: Interpersonal Effectiveness    Length of service: 31397 - 45-50 minutes    Number of patients in attendance: 12    Referred by: [facility] Treatment Team    Target symptoms: Depression and Anxiety    Patient's response to treatment: Active Listening    Progress toward goals: Progressing adequately    Interval History: Session focus was Interpersonal Effectiveness:  DEAR MAN.  Patients were encouraged to exercise skillfulness during interactions by using this framework.    Diagnosis:     ICD-10-CM ICD-9-CM   1. Generalized anxiety disorder  F41.1 300.02          Plan: Continue treatment on [facility]

## 2024-12-11 ENCOUNTER — HOSPITAL ENCOUNTER (EMERGENCY)
Facility: HOSPITAL | Age: 39
Discharge: HOME | End: 2024-12-11
Attending: EMERGENCY MEDICINE
Payer: COMMERCIAL

## 2024-12-11 ENCOUNTER — HOSPITAL ENCOUNTER (OUTPATIENT)
Dept: PSYCHIATRY | Facility: HOSPITAL | Age: 39
Discharge: HOME | End: 2024-12-11
Attending: STUDENT IN AN ORGANIZED HEALTH CARE EDUCATION/TRAINING PROGRAM
Payer: COMMERCIAL

## 2024-12-11 ENCOUNTER — PATIENT MESSAGE (OUTPATIENT)
Dept: GASTROENTEROLOGY | Facility: CLINIC | Age: 39
End: 2024-12-11
Payer: COMMERCIAL

## 2024-12-11 VITALS
RESPIRATION RATE: 18 BRPM | TEMPERATURE: 97 F | DIASTOLIC BLOOD PRESSURE: 62 MMHG | SYSTOLIC BLOOD PRESSURE: 115 MMHG | HEART RATE: 75 BPM

## 2024-12-11 VITALS
TEMPERATURE: 98 F | WEIGHT: 130 LBS | RESPIRATION RATE: 14 BRPM | OXYGEN SATURATION: 98 % | DIASTOLIC BLOOD PRESSURE: 75 MMHG | BODY MASS INDEX: 23.04 KG/M2 | HEIGHT: 63 IN | SYSTOLIC BLOOD PRESSURE: 127 MMHG | HEART RATE: 66 BPM

## 2024-12-11 DIAGNOSIS — M54.81 OCCIPITAL NEURALGIA OF RIGHT SIDE: ICD-10-CM

## 2024-12-11 DIAGNOSIS — R07.9 CHEST PAIN: Primary | ICD-10-CM

## 2024-12-11 DIAGNOSIS — F33.41 RECURRENT MAJOR DEPRESSIVE DISORDER, IN PARTIAL REMISSION: ICD-10-CM

## 2024-12-11 DIAGNOSIS — R07.89 CHEST DISCOMFORT: ICD-10-CM

## 2024-12-11 DIAGNOSIS — F41.1 GENERALIZED ANXIETY DISORDER: Primary | ICD-10-CM

## 2024-12-11 LAB
ALBUMIN SERPL BCP-MCNC: 4 G/DL (ref 3.5–5.2)
ALP SERPL-CCNC: 55 U/L (ref 40–150)
ALT SERPL W/O P-5'-P-CCNC: 11 U/L (ref 10–44)
ANION GAP SERPL CALC-SCNC: 12 MMOL/L (ref 8–16)
AST SERPL-CCNC: 24 U/L (ref 10–40)
B-HCG UR QL: NEGATIVE
BASOPHILS # BLD AUTO: 0.04 K/UL (ref 0–0.2)
BASOPHILS NFR BLD: 0.6 % (ref 0–1.9)
BILIRUB SERPL-MCNC: 0.4 MG/DL (ref 0.1–1)
BUN SERPL-MCNC: 12 MG/DL (ref 6–20)
CALCIUM SERPL-MCNC: 9.3 MG/DL (ref 8.7–10.5)
CHLORIDE SERPL-SCNC: 106 MMOL/L (ref 95–110)
CO2 SERPL-SCNC: 20 MMOL/L (ref 23–29)
CREAT SERPL-MCNC: 0.7 MG/DL (ref 0.5–1.4)
CTP QC/QA: YES
D DIMER PPP IA.FEU-MCNC: 0.24 MG/L FEU
DIFFERENTIAL METHOD BLD: NORMAL
EOSINOPHIL # BLD AUTO: 0.1 K/UL (ref 0–0.5)
EOSINOPHIL NFR BLD: 1.4 % (ref 0–8)
ERYTHROCYTE [DISTWIDTH] IN BLOOD BY AUTOMATED COUNT: 12.4 % (ref 11.5–14.5)
EST. GFR  (NO RACE VARIABLE): >60 ML/MIN/1.73 M^2
GLUCOSE SERPL-MCNC: 85 MG/DL (ref 70–110)
HCT VFR BLD AUTO: 43.9 % (ref 37–48.5)
HCV AB SERPL QL IA: NORMAL
HGB BLD-MCNC: 14.9 G/DL (ref 12–16)
HIV 1+2 AB+HIV1 P24 AG SERPL QL IA: NORMAL
IMM GRANULOCYTES # BLD AUTO: 0.01 K/UL (ref 0–0.04)
IMM GRANULOCYTES NFR BLD AUTO: 0.2 % (ref 0–0.5)
LIPASE SERPL-CCNC: 55 U/L (ref 4–60)
LYMPHOCYTES # BLD AUTO: 3 K/UL (ref 1–4.8)
LYMPHOCYTES NFR BLD: 47.4 % (ref 18–48)
MCH RBC QN AUTO: 29.7 PG (ref 27–31)
MCHC RBC AUTO-ENTMCNC: 33.9 G/DL (ref 32–36)
MCV RBC AUTO: 88 FL (ref 82–98)
MONOCYTES # BLD AUTO: 0.3 K/UL (ref 0.3–1)
MONOCYTES NFR BLD: 5.4 % (ref 4–15)
NEUTROPHILS # BLD AUTO: 2.9 K/UL (ref 1.8–7.7)
NEUTROPHILS NFR BLD: 45 % (ref 38–73)
NRBC BLD-RTO: 0 /100 WBC
OHS QRS DURATION: 78 MS
OHS QTC CALCULATION: 435 MS
PLATELET # BLD AUTO: 292 K/UL (ref 150–450)
PMV BLD AUTO: 11.3 FL (ref 9.2–12.9)
POTASSIUM SERPL-SCNC: 4.2 MMOL/L (ref 3.5–5.1)
PROT SERPL-MCNC: 8 G/DL (ref 6–8.4)
RBC # BLD AUTO: 5.02 M/UL (ref 4–5.4)
SODIUM SERPL-SCNC: 138 MMOL/L (ref 136–145)
TROPONIN I SERPL DL<=0.01 NG/ML-MCNC: <3 NG/L (ref 0–14)
TROPONIN I SERPL DL<=0.01 NG/ML-MCNC: <3 NG/L (ref 0–14)
WBC # BLD AUTO: 6.33 K/UL (ref 3.9–12.7)

## 2024-12-11 PROCEDURE — 25000242 PHARM REV CODE 250 ALT 637 W/ HCPCS: Performed by: PHYSICIAN ASSISTANT

## 2024-12-11 PROCEDURE — 85379 FIBRIN DEGRADATION QUANT: CPT | Performed by: PHYSICIAN ASSISTANT

## 2024-12-11 PROCEDURE — 90853 GROUP PSYCHOTHERAPY: CPT | Mod: ,,, | Performed by: PSYCHOLOGIST

## 2024-12-11 PROCEDURE — 83690 ASSAY OF LIPASE: CPT | Performed by: PHYSICIAN ASSISTANT

## 2024-12-11 PROCEDURE — 80053 COMPREHEN METABOLIC PANEL: CPT | Performed by: PHYSICIAN ASSISTANT

## 2024-12-11 PROCEDURE — 84484 ASSAY OF TROPONIN QUANT: CPT | Mod: 91 | Performed by: PHYSICIAN ASSISTANT

## 2024-12-11 PROCEDURE — 99285 EMERGENCY DEPT VISIT HI MDM: CPT | Mod: 25

## 2024-12-11 PROCEDURE — 86803 HEPATITIS C AB TEST: CPT | Performed by: PHYSICIAN ASSISTANT

## 2024-12-11 PROCEDURE — 63600175 PHARM REV CODE 636 W HCPCS: Performed by: PHYSICIAN ASSISTANT

## 2024-12-11 PROCEDURE — 87389 HIV-1 AG W/HIV-1&-2 AB AG IA: CPT | Performed by: PHYSICIAN ASSISTANT

## 2024-12-11 PROCEDURE — 96375 TX/PRO/DX INJ NEW DRUG ADDON: CPT

## 2024-12-11 PROCEDURE — 81025 URINE PREGNANCY TEST: CPT | Performed by: PHYSICIAN ASSISTANT

## 2024-12-11 PROCEDURE — 25000003 PHARM REV CODE 250: Performed by: PHYSICIAN ASSISTANT

## 2024-12-11 PROCEDURE — 90853 GROUP PSYCHOTHERAPY: CPT | Mod: ,,,

## 2024-12-11 PROCEDURE — 93010 ELECTROCARDIOGRAM REPORT: CPT | Mod: ,,, | Performed by: INTERNAL MEDICINE

## 2024-12-11 PROCEDURE — 96374 THER/PROPH/DIAG INJ IV PUSH: CPT

## 2024-12-11 PROCEDURE — 93005 ELECTROCARDIOGRAM TRACING: CPT

## 2024-12-11 PROCEDURE — 85025 COMPLETE CBC W/AUTO DIFF WBC: CPT | Performed by: PHYSICIAN ASSISTANT

## 2024-12-11 RX ORDER — ALUMINUM HYDROXIDE, MAGNESIUM HYDROXIDE, AND SIMETHICONE 1200; 120; 1200 MG/30ML; MG/30ML; MG/30ML
30 SUSPENSION ORAL
Status: COMPLETED | OUTPATIENT
Start: 2024-12-11 | End: 2024-12-11

## 2024-12-11 RX ORDER — KETOROLAC TROMETHAMINE 30 MG/ML
10 INJECTION, SOLUTION INTRAMUSCULAR; INTRAVENOUS
Status: COMPLETED | OUTPATIENT
Start: 2024-12-11 | End: 2024-12-11

## 2024-12-11 RX ORDER — LIDOCAINE HYDROCHLORIDE 20 MG/ML
5 SOLUTION OROPHARYNGEAL
Status: COMPLETED | OUTPATIENT
Start: 2024-12-11 | End: 2024-12-11

## 2024-12-11 RX ORDER — LORAZEPAM 1 MG/1
1 TABLET ORAL
Status: COMPLETED | OUTPATIENT
Start: 2024-12-11 | End: 2024-12-11

## 2024-12-11 RX ORDER — KETOROLAC TROMETHAMINE 10 MG/1
10 TABLET, FILM COATED ORAL EVERY 6 HOURS PRN
Qty: 20 TABLET | Refills: 0 | Status: SHIPPED | OUTPATIENT
Start: 2024-12-11 | End: 2024-12-16

## 2024-12-11 RX ORDER — ONDANSETRON HYDROCHLORIDE 2 MG/ML
4 INJECTION, SOLUTION INTRAVENOUS
Status: COMPLETED | OUTPATIENT
Start: 2024-12-11 | End: 2024-12-11

## 2024-12-11 RX ORDER — NITROGLYCERIN 0.4 MG/1
0.4 TABLET SUBLINGUAL
Status: COMPLETED | OUTPATIENT
Start: 2024-12-11 | End: 2024-12-11

## 2024-12-11 RX ORDER — BACLOFEN 5 MG/1
5 TABLET ORAL 3 TIMES DAILY
Qty: 270 TABLET | Refills: 1 | Status: SHIPPED | OUTPATIENT
Start: 2024-12-11

## 2024-12-11 RX ORDER — ACETAMINOPHEN 500 MG
1000 TABLET ORAL
Status: COMPLETED | OUTPATIENT
Start: 2024-12-11 | End: 2024-12-11

## 2024-12-11 RX ADMIN — LIDOCAINE HYDROCHLORIDE 5 ML: 20 SOLUTION ORAL at 01:12

## 2024-12-11 RX ADMIN — ACETAMINOPHEN 1000 MG: 500 TABLET ORAL at 04:12

## 2024-12-11 RX ADMIN — ONDANSETRON 4 MG: 2 INJECTION INTRAMUSCULAR; INTRAVENOUS at 01:12

## 2024-12-11 RX ADMIN — LORAZEPAM 1 MG: 1 TABLET ORAL at 04:12

## 2024-12-11 RX ADMIN — KETOROLAC TROMETHAMINE 10 MG: 30 INJECTION, SOLUTION INTRAMUSCULAR; INTRAVENOUS at 04:12

## 2024-12-11 RX ADMIN — NITROGLYCERIN 0.4 MG: 0.4 TABLET, ORALLY DISINTEGRATING SUBLINGUAL at 03:12

## 2024-12-11 RX ADMIN — ALUMINUM HYDROXIDE, MAGNESIUM HYDROXIDE, AND SIMETHICONE 30 ML: 200; 200; 20 SUSPENSION ORAL at 01:12

## 2024-12-11 NOTE — ED PROVIDER NOTES
Encounter Date: 12/11/2024       History     Chief Complaint   Patient presents with    Chest Pain     DENIES cardiac hx     39-year-old female with anxiety, migraines, endometriosis presents for intermittent midsternal chest and upper back discomfort for the last 2 or 3 days.  Pain feels like a tightness, nonradiating.  She can not identify any provoking factors, there is no association with food, deep breathing or movement.  She reports some associated nausea as well as epigastric discomfort and 1 episode of emesis.  She denies shortness of breath, fever, leg pain or swelling or other complaints.  She has no personal cardiac history, her grandmother had CHF.  History DVT/PE, no recent surgeries or immobilization.  She does take OCPs.      Review of patient's allergies indicates:  No Known Allergies  Past Medical History:   Diagnosis Date    Acne     ALLERGIC RHINITIS     Allergy     Anxiety     Dysmenorrhea     Low back pain     Migraine headache     Recurrent upper respiratory infection (URI)      Past Surgical History:   Procedure Laterality Date    DIAGNOSTIC LAPAROSCOPY N/A 7/18/2024    Procedure: LAPAROSCOPY, DIAGNOSTIC;  Surgeon: Liudmila Monaco MD;  Location: [facility] OR;  Service: OB/GYN;  Laterality: N/A;    ESOPHAGOGASTRODUODENOSCOPY N/A 7/28/2023    Procedure: EGD (ESOPHAGOGASTRODUODENOSCOPY);  Surgeon: Sameer Cedeño MD;  Location: [facility] ([address] [place] [address]);  Service: Endoscopy;  Laterality: N/A;    FACIAL COSMETIC SURGERY      2013    PH MONITORING, ESOPHAGUS, WIRELESS, (OFF REFLUX MEDS) N/A 7/28/2023    Procedure: PH MONITORING, ESOPHAGUS, WIRELESS, (OFF REFLUX MEDS);  Surgeon: Sameer Cedeño MD;  Location: [facility] ([address] [place] [address]);  Service: Endoscopy;  Laterality: N/A;  Please schedule Sari for a 96 hour esophageal Bravo pH probe study off all PPIs and off all H2 blockers she is been off of them for over 3 months now so she can get scheduled at any time with me.  Thank you  Ref by Dr RENZO Cedeño     SURGICAL REMOVAL OF ENDOMETRIOSIS N/A 7/18/2024    Procedure: DESTRUCTION, ENDOMETRIOSIS;  Surgeon: Liudmila Monaco MD;  Location: [facility] OR;  Service: OB/GYN;  Laterality: N/A;     Family History   Problem Relation Name Age of Onset    Endometriosis Mother      Cirrhosis Mother      Allergic rhinitis Father Stan     Allergic rhinitis Brother Jesús     Breast cancer Paternal Aunt      Stroke Paternal Grandfather      Amblyopia Neg Hx      Blindness Neg Hx      Cataracts Neg Hx      Glaucoma Neg Hx      Macular degeneration Neg Hx      Retinal detachment Neg Hx      Strabismus Neg Hx      Colon cancer Neg Hx      Ovarian cancer Neg Hx      Thyroid disease Neg Hx      Cancer Neg Hx      Melanoma Neg Hx      Allergies Neg Hx      Angioedema Neg Hx      Asthma Neg Hx      Atopy Neg Hx      Eczema Neg Hx      Immunodeficiency Neg Hx      Rhinitis Neg Hx      Urticaria Neg Hx      Celiac disease Neg Hx      Colon polyps Neg Hx      Crohn's disease Neg Hx      Esophageal cancer Neg Hx      Hemochromatosis Neg Hx      Inflammatory bowel disease Neg Hx      Rectal cancer Neg Hx      Ulcerative colitis Neg Hx      Stomach cancer Neg Hx      Pancreatic cancer Neg Hx      Carlos's esophagus Neg Hx      Pancreatitis Neg Hx      Uterine cancer Neg Hx      Bladder Cancer Neg Hx      Kidney cancer Neg Hx       Social History     Tobacco Use    Smoking status: Never     Passive exposure: Never    Smokeless tobacco: Never   Substance Use Topics    Alcohol use: Yes     Comment: socially    Drug use: No     Review of Systems    Physical Exam     Initial Vitals [12/11/24 1210]   BP Pulse Resp Temp SpO2   (!) 151/67 93 20 98.3 °F (36.8 °C) 96 %      MAP       --         Physical Exam    Nursing note and vitals reviewed.  Constitutional: She appears well-developed and well-nourished. She is not diaphoretic. No distress.   HENT:   Head: Normocephalic and atraumatic.   Eyes: No scleral icterus.   Cardiovascular:  Normal rate, regular  rhythm, normal heart sounds and intact distal pulses.     Exam reveals no gallop and no friction rub.       No murmur heard.  No lower extremity edema   Pulmonary/Chest: Breath sounds normal. No respiratory distress. She has no wheezes. She has no rhonchi. She has no rales. She exhibits no tenderness.   Abdominal: Abdomen is soft. Bowel sounds are normal. She exhibits no distension and no mass. There is no abdominal tenderness. There is no rebound and no guarding.   Musculoskeletal:         General: Normal range of motion.     Neurological: She is alert and oriented to person, place, and time.   Skin: Skin is warm and dry.   Psychiatric: She has a normal mood and affect.         ED Course   Procedures  Labs Reviewed   COMPREHENSIVE METABOLIC PANEL - Abnormal       Result Value    Sodium 138      Potassium 4.2      Chloride 106      CO2 20 (*)     Glucose 85      BUN 12      Creatinine 0.7      Calcium 9.3      Total Protein 8.0      Albumin 4.0      Total Bilirubin 0.4      Alkaline Phosphatase 55      AST 24      ALT 11      eGFR >60.0      Anion Gap 12     POCT URINE PREGNANCY - Normal    POC Preg Test, Ur Negative       Acceptable Yes     HEPATITIS C ANTIBODY    Hepatitis C Ab Non-reactive      Narrative:     Release to patient->Immediate   HIV 1 / 2 ANTIBODY    HIV 1/2 Ag/Ab Non-reactive      Narrative:     Release to patient->Immediate   CBC W/ AUTO DIFFERENTIAL    WBC 6.33      RBC 5.02      Hemoglobin 14.9      Hematocrit 43.9      MCV 88      MCH 29.7      MCHC 33.9      RDW 12.4      Platelets 292      MPV 11.3      Immature Granulocytes 0.2      Gran # (ANC) 2.9      Immature Grans (Abs) 0.01      Lymph # 3.0      Mono # 0.3      Eos # 0.1      Baso # 0.04      nRBC 0      Gran % 45.0      Lymph % 47.4      Mono % 5.4      Eosinophil % 1.4      Basophil % 0.6      Differential Method Automated     LIPASE    Lipase 55     TROPONIN I HIGH SENSITIVITY    Troponin I High Sensitivity <3     D  DIMER, QUANTITATIVE    D-Dimer 0.24     TROPONIN I HIGH SENSITIVITY    Troponin I High Sensitivity <3       EKG Readings: (Independently Interpreted)   Initial Reading: No STEMI. Rhythm: Normal Sinus Rhythm. Heart Rate: 80. ST Segments: Normal ST Segments. Clinical Impression: Normal Sinus Rhythm     ECG Results              EKG 12-lead (Final result)        Collection Time Result Time QRS Duration OHS QTC Calculation    12/11/24 12:14:07 12/11/24 13:31:17 78 435                     Final result by Interface, Lab In [facility] (12/11/24 13:31:23)                   Narrative:    Test Reason : R07.89,    Vent. Rate :  80 BPM     Atrial Rate :  80 BPM     P-R Int : 142 ms          QRS Dur :  78 ms      QT Int : 378 ms       P-R-T Axes :  61 112  24 degrees    QTcB Int : 435 ms    Normal sinus rhythm  Left posterior fascicular block  Abnormal ECG  When compared with ECG of 29-Aug-2022 10:58,  No significant change was found  Confirmed by Vilma Chou ([id number]) on 12/11/2024 1:31:15 PM    Referred By: AAAREFERRAL SELF           Confirmed By: Vilma Chou                                  Imaging Results              X-Ray Chest PA And Lateral (Final result)  Result time 12/11/24 14:35:53      Final result by Chilo Russ MD (12/11/24 14:35:53)                   Impression:      See above      Electronically signed by: Chilo Russ MD  Date:    12/11/2024  Time:    14:35               Narrative:    EXAMINATION:  XR CHEST PA AND LATERAL    CLINICAL HISTORY:  Chest pain, unspecified    TECHNIQUE:  PA and lateral views of the chest were performed.    COMPARISON:  No 12/05/2021 ne    FINDINGS:  Heart size normal.  The lungs are clear.  No pleural effusion                                       Medications   aluminum-magnesium hydroxide-simethicone 200-200-20 mg/5 mL suspension 30 mL (30 mLs Oral Given 12/11/24 1332)   LIDOcaine viscous HCl 2% oral solution 5 mL (5 mLs Oral Given 12/11/24 1332)   ondansetron injection 4 mg  (4 mg Intravenous Given 12/11/24 1332)   nitroGLYCERIN SL tablet 0.4 mg (0.4 mg Sublingual Given 12/11/24 1530)   acetaminophen tablet 1,000 mg (1,000 mg Oral Given 12/11/24 1635)   LORazepam tablet 1 mg (1 mg Oral Given 12/11/24 1635)   ketorolac injection 9.999 mg (9.999 mg Intravenous Given 12/11/24 1636)     Medical Decision Making  39-year-old female presenting for chest discomfort.  /67 with otherwise normal vitals.  She appears uncomfortable but nontoxic.    Differential diagnosis:  Unclear presentation, concerns include ACS, pancreatitis, GERD, I doubt aortic pathology    Check labs, give analgesics, chest x-ray reassess.    Workup is reassuring, negative troponin x2, negative D-dimer.  Unclear etiology of symptoms.  She has been has improved.  Discharge with instructions to follow up with PCP, Cardiology and return to the ED for worsening symptoms. Stressed the importance of follow-up, strict ED return precautions given.  Patient voiced understanding and is comfortable with discharge.     Amount and/or Complexity of Data Reviewed  Labs: ordered. Decision-making details documented in ED Course.  Radiology: ordered and independent interpretation performed. Decision-making details documented in ED Course.  ECG/medicine tests: ordered and independent interpretation performed.    Risk  OTC drugs.  Prescription drug management.               ED Course as of 12/11/24 1744   Wed Dec 11, 2024   1320 hCG Qualitative, Urine: Negative [CC]   1351 X-Ray Chest PA And Lateral  Per my independent interpretation, no consolidation or pulmonary edema [CC]   1411 D-Dimer: 0.24 [CC]   1435 Lipase: 55 [CC]   1435 BILIRUBIN TOTAL: 0.4 [CC]   1439 Troponin I High Sensitivity: <3 [CC]   1445 No bruit with GI cocktail.  Will try nitro [CC]   1613 Troponin I High Sensitivity: <3  No improvement with nitro, however chest pain has overall improved now 1/10 intensity [CC]      ED Course User Index  [CC] Janet Kwong PA-C                            Clinical Impression:  Final diagnoses:  [R07.89] Chest discomfort  [R07.9] Chest pain (Primary)          ED Disposition Condition    Discharge Stable          ED Prescriptions       Medication Sig Dispense Start Date End Date Auth. Provider    ketorolac (TORADOL) 10 mg tablet Take 1 tablet (10 mg total) by mouth every 6 (six) hours as needed for Pain. 20 tablet 12/11/2024 12/16/2024 Janet Kwong PA-C          Follow-up Information       Follow up With Specialties Details Why Contact Info Additional Information    Nhi Shearer MD Internal Medicine Schedule an appointment as soon as possible for a visit in 1 week  [address] [place] [address]  [phone]       [facility] - Cardiology - [facility] Cardiology Schedule an appointment as soon as possible for a visit in 1 week  [address] [facility] [address] [facility] [id number] [phone] Cardiology Services Clinics - 3rd floor    [facility] - Emergency Dept Emergency Medicine Go to  If symptoms worsen [address] [facility] [id number] [phone]              Janet Kwong PA-C  12/11/24 9313

## 2024-12-11 NOTE — DISCHARGE INSTRUCTIONS
Diagnosis: Chest pain    I am not sure what is causing your chest pain.  Your lab tests, chest x-ray and EKG did not show any concerning findings.  Prescribing an anti-inflammatory medicine you can take as needed for pain.    Please schedule follow-up appointment with your primary care doctor.  I also sent a referral to our Cardiology doctors for follow-up.  If you start to have any worsening symptoms, please return to the emergency department.

## 2024-12-11 NOTE — PROGRESS NOTES
Group Psychotherapy (PhD/LCSW)    Site: [facility]    Clinical status of patient: Intensive Outpatient Program (IOP)    Date: 12/10/2024    Group Focus: Psychodynamic Group Psychotherapy    Length of service: 79977 - 45-50 minutes    Number of patients in attendance: 6    Referred by: Behavioral Medicine Unit Treatment Team    Target symptoms: Anxiety    Patient's response to treatment: Active Listening, Self-disclosure, and Feedback given to another patient    Progress toward goals: Progressing adequately    Interval History: Time was spent welcoming new group members and sharing progress for group members who are being discharged. The utility of sharing emotional experiences with others and implementing skills outside of IOP was discussed.     Diagnosis:     ICD-10-CM ICD-9-CM   1. Generalized anxiety disorder  F41.1 300.02        Plan: Continue treatment on [facility]

## 2024-12-11 NOTE — PROGRESS NOTES
Group Psychotherapy (PhD/LCSW)     Site: [facility]     Clinical status of patient: Intensive Outpatient Program (IOP)     Date: 12/11/2024     Group Focus: Addressing Shame     Length of service: 75756 - 45-50 minutes     Number of patients in attendance: 7     Referred by: [facility] Treatment Team     Target symptoms: Anxiety and Depression     Patient's response to treatment: Active Listening and Self-disclosure     Progress toward goals: Progressing adequately     Interval History: Participants completed Shame Questionnaire and discussed experiences of shame with which they identified. Participants reviewed shame cycle and common sources of shame. Participants explored distinctions between healthy guilt and unhealthy shame. Participants reviewed strategies for coping with and addressing shame.      Diagnosis:     ICD-10-CM ICD-9-CM   1. Generalized anxiety disorder  F41.1 300.02   2. Recurrent major depressive disorder, in partial remission  F33.41 296.35      Plan: Continue treatment on [facility]

## 2024-12-11 NOTE — ED TRIAGE NOTES
Patient identifiers for Sari Serna 39 y.o. female checked and correct.  Chief Complaint   Patient presents with    Chest Pain     DENIES cardiac hx     Past Medical History:   Diagnosis Date    Acne     ALLERGIC RHINITIS     Allergy     Anxiety     Dysmenorrhea     Low back pain     Migraine headache     Recurrent upper respiratory infection (URI)      Allergies reported: Review of patient's allergies indicates:  No Known Allergies      LOC: Patient is awake, alert, and aware of environment with an appropriate affect. Patient is oriented x 4 and speaking appropriately.  APPEARANCE: Patient resting comfortably and in no acute distress. Patient is clean and well groomed, patient's clothing is properly fastened.  SKIN: The skin is warm and dry. Patient has normal skin turgor and moist mucus membranes.   MUSKULOSKELETAL: Patient is moving all extremities well, no obvious deformities noted. Pulses intact.   RESPIRATORY: Airway is open and patent. Respirations are spontaneous and non-labored with normal effort and rate.  CARDIAC: Patient has a normal rate and rhythm. Normal sinus on cardiac monitor. No peripheral edema noted. +chest pain  ABDOMEN: No distention noted. Soft and non-tender upon palpation.  NEUROLOGICAL: , PERRL. Facial expression is symmetrical. Hand grasps are equal bilaterally. Normal sensation in all extremities when touched with finger.

## 2024-12-12 ENCOUNTER — HOSPITAL ENCOUNTER (OUTPATIENT)
Dept: PSYCHIATRY | Facility: HOSPITAL | Age: 39
Discharge: HOME | End: 2024-12-12
Attending: STUDENT IN AN ORGANIZED HEALTH CARE EDUCATION/TRAINING PROGRAM
Payer: COMMERCIAL

## 2024-12-12 DIAGNOSIS — F41.1 GENERALIZED ANXIETY DISORDER: Primary | ICD-10-CM

## 2024-12-12 DIAGNOSIS — F33.41 RECURRENT MAJOR DEPRESSIVE DISORDER, IN PARTIAL REMISSION: ICD-10-CM

## 2024-12-12 PROCEDURE — 90853 GROUP PSYCHOTHERAPY: CPT

## 2024-12-12 NOTE — PLAN OF CARE
"   12/12/24 1531   Activity/Group Therapy Checklist   Group Other (Comments)  (Creative Therapy)   Attendance Attended   Follows Direction Followed directions   Group Interactions/Observations Interacted appropriately;Alert;Sharing;Supportive   Affect/Mood Range Normal range   Affect/Mood Display Appropriate   Goal Progression Progressing      facilitated creative session and dicussed with patients how creative therapy can be therapeutic to resolving anxiety and other stress-related issues. SW discussed activity: " Mask On, Mask Off". Patient's were able to draw and/ or color two blank masks and depict two versions of self. 1) Of how others perceive them and 2) of how they perceive themselves. SW discussed with pts how this was a form of self expression and an opportunity to use this creative as a way to have difficult conversations.   "

## 2024-12-12 NOTE — PROGRESS NOTES
Group Psychotherapy (PhD/LCSW)    Site: [facility]    Clinical status of patient: Intensive Outpatient Program (IOP)    Date: 12/11/2024    Group Focus: Mindfulness    Length of service: 87431 - 45-50 minutes    Number of patients in attendance: 14    Referred by: [facility] Treatment Team    Target symptoms: Depression and Anxiety    Patient's response to treatment: Active Listening    Progress toward goals: Progressing adequately    Interval History: Session focus was Mindfulness: Mindfulness [place] and Kindness Meditation. Patient's were introduced to the [place] Kindness meditation skill. Patients discussed the uses of the meditation, how to practice the meditation, and participated in a loving kindness meditation during session.     Diagnosis:     ICD-10-CM ICD-9-CM   1. Generalized anxiety disorder  F41.1 300.02   2. Recurrent major depressive disorder, in partial remission  F33.41 296.35        Plan: Continue treatment on [facility]

## 2024-12-12 NOTE — PLAN OF CARE
12/12/24 2650   Activity/Group Therapy Checklist   Group Other (Comments)  (Processing)   Attendance Attended   Follows Direction Followed directions   Group Interactions/Observations Interacted appropriately;Sharing;Supportive;Alert   Affect/Mood Range Normal range   Affect/Mood Display Appropriate   Goal Progression Progressing

## 2024-12-12 NOTE — PROGRESS NOTES
[facility]   DEPARTMENT OF PSYCHIATRY     IDENTIFIERS & DEMOGRAPHICS:     -- PATIENT IDENTIFIERS: Sari Serna  [id number]  1985  39 y.o.  female  -- LOCATION OF PATIENT: [facility]    -- MODE OF ARRIVAL: self-presented  -- PRESENT WITH PATIENT DURING SESSION: ALONE  -- SOURCES OF INFORMATION: PATIENT, EHR/chart  -- ENCOUNTER PROVIDER: Shasta Ambriz MD        PRESENTATION:     OVERVIEW OF THE HPI:    Patient is a 38 yo F with past psychiatric history of MDD, ADAM, and panic disorder. She follows with Dr. Pham at [facility]. Most recently, prescribed Lexapro 20 mg, Elavil 25 mg (discontinued a few weeks ago), and Ativan 0.5 mg PRN panic attacks. Currently established with a therapist. She was referred to [facility] for worsening anxiety.      SUBJECTIVE/CURRENT FINDINGS:    Patient discussed going to the ED yesterday for chest pain. ED work-up largely negative and they referred her to outpatient cardiology. Patient reported having an appointment set up with the cardiologist and plans to attend. She shared she is feeling better today and having no chest pain.   Patient reported taking Lexapro as prescribed. Denied any side effects and denied needing refills at this time. Except for the Ativan they gave her in the ED yesterday, she has not needed to take PRN Ativan in a few weeks.   Patient reported noticing improvements in her self-confidence, anxiety, advocating for self, and coping skills through the program. Is considering a job change, as this was her main stressor. Was able to work through a crying spell recently. Slept well last night. Denied SI.     REVIEW OF SYSTEMS:    >> SOURCES: patient     Y   Sleep Disturbance/Disruption  +insomnia     N   Appetite/Weight Change   N   Alterations in Energy Level   Y   Impaired Focus/Concentration  +inattentive     Y   Depressive Symptomatology  +depressed mood  no anhedonia, no hopelessness     Y   Excessive Anxiety/Worry   "+generalized anxiety/worry    last panic attack about 2 weeks ago   N   Dysregulated Mood/Behavior   N   Manic Symptomatology   N   Psychosis   N   Trauma-Related   N   Impulsivity/Compulsivity/Obsessionality   N   Disordered Eating   N   Dissociation   Y   Pain  chronic pain: hand, neck/shoulder   N   Cardiopulmonary Symptoms   N   Abnormal Involuntary Movements    Regarding the current presentation, no other significant issues or complaints are voiced or known at this time.       ADD-ON PSYCHOTHERAPY:     ADD-ON THERAPY     HISTORY:     See H&P from 12/4/24 for full history.     Allergies:  Patient has no known allergies.     EXAMINATION:     VITALS:  There were no vitals taken for this visit.     MENTAL STATUS EXAMINATION:  Appearance: appears stated age, normal weight  appropriately dressed, adequately groomed, in no apparent distress, well-appearing    Behavior & Attitude: participative, under good behavioral control, able to redirect, appropriate eye contact  calm, engaged, agreeable, cooperative    Movements & Motor Activity: no psychomotor agitation, no psychomotor retardation, normal gait, normal station, ambulates without assistance, not wheelchair bound (able to ambulate), no weakness, no spasticity, no rigidity, no tics, no tremor, no akathisia, no dyskinesia, no ataxia, no parkinsonism    Speech & Language: normal rate, normal volume, normal quantity, normal latency, spontaneous, reciprocal, fluent    Mood: "ok"  Affect: reactive, anxious    Thought Process & Associations: linear, goal-directed, organized, logical, coherent, relevant, abstract  no loosening of associations    Thought Content & Perceptions: no delusions, no paranoid ideation, no ideas of reference, no grandiosity, no hyperreligiosity, no hallucinations, no responding to internal stimuli    Sensorium: awake, alert, clear  no confusion, no delirium    Orientation: grossly intact, " oriented to person, oriented to place, oriented to time, oriented to situation    Recent & Remote Memory: intact (recent), intact (remote)    Attention & Concentration: intact  attentive to conversation, not easily distracted    Fund of Knowledge: intact, vocabulary proficient    Insight: intact, good  demonstrates sufficient awareness of condition/situation    Judgment: intact, good  heeds instructions/advice      PSYCHIATRIC SCREENINGS:  > Anxiety (ADAM-7):     > Depression (PHQ-9):         RISK & REGULATORY:      RISK PARAMETERS (current to the encounter/episode  NOT inclusive of past history):     N   Suicidal Ideation/Threats   N   Suicide Attempts/Gestures   N   Homicidal Ideation/Threats   N   Homicidal Behavior   N   Non-Suicidal Self-Injurious Behavior   N   Perpetrated Violence     FIREARMS & WEAPONS:     Y   Ready Access to Firearms   Y   Gun Safety Counseled  e.g., proper storage, inherent risk     SAFETY SCREENINGS:    -- FUTURE ORIENTED: YES  -- REMORSE/REGRET: NO     REGULATORY:      INFORMED CONSENT & SHARED DECISION MAKING are the hallmark and bedrock of good clinical care, and as such have been employed and obtained, respectively, to the degree possible.  Discussed, to the extent possible, diagnosis, risks and benefits of proposed treatment (e.g., medication, therapy) vs alternative treatments vs no treatment, potential side effects of these treatments, and the inherent unpredictability of treatment.         - ABILITY TO UNDERSTAND, PARTICIPATE & ENGAGE: present and intact     - AGREEABLE TO TREATMENT (consent/assent): the patient consents to treatment     - RELIABILITY/ACCURACY: the patient is deemed to be a reliable and factually accurate historian      WARNINGS & PRECAUTIONS:  >> In cases of emergencies (e.g. SI/HI resulting in danger to self or others, functioning deteriorating to the level of grave disability), call 911 or 988, or present to the  emergency department for immediate assistance.    >> Individuals should not operate a motor vehicle or heavy machinery if effects of medications or underlying symptoms/condition impair the ability to do so safely.    >> FULLY comply with ANY/ALL medication as prescribed/instructed and report ANY/ALL suspected adverse effects to appropriate health care providers.       ASSESSMENT & PLAN:     DIAGNOSES & PROBLEMS:       1.  Generalized Anxiety Disorder    2.  Major Depressive Disorder, recurrent, in partial remission    3.  Panic Disorder    PSYCHOTROPIC REGIMEN:   (C)=Continue as prescribed  (A)=Adjust as noted  (I)=Iniitate  (D)=Discontinue      1.  Lexapro 20 mg daily (C)    2.  Ativan 0.5 mg daily PRN panic attack (C)    >> Continuing current medications. Defer any changes at this time.   >> Counseled on sleep hygiene.  >> Counseled on firearm safety.    CHART REVIEW: available documentation has been reviewed, and pertinent elements of the chart have been incorporated into this evaluation where appropriate.       DIAGNOSTIC TESTING:      Glu 85  12/11/2024  Li *   *  TSH 1.119  4/8/2024    HgA1c *   *  VPA *   *   FT4 1.12  1/21/2023    Na 138  12/11/2024  CLZ *   *  WBC 6.33  12/11/2024    Cr 0.7  12/11/2024  ANC 2.9; 45.0;   12/11/2024   Hgb 14.9  12/11/2024     BUN 12  12/11/2024  Trop I *   *  HCT 43.9  12/11/2024     GFR >60.0  12/11/2024   CPK *   *    12/11/2024     Alb 4.0  12/11/2024   PRL *   *  B12 499  1/21/2023     T Bili 0.4  12/11/2024  Chol 184  4/8/2024  B9 9.0  1/21/2023    ALP 55  12/11/2024  TGs 136  4/8/2024  B1 *   *    AST 24  12/11/2024  HDL 58  4/8/2024  Vit D 33  1/21/2023     ALT 11  12/11/2024  LDL 98.8  4/8/2024  HIV Non-reactive  12/11/2024     INR *   *  Debby 57  11/22/2024   Hep C Non-reactive  12/11/2024    GGT *   *  Lip 55  12/11/2024  RPR *   *    MCV 88  12/11/2024   NH4 *   *  UPT Negative  12/11/2024      PETH *   *  THC *   *     ETOH *   *  KARRI *   *    EtG *   *  AMP *   *    ALC *   *  OPI *   *    BZO *   *  MTD *   *     BAR *   *  BUP *   *    PCP *   *  FEN *   *     Results for orders placed or performed during the hospital encounter of 12/11/24   EKG 12-lead    Collection Time: 12/11/24 12:14 PM   Result Value Ref Range    QRS Duration 78 ms    [facility] QTC Calculation 435 ms    Narrative    Test Reason : R07.89,    Vent. Rate :  80 BPM     Atrial Rate :  80 BPM     P-R Int : 142 ms          QRS Dur :  78 ms      QT Int : 378 ms       P-R-T Axes :  61 112  24 degrees    QTcB Int : 435 ms    Normal sinus rhythm  Left posterior fascicular block  Abnormal ECG  When compared with ECG of 29-Aug-2022 10:58,  No significant change was found  Confirmed by Vilma Chou ([id number]) on 12/11/2024 1:31:15 PM    Referred By: AAAREFERRAL SELF           Confirmed By: Vilma Chou        BECERRA & LINKS:        Y  = yes/endorses     N  = no/denies     U  = unknown/unable to assess    ADHD   AIMS   AUDIT   AUDIT-C   C-SSRS (Screen)   C-SSRS (Short)   C-SSRS (Full)   DAST   DAST-10   ADAM-7   MoCA   PCL-5   PHQ-9   DREW   YMRS     Consults  [facility] BEHAVIORAL MEDICINE U*       Shasta Ambriz MD   [facility], PGY4

## 2024-12-12 NOTE — PROGRESS NOTES
Group Psychotherapy (PhD/LCSW)    Site: [facility]    Clinical status of patient: Intensive Outpatient Program (IOP)    Date: 12/12/2024    Group Focus: Emotion Regulation     Length of service: 97493 - 45-50 minutes    Number of patients in attendance: 13    Referred by: [facility] Treatment Team    Target symptoms: Depression and Anxiety    Patient's response to treatment: Active Listening    Progress toward goals: Progressing adequately    Interval History: Session focus was Emotion Regulation:  ABC PLEASE.  Patients were encouraged to reduce vulnerability to distress by accumulating positive emotions, building mastery, coping ahead, and by attending to physical well-being.  Each patient identified a way to accumulate positive emotions and build mastery.    Diagnosis:     ICD-10-CM ICD-9-CM   1. Generalized anxiety disorder  F41.1 300.02   2. Recurrent major depressive disorder, in partial remission  F33.41 296.35        Plan: Continue treatment on [facility]

## 2024-12-13 ENCOUNTER — HOSPITAL ENCOUNTER (OUTPATIENT)
Dept: PSYCHIATRY | Facility: HOSPITAL | Age: 39
Discharge: HOME | End: 2024-12-13
Attending: STUDENT IN AN ORGANIZED HEALTH CARE EDUCATION/TRAINING PROGRAM
Payer: COMMERCIAL

## 2024-12-13 VITALS
DIASTOLIC BLOOD PRESSURE: 77 MMHG | TEMPERATURE: 98 F | HEART RATE: 78 BPM | SYSTOLIC BLOOD PRESSURE: 116 MMHG | RESPIRATION RATE: 18 BRPM

## 2024-12-13 DIAGNOSIS — F33.41 RECURRENT MAJOR DEPRESSIVE DISORDER, IN PARTIAL REMISSION: ICD-10-CM

## 2024-12-13 DIAGNOSIS — F41.1 GENERALIZED ANXIETY DISORDER: Primary | ICD-10-CM

## 2024-12-13 PROCEDURE — 90853 GROUP PSYCHOTHERAPY: CPT | Mod: ,,, | Performed by: SOCIAL WORKER

## 2024-12-13 PROCEDURE — 90853 GROUP PSYCHOTHERAPY: CPT

## 2024-12-13 NOTE — PLAN OF CARE
12/13/24 0872   Activity/Group Therapy Checklist   Group Meditation/Relaxation   Attendance Attended   Follows Direction Followed directions   Group Interactions/Observations Interacted appropriately;Sharing;Supportive;Alert   Affect/Mood Range Normal range   Affect/Mood Display Appropriate   Goal Progression Progressing

## 2024-12-16 ENCOUNTER — HOSPITAL ENCOUNTER (OUTPATIENT)
Dept: PSYCHIATRY | Facility: HOSPITAL | Age: 39
Discharge: HOME | End: 2024-12-16
Attending: STUDENT IN AN ORGANIZED HEALTH CARE EDUCATION/TRAINING PROGRAM
Payer: COMMERCIAL

## 2024-12-16 VITALS
TEMPERATURE: 98 F | HEART RATE: 72 BPM | RESPIRATION RATE: 18 BRPM | SYSTOLIC BLOOD PRESSURE: 118 MMHG | DIASTOLIC BLOOD PRESSURE: 59 MMHG

## 2024-12-16 DIAGNOSIS — F33.41 RECURRENT MAJOR DEPRESSIVE DISORDER, IN PARTIAL REMISSION: ICD-10-CM

## 2024-12-16 DIAGNOSIS — F41.1 GENERALIZED ANXIETY DISORDER: Primary | ICD-10-CM

## 2024-12-16 DIAGNOSIS — F41.0 PANIC DISORDER: ICD-10-CM

## 2024-12-16 PROCEDURE — 90853 GROUP PSYCHOTHERAPY: CPT

## 2024-12-16 PROCEDURE — 90833 PSYTX W PT W E/M 30 MIN: CPT | Mod: ,,, | Performed by: STUDENT IN AN ORGANIZED HEALTH CARE EDUCATION/TRAINING PROGRAM

## 2024-12-16 PROCEDURE — 99232 SBSQ HOSP IP/OBS MODERATE 35: CPT | Mod: GT,,, | Performed by: STUDENT IN AN ORGANIZED HEALTH CARE EDUCATION/TRAINING PROGRAM

## 2024-12-16 NOTE — PROGRESS NOTES
Group Psychotherapy (PhD/LCSW)    Site: [facility]    Clinical status of patient: Intensive Outpatient Program (IOP)    Date: 12/16/2024    Group Focus: Distress Tolerance     Length of service: 47325 - 45-50 minutes    Number of patients in attendance: 14    Referred by: [facility] Treatment Team    Target symptoms: Depression and Anxiety    Patient's response to treatment: Active Listening    Progress toward goals: Progressing adequately    Interval History: Session focus was Distress Tolerance:  Pros & Cons.  Patients were encouraged to use crisis survival skills to reduce intensity of distress.  They were taught to use Pros & Cons to reinforce desired behaviors.    Diagnosis:     ICD-10-CM ICD-9-CM   1. Generalized anxiety disorder  F41.1 300.02   2. Recurrent major depressive disorder, in partial remission  F33.41 296.35   3. Panic disorder  F41.0 300.01        Plan: Continue treatment on [facility]

## 2024-12-16 NOTE — PROGRESS NOTES
Group Psychotherapy (PhD/LCSW)    Site: [facility]    Clinical status of patient: Intensive Outpatient Program (IOP)    Date: 12/16/2024    Group Focus: Sleep 101    Length of service: 03148 - 45-50 minutes    Number of patients in attendance: 14    Referred by: [facility] Treatment Team    Target symptoms: Depression and Anxiety    Patient's response to treatment: Active Listening    Progress toward goals: Progressing adequately    Interval History: STIMULUS CONTROL and SLEEP COMPRESSION   Session focus was on stimulus control and sleep compression. Clinician and patient discussed associating beds with wakefulness and how to disrupt the connection. Clinician also discussed the use of sleep compression to improve sleep quality and stimulate sleep drive. Patients reviewed factors contributing to sleep hygiene.  Patients reviewed sleep diaries and strategies for implementing stimulus control and sleep compression.     Diagnosis:     ICD-10-CM ICD-9-CM   1. Generalized anxiety disorder  F41.1 300.02   2. Recurrent major depressive disorder, in partial remission  F33.41 296.35   3. Panic disorder  F41.0 300.01        Plan: Continue treatment on [facility]

## 2024-12-16 NOTE — PLAN OF CARE
12/16/24 1418   Activity/Group Therapy Checklist   Group Other (Comments)  (Processing)   Attendance Attended   Follows Direction Followed directions   Group Interactions/Observations Interacted appropriately;Sharing;Supportive;Alert   Affect/Mood Range Normal range   Affect/Mood Display Appropriate   Goal Progression Progressing

## 2024-12-16 NOTE — PROGRESS NOTES
"  [facility]   DEPARTMENT OF PSYCHIATRY     IDENTIFIERS & DEMOGRAPHICS:     -- PATIENT IDENTIFIERS: Sari Serna  [id number]  1985  39 y.o.  female  -- LOCATION OF PATIENT: [facility]    -- MODE OF ARRIVAL: self-presented  -- PRESENT WITH PATIENT DURING SESSION: ALONE  -- SOURCES OF INFORMATION: PATIENT, EHR/chart  -- ENCOUNTER PROVIDER: Shasta Ambriz MD        PRESENTATION:     OVERVIEW OF THE HPI:    Patient is a 40 yo F with past psychiatric history of MDD, ADAM, and panic disorder. She follows with Dr. Pham at [facility]. Most recently, prescribed Lexapro 20 mg, Elavil 25 mg (discontinued a few weeks ago), and Ativan 0.5 mg PRN panic attacks. Currently established with a therapist. She was referred to [facility] for worsening anxiety.      SUBJECTIVE/CURRENT FINDINGS:    Patient taking medication as prescribed, no reported issues.   She shared some continued anxiety related to her job. Mentioned finding some clarity about what she wants to do concerning her job and has applied for other opportunities.   Has been enjoying group sessions. Looking forward to doing more with DBT workbook. She shared using coping skill she learned and having more "good moments." Denied SI.     REVIEW OF SYSTEMS:    >> SOURCES: patient     Y   Sleep Disturbance/Disruption  +insomnia     N   Appetite/Weight Change   N   Alterations in Energy Level   Y   Impaired Focus/Concentration  +inattentive     Y   Depressive Symptomatology  no anhedonia, no hopelessness     Y   Excessive Anxiety/Worry  +generalized anxiety/worry    last panic attack about 2 weeks ago   N   Dysregulated Mood/Behavior   N   Manic Symptomatology   N   Psychosis   N   Trauma-Related   N   Impulsivity/Compulsivity/Obsessionality   N   Disordered Eating   N   Dissociation   Y   Pain  chronic pain: hand, neck/shoulder   N   Cardiopulmonary Symptoms   N   Abnormal Involuntary " Movements    Regarding the current presentation, no other significant issues or complaints are voiced or known at this time.       ADD-ON PSYCHOTHERAPY:     ADD-ON THERAPY     HISTORY:     See H&P from 12/4/24 for full history.     Allergies:  Patient has no known allergies.     EXAMINATION:     VITALS:  BP (!) 118/59   Pulse 72   Temp 97.9 °F (36.6 °C)   Resp 18      MENTAL STATUS EXAMINATION:  Appearance: appears stated age, normal weight  appropriately dressed, adequately groomed, in no apparent distress, well-appearing    Behavior & Attitude: participative, under good behavioral control, able to redirect, appropriate eye contact  calm, engaged, agreeable, cooperative    Movements & Motor Activity: no psychomotor agitation, no psychomotor retardation, normal gait, normal station, ambulates without assistance, not wheelchair bound (able to ambulate), no weakness, no spasticity, no rigidity, no tics, no tremor, no akathisia, no dyskinesia, no ataxia, no parkinsonism    Speech & Language: normal rate, normal volume, normal quantity, normal latency, spontaneous, reciprocal, fluent    Mood: improving  Affect: reactive, anxious    Thought Process & Associations: linear, goal-directed, organized, logical, coherent, relevant, abstract  no loosening of associations    Thought Content & Perceptions: no delusions, no paranoid ideation, no ideas of reference, no grandiosity, no hyperreligiosity, no hallucinations, no responding to internal stimuli    Sensorium: awake, alert, clear  no confusion, no delirium    Orientation: grossly intact, oriented to person, oriented to place, oriented to time, oriented to situation    Recent & Remote Memory: intact (recent), intact (remote)    Attention & Concentration: intact  attentive to conversation, not easily distracted    Fund of Knowledge: intact, vocabulary proficient    Insight: intact, good  demonstrates sufficient awareness of condition/situation     Judgment: intact, good  heeds instructions/advice      PSYCHIATRIC SCREENINGS:  > Anxiety (ADAM-7):     > Depression (PHQ-9):         RISK & REGULATORY:      RISK PARAMETERS (current to the encounter/episode  NOT inclusive of past history):     N   Suicidal Ideation/Threats   N   Suicide Attempts/Gestures   N   Homicidal Ideation/Threats   N   Homicidal Behavior   N   Non-Suicidal Self-Injurious Behavior   N   Perpetrated Violence     FIREARMS & WEAPONS:     Y   Ready Access to Firearms   Y   Gun Safety Counseled  e.g., proper storage, inherent risk     SAFETY SCREENINGS:    -- FUTURE ORIENTED: YES  -- REMORSE/REGRET: NO     REGULATORY:      INFORMED CONSENT & SHARED DECISION MAKING are the hallmark and bedrock of good clinical care, and as such have been employed and obtained, respectively, to the degree possible.  Discussed, to the extent possible, diagnosis, risks and benefits of proposed treatment (e.g., medication, therapy) vs alternative treatments vs no treatment, potential side effects of these treatments, and the inherent unpredictability of treatment.         - ABILITY TO UNDERSTAND, PARTICIPATE & ENGAGE: present and intact     - AGREEABLE TO TREATMENT (consent/assent): the patient consents to treatment     - RELIABILITY/ACCURACY: the patient is deemed to be a reliable and factually accurate historian      WARNINGS & PRECAUTIONS:  >> In cases of emergencies (e.g. SI/HI resulting in danger to self or others, functioning deteriorating to the level of grave disability), call 911 or 988, or present to the emergency department for immediate assistance.    >> Individuals should not operate a motor vehicle or heavy machinery if effects of medications or underlying symptoms/condition impair the ability to do so safely.    >> FULLY comply with ANY/ALL medication as prescribed/instructed and report ANY/ALL suspected adverse effects to appropriate health care providers.        ASSESSMENT & PLAN:     DIAGNOSES & PROBLEMS:       1.  Generalized Anxiety Disorder    2.  Major Depressive Disorder, recurrent, in partial remission    3.  Panic Disorder    PSYCHOTROPIC REGIMEN:   (C)=Continue as prescribed  (A)=Adjust as noted  (I)=Iniitate  (D)=Discontinue      1.  Lexapro 20 mg daily (C)    2.  Ativan 0.5 mg daily PRN panic attack (C)    >> Continuing current medications.   >> Counseled on sleep hygiene.  >> Counseled on firearm safety.    CHART REVIEW: available documentation has been reviewed, and pertinent elements of the chart have been incorporated into this evaluation where appropriate.       DIAGNOSTIC TESTING:      Glu 85  12/11/2024  Li *   *  TSH 1.119  4/8/2024    HgA1c *   *  VPA *   *   FT4 1.12  1/21/2023    Na 138  12/11/2024  CLZ *   *  WBC 6.33  12/11/2024    Cr 0.7  12/11/2024  ANC 2.9; 45.0;   12/11/2024   Hgb 14.9  12/11/2024     BUN 12  12/11/2024  Trop I *   *  HCT 43.9  12/11/2024     GFR >60.0  12/11/2024   CPK *   *    12/11/2024     Alb 4.0  12/11/2024   PRL *   *  B12 499  1/21/2023     T Bili 0.4  12/11/2024  Chol 184  4/8/2024  B9 9.0  1/21/2023    ALP 55  12/11/2024  TGs 136  4/8/2024  B1 *   *    AST 24  12/11/2024  HDL 58  4/8/2024  Vit D 33  1/21/2023     ALT 11  12/11/2024  LDL 98.8  4/8/2024  HIV Non-reactive  12/11/2024     INR *   *  Debby 57  11/22/2024   Hep C Non-reactive  12/11/2024    GGT *   *  Lip 55  12/11/2024  RPR *   *    MCV 88  12/11/2024   NH4 *   *  UPT Negative  12/11/2024      PETH *   *  THC *   *    ETOH *   *  KARRI *   *    EtG *   *  AMP *   *    ALC *   *  OPI *   *    BZO *   *  MTD *   *     BAR *   *  BUP *   *    PCP *   *  FEN *   *     Results for orders placed or performed during the hospital encounter of 12/11/24   EKG 12-lead    Collection Time: 12/11/24 12:14 PM   Result Value Ref Range    QRS Duration 78 ms    [facility] QTC Calculation 435 ms    Narrative    Test Reason :  R07.89,    Vent. Rate :  80 BPM     Atrial Rate :  80 BPM     P-R Int : 142 ms          QRS Dur :  78 ms      QT Int : 378 ms       P-R-T Axes :  61 112  24 degrees    QTcB Int : 435 ms    Normal sinus rhythm  Left posterior fascicular block  Abnormal ECG  When compared with ECG of 29-Aug-2022 10:58,  No significant change was found  Confirmed by Vilma Chou ([id number]) on 12/11/2024 1:31:15 PM    Referred By: AAAREFERRAL SELF           Confirmed By: Vilma Chou        BECERRA & LINKS:        Y  = yes/endorses     N  = no/denies     U  = unknown/unable to assess    ADHD   AIMS   AUDIT   AUDIT-C   C-SSRS (Screen)   C-SSRS (Short)   C-SSRS (Full)   DAST   DAST-10   ADAM-7   MoCA   PCL-5   PHQ-9   DREW   YMRS     Consults  [facility] BEHAVIORAL MEDICINE U*       Shasta Ambriz MD   [facility], PGY4

## 2024-12-16 NOTE — PROGRESS NOTES
Group Psychotherapy (PhD/LCSW)    Site: [facility]    Clinical status of patient: Intensive Outpatient Program (IOP)    Date: 12/13/2024    Group Focus: Psychodynamic Processing    Length of service: 59143 - 45-50 minutes    Number of patients in attendance: 7    Referred by: [facility] Treatment Team    Target symptoms: Depression and Anxiety    Patient's response to treatment: Active Listening    Progress toward goals: Progressing adequately    Interval History: Patients exhanged encouraging words with a fellow group member completing the program today. The group session focused on personal growth and identity exploration. Group members discussed intersecting identities and the importance of allowing oneself to change. Patients also addressed the challenges of making career transitions and provided support to fellow group  member dealing with parental stressors. This patient remained engaged and actively participated in group discussion.    Diagnosis:     ICD-10-CM ICD-9-CM   1. Generalized anxiety disorder  F41.1 300.02   2. Recurrent major depressive disorder, in partial remission  F33.41 296.35        Plan: Continue treatment on [facility]

## 2024-12-17 ENCOUNTER — HOSPITAL ENCOUNTER (OUTPATIENT)
Dept: PSYCHIATRY | Facility: HOSPITAL | Age: 39
Discharge: HOME | End: 2024-12-17
Attending: STUDENT IN AN ORGANIZED HEALTH CARE EDUCATION/TRAINING PROGRAM
Payer: COMMERCIAL

## 2024-12-17 DIAGNOSIS — F41.1 GENERALIZED ANXIETY DISORDER: Primary | ICD-10-CM

## 2024-12-17 DIAGNOSIS — F41.0 PANIC DISORDER: ICD-10-CM

## 2024-12-17 PROCEDURE — 90853 GROUP PSYCHOTHERAPY: CPT | Mod: ,,, | Performed by: PSYCHOLOGIST

## 2024-12-17 PROCEDURE — 90853 GROUP PSYCHOTHERAPY: CPT | Performed by: SOCIAL WORKER

## 2024-12-17 NOTE — DISCHARGE SUMMARY
[facility] DISCHARGE SUMMARY    Discharge Date: 2024 9:21 AM   Pt Name: Sari Serna   : 1985   MRN: [id number]     Admit Date:       SUBJECTIVE:  Patient is a 39 y.o. old, female, with past psychiatric history of MDD, ADAM, panic disorder, admitted with worsening symptoms of anxiety.     Program course-   Patient completed program and participated well in group sessions. She was continued on home medication of Lexapro 20 mg daily. She did not need to use her PRN Ativan throughout program. Denied any side effects or issues with medication.    Today, patient reporting improvements in anxiety and mood. Slept well last night. Looking forward to taking a trip this week to see her brother. Denied issues with concentration/focus. Denied SI, HI, AVH.   Patient endorsed having a good experience at the program and is glad she participated. Discussed learning concrete ways to address her anxiety. Anxiety still present concerning her job, but more manageable. She discussed plans to use her DBT and anxiety workbooks, plus continue seeing her therapist. Has follow-up with [facility] with Dr. Pham 25, which she plans to attend.         PATIENT HEALTH QUESTIONNAIRE-9 ( P H Q - 9 )- DAY OF DISCHARGE    Over the last 2 weeks, how often have you been bothered by any of the following problems?  0-Not at all  1- Several days  2- More than half the days  3- Nearly every day    Little interest or pleasure in doing things 0 1 2 3  Feeling down, depressed, or hopeless 0 1 2 3  Trouble falling or staying asleep, or sleeping too much 0 1 2 3  Feeling tired or having little energy 0 1 2 3  Poor appetite or overeating 0 1 2 3  Feeling bad about yourself -- or that you are a failure or have let yourself or your family down 0 1 2 3  Trouble concentrating on things, such as reading the newspaper or watching television 0 1 2 3  Moving or speaking so slowly that other people could have noticed?  Or the opposite -- being so fidgety or restless that you have been moving around a lot more than usual 0 1 2 3  Thoughts that you would be better off dead or of hurting yourself in some way 0 1 2 3    Total Score: __4____    If you checked off any problems, how difficult have these problems made it for you to do your  work, take care of things at home, or get along with other people?  Not difficult at all  Somewhat difficult  Very difficult  Extremely difficult    Developed by Rosanne Jacobs, Colleen Miles, Jesus Mistry and colleagues, with an educational lyndsey from  [employer] No permission required to reproduce, translate, display or distribute.    PHQ-9 Patient Depression Questionnaire Explanation  Interpretation of Total Score  Total Score Depression Severity  1-4 Minimal depression  5-9 Mild depression  10-14 Moderate depression  15-19 Moderately severe depression  20-27 Severe depression    PHQ9 Copyright © [employer] All rights reserved. Reproduced with permission. PRIME-MD ® is a  trademark of [employer].  [id number] 10-       GENERALIZED ANXIETY DISORDER SCALE (ADAM -7)    Over the last two weeks, how often have you been bothered by the following problems?  0-Not at all  1- Several days  2- More than half the days  3- Nearly every day    1. Feeling nervous, anxious, or on edge-0 1 2 3  2. Not being able to stop or control worrying-0 1 2 3  3. Worrying too much about different things- 0 1 2 3  4. Trouble relaxing-0 1 2 3  5. Being so restless that it is hard to sit still- 0 1 2 3  6. Becoming easily annoyed or irritable- 0 1 2 3  7. Feeling afraid, as if something awful  might happen- 0 1 2 3     If you checked any problems, how difficult have they made it for you to do your work, take care of things at home, or get along with other people?  Not difficult at all/ Somewhat difficult/  Very difficult/  Extremely difficult    Scoring ADAM-7 Anxiety Severity =  /21  This is calculated by  "assigning scores of 0, 1, 2, and 3 to the response categories, respectively, of not at all, several days, more than half the days, and nearly every day. ADAM-7 total score for the seven items ranges from 0 to 21.  0-4: minimal anxiety  5-9: mild anxiety  10-14: moderate anxiety  15-21: severe anxiety    Source: Primary Care Evaluation of Mental Disorders Patient Health Questionnaire (PRIME-MD-PHQ). The PHQ was developed by Rosanne Jacobs, Colleen Miles, Jesus Mistry, and colleagues. For research information, contact Dr. Jacobs at [email or web address]. PRIME-MD® is a trademark of [employer]. Copyright© 1999 [employer]. All rights reserved. Reproduced with permission     Risk Parameters:  Patient reports no suicidal ideation  Patient reports no homicidal ideation  Patient reports no self-injurious behavior  Patient reports no violent behavior    Allergies:   Review of patient's allergies indicates:  No Known Allergies      Current Medications:   Lexapro 20 mg daily   Ativan 0.5 mg daily PRN panic attacks        OBJECTIVE:   Vitals (Most Recent)  There were no vitals filed for this visit.; defer to nursing note      Labs/Imaging/Studies:   No results found for this or any previous visit (from the past 48 hours).   No results found for: "PHENYTOIN", "PHENOBARB", "VALPROATE", "CBMZ"    Mental Status Exam:  General Appearance: appears stated age, well developed and nourished, adequately groomed and appropriately dressed, in no acute distress  Behavior: normal; cooperative; reasonably friendly, pleasant, and polite; appropriate eye-contact; under good behavioral control  Involuntary Movements and Motor Activity: no abnormal involuntary movements noted  Gait and Station: intact  Speech and Language: normal rate, rhythm, volume, tone, and pitch  Mood: "glad"  Affect: euthymic, reactive  Thought Process and Associations: intact; linear, goal-directed, organized, and logical; no loosening of associations " noted  Thought Content and Perceptions:: no suicidal or homicidal ideation, no auditory or visual hallucinations, no paranoid ideation, no ideas of reference, no evidence of delusions or psychosis  Sensorium and Orientation: intact; alert with clear sensorium; oriented fully to person, place, time and situation  Recent and Remote Memory: grossly intact, able to recall relevant and salient information from the recent and remote past  Attention and Concentration: grossly intact, attentive to the conversation and not readily distractible  Fund of Knowledge: grossly intact, used appropriate vocabulary and demonstrated an awareness of current events, consistent with educational level achieved  Insight: good  Judgment: good      AIMS: if on an antipsychotic    ASSESSMENT/PLAN:   General Assessment:  Patient is a 39 y.o., female, admitted to the [facility] partial hospitalization program for stabilization of anxiety.        Diagnosis:    1.  Generalized Anxiety Disorder    2.  Major Depressive Disorder, recurrent, in partial remission    3.  Panic Disorder      Plan:  1. Completed BMU treatment with much improvement in presenting symptoms and was encouraged to attend after care groups for better transition to out pt care.     2. Psych meds  Continue current med regimen, tolerating well with out any side effects. Pt provided with enough refills until follow up appointment.  Lexapro 20 mg daily  Ativan 0.5 mg daily PRN panic attack  Discussed with patient informed consent, risks vs. benefits, alternative treatments, side effect profile and the inherent unpredictability of individual responses to these treatments. Answered any questions patient may have had. The patient expresses understanding of the above and displays the capacity to  agree with this.     3. Patient Discharge Instructions and informed to:-  Follow up regularly with [facility] appointments for further psychiatric care and management. Please see SW note for after  care appointments  Refrain from using excessive alcohol, avoid any illicit substances and or abuse of prescription medications, as this may worsen mood and may be detrimental to health.  Call the crisis line at: [phone] for help in a crisis and emergent situations and present to ED for any worsening of psychiatric symptoms or any SI/HI/AVH    Shasta Raafel Ambriz MD  [facility]   12/17/2024 9:21 AM

## 2024-12-17 NOTE — PROGRESS NOTES
Group Psychotherapy (PhD/LCSW)    Site: [facility]    Clinical status of patient: Intensive Outpatient Program (IOP)    Date: 12/17/2024    Group Focus: Interpersonal Effectiveness    Length of service: 87728 - 45-50 minutes    Number of patients in attendance: 14    Referred by: [facility] Treatment Team    Target symptoms: Depression and Anxiety    Patient's response to treatment: Active Listening    Progress toward goals: Progressing adequately    Interval History: Session focus was Interpersonal Effectiveness:  GIVE and FAST.  Patients were introduced to skills to promote active listening, self-respect, and attendance to values. Patients were provided with opportunities to collaborate with others and role-play in session.    Diagnosis:     ICD-10-CM ICD-9-CM   1. Generalized anxiety disorder  F41.1 300.02   2. Panic disorder  F41.0 300.01            Plan: Continue treatment on [facility]

## 2024-12-17 NOTE — PROGRESS NOTES
"Group Psychotherapy (PhD/LCSW)    Site: [facility]    Clinical status of patient: Intensive Outpatient Program (IOP)    Date: 12/17/2024    Group Focus: Psychodynamic Group Psychotherapy    Length of service: 73261 - 45-50 minutes    Number of patients in attendance: 6    Referred by: [facility] Treatment Team    Target symptoms: Depression and Anxiety    Patient's response to treatment: Active Listening, Self-disclosure, and Feedback given to another patient    Progress toward goals: Progressing well    Interval History: Time was spent introducing new group members and reviewing progress made for [facility] graduates. Strategies for coping with negative thoughts and feelings about oneself and others were reviewed, including utilizing a "one step at a time" mindset, mindfulness, and practicing self-compassion. Patient noted looking forward to using skills learned in group such as mindful meditation and setting boundaries upon discharge.     Diagnosis:   Generalized anxiety disorder  Major depressive disorder, recurrent, in partial remission  Panic disorder    Plan: Patient discharged from [facility].      Patient noted looking forward to using skills learned in group such as mindful meditation and setting boundaries upon discharge.   "

## 2024-12-19 NOTE — PLAN OF CARE
12/17/24 1100   Activity/Group Therapy Checklist   Group Educational   Attendance Attended   Follows Direction Followed directions   Group Interactions/Observations Interacted appropriately   Affect/Mood Range Normal range   Affect/Mood Display Appropriate   Goal Progression Progressing

## 2024-12-30 ENCOUNTER — OFFICE VISIT (OUTPATIENT)
Dept: GASTROENTEROLOGY | Facility: CLINIC | Age: 39
End: 2024-12-30
Payer: COMMERCIAL

## 2024-12-30 VITALS
WEIGHT: 133.63 LBS | DIASTOLIC BLOOD PRESSURE: 77 MMHG | BODY MASS INDEX: 23.68 KG/M2 | HEART RATE: 93 BPM | HEIGHT: 63 IN | SYSTOLIC BLOOD PRESSURE: 124 MMHG

## 2024-12-30 DIAGNOSIS — R19.7 DIARRHEA, UNSPECIFIED TYPE: ICD-10-CM

## 2024-12-30 DIAGNOSIS — K86.9 ENLARGED PANCREAS: Primary | ICD-10-CM

## 2024-12-30 DIAGNOSIS — R10.33 PERIUMBILICAL ABDOMINAL PAIN: ICD-10-CM

## 2024-12-30 PROCEDURE — 3074F SYST BP LT 130 MM HG: CPT | Mod: CPTII,S$GLB,,

## 2024-12-30 PROCEDURE — 3008F BODY MASS INDEX DOCD: CPT | Mod: CPTII,S$GLB,,

## 2024-12-30 PROCEDURE — 99999 PR PBB SHADOW E&M-EST. PATIENT-LVL IV: CPT | Mod: PBBFAC,,,

## 2024-12-30 PROCEDURE — 3078F DIAST BP <80 MM HG: CPT | Mod: CPTII,S$GLB,,

## 2024-12-30 PROCEDURE — 99214 OFFICE O/P EST MOD 30 MIN: CPT | Mod: S$GLB,,,

## 2024-12-30 PROCEDURE — 1159F MED LIST DOCD IN RCRD: CPT | Mod: CPTII,S$GLB,,

## 2024-12-30 NOTE — PROGRESS NOTES
[facility]    Reason for Visit:  The primary encounter diagnosis was Enlarged pancreas. Diagnoses of Periumbilical abdominal pain and Diarrhea, unspecified type were also pertinent to this visit.    PCP:   Nhi Shearer   [address] / [place] [address]      Initial HPI   This is a 39 y.o. female with Pmhx of anxiety, MDD, panic disorder, and endometriosis presenting for enlarged pancreas on imaging. She was initially seen by her PCP in November for a few weeks hx of abdominal pain. At that time she reported pain with urination and intermittent umbilical abdominal pain. She was treated for a UTI and reports her abdominal pain had improved since then but she still has intermittent pressure/pain at her [facility]. Her PCP then ordered a CT 11/20/24 which demonstrated a diffusely enlarged pancreas, with no peripancreatic fluid, inflammatory stranding or ductal dilatation. The radiology report mentioned this may be a normal variant but IgG4 related disease can have this appearance. This prompted a referral to [facility] to further evaluate her for possible autoimmune pancreatitis. Labs performed 11/22 showed normal lipase/amylase and normal IgG4. Repeat labs 12/11 again showed normal lipase. LFTs wnl. She does have a family history of autoimmune issues and a positive URI in the past which has her concerned about a possible autoimmune etiology.    On today's exam she reports the umbilical pressure is much less severe but still happens occasionally. Nothing in particular seems to trigger the pain. The pain is localized around the area of her previous laparoscopic surgery incisions, which she had done in July for endometriosis. The pain is not constant or severe and does not radiate. She does endorse some intermittent watery diarrhea for many years that improved significantly with initiating low FODMAP diet. Denies jaundice, weight loss, dark urine, pale stools,  steatorrhea, pruritus, NV. Denies  known history of pancreatitis. Denies family history of pancreatitis or pancreatic cancer.       ROS:  Review of Systems   Constitutional:  Negative for chills, fever and weight loss.   Gastrointestinal:  Positive for abdominal pain and diarrhea. Negative for blood in stool, constipation, heartburn, melena, nausea and vomiting.   Genitourinary:  Negative for dysuria.   Skin:  Negative for itching and rash.   Psychiatric/Behavioral:  The patient is nervous/anxious.         Medical History:  has a past medical history of Acne, ALLERGIC RHINITIS, Allergy, Anxiety, Dysmenorrhea, Low back pain, Migraine headache, and Recurrent upper respiratory infection (URI).    Surgical History:  has a past surgical history that includes Facial cosmetic surgery; Esophagogastroduodenoscopy (N/A, 2023); ph monitoring, esophagus, wireless, (off reflux meds) (N/A, 2023); Diagnostic laparoscopy (N/A, 2024); and Surgical removal of endometriosis (N/A, 2024).    Family History: family history includes Allergic rhinitis in her brother and father; Breast cancer in her paternal aunt; Cirrhosis in her mother; Endometriosis in her mother; Stroke in her paternal grandfather..       Review of patient's allergies indicates:  No Known Allergies    Current Outpatient Medications on File Prior to Visit   Medication Sig Dispense Refill    amitriptyline (ELAVIL) 25 MG tablet Take 25 mg by mouth every evening.      amoxicillin-clavulanate 875-125mg (AUGMENTIN) 875-125 mg per tablet Take 1 tablet by mouth 2 (two) times daily.      azelastine 205.5 mcg (0.15 %) Spry       baclofen (LIORESAL) 5 mg Tab tablet TAKE 1 TABLET BY MOUTH THREE TIMES A  tablet 1    BREZTRI AEROSPHERE 160-9-4.8 mcg/actuation HFAA SMARTSI-2 Puff(s) By Mouth 1-2 Times Daily      EScitalopram oxalate (LEXAPRO) 20 MG tablet Take 1 tablet (20 mg total) by mouth once daily. 90 tablet 3    fluticasone (FLONASE) 50 mcg/actuation nasal spray 2 sprays (100 mcg  total) by Each Nare route once daily. 1 Bottle 0    ipratropium (ATROVENT) 42 mcg (0.06 %) nasal spray       ketoconazole (NIZORAL) 2 % shampoo Wash scalp with medicated shampoo at least 2x/week. Let sit on scalp at least 5 minutes prior to rinsing 240 mL 5    LIDOcaine (LIDODERM) 5 % Place 1 patch onto the skin once daily. Remove & Discard patch within 12 hours or as directed by MD 30 patch 0    loratadine (CLARITIN) 10 mg tablet       LORazepam (ATIVAN) 0.5 MG tablet Take 1 tablet (0.5 mg total) by mouth 2 (two) times daily as needed for Anxiety. 30 tablet 0    norgestimate-ethinyl estradioL (ESTARYLLA) 0.25-35 mg-mcg per tablet Take 1 tablet by mouth once daily. CONTINUOUSLY, NO PLACEBO PILLS 112 tablet 2    ondansetron (ZOFRAN-ODT) 4 MG TbDL DISSOLVE 1 tablet (4 mg total) by mouth every 8 (eight) hours as needed (nausea). 10 tablet 0    triamcinolone (NASACORT) 55 mcg nasal inhaler 2 sprays once daily.      urea (CARMOL) 40 % Crea AAA BID 28 g 1    albuterol (PROVENTIL) 2.5 mg /3 mL (0.083 %) nebulizer solution Take 3 mLs (2.5 mg total) by nebulization every 6 (six) hours as needed for Wheezing. Rescue 90 mL 1    gabapentin (NEURONTIN) 300 MG capsule Take 1 capsule (300 mg total) by mouth every evening for 7 days, THEN 1 capsule (300 mg total) 2 (two) times daily for 7 days, THEN 1 capsule (300 mg total) 3 (three) times daily for 16 days. 69 capsule 0    ibuprofen (ADVIL,MOTRIN) 800 MG tablet Take 1 tablet (800 mg total) by mouth every 8 (eight) hours as needed for Pain. (Patient not taking: Reported on 10/22/2024) 30 tablet 0    predniSONE (DELTASONE) 20 MG tablet Take 3 pills daily for 3 days, then 2 pills daily for 3 days, then 1 pill daily for 3 days, then 1/2 pill daily for 4 days. (Patient not taking: Reported on 10/22/2024) 20 tablet 0     Current Facility-Administered Medications on File Prior to Visit   Medication Dose Route Frequency Provider Last Rate Last Admin    LIDOcaine HCL 10 mg/ml (1%)  injection 18 mL  18 mL Intramuscular  Georgina Holden MD   18 mL at 04/06/24 2030         Objective Findings: (Limited due to virtual nature of encounter)    Physical Exam:  Physical Exam  Constitutional:       General: She is not in acute distress.     Appearance: Normal appearance. She is not ill-appearing.   Eyes:      General: No scleral icterus.  Abdominal:      General: There is no distension.      Palpations: There is no mass.      Tenderness: There is abdominal tenderness (very mild TTP of umbilicus. No TTP of epigastric or RUQ). There is no guarding or rebound.      Hernia: No hernia is present.   Skin:     General: Skin is warm and dry.      Coloration: Skin is not jaundiced or pale.   Neurological:      Mental Status: She is alert and oriented to person, place, and time. Mental status is at baseline.             Labs:  Lab Results   Component Value Date    WBC 6.33 12/11/2024    HGB 14.9 12/11/2024    HCT 43.9 12/11/2024     12/11/2024    CRP 17.7 (H) 04/08/2024    CHOL 184 04/08/2024    TRIG 136 04/08/2024    HDL 58 04/08/2024    ALKPHOS 55 12/11/2024    LIPASE 55 12/11/2024    ALT 11 12/11/2024    AST 24 12/11/2024     12/11/2024    K 4.2 12/11/2024     12/11/2024    CREATININE 0.7 12/11/2024    BUN 12 12/11/2024    CO2 20 (L) 12/11/2024    TSH 1.119 04/08/2024    HGBA1C 5.0 01/26/2019       Imaging reviewed:    CT Abdomen Pelvis 11/20/24  FINDINGS:  Abdomen:     - Lung bases: Clear.     - Liver: Multiple hepatic cysts.     - Gallbladder and bile ducts: Unremarkable.     - Spleen: Unremarkable.     - Pancreas: The pancreas appears diffusely enlarged, with no peripancreatic free fluid, inflammatory stranding or ductal dilatation.  This may merely be normal for the patient, but IgG 4 related disease can have this appearance.     - Kidneys: No mass or hydronephrosis.     - Adrenals: Unremarkable.     - Retroperitoneum:  No significant adenopathy.     - Vascular: Unremarkable.     -  Bowel/mesentery: Unremarkable.     Pelvis:     No pelvic mass, adenopathy, or free fluid.     Bones:  Unremarkable.     Impression:     Boggy body pancreas, nonspecific and possibly just normal for the patient.  Also consider IgG 4 disease.  Correlate clinically.      Endoscopy reviewed:    EGD 7/28/23  Impression:            - Normal examined duodenum.                          - Normal stomach. Biopsied.                          - 1 cm hiatal hernia.                          - The BRAVO pH capsule was deployed.   Recommendation:        - Discharge patient to home.                          - Await pathology results.                          - Return to GI clinic at the next available                          appointment.                          - Telephone endoscopist for pathology results in 3                          weeks.                          - The findings and recommendations were discussed                          with the patient.   Assessment:  1. Enlarged pancreas    2. Periumbilical abdominal pain    3. Diarrhea, unspecified type             Recommendations:  Based on the patients clinical presentation, history, and labwork I have very low suspicion for IgG4 autoimmune pancreatitis at this time. Her abdominal discomfort is not typical for pancreatitis as she denies epigastric/RUQ/back pain. Intermittent umbilical pressure would not usually suggest a pancreatic etiology. She also denies a history of pancreatitis and lipase/Amylase are normal. AIP is often asymptomatic but she does not have any of the other symptoms typical for AIP such as jaundice, dark urine, floating stools, NV, or weight loss. Her IgG4 level was normal which is also reassuring and her LFTs are wnl indicating no current biliary obstruction. I discussed her case with Dr. Armstrong and he does not believe endoscopic intervention is warranted at this time given an incidental finding of an enlarged pancreas in the absence of symptoms or  abnormal labwork. The diffusely enlarged pancreas on CT is likely a normal variant but her PCP can order repeat imaging in the future if warranted   She does report a history of watery diarrhea and fecal urgency. This has improved with a low FODMAP diet and likely unrelated to the pancreas. I ordered a pancreatic elastase to rule out a pancreatic etiology for her diarrhea and explained this must be done with a solid stool sample. I offered to place a referral to general GI for the diarrhea and to further investigate the abdominal pain but she declined at this time  Explained that if she begins to experience symptoms such as jaundice, epigastric abdominal pain, weight loss, NV she should notify me and return to clinic for possible repeat imaging/labs  The umbilical discomfort is possibly related to her previous laparoscopic surgery from scar tissue/possible hernia and she should continue to follow with her PCP for this issue     Thank you so much for allowing me to participate in the care of Sari Breen PA-C  Advanced Endoscopy Physician Assistant  [facility]

## 2024-12-31 ENCOUNTER — PATIENT MESSAGE (OUTPATIENT)
Dept: OBSTETRICS AND GYNECOLOGY | Facility: CLINIC | Age: 39
End: 2024-12-31
Payer: COMMERCIAL

## 2025-01-03 ENCOUNTER — DOCUMENTATION ONLY (OUTPATIENT)
Dept: REHABILITATION | Facility: HOSPITAL | Age: 40
End: 2025-01-03
Payer: COMMERCIAL

## 2025-01-03 ENCOUNTER — TELEPHONE (OUTPATIENT)
Dept: PAIN MEDICINE | Facility: CLINIC | Age: 40
End: 2025-01-03

## 2025-01-03 ENCOUNTER — OFFICE VISIT (OUTPATIENT)
Dept: PAIN MEDICINE | Facility: CLINIC | Age: 40
End: 2025-01-03
Payer: COMMERCIAL

## 2025-01-03 ENCOUNTER — DOCUMENTATION ONLY (OUTPATIENT)
Dept: REHABILITATION | Facility: OTHER | Age: 40
End: 2025-01-03
Payer: COMMERCIAL

## 2025-01-03 VITALS
HEIGHT: 63 IN | HEART RATE: 80 BPM | BODY MASS INDEX: 23.4 KG/M2 | DIASTOLIC BLOOD PRESSURE: 74 MMHG | WEIGHT: 132.06 LBS | SYSTOLIC BLOOD PRESSURE: 119 MMHG

## 2025-01-03 DIAGNOSIS — M54.2 NECK PAIN ON RIGHT SIDE: ICD-10-CM

## 2025-01-03 DIAGNOSIS — M54.81 OCCIPITAL NEURALGIA OF RIGHT SIDE: Primary | ICD-10-CM

## 2025-01-03 PROCEDURE — 99999 PR PBB SHADOW E&M-EST. PATIENT-LVL III: CPT | Mod: PBBFAC,,, | Performed by: EMERGENCY MEDICINE

## 2025-01-03 RX ORDER — DEXAMETHASONE SODIUM PHOSPHATE 10 MG/ML
10 INJECTION INTRAMUSCULAR; INTRAVENOUS
Status: SHIPPED | OUTPATIENT
Start: 2025-01-03

## 2025-01-03 NOTE — PROGRESS NOTES
Patient emailed asking for more information about [facility], left her a voicemail this morning with [facility] callback number with instructions to call for more information on the program and next steps for scheduling med screen.    Lamberto Rashid PT, DPT

## 2025-01-03 NOTE — PROGRESS NOTES
Interventional Pain Management - New Patient Visit    Interval history 01/03/2025:  Patient has been participating in PT and HEP, but reports that she still has constant pain. She is able to perform yoga, but the pain is still present in the am and she still gets occasional shooting headaches. She has been taking the yanick with a mild improvement. Her average pain is 2-3/10 with a max of 7-8/10, but there is improvement overall.     Original HPI  11/1924: Sari Serna is a 39 y.o. year old female patient who has a past medical history of Acne, ALLERGIC RHINITIS, Allergy, Anxiety, Dysmenorrhea, Low back pain, Migraine headache, and Recurrent upper respiratory infection (URI). She presents in referral from No ref. provider found for neck pain since 02/2024    Original Pain Description:  The pain is located in the neck and is radiating to the occipital area bilaterally . The pain is described as sharp. Exacerbating factors: Sitting and any neck movement. Mitigating factors heat and medications. Symptoms interfere with daily activity. The patient feels like symptoms have been unchanged. Patient denies significant motor weakness. She went to ER in April and felt pain in her arms, but that resolved. She reports hand stiffness in the morning. She reports that back in February she felt a pain radiating to her right eye, but that resolved. She does get pains that radiate to her right temporal region on the right side.       PAIN SCORES:  Best: Pain is 4  Current: Pain is 6  Worst: Pain is 9    6 weeks of Conservative therapy:  PT: Participating  Chiro:  HEP: Participating    Treatments / Medications:   Elavil 25 mg qHS PRN insomnia   Ibuprofen 800 mg - PRN  Yanick 300mg   Baclofen 5mg  Meloxicam 15 mg - not taking  Percocet 5 mg q.4 hours p.r.n - not taking, was pot-op  Ativan 0.5 mg p.r.n.    Antiplatelets/Anticoagulants:   none    Interventional Pain Procedures:   NA    IMAGING:    MRI CERVICAL SPINE WITHOUT CONTRAST   08/29/2024  C3-C4: Small PDOC   C4-C5: PDOC to right w/central AF->mild spinal canal stenosis and mild right neural foraminal narrowing.  C5-C6: PDOC=>mod spinal canal stenosis and mod right and mild left neural foraminal narrowing. 2mm RL C5 on C6. Modic type 1 change     Past Surgical History:   Procedure Laterality Date    DIAGNOSTIC LAPAROSCOPY N/A 7/18/2024    Procedure: LAPAROSCOPY, DIAGNOSTIC;  Surgeon: Liudmila Monaco MD;  Location: [facility] OR;  Service: OB/GYN;  Laterality: N/A;    ESOPHAGOGASTRODUODENOSCOPY N/A 7/28/2023    Procedure: EGD (ESOPHAGOGASTRODUODENOSCOPY);  Surgeon: Sameer Cedeño MD;  Location: [facility] ([facility]);  Service: Endoscopy;  Laterality: N/A;    FACIAL COSMETIC SURGERY      2013    PH MONITORING, ESOPHAGUS, WIRELESS, (OFF REFLUX MEDS) N/A 7/28/2023    Procedure: PH MONITORING, ESOPHAGUS, WIRELESS, (OFF REFLUX MEDS);  Surgeon: Sameer Cedeño MD;  Location: [facility] ([address] [place] [address]);  Service: Endoscopy;  Laterality: N/A;  Please schedule Sari for a 96 hour esophageal Bravo pH probe study off all PPIs and off all H2 blockers she is been off of them for over 3 months now so she can get scheduled at any time with me.  Thank you  Ref by Dr RENZO Cedeño    SURGICAL REMOVAL OF ENDOMETRIOSIS N/A 7/18/2024    Procedure: DESTRUCTION, ENDOMETRIOSIS;  Surgeon: Liudmila Monaco MD;  Location: [facility] OR;  Service: OB/GYN;  Laterality: N/A;       Social History     Socioeconomic History    Marital status: Single   Occupational History    Occupation: therpist   Tobacco Use    Smoking status: Never     Passive exposure: Never    Smokeless tobacco: Never   Substance and Sexual Activity    Alcohol use: Yes     Comment: socially    Drug use: No    Sexual activity: Yes     Partners: Male     Birth control/protection: OCP     Comment: Single    Other Topics Concern    Are you pregnant or think you may be? No    Breast-feeding No   Social History Narrative    She is a special      No children  "      Medications/Allergies: See med card    ROS:  GENERAL: No fever. No chills. No fatigue. Denies weight loss. Denies weight gain.  Back / musculoskeletal / neuro : See HPI    VITALS:   Vitals:    01/03/25 1421   BP: 119/74   Pulse: 80   Weight: 59.9 kg (132 lb 0.9 oz)   Height: 5' 3" (1.6 m)   PainSc:   2   PainLoc: Neck       Body mass index is 23.39 kg/m².      1/3/2025     2:23 PM 11/19/2024     3:25 PM   Last 3 PDI Scores   Pain Disability Index (PDI) 21 35       PHYSICAL EXAM:   GENERAL: Well appearing, in no acute distress, alert and oriented x3.  PSYCH:  Mood and affect appropriate.  SKIN: Skin color, texture, turgor normal, no rashes or lesions.  HEENT:  Normocephalic, atraumatic. Cranial nerves grossly intact.  NECK:  negative Spurling's bilaterally.   Pain with axial loading.  Tenderness to palpation along right occipital ridge.  PULM: No evidence of respiratory difficulty, symmetric chest rise.  GI:  Non-distended  BACK: Normal range of motion. No pain to palpation over the spinous processes. No pain to palpation over facet joints. There is no pain with palpation over the sacroiliac joints bilaterally.   EXTREMITIES: No deformities, edema, or skin discoloration.   MUSCULOSKELETAL: Shoulder, hip, and knee provocative maneuvers are negative. No atrophy is noted.  NEURO: Sensation is equal and appropriate bilaterally. Bilateral upper and lower extremity strength is normal and symmetric. Bilateral upper and lower extremity coordination and muscle stretch reflexes are physiologic and symmetric. Plantar response are downgoing. Straight leg raising in the supine position is negative to radicular pain.   GAIT: normal.      LABS:    Lab Results   Component Value Date    HGBA1C 5.0 01/26/2019       Lab Results   Component Value Date    CREATININE 0.7 12/11/2024       ASSESSMENT: 39 y.o. year old female with pain, consistent with:    Encounter Diagnoses   Name Primary?    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   She has been improving with conservative therapy, but has plateaued in her improvement and would like to discuss intervention.    PLAN:  - I have stressed the importance of physical activity and a home exercise plan to help with pain and improve health.  - Patient can continue with medications for now since they are providing benefits, using them appropriately, and without side effects.  - Counseled patient regarding the importance of activity modification and constant sleeping habits.  - Interventions:  Right occipital nerve block . Explained the risks and benefits of the procedure in detail with the patient today in clinic along with alternative treatment options, and the patient elected to pursue the intervention at this time.    - she set herself up for an FRP eval as well  - Imaging: Reviewed available imaging with patient and answered any questions they had regarding study.  - The patient's pathophysiology was explained in detail with reference to x-rays, models, other visual aids as appropriate.   - Follow up visit: return to clinic for procedure    Nick Flannery MD  01/03/2025

## 2025-01-03 NOTE — PROGRESS NOTES
Patient Name:  Sari Serna   MRN:  [id number]    Patient provided with information about FRP at [facility] today via email and phone today. Scheduling information provided, planning to follow up with [facility].     Mireya Hutchinson, OT  1/3/2025

## 2025-01-06 ENCOUNTER — PATIENT MESSAGE (OUTPATIENT)
Dept: PSYCHIATRY | Facility: CLINIC | Age: 40
End: 2025-01-06
Payer: COMMERCIAL

## 2025-01-06 ENCOUNTER — TELEPHONE (OUTPATIENT)
Dept: PAIN MEDICINE | Facility: CLINIC | Age: 40
End: 2025-01-06
Payer: COMMERCIAL

## 2025-01-06 ENCOUNTER — OFFICE VISIT (OUTPATIENT)
Dept: PSYCHIATRY | Facility: CLINIC | Age: 40
End: 2025-01-06
Payer: COMMERCIAL

## 2025-01-06 DIAGNOSIS — F41.0 PANIC DISORDER: ICD-10-CM

## 2025-01-06 DIAGNOSIS — F33.41 RECURRENT MAJOR DEPRESSIVE DISORDER, IN PARTIAL REMISSION: ICD-10-CM

## 2025-01-06 DIAGNOSIS — M54.81 OCCIPITAL NEURALGIA OF RIGHT SIDE: Primary | ICD-10-CM

## 2025-01-06 DIAGNOSIS — F41.1 GENERALIZED ANXIETY DISORDER: Primary | ICD-10-CM

## 2025-01-06 PROCEDURE — 99214 OFFICE O/P EST MOD 30 MIN: CPT | Mod: S$GLB,,,

## 2025-01-06 NOTE — PROGRESS NOTES
OUTPATIENT PSYCHIATRY FOLLOW UP VISIT    ENCOUNTER DATE:  1/6/2025  SITE:  [facility]  LENGTH OF SESSION:  30 minutes      CHIEF COMPLAINT:  Anxiety, Panic Attacks, Depression      HISTORY OF PRESENTING ILLNESS:  Sari Serna is a 39 y.o. female with history of  anxiety and depression  who presents for follow up appointment.      Plan at last appointment on 11/18/2024:  Continue Lexapro 20 mg  Discussed diagnosis, risks and benefits of proposed treatment vs alternative treatments vs no treatment, inherent unpredictability of medications and the potential side effects of these treatments specifically for: SSRI's, including but not limited to GI side effects, sexual dysfunction, psychomotor activation vs. somnolence, urinary retention, dry mouth and constipation.    Continue Elavil 25 mg nightly  Continue Ativan 0.5 mg daily as needed for severe anxiety/panic attacks   Continue with therapy and plans to begin IOP in December   PDMP reviewed: Ativan last filled 10/07/2024 #30 - patient using sparingly and does not require refill at this time. Will call provider when she runs out.    Interval history as told by Patient - & or family/friend/spouse/caregiver with pts permission    Patient reports she is doing well overall. Says that IOP was very beneficial, and she specifically enjoyed getting resources for DBT. Says she has continued to use breathing techniques and DBT workbook at home, which she says is going well. Reports some ongoing anxiety as she starts searching for a new job and some concern that she will not find a new job before her leave ends and will have to go back to her old job. She says otherwise she has been doing well. Says her social life has been good and she has been going to the gym. Does report some issues with sleep over the past week which she attributes to anxiety mentioned above. Reports getting 6-7 hours per night. Denies any changes in appetite.  Reports  taking Ativan once for the past month an half. Denies any suicidal thoughts or thoughts of self harm. Does report some mild grogginess with Lexapro but otherwise no other side effects.       Generalized Anxiety Disorder 7-item (GAD7)  Over the last 2 weeks, how often have you been bothered by the following problems?    Feeling nervous, anxious or on edge +2 - More than half the days   Not being able to stop or control worrying +1 - Several days   Worrying too much about different things +2 - More than half the days   Trouble relaxing +1 - Several days   Being so restless that it is hard to sit still +1 - Several days   Becoming easily annoyed or irritable + 0 - Not at all   Feeling afraid as if something awful might happen + 0 - Not at all       How difficult have these problems made it for you to do your work, take care of things at home, or get along with other people? Somewhat difficult       Total score 7    Mild anxiety (5-9)     Sensitivity 89%, Specificity 82%, Positive likelihood ratio 5.1; when score >10    Source: Primary Care Evaluation of Mental Disorders Patient Health Questionnaire (PRIME-MD-PHQ). The PHQ was developed by Rosanne Jacobs, Colleen Miles, Jesus Mistry, and colleagues. For research information, contact Dr. Jacobs at [email or web address]. PRIME-MD® is a trademark of [employer] Copyright© 1999 [employer] All rights reserved. Reproduced with permission      PSYCHIATRIC REVIEW OF SYSTEMS:(none/ yes- better/worse/stable/& what symptoms)    Symptoms of Depression: better    Symptoms of Anxiety/ panic attacks: better    Symptoms of Sobeida/Hypomania: none    Symptoms of psychosis: none    Sleep: better    Appetite: stable    Risk Parameters:  Patient reports no suicidal ideation  Patient reports no homicidal ideation  Patient reports no self-injurious behavior  Patient reports no violent behavior    PSYCHIATRIC MED REVIEW    Current psych meds  Medication side effects:   Drowsiness  Medication compliance:  Yes      PAST PSYCHIATRIC, MEDICAL, AND SOCIAL HISTORY REVIEWED  The patient's past medical, family and social history have been reviewed and updated as appropriate within the electronic medical record - see encounter notes.    MEDICAL REVIEW OF SYSTEMS:  Complete review of systems performed covering Constitutional, Musculoskeletal, Neurologic.  All systems negative.    ALL MEDICATIONS:    Current Outpatient Medications:     albuterol (PROVENTIL) 2.5 mg /3 mL (0.083 %) nebulizer solution, Take 3 mLs (2.5 mg total) by nebulization every 6 (six) hours as needed for Wheezing. Rescue, Disp: 90 mL, Rfl: 1    amitriptyline (ELAVIL) 25 MG tablet, Take 25 mg by mouth every evening., Disp: , Rfl:     amoxicillin-clavulanate 875-125mg (AUGMENTIN) 875-125 mg per tablet, Take 1 tablet by mouth 2 (two) times daily., Disp: , Rfl:     azelastine 205.5 mcg (0.15 %) Kareem, , Disp: , Rfl:     baclofen (LIORESAL) 5 mg Tab tablet, TAKE 1 TABLET BY MOUTH THREE TIMES A DAY, Disp: 270 tablet, Rfl: 1    BREZTRI AEROSPHERE 160-9-4.8 mcg/actuation HFAA, SMARTSI-2 Puff(s) By Mouth 1-2 Times Daily, Disp: , Rfl:     EScitalopram oxalate (LEXAPRO) 20 MG tablet, Take 1 tablet (20 mg total) by mouth once daily., Disp: 90 tablet, Rfl: 3    fluticasone (FLONASE) 50 mcg/actuation nasal spray, 2 sprays (100 mcg total) by Each Nare route once daily., Disp: 1 Bottle, Rfl: 0    gabapentin (NEURONTIN) 300 MG capsule, Take 1 capsule (300 mg total) by mouth every evening for 7 days, THEN 1 capsule (300 mg total) 2 (two) times daily for 7 days, THEN 1 capsule (300 mg total) 3 (three) times daily for 16 days., Disp: 69 capsule, Rfl: 0    ibuprofen (ADVIL,MOTRIN) 800 MG tablet, Take 1 tablet (800 mg total) by mouth every 8 (eight) hours as needed for Pain. (Patient not taking: Reported on 10/22/2024), Disp: 30 tablet, Rfl: 0    ipratropium (ATROVENT) 42 mcg (0.06 %) nasal spray, , Disp: , Rfl:     ketoconazole (NIZORAL) 2  % shampoo, Wash scalp with medicated shampoo at least 2x/week. Let sit on scalp at least 5 minutes prior to rinsing, Disp: 240 mL, Rfl: 5    LIDOcaine (LIDODERM) 5 %, Place 1 patch onto the skin once daily. Remove & Discard patch within 12 hours or as directed by MD, Disp: 30 patch, Rfl: 0    loratadine (CLARITIN) 10 mg tablet, , Disp: , Rfl:     LORazepam (ATIVAN) 0.5 MG tablet, Take 1 tablet (0.5 mg total) by mouth 2 (two) times daily as needed for Anxiety., Disp: 30 tablet, Rfl: 0    norgestimate-ethinyl estradioL (ESTARYLLA) 0.25-35 mg-mcg per tablet, Take 1 tablet by mouth once daily. CONTINUOUSLY, NO PLACEBO PILLS, Disp: 112 tablet, Rfl: 2    ondansetron (ZOFRAN-ODT) 4 MG TbDL, DISSOLVE 1 tablet (4 mg total) by mouth every 8 (eight) hours as needed (nausea)., Disp: 10 tablet, Rfl: 0    predniSONE (DELTASONE) 20 MG tablet, Take 3 pills daily for 3 days, then 2 pills daily for 3 days, then 1 pill daily for 3 days, then 1/2 pill daily for 4 days. (Patient not taking: Reported on 10/22/2024), Disp: 20 tablet, Rfl: 0    triamcinolone (NASACORT) 55 mcg nasal inhaler, 2 sprays once daily., Disp: , Rfl:     urea (CARMOL) 40 % Crea, AAA BID, Disp: 28 g, Rfl: 1    Current Facility-Administered Medications:     dexAMETHasone injection 10 mg, 10 mg, Intramuscular, 1 time in Clinic/HOD,     Facility-Administered Medications Ordered in Other Visits:     LIDOcaine HCL 10 mg/ml (1%) injection 18 mL, 18 mL, Intramuscular, , Georgina Holden MD, 18 mL at 04/06/24 2030    ALLERGIES:  Review of patient's allergies indicates:  No Known Allergies    RELEVANT LABS/STUDIES:    Lab Results   Component Value Date    WBC 6.33 12/11/2024    HGB 14.9 12/11/2024    HCT 43.9 12/11/2024    MCV 88 12/11/2024     12/11/2024     BMP  Lab Results   Component Value Date     12/11/2024    K 4.2 12/11/2024     12/11/2024    CO2 20 (L) 12/11/2024    BUN 12 12/11/2024    CREATININE 0.7 12/11/2024    CALCIUM 9.3 12/11/2024     ANIONGAP 12 12/11/2024    ESTGFRAFRICA >60.0 04/10/2021    EGFRNONAA >60.0 04/10/2021     Lab Results   Component Value Date    ALT 11 12/11/2024    AST 24 12/11/2024    ALKPHOS 55 12/11/2024    BILITOT 0.4 12/11/2024     Lab Results   Component Value Date    TSH 1.119 04/08/2024     Lab Results   Component Value Date    HGBA1C 5.0 01/26/2019       VITALS  There were no vitals filed for this visit.    PHYSICAL EXAM  General: well developed, well nourished  Neurologic:   Gait: Normal   Psychomotor signs:  No involuntary movements or tremor  AIMS: none    PSYCHIATRIC EXAM:     Mental Status Exam:  Appearance: unremarkable, age appropriate  Behavior/Cooperation: normal, cooperative  Speech: normal tone, normal rate, normal pitch, normal volume  Language: uses words appropriately; NO aphasia or dysarthria  Mood:  good  Affect full, reactive appropriate   Thought Process: normal and logical  Thought Content: normal, no suicidality, no homicidality, delusions, or paranoia  Level of Consciousness: Alert and Oriented x3  Memory:  Intact  Attention/concentration: appropriate for age/education.   Fund of Knowledge: appears adequate  Insight:  Impaired/  Limited/ Intact  Judgment: Impaired/  Limited/ Intact       IMPRESSION:    Initial Evaluation 10/7/2024:  Sari Serna is a 39 y.o. female with history of anxiety and depression who presents for initial assessment. Patient reports long history of anxiety, on Lexapro and Klonopin for a few years. Recently with exacerbation of anxiety with panic attacks (crying uncontrollably, shaking and hyperventilating) which attributes to stressors at work and switched from Klonopin to Ativan 4 -6 weeks ago. States she has been taking Ativan 1-3 times per week with benefit. With regards to Lexapro, she is currently on 10 mg, and states she believes it was increased to 15 mg temporarily, and then brought back down to 10 mg. Is amenable to increasing to Lexapro 20 mg today. Will trial  15 mg for a week, then increase to 20 mg pending tolerability. Will continue Ativan in the meantime as well as Elavil. If anxiety is not well controlled on max Lexapro, will consider Effexor. Otherwise, patient denies SI/HI/AVH or paranoia.     Interval History 1/6/2025:  Overall doing well, though with some ongoing anxiety related to finding a new job. Marked benefit from IOP reported by patient. Of note today, patient's ADAM-7 is a 7, slightly higher than last visit, but still within mild anxiety range. Still using Ativan sparingly. Otherwise no further complaints. No SI/HI/AVH.     Status/Progress:  Based on the examination today, the patient's problem(s) is/are improved.  New problems have not been presented today.     DIAGNOSES:  Generalized Anxiety Disorder with Panic Attacks  Major Depressive Disorder, recurrent, in partial remission     PLAN:  Psych Med:  Continue Lexapro 20 mg  Discussed diagnosis, risks and benefits of proposed treatment vs alternative treatments vs no treatment, inherent unpredictability of medications and the potential side effects of these treatments specifically for: SSRI's, including but not limited to GI side effects, sexual dysfunction, psychomotor activation vs. somnolence, urinary retention, dry mouth and constipation.    Elavil 25 mg nightly for insomnia   Continue Ativan 0.5 mg daily as needed for severe anxiety/panic attacks   Continue with therapy   PDMP reviewed: Ativan last filled 10/07/2024 #30 - patient using sparingly and does not require refill at this time. Will call provider when she runs out.    Discussed with patient informed consent, risks vs. benefits, alternative treatments, side effect profile and the inherent unpredictability of individual responses to these treatments. Answered any questions patient may have had. The patient expresses understanding of the above and displays the capacity to agree with this current plan     RETURN TO CLINIC:  6 weeks    Staff  Psychiatrist: Albin Pham MD  [facility] PGY3  1/6/2025 4:13 PM

## 2025-01-09 ENCOUNTER — PATIENT MESSAGE (OUTPATIENT)
Dept: PSYCHIATRY | Facility: CLINIC | Age: 40
End: 2025-01-09
Payer: COMMERCIAL

## 2025-01-09 NOTE — PROGRESS NOTES
[facility] resident Neil Pham MD  discussed his assessment and plan for this case.  I agree with  findings and plan.    Albin Thomas MD DFAPA [facility]  [address]

## 2025-01-13 ENCOUNTER — PATIENT MESSAGE (OUTPATIENT)
Dept: PSYCHIATRY | Facility: CLINIC | Age: 40
End: 2025-01-13
Payer: COMMERCIAL

## 2025-01-13 ENCOUNTER — OFFICE VISIT (OUTPATIENT)
Dept: PAIN MEDICINE | Facility: OTHER | Age: 40
End: 2025-01-13
Payer: COMMERCIAL

## 2025-01-13 DIAGNOSIS — M79.18 MYOFASCIAL PAIN: ICD-10-CM

## 2025-01-13 DIAGNOSIS — F41.1 GENERALIZED ANXIETY DISORDER: ICD-10-CM

## 2025-01-13 DIAGNOSIS — M54.81 OCCIPITAL NEURALGIA OF RIGHT SIDE: Primary | ICD-10-CM

## 2025-01-13 DIAGNOSIS — R68.89 DECREASED MOTOR ACTIVITY: ICD-10-CM

## 2025-01-13 DIAGNOSIS — R26.89 DECREASED SPINAL MOBILITY: ICD-10-CM

## 2025-01-13 PROCEDURE — 1160F RVW MEDS BY RX/DR IN RCRD: CPT | Mod: CPTII,,, | Performed by: NURSE PRACTITIONER

## 2025-01-13 PROCEDURE — 1159F MED LIST DOCD IN RCRD: CPT | Mod: CPTII,,, | Performed by: NURSE PRACTITIONER

## 2025-01-13 PROCEDURE — 99215 OFFICE O/P EST HI 40 MIN: CPT | Mod: ,,, | Performed by: NURSE PRACTITIONER

## 2025-01-13 NOTE — PROGRESS NOTES
[facility]    Initial Medical Screening Visit Note    Subjective:       Chief Complaint Requiring Rehabilitation: Chronic Pain    Consulted by: No ref. provider found      HPI:     Sari Serna is a 39 y.o. female who presents today for the [facility] Medical Screening Visit. Sari Serna was referred by No ref. provider found with associated diagnosis of chronic pain.     She began having bouts of generalized body pain about 10-12 years ago. This would wax and wane. She would just deal with it. She notes increased body pain over the last 4 months. She will wake up feeling sore. She also notes stiffness into the hands. She has seen Rheumatology in the past. She does have a positive URI. She does have a family history of autoimmune disorders (mom and 2 cousins). She began having neck pain over the last year. She also notes pain at the base of her head on the right that radiates forward to the eyes. She denies any radicular arm pain, but does note right hand pain. The hand pain is worse with repetitive activity such as writing or typing. She endorses anxiety, recently completed IOP, does see Psychiatry. She does have a history of endometriosis. Endorses IBS symptoms. History obtained via interview and chart review. See below for additional details.         Chronic Pain History:      Ambulatory status:  Fully ambulatory       Balance problems?  No       Fainting/Syncope/POTS?  No       Physical Therapy?  PT- currently in  OT- Never      Exercise Habits?  Yoga - twice a week  Mack- 3 times a week       Alternative/Complementary Therapies (massage, yoga, trang chi, acupuncture, guided imagery, chiropractic care, hypnosis, biofeedback, herbs, supplements, dietary approaches)?  Yoga       Current pain medications:  None currently  Recently tried Gabapentin, Baclofen       Pain management injections:  None       Relevant surgeries:  2024- laparoscopic GYN surgery       Any upcoming surgeries or procedures? 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 No, intermittent snoring     Sleep Aids? Elavil       Mental Health Hx/Tx  Anxiety   Seeing Psychiatry and Counselor   Recently completed IOP    Stress/Stress Mgmt comments: Ativan PRN, breathing exercises, exercise     Inpatient Psychiatric Tx? No     SI? No       Social Hx/Connections:   Partner (11 years)  Dog       Health Habits:      Smoking Status: No       Alcohol use: Socially (once a week)     Illegal/illicit drug use: No      Substance abuse hx?: No               Past Medical History:   Diagnosis Date    Acne     ALLERGIC RHINITIS     Allergy     Anxiety     Dysmenorrhea     Low back pain     Migraine headache     Recurrent upper respiratory infection (URI)        Past Surgical History:   Procedure Laterality Date    DIAGNOSTIC LAPAROSCOPY N/A 7/18/2024    Procedure: LAPAROSCOPY, DIAGNOSTIC;  Surgeon: Liudmila Monaco MD;  Location: [facility];  Service: OB/GYN;  Laterality: N/A;    ESOPHAGOGASTRODUODENOSCOPY N/A 7/28/2023    Procedure: EGD (ESOPHAGOGASTRODUODENOSCOPY);  Surgeon: Sameer Cedeño MD;  [facility] ([address] [place] [address]);  Service: Endoscopy;  Laterality: N/A;    FACIAL COSMETIC SURGERY      2013    PH MONITORING, ESOPHAGUS, WIRELESS, (OFF REFLUX MEDS) N/A 7/28/2023    Procedure: PH MONITORING, ESOPHAGUS, WIRELESS, (OFF REFLUX MEDS);  Surgeon: Sameer Cedeño MD;  [facility]  Service: Endoscopy;  Laterality: N/A;  Please schedule Sari for a 96 hour esophageal Bravo pH probe study off all PPIs and off all H2 blockers she is been off of them for over 3 months now so she can get scheduled at any time with me.  Thank you  Ref by Dr RENZO Cedeño    SURGICAL REMOVAL OF ENDOMETRIOSIS N/A 7/18/2024    Procedure: DESTRUCTION, ENDOMETRIOSIS;  Surgeon: Liudmila Monaco MD;  Location: [facility];  Service: OB/GYN;  Laterality: N/A;        Review of patient's allergies indicates:  No Known Allergies    Current Outpatient Medications   Medication Sig Dispense Refill    albuterol (PROVENTIL) 2.5 mg /3 mL (0.083 %) nebulizer solution Take 3 mLs (2.5 mg total) by nebulization every 6 (six) hours as needed for Wheezing. Rescue 90 mL 1    amitriptyline (ELAVIL) 25 MG tablet Take 25 mg by mouth every evening.      amoxicillin-clavulanate 875-125mg (AUGMENTIN) 875-125 mg per tablet Take 1 tablet by mouth 2 (two) times daily.      azelastine 205.5 mcg (0.15 %) Spry       baclofen (LIORESAL) 5 mg Tab tablet TAKE 1 TABLET BY MOUTH THREE TIMES A  tablet 1    BREZTRI AEROSPHERE 160-9-4.8 mcg/actuation HFAA SMARTSI-2 Puff(s) By Mouth 1-2 Times Daily      EScitalopram oxalate (LEXAPRO) 20 MG tablet Take 1 tablet (20 mg total) by mouth once daily. 90 tablet 3    fluticasone (FLONASE) 50 mcg/actuation nasal spray 2 sprays (100 mcg total) by Each Nare route once daily. 1 Bottle 0    gabapentin (NEURONTIN) 300 MG capsule Take 1 capsule (300 mg total) by mouth every evening for 7 days, THEN 1 capsule (300 mg total) 2 (two) times daily for 7 days, THEN 1 capsule (300 mg total) 3 (three) times daily for 16 days. 69 capsule 0    ibuprofen (ADVIL,MOTRIN) 800 MG tablet Take 1 tablet (800 mg total) by mouth every 8 (eight) hours as needed for Pain. (Patient not taking: Reported on 10/22/2024) 30 tablet 0    ipratropium (ATROVENT) 42 mcg (0.06 %) nasal spray       ketoconazole (NIZORAL) 2 % shampoo Wash scalp with medicated shampoo at least 2x/week. Let sit on scalp at least 5 minutes prior to rinsing 240 mL 5    LIDOcaine (LIDODERM) 5 % Place 1 patch onto the skin once daily. Remove & Discard patch within 12 hours or as directed by MD 30 patch 0    loratadine (CLARITIN) 10 mg tablet       LORazepam (ATIVAN) 0.5 MG tablet Take 1 tablet (0.5 mg total) by mouth 2 (two) times daily as needed for Anxiety. 30 tablet 0    norgestimate-ethinyl estradioL (ESTARYLLA)  0.25-35 mg-mcg per tablet Take 1 tablet by mouth once daily. CONTINUOUSLY, NO PLACEBO PILLS 112 tablet 2    ondansetron (ZOFRAN-ODT) 4 MG TbDL DISSOLVE 1 tablet (4 mg total) by mouth every 8 (eight) hours as needed (nausea). 10 tablet 0    predniSONE (DELTASONE) 20 MG tablet Take 3 pills daily for 3 days, then 2 pills daily for 3 days, then 1 pill daily for 3 days, then 1/2 pill daily for 4 days. (Patient not taking: Reported on 10/22/2024) 20 tablet 0    triamcinolone (NASACORT) 55 mcg nasal inhaler 2 sprays once daily.      urea (CARMOL) 40 % Crea AAA BID 28 g 1     Current Facility-Administered Medications   Medication Dose Route Frequency Provider Last Rate Last Admin    dexAMETHasone injection 10 mg  10 mg Intramuscular 1 time in Clinic/HOD          Facility-Administered Medications Ordered in Other Visits   Medication Dose Route Frequency Provider Last Rate Last Admin    LIDOcaine HCL 10 mg/ml (1%) injection 18 mL  18 mL Intramuscular  Georgina Holden MD   18 mL at 04/06/24 2030       Family History   Problem Relation Name Age of Onset    Endometriosis Mother      Cirrhosis Mother      Allergic rhinitis Father Stan     Allergic rhinitis Brother Jesús     Breast cancer Paternal Aunt      Stroke Paternal Grandfather      Amblyopia Neg Hx      Blindness Neg Hx      Cataracts Neg Hx      Glaucoma Neg Hx      Macular degeneration Neg Hx      Retinal detachment Neg Hx      Strabismus Neg Hx      Colon cancer Neg Hx      Ovarian cancer Neg Hx      Thyroid disease Neg Hx      Cancer Neg Hx      Melanoma Neg Hx      Allergies Neg Hx      Angioedema Neg Hx      Asthma Neg Hx      Atopy Neg Hx      Eczema Neg Hx      Immunodeficiency Neg Hx      Rhinitis Neg Hx      Urticaria Neg Hx      Celiac disease Neg Hx      Colon polyps Neg Hx      Crohn's disease Neg Hx      Esophageal cancer Neg Hx      Hemochromatosis Neg Hx      Inflammatory bowel disease Neg Hx      Rectal cancer Neg Hx      Ulcerative colitis Neg Hx       Stomach cancer Neg Hx      Pancreatic cancer Neg Hx      Carlos's esophagus Neg Hx      Pancreatitis Neg Hx      Uterine cancer Neg Hx      Bladder Cancer Neg Hx      Kidney cancer Neg Hx         Social History     Socioeconomic History    Marital status: Single   Occupational History    Occupation: therpist   Tobacco Use    Smoking status: Never     Passive exposure: Never    Smokeless tobacco: Never   Substance and Sexual Activity    Alcohol use: Yes     Comment: socially    Drug use: No    Sexual activity: Yes     Partners: Male     Birth control/protection: OCP     Comment: Single    Other Topics Concern    Are you pregnant or think you may be? No    Breast-feeding No   Social History Narrative    She is a special      No children              Objective:        GEN: Well developed, well nourished. No acute distress. Fully alert, oriented, and appropriate. Speech normal santa.   PSYCH: Normal affect. Thought content appropriate.  CHEST: Breathing symmetric. No audible wheezing.  SKIN: Warm, dry. No rash or discoloration on exposed areas.   NEURO/MUSCULOSKELETAL:  Cervical: ROM limited and painful; TTP neck and shoulder musculature  5/5 UE strength; gross sensation intact bilaterally; Negative Solo's bilaterally.  Lumbar: ROM full without pain. No pain with palpation over lumbar musculature; 5/5 LE strength bilaterally; gross sensation and reflexes intact bilaterally; negative clonus bilaterally  SLR negative bilaterally (sitting)       Imaging:      MRI Cervical Spine 8/28/2024:  COMPARISON:  MRI cervical spine 10/09/2021, cervical spine x-rays 04/09/2024     FINDINGS:  Alignment: 2 mm retrolisthesis of C5 on C6.  Sagittal alignment is otherwise maintained.     Vertebrae: Vertebral body heights are maintained without evidence of fracture. No marrow signal abnormality to suggest an infiltrative process.  C7 vertebral body hemangioma.     Discs: Multilevel degenerative disc height loss and  desiccation, most prominent at C5-C6.  Endplate edema (Modic type 1 change) present at C5-C6.     Cord: Normal.     Skull base and craniocervical junction: Normal.     Degenerative findings:     C2-C3: No spinal canal stenosis or neural foraminal narrowing.     C3-C4: Small posterior disc osteophyte complex.  No spinal canal stenosis or neural foraminal narrowing.     C4-C5: Posterior disc osteophyte complex eccentric to the right with central annular fissure resulting in mild spinal canal stenosis and mild right neural foraminal narrowing.     C5-C6: Posterior disc osteophyte complex resulting in moderate spinal canal stenosis and moderate right and mild left neural foraminal narrowing.     C6-C7: No spinal canal stenosis or neural foraminal narrowing.     C7-T1: No spinal canal stenosis or neural foraminal narrowing.     Paraspinal muscles & soft tissues: Unremarkable.     Impression:     Degenerative changes, most advanced at C5-6 where there is moderate canal stenosis, moderate right foraminal stenosis, and extensive endplate edema.       Assessment:     Encounter Diagnoses   Name Primary?    Occipital neuralgia of right side Yes    Generalized anxiety disorder     Myofascial pain     Decreased motor activity     Decreased spinal mobility        Plan:     Diagnoses and all orders for this visit:    Occipital neuralgia of right side  -     Ambulatory Referral/Consult to Physical Therapy/Occupational Therapy; Future  -     Ambulatory Referral/Consult to Physical Therapy/Occupational Therapy; Future  -     Ambulatory referral/consult to Psychiatry; Future    Generalized anxiety disorder  -     Ambulatory referral/consult to Psychiatry; Future    Myofascial pain  -     Ambulatory referral/consult to Psychiatry; Future    Decreased motor activity  -     Ambulatory Referral/Consult to Physical Therapy/Occupational Therapy; Future  -     Ambulatory Referral/Consult to Physical Therapy/Occupational Therapy;  Future    Decreased spinal mobility  -     Ambulatory Referral/Consult to Physical Therapy/Occupational Therapy; Future  -     Ambulatory Referral/Consult to Physical Therapy/Occupational Therapy; Future         Sari Serna is a 39 y.o. female with the above diagnoses.     Widespread Pain Index- 4  Symptoms Severity Index - 19     Education about pain and the chronic pain cycle was provided today. Discussed the importance of multimodal and multidisciplinary management of chronic pain with a focus on both pain relief and function. Discussed how our team provides education and training aimed at improving physical function, emotional health, stress and pain coping skills.Treatment is designed to build confidence in physical activity and ADLs and in your ability to control and manage your pain.     Recommend proceeding with PT/OT screening visit for further evaluation of personal goals, functional status and limitations prior to enrollment in the program.     I spent a total of 60 minutes with the patient, and greater than 50% of the time was spent in counseling and education.     The above plan and management options were discussed at length with patient. Patient is in agreement with the above and verbalized understanding. It will be communicated with the referring physician via electronic record, fax, or mail.    Debby Ross NP  01/13/2025

## 2025-01-14 NOTE — PROGRESS NOTES
"  NOTE: This is a duplicate copy utilized for reassessment purposes & continuity of care.  See pt's plan of care for co-signed copy.    [facility]  Functional Restoration Program  Physical Therapy Initial Evaluation    Date: 1/15/2025   Name: Sari Serna  Clinic Number: [id number]    Therapy Diagnosis:   Encounter Diagnoses   Name Primary?    Occipital neuralgia of right side     Decreased motor activity     Decreased spinal mobility     Impaired functional mobility and activity tolerance Yes     Physician: Debby Ross NP    Physician Orders: PT Eval and Treat   Medical Diagnosis from Referral:   M54.81 (ICD-10-CM) - Occipital neuralgia of right side   R68.89 (ICD-10-CM) - Decreased motor activity   R26.89 (ICD-10-CM) - Decreased spinal mobility     Evaluation Date: 1/15/2025  Authorization Period Expiration: 01/13/26  Plan of Care Expiration: 03/28/25  Visit # / Visits authorized: 01/ 01  FOTO: 01/ 03      Precautions: Standard    Time In: 1:30 pm   Time Out: 2:30 pm   Total Appointment Time (timed & untimed codes): 60 minutes      Subjective     Date of onset: Feb 2024 onset during kitchen remodel  /c inc over last 4 months   Patient reported history of current condition - Sari reports: primary c/o of cervical and B shoulder pain.  Patient report sx most intense in AM, but also have "good day/bad day" presentation.  Patient note at time progression to sharp shooting HA.  Patient note at times sharp pain when brushing her hair.  Patient also endorse R hand cramping /c repetitive use ex extended typing    Patient verbalize concern for "pinch nerve" leading to temporary inability to raise LUE overhead.    Patient clarify equal functional limitations from pain and fatigue throughout the day.    Patient note discomfort /c head turn.  Patient report sleep is disturbed and has tried cervical pillows.   Patient report job tasks include extended sitting and note this can " "induce R hand cramping.    Patient clarify she is on mental health leave for panic attacks and recently having completed [facility] IOP program.  Patient report her leave from work ends Feb 18th.  Patient emotional when discussing return to work noting anxiety and concern for stressors of work.   Pt report minimal difficulty /c dog care taking and no concern for balance.   Patient report presently in PT for cervical diagnosis /c mod but feeling plateau-ed   Patient note exercise regimen include yoga and Mack both at least 2 x week. On average patient feel more limitation after exercise class from fatigue.    Patient report recently completing IOP and continues 1 x wk visits and intends to continue that.   Patient verbalizes understanding that her chronic pain stems from chronically being in fight flight stage.  Patient note frustration /c her "perfectionistic tendencies"  Patient is unsure of follow thru on plans for nerve block to address cervical pain and HA.     Patient's FRP goals:  to wake up feeling refreshed for improved mood and inc ability to complete household chores, dec fatigue /c Mack     Per MD report   Sari Serna is a 39 y.o. female who presents today for the Functional Restoration Program Medical Screening Visit. Sari Serna was referred by No ref. provider found with associated diagnosis of chronic pain.   She began having bouts of generalized body pain about 10-12 years ago. This would wax and wane. She would just deal with it. She notes increased body pain over the last 4 months. She will wake up feeling sore. She also notes stiffness into the hands. She has seen Rheumatology in the past. She does have a positive URI. She does have a family history of autoimmune disorders (mom and 2 cousins). She began having neck pain over the last year. She also notes pain at the base of her head on the right that radiates forward to the eyes. She denies any radicular arm pain, but does note " right hand pain. The hand pain is worse with repetitive activity such as writing or typing. She endorses anxiety, recently completed IOP, does see Psychiatry. She does have a history of endometriosis. Endorses IBS symptoms. History obtained via interview and chart review.      Imaging:   Per MD report:   MRI Cervical Spine 8/28/2024:  COMPARISON:  MRI cervical spine 10/09/2021, cervical spine x-rays 04/09/2024     FINDINGS:  Alignment: 2 mm retrolisthesis of C5 on C6.  Sagittal alignment is otherwise maintained.  Vertebrae: Vertebral body heights are maintained without evidence of fracture. No marrow signal abnormality to suggest an infiltrative process.  C7 vertebral body hemangioma.  Discs: Multilevel degenerative disc height loss and desiccation, most prominent at C5-C6.  Endplate edema (Modic type 1 change) present at C5-C6.  Cord: Normal.  Skull base and craniocervical junction: Normal.     Degenerative findings:  C2-C3: No spinal canal stenosis or neural foraminal narrowing.  C3-C4: Small posterior disc osteophyte complex.  No spinal canal stenosis or neural foraminal narrowing.  C4-C5: Posterior disc osteophyte complex eccentric to the right with central annular fissure resulting in mild spinal canal stenosis and mild right neural foraminal narrowing.  C5-C6: Posterior disc osteophyte complex resulting in moderate spinal canal stenosis and moderate right and mild left neural foraminal narrowing.  C6-C7: No spinal canal stenosis or neural foraminal narrowing.  C7-T1: No spinal canal stenosis or neural foraminal narrowing.  Paraspinal muscles & soft tissues: Unremarkable.     Impression:  Degenerative changes, most advanced at C5-6 where there is moderate canal stenosis, moderate right foraminal stenosis, and extensive endplate edema.      Prior Therapy: PT for   Social History: lives in 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   Prior  Level of Function:  working full time, regular exercise class  Current Level of Function: pain in AM, fatigue at EOD,     Pain:      Current 3/10, worst 3/10, best 2/10   Location: B cervical, HA  Description:  sharp  fatigue   Aggravating Factors:  repetitive hand use,  waking in AM,  exercise classes  Easing Factors:  heat pack, ice pack, rest, exercise classes     Patient's FRP goals:  to wake up feeling refreshed for improved mood and inc ability to complete household chores, dec fatigue /c Mack     Medical History:   Past Medical History:   Diagnosis Date    Acne     ALLERGIC RHINITIS     Allergy     Anxiety     Dysmenorrhea     Low back pain     Migraine headache     Recurrent upper respiratory infection (URI)        Surgical History:   Sari Serna  has a past surgical history that includes Facial cosmetic surgery; Esophagogastroduodenoscopy (N/A, 7/28/2023); ph monitoring, esophagus, wireless, (off reflux meds) (N/A, 7/28/2023); Diagnostic laparoscopy (N/A, 7/18/2024); and Surgical removal of endometriosis (N/A, 7/18/2024).    Medications:   Sari Strong has a current medication list which includes the following prescription(s): albuterol, amitriptyline, amoxicillin-clavulanate 875-125mg, azelastine, baclofen, breztri aerosphere, escitalopram oxalate, fluticasone propionate, gabapentin, ibuprofen, ipratropium, ketoconazole, lidocaine, loratadine, lorazepam, norgestimate-ethinyl estradiol, ondansetron, prednisone, triamcinolone, and urea, and the following Facility-Administered Medications: dexamethasone and lidocaine hcl 10 mg/ml (1%).    Allergies:   Review of patient's allergies indicates:  No Known Allergies     Objective     Postural examination:  Seated: slouched able to self correct   Standing: NBOS    Range of Motion - Cervical   AROM AROM AROM    1/15/2025 Reassessment  Reassessment   Flexion WFL ° ° °   Extension 30 ° ° °   Side bending Right Min loss ° °   Side bending Left Min loss P! °  °   Rotation Right 35 ° ° °   Rotation Left 40 ° ° °     Range of Motion - Lumbar   1/15/2025      ROM Loss ROM Loss ROM Loss   Flexion within functional limits     Extension within functional limits     Side bending Right within functional limits     Side bending Left within functional limits     Rotation Right within functional limits     Rotation Left within functional limits       Strength Testing   1/15/2025 Reassessment Reassessment   Motion Tested         Lower Extremity R L R L R L   Hip Flexion 4+/5 4+/5       Hip Extension 4+/5 4+/5       Hip Abduction 4/5 4/5       Hip IR 4/5 4/5       Hip ER 4/5 4/5       Knee Extension 5/5 5/5       Knee Flexion 5/5 5/5         Functional Testing     1/15/2025 Reassessment  Reassessment   Timed Up and Go 7.70  sec sec sec   Single Limb Stance R LE EO/EC 30 sec/ 5 sec sec sec   Single Limb Stance L LE  EO/EC 30 sec/ 5 sec sec sec   2 Minute Walk Test 146 meters feet feet   6 Minute Walk Test 434 meters feet feet   Self Selected Walking Speed 1.2 m/sec m/sec m/sec     GAIT:  Assistive Device used: none  Level of Assistance: independent  Patient displays the following gait deviations:  dec reciprocal arm swing     Minimum standards/norms:    TUG:  < 13.5 seconds/ Males 7.3 sec, Females 8.1 sec  SLS:  26-29 sec  Ages 20-69  Self Selected Walking Speed:  .4-.8m/sec  Limited community ambulator                                                   .8- 1.2  Community ambulator                                                    1.2 m/sec and above  Able to safely cross streets        Limitation/Restriction for FOTO CERVICAL Survey    Therapist reviewed FOTO scores for Sari Serna on 1/15/2025.   FOTO documents entered into [employer] - see Media section.    INTAKE Score 1/15/2025: 56% (NDI 28)    Predicted Score: 59%         TREATMENT     Total Treatment time (time-based codes) separate from Evaluation: 20 minutes     Sari received the treatments listed below:        Sari  received therapeutic activities to improve functional performance for 15 minutes, including:  PT education:  Physical & psychological viscous pain cycles (see patient instructions 1/15/2025)  Role of consistent activity (graded exposure) to break the physical cycle  Importance of education & behavioral changes that promote intrinsic patient motivation to help break the psychological cycle  Impact on pt's functional goals when breaking each one of these cycles.    Impact central sensitization has on the sensory system in the chronic pain population      Sari received therapeutic exercises to develop strength, endurance, ROM, flexibility, posture, and core stabilization for 5 minutes including:    Patient describe and demonstrate prior HEP  Standing Y, W facing wall  Wall push ups     Issued HEP   Cervical retractions x 10 cue for level chin       PATIENT EDUCATION AND HOME EXERCISES     Education provided:   - Pain Cycles (see patient instructions)     Written Home Exercises Provided: yes. Exercises were reviewed and Sari was able to demonstrate them prior to the end of the session.  Sari demonstrated fair  understanding of the education provided. See EMR under Patient Instructions for information provided during therapy sessions.    ASSESSMENT   Sari Strong is a 39 y.o. female referred to outpatient Physical Therapy with a medical diagnosis of M54.81 (ICD-10-CM) - Occipital neuralgia of right side, R68.89 (ICD-10-CM) - Decreased motor activity, R26.89 (ICD-10-CM) - Decreased spinal mobility . Patient presents with subjective report of decreased endurance, pain, weakness, impaired mobility, decreased AROM, fear of pain /c central sensitization, increased psychosocial concerns, & inability to perform ADL's as before due to current functional status. Physical exam demonstrate no sig deficits in balance or BLE strength, likely propelled by continued exercise class performance.  Patient encouraged to continue  "attending regular classes but will benefit from education on exertion scale, body awareness to prevent "boom/bust scenarios".  Patient's emotional lability when speaking of changes in functionality and concern for return to work offer evidence of psychosocial elements likely influencing her pain presentation.  However, considering patient recently completed IOP program patient verbalize good initial understanding of role of nervous system and emotions/stress.  Discussed how our team provides education and training aimed at improving physical function, emotional health, stress and pain coping skills.Treatment is designed to build confidence in physical activity and ADLs and in your ability to control and manage your pain. Plan further education to dec fear of pain and inc functionality.  Patient's increased psychosocial concerns /c central sensitization present an unstable clinical presentation which may limit progress, but the intrinsic motivation to improve will assist.      Based on the above history and physical examination an active physical therapy program within a multi-disciplinary setting is recommended.  Pt will benefit from skilled outpatient physical therapy to address the deficits listed below in the chart, provide pt/family education and to maximize pt's level of independence in the home and community environment.     Plan of care discussed with patient: Yes  Pt's spiritual, cultural and educational needs considered and patient is agreeable to the plan of care and goals as stated below:     Pt prognosis is Good.   Anticipated Barriers for therapy: chronic nature of issues, psychosocial factors,     Medical Necessity is demonstrated by the following  History  Co-morbidities and personal factors that may impact the plan of care Co-morbidities:   anxiety, coping style/mechanism, depression, difficulty sleeping, financial considerations, and level of undertstanding of current condition    Personal Factors: "   coping style  lifestyle  character  attitudes     high   Examination  Body Structures and Functions, activity limitations and participation restrictions that may impact the plan of care Body Regions:   head  neck  upper extremities  trunk    Body Systems:    ROM  strength  gross coordinated movement  transitions  motor control    Participation Restrictions:   None, return to work Feb 18th     Activity limitations:   Learning and applying knowledge  no deficits    General Tasks and Commands  no deficits    Communication  no deficits    Mobility  lifting and carrying objects    Self care  washing oneself (bathing, drying, washing hands)  caring for body parts (brushing teeth, shaving, grooming)    Domestic Life  shopping  cooking  doing house work (cleaning house, washing dishes, laundry)    Interactions/Relationships  no deficits    Life Areas  no deficits    Community and Social Life  community life  recreation and leisure         high   Clinical Presentation unstable clinical presentation with unpredictable characteristics high   Decision Making/ Complexity Score: high       GOALS: Pt is in agreement with the following goals:  Goal Progress Towards Goal   SHORT Term: In 3 weeks, pt will:    Verbalize & demonstrate good understanding of resisted training with 3-1-3 second rep for [id number] contraction to encourage full muscle recruitment for strength & endurance. Initiated 1/15/2025   Verbalize & demonstrate good understanding of home stretch routine to improve AROM, help decrease pain & improve mobility. Initiated 1/15/2025   Demonstrate proper supine or seated diaphragmatic breathing with decreased chest excursion & emphasis on full abdominal excursion & increased expiration time to promote muscle relaxation & increased parasympathetic activity to improve patient tolerance to activities. Initiated 1/15/2025   Verbalize good understanding of importance of proper posture in resisted exercise,  "functional activities & ADL's in order to help reduce postural strain, promote proper breathing. Initiated 1/15/2025   Demonstrate good understanding of full body resisted exercises w/ use of pictures &/or verbal cues to promote independence w/ exercise at the end of the program. Initiated 1/15/2025   Verbalize plan for continuing resisted exercises w/ specific location (i.e. commercial gym, home, community fitness center)  to incorporate all major muscle groups to maintain & progress therapeutic functional improvements. Initiated 1/15/2025   Verbalize difference between  "hurt vs harm"  to demonstrate understanding of fear avoidance in pain cycle.  Initiated 1/15/2025       Midterm: In 8 weeks, pt will:    Verbalize good understanding of activities planning/scheduling (stretching, mindfulness, resisted training, & cardio) prescribed by PT/OT following the end of the program to sustain & progress functional improvements. Initiated 1/15/2025   Perform selected resisted training w/ minimal to no cuing in therapy session to be independent w/ resisted strengthening. Initiated 1/15/2025   Demonstrate improved symptom self-management w/ posture/positioning and mechanical movements and/or implementation of appropriate modalities throughout a typical day.  Initiated 1/15/2025   Demonstrate independence w/ home stretch routine to improve AROM, help decrease pain & improve mobility. Initiated 1/15/2025       Long Term: In 12 weeks, pt will:    Perform selected cardio & resisted training independently to continue safe regular performance of daily exercise. Initiated 1/15/2025   Maintain 430 meters on 6 MWT /s inc fatigue acute or once get home to improve functional activity tolerance.   Initiated 1/15/2025   Perform single leg stance EC >10 seconds or greater bilaterally to improve safety and balance in ADLs. Initiated 1/15/2025   Demonstrate Timed Up & Go (with appropriate assistive device, if necessary) of 7 seconds or less " to decrease fall risk and improve functional mobility. Initiated 1/15/2025   Score NDI 20 or less on CERVICAL FOTO to indicate significant functional improvements in impaired functions secondary to pain. Initiated 1/15/2025    Initiated 1/15/2025         PLAN   Plan of care Certification: 1/15/2025 to 03/28/25.    Outpatient Physical Therapy 3 times weekly for 12 weeks to include the following interventions: Gait Training, Manual Therapy, Moist Heat/ Ice, Neuromuscular Re-ed, Patient Education, Therapeutic Activities, and Therapeutic Exercise.     Conner Huff, PT

## 2025-01-15 ENCOUNTER — CLINICAL SUPPORT (OUTPATIENT)
Dept: REHABILITATION | Facility: OTHER | Age: 40
End: 2025-01-15
Attending: NURSE PRACTITIONER
Payer: COMMERCIAL

## 2025-01-15 ENCOUNTER — CLINICAL SUPPORT (OUTPATIENT)
Dept: REHABILITATION | Facility: OTHER | Age: 40
End: 2025-01-15
Payer: COMMERCIAL

## 2025-01-15 DIAGNOSIS — R26.89 DECREASED SPINAL MOBILITY: ICD-10-CM

## 2025-01-15 DIAGNOSIS — Z74.09 IMPAIRED FUNCTIONAL MOBILITY AND ACTIVITY TOLERANCE: Primary | ICD-10-CM

## 2025-01-15 DIAGNOSIS — M54.81 OCCIPITAL NEURALGIA OF RIGHT SIDE: ICD-10-CM

## 2025-01-15 DIAGNOSIS — R68.89 DECREASED MOTOR ACTIVITY: ICD-10-CM

## 2025-01-15 DIAGNOSIS — R52 PAIN AGGRAVATED BY ACTIVITIES OF DAILY LIVING: Primary | ICD-10-CM

## 2025-01-15 PROCEDURE — 97530 THERAPEUTIC ACTIVITIES: CPT

## 2025-01-15 PROCEDURE — 97163 PT EVAL HIGH COMPLEX 45 MIN: CPT

## 2025-01-15 PROCEDURE — 97167 OT EVAL HIGH COMPLEX 60 MIN: CPT

## 2025-01-15 NOTE — PATIENT INSTRUCTIONS
The physical cycle is very intuitive for people! As pain increases & you avoid activities, you get weaker. This leads to more pain, which leads to more activity avoidance & the cycle fuels on leading to maladaptive behaviors. You start avoiding activities you love to do & start having pain with normal & safe activities.    With chronic conditions, it is important to consider all of the negative psychological factors associated with pain. How we experience pain is based on the context of our lives. You are limited & thus getting less emily out of life, but also have all of the past experiences with pain looming over you. This negative context is scientifically proven to cause the brain to become more sensitive & perceive more pain. Pain may change or spread, & you may to start feeling pain from non-painful stimuli, such as movement & everyday life. This does not mean your pain is not real, it just means the brain is producing more than it should & your mind needs to be retrained along with your body.

## 2025-01-16 ENCOUNTER — PATIENT MESSAGE (OUTPATIENT)
Dept: PSYCHIATRY | Facility: CLINIC | Age: 40
End: 2025-01-16
Payer: COMMERCIAL

## 2025-01-16 ENCOUNTER — PATIENT MESSAGE (OUTPATIENT)
Dept: PAIN MEDICINE | Facility: OTHER | Age: 40
End: 2025-01-16
Payer: COMMERCIAL

## 2025-01-16 PROBLEM — R52 PAIN AGGRAVATED BY ACTIVITIES OF DAILY LIVING: Status: ACTIVE | Noted: 2025-01-16

## 2025-01-16 NOTE — PLAN OF CARE
"[facility]   Occupational Therapy Initial Evaluation    Encounter Date: 1/15/2025  Name: Sari Strong [facility]  Clinic Number: [id number]    Therapy Diagnosis:   Encounter Diagnoses   Name Primary?    Occipital neuralgia of right side     Decreased motor activity     Decreased spinal mobility     Pain aggravated by activities of daily living Yes       Referring Provider: Debby Ross NP    Physician Orders: eval and treat; FRP  Medical Diagnosis: Occipital neuralgia of right side [M54.81], Decreased motor activity [R68.89], Decreased spinal mobility [R26.89]   Evaluation Date: 1/15/2025  Insurance Authorization Period Expiration: 12/31/25  Plan of Care Certification Period:4/14/2025  Visit # / Visits authorized: 1/1  FOTO completed: 1/3    Precautions:  Standard    Time In:1430  Time Out: 1530  Total Appointment Time (timed & untimed codes): 60 minutes    Subjective      History of current condition, based on chart review and Sari's report:    She began having bouts of generalized body pain about 10-12 years ago. This would wax and wane. She would just deal with it. She notes increased body pain over the last 4 months. She will wake up feeling sore. She also notes stiffness into the hands. She has seen Rheumatology in the past. She does have a positive URI. She does have a family history of autoimmune disorders (mom and 2 cousins). She began having neck pain over the last year. She also notes pain at the base of her head on the right that radiates forward to the eyes. She denies any radicular arm pain, but does note right hand pain. The hand pain is worse with repetitive activity such as writing or typing. She endorses anxiety, recently completed IOP, does see Psychiatry. She does have a history of endometriosis. Endorses IBS symptoms. History obtained via interview and chart review.     Sari Mainlle Statement: "I was told that I was too sensitive. That I " "cried too much." Re childhood and experience of heightened sensitivity. 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, SSH, threwshold entrance, TBS  Family dynamic: family is complex; good supports from friends and boyfriend; boyfriend is a teacher as well   DME: none  Driving status: pain checking blindspots  Occupations/hobbies/homemaking: viral, yoga, reading, cooking  Working presently: currently on medical leave 2* anxiety and completion of IOP    Prior Level of Function: Activities patient can no longer perform/has great difficulty with include: driving, yoga, writing (hand pain), holding book/fredy, writing IEPs   Prior Therapy: currently finishing with PT  Current Exercise Routine: viral, yoga, remains very active- notes that pain has compromised this capacity   Current Self-care Routine: limited by pain  Mental health: endorses situational anxiety and frustrations at complications related to pain   Disturbed sleep: Yes difficulty falling asleep, hard time getting out of bed 2* pain     Pain:      Pain Related Behaviors Observed: guarded stiff movement; currently rating pain as 3/10.  Functional Pain Scale Rating 0-10: within the last 24 hours  Date 1/15/2025   Average 3/10   Worst 3/10   Best 2/10   Composite score 2.7/10   [KATHIE  is 1 point or 15-20% change in interference composite score (Sharadorkin et al. 2008)]    Location: neck trap area, R hand pain (cramping with repetitive use)   Description: Sharp  Functional Exacerbations: morning, prolonged sitting, pain in neck, over use of hands, stirring in kitchen  Easing Factors: heat, ice, currently seeing PT for dry needling, manual manipulation    Patient Specific Activities based on Personal goals:  Activity  (To be rated first session after eval)    Performance Satisfaction   Cooking (R hand use)      2.  Work set up      3.  yoga     4.  Social outings     5.  Groceries/cooking          Total " Score     Ratin-10; unable/unsatisfied - Able/Satisfied    Medical History:   Past Medical History:   Diagnosis Date    Acne     ALLERGIC RHINITIS     Allergy     Anxiety     Dysmenorrhea     Low back pain     Migraine headache     Recurrent upper respiratory infection (URI)         Surgical History:   Sari Strong  has a past surgical history that includes Facial cosmetic surgery; Esophagogastroduodenoscopy (N/A, 2023); ph monitoring, esophagus, wireless, (off reflux meds) (N/A, 2023); Diagnostic laparoscopy (N/A, 2024); and Surgical removal of endometriosis (N/A, 2024).    Medications:   Sari Strong has a current medication list which includes the following prescription(s): albuterol, amitriptyline, amoxicillin-clavulanate 875-125mg, azelastine, baclofen, breztri aerosphere, escitalopram oxalate, fluticasone propionate, gabapentin, ibuprofen, ipratropium, ketoconazole, lidocaine, loratadine, lorazepam, norgestimate-ethinyl estradiol, ondansetron, prednisone, triamcinolone, and urea, and the following Facility-Administered Medications: dexamethasone and lidocaine hcl 10 mg/ml (1%).    Allergies:   Review of patient's allergies indicates:  No Known Allergies    Objective     COGNITIVE Exam  Behaviors: engaged, pleasant, tearful at times  Memory: intermittent difficulty with recall/word finding  Safety awareness/insight to disability: fear of pain  Coping skills/emotional control: active coping strategies, focus on work, and exercise ; during session Appropriate to situation.    Dominant hand: right    Active Range of Motion  Upper Extremity 1/15/2025    TASHIA RIVERA Comments   Hands WFL WFL Hypermobility in extension of fingers   Wrist WFL WFL    Elbows WFL WFL    Shoulders WFL WFL      Upper Extremity Strength - Manual Muscle Testing  Motion Tested 1/15/2025    Right Left  comments   Shoulder Flexion 5/5 5/5    Shoulder Extension 5/5 5/5    Shoulder Abduction 5/5 5/5    Shoulder IR 5/5 5/5     Shoulder ER 5/5 5/5    Elbow Flexion 5/5 5/5    Elbow Extension 5/5 5/5      Functional Strength  Pile Testing: Lifting   1/15/2025 (lbs/HR/GARRETT)    Repetitive Floor to Waist      8/105/3   Repetitive Waist to Shoulder    8/121/2-3   1- Time Maximum     10/105/3   Carry-2 Handed (40ft)     10/107/4   Work demand Level   Sedentary (#10 max, 1.5 METS)    Reason for stop point Inability to maintain proper body mechanics.   Comments     The work demand level listed above is based on the physical performance screening test performed. More comprehensive physical testing integrated with clinical findings would be required to derived an accurate work capacity.     Balance  5 times sit-stand (adults 18-65 y/o) 1/15/2025   >12 sec= fall risk for general elderly  >16 sec= fall risk for Parkinson's disease  >10 sec= balance/vestibular dysfunction (<61 y/o)  >14.2 sec= balance/vestibular dysfunction (>61 y/o)  >12 sec= fall risk for CVA 10.73 seconds    (without UE)   Comments -        Endurance Testing: Modified Ramp Treadmill Test   1/15/2025   Minutes Completed  6 min    % Grades  12 %   Speed (mph)  2.5 mph   METS 7    bpm   Garrett Rating Perceived Exertion Scale   5   Reason for stop point GARRETT scale rating beyond recommended levels, Decline in maintaining pace safely    Comments -     During physical testing, participation level was consistent.     Limitation/Restriction for FOTO NOC Survey    Therapist reviewed FOTO scores for Sari Serna on 1/15/2025.   FOTO documents entered into [employer] - see Media section.    Limitation Score: 48%         Treatment   In addition to eval, Ms. Serna received the following treatments:    Dynamic functional therapeutic activities to improve functional performance for 30 minutes, including:  Education provided:   - proper posture and body mechanics.  - anticipated muscular soreness following lift testing and strategies for management including stretching, using ice,  scheduled rest.  - Functional pain scale  - CASSANDRA rate of perceived exertion scale.   - pain cycle  - pursed lip and diaphragmatic breathing techniques  - details of the Functional [facility]oration Program.     Written Home Exercises Provided: yes.Sari given handout re: cassandra scale, pain cycle, z-lie, functional lifting mechanics, pursed lip and diaphragmatic breathing techniques.    Exercises were reviewed and Sari was able to demonstrate them prior to the end of the session. Sari demonstrated good  understanding of the education provided.     Educational materials can be found under patient instructions in the EMR.     No environmental, cultural, spiritual, developmental or education needs expressed or noted during evaluation today.     Assessment     Sari appears to be a good candidate for the Functional [facility]oration Program. @fname@ would benefit from Occupational Therapy intervention to address deficits. She experiences decreased independence with activities of roles of life, and appears open to education of how goal of chronic pain education program is to provide tools, education and training aimed at improving physical functioning, emotional health, stress and pain coping skills, to build confidence in physical activity and improve independence with ADLs and IADLs, and in the ability to control and manage symptoms. Sari did voice understanding that behavioral changes are needed to reach goals. Sari with anxious and appropriate affect and pleasant and appropriate mood. She was able to participate in all aspects of assessment, although exhibited pain avoidance behavior. She was open to education regarding use of CASSANDRA scale to guide pacing. Due to the chronic nature of her issues and various co morbidities, she presents with an unstable clinical presentation which may limit her progress, but with good motivation to make behavioral changes.     Sari's prognosis is Good due to high intrinisic  motivation.    Sari is unable to fully participate in her work, recreational, and home-based activities because of decreased functional strength, decreased  AROM, decreased endurance, limited positional tolerance, fear of re-injury, and fear of increased pain. She will benefit from skilled outpatient Occupational Therapy to address the limitations and goals stated above and in the chart below, provide patient/family education, and to maximize patient's level of functional independence and meet functional goals.     Plan of care discussed with patient: Yes  Pt's spiritual, cultural and educational needs considered and patient is agreeable to the plan of care and goals as stated below:     Medical necessity is demonstrated by the following problem list:  Profile and History Assessment of Occupational Performance Level of Clinical Decision Making Complexity Score   Occupational Profile:   Sari Serna is a 39 y.o. female who lives with their partner and is on medical leave from work.Sari Serna has difficulty with  ADLs and IADLs as listed previously, which  affecting her daily functional abilities. Her main goal for therapy is improved pain management for completion of ADLs/IADLs.      Comorbidities:    has a past medical history of Acne, ALLERGIC RHINITIS, Allergy, Anxiety, Dysmenorrhea, Low back pain, Migraine headache, and Recurrent upper respiratory infection (URI).    Medical and Therapy History Review:   Extensive Performance Deficits    Physical:  Joint Mobility  Joint Stability  Muscle Power/Strength  Muscle Endurance  Gross Motor Coordination  Fine Motor Coordination  Proprioception Functions  Postural Control  Pain    Cognitive:  Attention  Initiation  Sequencing  Safety Awareness/Insight to Disability  Emotional Control    Psychosocial:   Pain avoidance, social isolation   Social Interaction  Habits  Routines  Rituals  Family Support  Group Participation Clinical Decision  Making:  high    Assessment Process:  Comprehensive Assessments    Modification/Need for Assistance:  Minimal-Moderate Modifications/Assistance    Intervention Selection:  Multiple Treatment Options       high  Based on PMHX, co morbidities , data from assessments and functional level of assistance required with task and clinical presentation directly impacting function.           Goals:    SHORT Term goals: In 3 weeks, patient will:  Status of goal:   Implements correct use of breathing techniques during functional lifting tasks, with minimal cues needed. Initiated   Utilizes MICHELLE rate of exertion scale to pace performance with functional lifting tasks, and implements self-care strategies during activity participation to manage pain. Initiated   Verbalize understanding of energy conservation and pacing strategies during daily habits, roles, and routines in order to improve tolerance for work and home demands.  Initiated   Demonstrate increased positional tolerance to the level needed for work, home, and community activities (standing in cue at social event, managing supplies for outing, streneous IADL), as evidenced by having a METS level of 7. Initiated   Demonstrate proper body mechanics with functional lifting tasks (floor to waist, waist to shoulder, maximum lift, and box carry), via teach back method with minimal cues needed. Initiated   Demonstrate increased functional strength by lifting 13 lbs waist to shoulder for management of (I)ADL, including dressing and grooming, placing items in kitchen cabinets, folding towels, and/or managing suitcase when traveling.   Initiated   Demonstrate increased functional strength by lifting 13 lbs floor to waist to meet demand of work/home routine, including lifting groceries, managing laundry, and/or managing suitcase when traveling.   Initiated   Tolerate Home Activity Plan (HAP)/ Home Exercise Program (HEP) to promote safety and independence with ADL, with report of  completion of at least 2 out of 4 days outside of program participation.  Initiated   Show improved perception of own abilities as evidenced by increase of FOTO scores to established MDC value and perception of performance ratings regarding personal long term goals.  Initiated       Long Term goals: In 9 weeks, patient will: Status of goal:   Report implementation of application of mindfulness, stretching, and other coping strategies for improved participation in daily routine. Initiated   Verbalize functional application of lifting techniques in daily life (golfers lift, floor to waist, waist to shoulder). Initiated   Applies functional application of lifting techniques (golfer's lift, floor to waist, waist to shoulder) in activities of daily life, per patient report.   Initiated   Demonstrate physical capacities consistent with a Light (#20 max, frequent lifting/carrying #10, 2.5 METS  demand level, as needed to perform activities to be successful in roles of life, regarding Full Time Employment, Household Management, Volunteer Work, and Community Participation. Initiated   Have an improvement of 3 points in 2/5 personal goals in rating of  performance.  Initiated   Show improved perception of own abilities as evidenced by increase of FOTO scores to established MC II value and perception of performance ratings regarding personal long term goals.  Initiated        Plan   Plan of care certification: 1/15/2025 to 4/14/25    Outpatient occupational therapy, 2 times a week for 12 weeks to include the following interventions: Patient Education, Self- Care/Home-management strategies, Therapeutic Activities, Therapeutic Exercise and Therapeutic interventions that focus on cognitive function and compensatory strategies to manage the performance activities, followed by independent application of FRP tools and techniques learned, to work towards improved performance regarding personal goals with return 2 month post cohort  completion for taking of measures and review of plan of care.. If enrolled, Sari would prefer cohort with therapy in the PM.    Mireya Hutchinson, OT, LOTR, 1/15/2025  Occupational Therapy

## 2025-01-17 ENCOUNTER — CLINICAL SUPPORT (OUTPATIENT)
Dept: REHABILITATION | Facility: OTHER | Age: 40
End: 2025-01-17
Payer: COMMERCIAL

## 2025-01-17 ENCOUNTER — TELEPHONE (OUTPATIENT)
Dept: PAIN MEDICINE | Facility: CLINIC | Age: 40
End: 2025-01-17
Payer: COMMERCIAL

## 2025-01-17 DIAGNOSIS — Z74.09 IMPAIRED FUNCTIONAL MOBILITY AND ACTIVITY TOLERANCE: Primary | ICD-10-CM

## 2025-01-17 PROCEDURE — 97112 NEUROMUSCULAR REEDUCATION: CPT | Mod: CQ

## 2025-01-17 PROCEDURE — 97110 THERAPEUTIC EXERCISES: CPT | Mod: CQ

## 2025-01-17 NOTE — PROGRESS NOTES
"[facility]    Functional Restoration Program  Physical Therapy Treatment Note      Name: Sari Serna  MRN:[id number]      Therapy Diagnosis:   Encounter Diagnosis   Name Primary?    Impaired functional mobility and activity tolerance Yes     Physician: Debby Ross NP    Date: 1/17/2025        Time In: 2:30  Time Out: 3:30  Total Billable Time: 60 minutes    SUBJECTIVE       Sari reports she is on leave from [employer] at this time. She realized she needed to take the time to care for herself, "I need a brain re-set".       Current 3/10  Location(s): bilateral neck  and slight headache     OBJECTIVE     Objective Measures updated at progress report unless specified.     Treatment       Sari received the Individualized Treatments listed below:     Sari participated in dynamic functional therapeutic activities to improve functional performance for 45 minutes, including:      Full body functional mobility w/ 3-10 sec hold technique &/or 3-5 repetition technique to improve functional performance. In order to:  Improve driving safety, visual & spatial awareness:  Cervical flx/ext  Cervical side bending  Cervical rotation  Cervical protraction/retraction  Improve lifting mechanics, showering/bathing, dressing, cooking, reaching, pushing & fine motor skills  Shld rolls  Shld depression/elevation  Scapular retraction/protraction/scaption  Posterior shld stretch (cross arm)  Shld ER stretch (chicken wing)  Elbow flx/ext  Forearm supination/pronation  Wrist flx/ext  Open/close hands/fingers  Improve bed mobility & rolling, bending over, cooking & cleaning:  Seated trunk rotations  Seated trunk/thoracic ext/flx  Improve functional gait, squatting, sitting tolerance, functional balance:   Seated marches & knee to chest  Seated knee flx/ext  Seated hip ER/piriformis stretch  Seated hamstring stretch  Seated toe raise to heel raise   Seated ankle circles each way  Improve overhead " reaching/activities, standing tolerance:  Standing lumbar extensions  Standing lateral leaning stretch  Standing quad stretch (UE support for balance)    Pt Education:  Review  Resisted Exercise Basics  Modified Garrett scale  Motion is lotio, Hurt vs harm, Calming the nervous system  Postural awareness during resisted ex and functional mobility    Sari participated in neuromuscular re-education activities to improve: Balance, Coordination, Kinesthetic, Sense, Proprioception, and Posture for 15 minutes. The following activities were included:      Peripheral muscle strengthening which included 1-2 sets of 10-20 repetitions at a slow, controlled 5-7 second per rep pace focused on strengthening supporting musculature for improved body mechanics & functional mobility.  Patient & therapist focused on proper form & speed during treatment to ensure optimal strengthening of each targeted muscle group & neuromuscular re-education. Patients are instructed to keep sets/reps with proper load to stay at 3-5 out of 10 on the provided modified Garrett exertion scale.    CustomMade Machine Exercises Weight (lbs) Sets Repetitions Garrett Exertion Scale Rating   Leg Extension  5  15 3   Hamstring Curls 20  15 3   Chest Press       Pec Fly       Lat Pull Down       Mid Row       Bicep Bar Curls       Standing Tricep Extension       Single Leg Press 15  15 3     Sari received therapeutic exercises to develop strength, endurance, ROM, flexibility, posture, and core stabilization for 0 minutes including:     Patient describe and demonstrate prior HEP  Standing Y, W facing wall  Wall push ups      Review HEP (performed during stretch routine)  Cervical retractions x 10 cue for level chin   Patient Education and Home Exercises     Home Exercises Provided and Patient Education Provided     Education provided:   - FRP Educational Topics: Modified Garrett Exertion Scale, Physical & Psychological Pain Cycles, Z-Lying for Pain Relief/Relaxation, and  Diaphragmatic Breathing    Written Home Exercises Provided: yes, FRP stretch routine. Previous HEP issued exercises were reviewed and Sari was able to demonstrate them prior to the end of the session.  Sari demonstrated good  understanding of the education provided. See EMR under Patient Instructions for information provided during therapy sessions    ASSESSMENT     Sari participated well in PT session today. Pt educated on the tenets of FRP with good understanding and willingness to participate. Introduction to stretch routine and given copy for HEP. Pt was able to perform without exacerbating symptoms. Neuro muscular re-ed also introduced with resisted training on [employer] machines. Pt educated on resisted ex basics and use of the Garrett scale to show how resistance training can be used to calm the NS.     Sari Is progressing well towards her goals.   Pt prognosis is Good.     Pt will continue to benefit from skilled outpatient physical therapy to address the deficits listed in the problem list box on initial evaluation, provide pt/family education and to maximize pt's level of independence in the home and community environment.     Pt's spiritual, cultural and educational needs considered and pt agreeable to plan of care and goals.     Anticipated Barriers for therapy: chronic nature of issues, psychosocial factors, central sensitization    GOALS: Pt is in agreement with the following goals:  Goal Progress Towards Goal   SHORT Term: In 3 weeks, pt will:     Verbalize & demonstrate good understanding of resisted training with 3-1-3 second rep for kcyqxowutk-ylqhioyos-syudymdxw contraction to encourage full muscle recruitment for strength & endurance. Initiated 1/15/2025   Verbalize & demonstrate good understanding of home stretch routine to improve AROM, help decrease pain & improve mobility. Initiated 1/15/2025   Demonstrate proper supine or seated diaphragmatic breathing with decreased chest excursion &  "emphasis on full abdominal excursion & increased expiration time to promote muscle relaxation & increased parasympathetic activity to improve patient tolerance to activities. Initiated 1/15/2025   Verbalize good understanding of importance of proper posture in resisted exercise, functional activities & ADL's in order to help reduce postural strain, promote proper breathing. Initiated 1/15/2025   Demonstrate good understanding of full body resisted exercises w/ use of pictures &/or verbal cues to promote independence w/ exercise at the end of the program. Initiated 1/15/2025   Verbalize plan for continuing resisted exercises w/ specific location (i.e. commercial gym, home, community fitness center)  to incorporate all major muscle groups to maintain & progress therapeutic functional improvements. Initiated 1/15/2025   Verbalize difference between  "hurt vs harm"  to demonstrate understanding of fear avoidance in pain cycle.  Initiated 1/15/2025         Midterm: In 8 weeks, pt will:     Verbalize good understanding of activities planning/scheduling (stretching, mindfulness, resisted training, & cardio) prescribed by PT/OT following the end of the program to sustain & progress functional improvements. Initiated 1/15/2025   Perform selected resisted training w/ minimal to no cuing in therapy session to be independent w/ resisted strengthening. Initiated 1/15/2025   Demonstrate improved symptom self-management w/ posture/positioning and mechanical movements and/or implementation of appropriate modalities throughout a typical day.  Initiated 1/15/2025   Demonstrate independence w/ home stretch routine to improve AROM, help decrease pain & improve mobility. Initiated 1/15/2025         Long Term: In 12 weeks, pt will:     Perform selected cardio & resisted training independently to continue safe regular performance of daily exercise. Initiated 1/15/2025   Maintain 430 meters on 6 MWT /s inc fatigue acute or once get home to " improve functional activity tolerance.   Initiated 1/15/2025   Perform single leg stance EC >10 seconds or greater bilaterally to improve safety and balance in ADLs. Initiated 1/15/2025   Demonstrate Timed Up & Go (with appropriate assistive device, if necessary) of 7 seconds or less to decrease fall risk and improve functional mobility. Initiated 1/15/2025   Score NDI 20 or less on CERVICAL FOTO to indicate significant functional improvements in impaired functions secondary to pain. Initiated 1/15/2025     Initiated 1/15/2025          PLAN     Continue PT per POC     Monae Day, PTA

## 2025-01-17 NOTE — PLAN OF CARE
"  NOTE: This is a duplicate copy utilized for reassessment purposes & continuity of care.  See pt's plan of care for co-signed copy.    [facility]  Functional Restoration Program  Physical Therapy Initial Evaluation    Date: 1/15/2025   Name: Sari Serna  Clinic Number: [id number]    Therapy Diagnosis:   Encounter Diagnoses   Name Primary?    Occipital neuralgia of right side     Decreased motor activity     Decreased spinal mobility     Impaired functional mobility and activity tolerance Yes     Physician: Debby Ross NP    Physician Orders: PT Eval and Treat   Medical Diagnosis from Referral:   M54.81 (ICD-10-CM) - Occipital neuralgia of right side   R68.89 (ICD-10-CM) - Decreased motor activity   R26.89 (ICD-10-CM) - Decreased spinal mobility     Evaluation Date: 1/15/2025  Authorization Period Expiration: 01/13/26  Plan of Care Expiration: 03/28/25  Visit # / Visits authorized: 01/ 01  FOTO: 01/ 03      Precautions: Standard    Time In: 1:30 pm   Time Out: 2:30 pm   Total Appointment Time (timed & untimed codes): 60 minutes      Subjective     Date of onset: Feb 2024 onset during kitchen remodel  /c inc over last 4 months   Patient reported history of current condition - Sari reports: primary c/o of cervical and B shoulder pain.  Patient report sx most intense in AM, but also have "good day/bad day" presentation.  Patient note at time progression to sharp shooting HA.  Patient note at times sharp pain when brushing her hair.  Patient also endorse R hand cramping /c repetitive use ex extended typing    Patient verbalize concern for "pinch nerve" leading to temporary inability to raise LUE overhead.    Patient clarify equal functional limitations from pain and fatigue throughout the day.    Patient note discomfort /c head turn.  Patient report sleep is disturbed and has tried cervical pillows.   Patient report job tasks include extended sitting and note this can " "induce R hand cramping.    Patient clarify she is on mental health leave for panic attacks and recently having completed [facility] IOP program.  Patient report her leave from work ends Feb 18th.  Patient emotional when discussing return to work noting anxiety and concern for stressors of work.   Pt report minimal difficulty /c dog care taking and no concern for balance.   Patient report presently in PT for cervical diagnosis /c mod but feeling plateau-ed   Patient note exercise regimen include yoga and Mack both at least 2 x week. On average patient feel more limitation after exercise class from fatigue.    Patient report recently completing IOP and continues 1 x wk visits and intends to continue that.   Patient verbalizes understanding that her chronic pain stems from chronically being in fight flight stage.  Patient note frustration /c her "perfectionistic tendencies"  Patient is unsure of follow thru on plans for nerve block to address cervical pain and HA.     Patient's FRP goals:  to wake up feeling refreshed for improved mood and inc ability to complete household chores, dec fatigue /c Mack     Per MD report   Sari Serna is a 39 y.o. female who presents today for the Functional Restoration Program Medical Screening Visit. Sari Serna was referred by No ref. provider found with associated diagnosis of chronic pain.   She began having bouts of generalized body pain about 10-12 years ago. This would wax and wane. She would just deal with it. She notes increased body pain over the last 4 months. She will wake up feeling sore. She also notes stiffness into the hands. She has seen Rheumatology in the past. She does have a positive URI. She does have a family history of autoimmune disorders (mom and 2 cousins). She began having neck pain over the last year. She also notes pain at the base of her head on the right that radiates forward to the eyes. She denies any radicular arm pain, but does note " right hand pain. The hand pain is worse with repetitive activity such as writing or typing. She endorses anxiety, recently completed IOP, does see Psychiatry. She does have a history of endometriosis. Endorses IBS symptoms. History obtained via interview and chart review.      Imaging:   Per MD report:   MRI Cervical Spine 8/28/2024:  COMPARISON:  MRI cervical spine 10/09/2021, cervical spine x-rays 04/09/2024     FINDINGS:  Alignment: 2 mm retrolisthesis of C5 on C6.  Sagittal alignment is otherwise maintained.  Vertebrae: Vertebral body heights are maintained without evidence of fracture. No marrow signal abnormality to suggest an infiltrative process.  C7 vertebral body hemangioma.  Discs: Multilevel degenerative disc height loss and desiccation, most prominent at C5-C6.  Endplate edema (Modic type 1 change) present at C5-C6.  Cord: Normal.  Skull base and craniocervical junction: Normal.     Degenerative findings:  C2-C3: No spinal canal stenosis or neural foraminal narrowing.  C3-C4: Small posterior disc osteophyte complex.  No spinal canal stenosis or neural foraminal narrowing.  C4-C5: Posterior disc osteophyte complex eccentric to the right with central annular fissure resulting in mild spinal canal stenosis and mild right neural foraminal narrowing.  C5-C6: Posterior disc osteophyte complex resulting in moderate spinal canal stenosis and moderate right and mild left neural foraminal narrowing.  C6-C7: No spinal canal stenosis or neural foraminal narrowing.  C7-T1: No spinal canal stenosis or neural foraminal narrowing.  Paraspinal muscles & soft tissues: Unremarkable.     Impression:  Degenerative changes, most advanced at C5-6 where there is moderate canal stenosis, moderate right foraminal stenosis, and extensive endplate edema.      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   Prior  Level of Function:  working full time, regular exercise class  Current Level of Function: pain in AM, fatigue at EOD,     Pain:      Current 3/10, worst 3/10, best 2/10   Location: B cervical, HA  Description:  sharp  fatigue   Aggravating Factors:  repetitive hand use,  waking in AM,  exercise classes  Easing Factors:  heat pack, ice pack, rest, exercise classes     Patient's FRP goals:  to wake up feeling refreshed for improved mood and inc ability to complete household chores, dec fatigue /c Mack     Medical History:   Past Medical History:   Diagnosis Date    Acne     ALLERGIC RHINITIS     Allergy     Anxiety     Dysmenorrhea     Low back pain     Migraine headache     Recurrent upper respiratory infection (URI)        Surgical History:   Sari Serna  has a past surgical history that includes Facial cosmetic surgery; Esophagogastroduodenoscopy (N/A, 7/28/2023); ph monitoring, esophagus, wireless, (off reflux meds) (N/A, 7/28/2023); Diagnostic laparoscopy (N/A, 7/18/2024); and Surgical removal of endometriosis (N/A, 7/18/2024).    Medications:   Sari Strong has a current medication list which includes the following prescription(s): albuterol, amitriptyline, amoxicillin-clavulanate 875-125mg, azelastine, baclofen, breztri aerosphere, escitalopram oxalate, fluticasone propionate, gabapentin, ibuprofen, ipratropium, ketoconazole, lidocaine, loratadine, lorazepam, norgestimate-ethinyl estradiol, ondansetron, prednisone, triamcinolone, and urea, and the following Facility-Administered Medications: dexamethasone and lidocaine hcl 10 mg/ml (1%).    Allergies:   Review of patient's allergies indicates:  No Known Allergies     Objective     Postural examination:  Seated: slouched able to self correct   Standing: NBOS    Range of Motion - Cervical   AROM AROM AROM    1/15/2025 Reassessment  Reassessment   Flexion WFL ° ° °   Extension 30 ° ° °   Side bending Right Min loss ° °   Side bending Left Min loss P! °  °   Rotation Right 35 ° ° °   Rotation Left 40 ° ° °     Range of Motion - Lumbar   1/15/2025      ROM Loss ROM Loss ROM Loss   Flexion within functional limits     Extension within functional limits     Side bending Right within functional limits     Side bending Left within functional limits     Rotation Right within functional limits     Rotation Left within functional limits       Strength Testing   1/15/2025 Reassessment Reassessment   Motion Tested         Lower Extremity R L R L R L   Hip Flexion 4+/5 4+/5       Hip Extension 4+/5 4+/5       Hip Abduction 4/5 4/5       Hip IR 4/5 4/5       Hip ER 4/5 4/5       Knee Extension 5/5 5/5       Knee Flexion 5/5 5/5         Functional Testing     1/15/2025 Reassessment  Reassessment   Timed Up and Go 7.70  sec sec sec   Single Limb Stance R LE EO/EC 30 sec/ 5 sec sec sec   Single Limb Stance L LE  EO/EC 30 sec/ 5 sec sec sec   2 Minute Walk Test 146 meters feet feet   6 Minute Walk Test 434 meters feet feet   Self Selected Walking Speed 1.2 m/sec m/sec m/sec     GAIT:  Assistive Device used: none  Level of Assistance: independent  Patient displays the following gait deviations:  dec reciprocal arm swing     Minimum standards/norms:    TUG:  < 13.5 seconds/ Males 7.3 sec, Females 8.1 sec  SLS:  26-29 sec  Ages 20-69  Self Selected Walking Speed:  .4-.8m/sec  Limited community ambulator                                                   .8- 1.2  Community ambulator                                                    1.2 m/sec and above  Able to safely cross streets        Limitation/Restriction for FOTO CERVICAL Survey    Therapist reviewed FOTO scores for Sari Serna on 1/15/2025.   FOTO documents entered into [employer] - see Media section.    INTAKE Score 1/15/2025: 56% (NDI 28)    Predicted Score: 59%         TREATMENT     Total Treatment time (time-based codes) separate from Evaluation: 20 minutes     Sari received the treatments listed below:        Sari  received therapeutic activities to improve functional performance for 15 minutes, including:  PT education:  Physical & psychological viscous pain cycles (see patient instructions 1/15/2025)  Role of consistent activity (graded exposure) to break the physical cycle  Importance of education & behavioral changes that promote intrinsic patient motivation to help break the psychological cycle  Impact on pt's functional goals when breaking each one of these cycles.    Impact central sensitization has on the sensory system in the chronic pain population      Sari received therapeutic exercises to develop strength, endurance, ROM, flexibility, posture, and core stabilization for 5 minutes including:    Patient describe and demonstrate prior HEP  Standing Y, W facing wall  Wall push ups     Issued HEP   Cervical retractions x 10 cue for level chin       PATIENT EDUCATION AND HOME EXERCISES     Education provided:   - Pain Cycles (see patient instructions)     Written Home Exercises Provided: yes. Exercises were reviewed and Sari was able to demonstrate them prior to the end of the session.  Sari demonstrated fair  understanding of the education provided. See EMR under Patient Instructions for information provided during therapy sessions.    ASSESSMENT   Sari Strong is a 39 y.o. female referred to outpatient Physical Therapy with a medical diagnosis of M54.81 (ICD-10-CM) - Occipital neuralgia of right side, R68.89 (ICD-10-CM) - Decreased motor activity, R26.89 (ICD-10-CM) - Decreased spinal mobility . Patient presents with subjective report of decreased endurance, pain, weakness, impaired mobility, decreased AROM, fear of pain /c central sensitization, increased psychosocial concerns, & inability to perform ADL's as before due to current functional status. Physical exam demonstrate no sig deficits in balance or BLE strength, likely propelled by continued exercise class performance.  Patient encouraged to continue  "attending regular classes but will benefit from education on exertion scale, body awareness to prevent "boom/bust scenarios".  Patient's emotional lability when speaking of changes in functionality and concern for return to work offer evidence of psychosocial elements likely influencing her pain presentation.  However, considering patient recently completed IOP program patient verbalize good initial understanding of role of nervous system and emotions/stress.  Discussed how our team provides education and training aimed at improving physical function, emotional health, stress and pain coping skills.Treatment is designed to build confidence in physical activity and ADLs and in your ability to control and manage your pain. Plan further education to dec fear of pain and inc functionality.  Patient's increased psychosocial concerns /c central sensitization present an unstable clinical presentation which may limit progress, but the intrinsic motivation to improve will assist.      Based on the above history and physical examination an active physical therapy program within a multi-disciplinary setting is recommended.  Pt will benefit from skilled outpatient physical therapy to address the deficits listed below in the chart, provide pt/family education and to maximize pt's level of independence in the home and community environment.     Plan of care discussed with patient: Yes  Pt's spiritual, cultural and educational needs considered and patient is agreeable to the plan of care and goals as stated below:     Pt prognosis is Good.   Anticipated Barriers for therapy: chronic nature of issues, psychosocial factors,     Medical Necessity is demonstrated by the following  History  Co-morbidities and personal factors that may impact the plan of care Co-morbidities:   anxiety, coping style/mechanism, depression, difficulty sleeping, financial considerations, and level of undertstanding of current condition    Personal Factors: "   coping style  lifestyle  character  attitudes     high   Examination  Body Structures and Functions, activity limitations and participation restrictions that may impact the plan of care Body Regions:   head  neck  upper extremities  trunk    Body Systems:    ROM  strength  gross coordinated movement  transitions  motor control    Participation Restrictions:   None, return to work Feb 18th     Activity limitations:   Learning and applying knowledge  no deficits    General Tasks and Commands  no deficits    Communication  no deficits    Mobility  lifting and carrying objects    Self care  washing oneself (bathing, drying, washing hands)  caring for body parts (brushing teeth, shaving, grooming)    Domestic Life  shopping  cooking  doing house work (cleaning house, washing dishes, laundry)    Interactions/Relationships  no deficits    Life Areas  no deficits    Community and Social Life  community life  recreation and leisure         high   Clinical Presentation unstable clinical presentation with unpredictable characteristics high   Decision Making/ Complexity Score: high       GOALS: Pt is in agreement with the following goals:  Goal Progress Towards Goal   SHORT Term: In 3 weeks, pt will:    Verbalize & demonstrate good understanding of resisted training with 3-1-3 second rep for kguhsgwxyh-[id number]-ajyoskkso contraction to encourage full muscle recruitment for strength & endurance. Initiated 1/15/2025   Verbalize & demonstrate good understanding of home stretch routine to improve AROM, help decrease pain & improve mobility. Initiated 1/15/2025   Demonstrate proper supine or seated diaphragmatic breathing with decreased chest excursion & emphasis on full abdominal excursion & increased expiration time to promote muscle relaxation & increased parasympathetic activity to improve patient tolerance to activities. Initiated 1/15/2025   Verbalize good understanding of importance of proper posture in resisted exercise,  "functional activities & ADL's in order to help reduce postural strain, promote proper breathing. Initiated 1/15/2025   Demonstrate good understanding of full body resisted exercises w/ use of pictures &/or verbal cues to promote independence w/ exercise at the end of the program. Initiated 1/15/2025   Verbalize plan for continuing resisted exercises w/ specific location (i.e. commercial gym, home, community fitness center)  to incorporate all major muscle groups to maintain & progress therapeutic functional improvements. Initiated 1/15/2025   Verbalize difference between  "hurt vs harm"  to demonstrate understanding of fear avoidance in pain cycle.  Initiated 1/15/2025       Midterm: In 8 weeks, pt will:    Verbalize good understanding of activities planning/scheduling (stretching, mindfulness, resisted training, & cardio) prescribed by PT/OT following the end of the program to sustain & progress functional improvements. Initiated 1/15/2025   Perform selected resisted training w/ minimal to no cuing in therapy session to be independent w/ resisted strengthening. Initiated 1/15/2025   Demonstrate improved symptom self-management w/ posture/positioning and mechanical movements and/or implementation of appropriate modalities throughout a typical day.  Initiated 1/15/2025   Demonstrate independence w/ home stretch routine to improve AROM, help decrease pain & improve mobility. Initiated 1/15/2025       Long Term: In 12 weeks, pt will:    Perform selected cardio & resisted training independently to continue safe regular performance of daily exercise. Initiated 1/15/2025   Maintain 430 meters on 6 MWT /s inc fatigue acute or once get home to improve functional activity tolerance.   Initiated 1/15/2025   Perform single leg stance EC >10 seconds or greater bilaterally to improve safety and balance in ADLs. Initiated 1/15/2025   Demonstrate Timed Up & Go (with appropriate assistive device, if necessary) of 7 seconds or less " to decrease fall risk and improve functional mobility. Initiated 1/15/2025   Score NDI 20 or less on CERVICAL FOTO to indicate significant functional improvements in impaired functions secondary to pain. Initiated 1/15/2025    Initiated 1/15/2025         PLAN   Plan of care Certification: 1/15/2025 to 03/28/25.    Outpatient Physical Therapy 3 times weekly for 12 weeks to include the following interventions: Gait Training, Manual Therapy, Moist Heat/ Ice, Neuromuscular Re-ed, Patient Education, Therapeutic Activities, and Therapeutic Exercise.     Conner Huff, PT

## 2025-01-17 NOTE — TELEPHONE ENCOUNTER
Phoned patient and discussed her message.     Discussed her paperwork, she will upload the paperwork. It will require a physician signature. Will discuss with Dr. Carbajal.

## 2025-01-17 NOTE — TELEPHONE ENCOUNTER
----- Message from Kvng Coker sent at 1/17/2025  4:02 PM CST -----  Regarding: RE: call back  Please advise  ----- Message -----  From: Lisset Manriquez  Sent: 1/17/2025   3:59 PM CST  To: Cody Guardado Staff  Subject: call back                                        Type: Patient Call Back    Who called: pt     What is the request in detail: requesting call to discuss paperwork     Can the clinic reply by [facility]?no    Would the patient rather a call back or a response via [facility]? call    Best call back number: [phone]     Additional Information:

## 2025-01-22 ENCOUNTER — PATIENT MESSAGE (OUTPATIENT)
Dept: REHABILITATION | Facility: OTHER | Age: 40
End: 2025-01-22
Payer: COMMERCIAL

## 2025-01-24 ENCOUNTER — CLINICAL SUPPORT (OUTPATIENT)
Dept: REHABILITATION | Facility: OTHER | Age: 40
End: 2025-01-24
Payer: COMMERCIAL

## 2025-01-24 DIAGNOSIS — Z74.09 IMPAIRED FUNCTIONAL MOBILITY AND ACTIVITY TOLERANCE: Primary | ICD-10-CM

## 2025-01-24 DIAGNOSIS — R52 PAIN AGGRAVATED BY ACTIVITIES OF DAILY LIVING: Primary | ICD-10-CM

## 2025-01-24 PROCEDURE — 97112 NEUROMUSCULAR REEDUCATION: CPT | Mod: CQ

## 2025-01-24 PROCEDURE — 97530 THERAPEUTIC ACTIVITIES: CPT

## 2025-01-24 PROCEDURE — 97530 THERAPEUTIC ACTIVITIES: CPT | Mod: CQ

## 2025-01-24 PROCEDURE — 97110 THERAPEUTIC EXERCISES: CPT | Mod: CQ

## 2025-01-24 NOTE — PROGRESS NOTES
"[facility]    Functional Restoration Program  Physical Therapy Treatment Note      Name: Sari Serna  MRN:[id number]      Therapy Diagnosis:   Encounter Diagnosis   Name Primary?    Impaired functional mobility and activity tolerance Yes       Physician: Debby Ross NP    Date: 1/25/2025        Time In: 2:30  Time Out: 3:30  Total Billable Time: 60 minutes    SUBJECTIVE       Sari reports she is on leave from [employer] at this time. She realized she needed to take the time to care for herself, "I need a brain re-set".     Rohan yoga challenge, so performing everyday      Current 3/10  Location(s): bilateral neck  and slight headache     OBJECTIVE     Objective Measures updated at progress report unless specified.     Treatment       Sari received the Individualized Treatments listed below:     Sari participated in dynamic functional therapeutic activities to improve functional performance for 45 minutes, including:      Full body functional mobility w/ 3-10 sec hold technique &/or 3-5 repetition technique to improve functional performance. In order to:  Improve driving safety, visual & spatial awareness:  Cervical flx/ext  Cervical side bending  Cervical rotation  Cervical protraction/retraction  Improve lifting mechanics, showering/bathing, dressing, cooking, reaching, pushing & fine motor skills  Shld rolls  Shld depression/elevation  Scapular retraction/protraction/scaption  Posterior shld stretch (cross arm)  Shld ER stretch (chicken wing)  Elbow flx/ext  Forearm supination/pronation  Wrist flx/ext  Open/close hands/fingers  Improve bed mobility & rolling, bending over, cooking & cleaning:  Seated trunk rotations  Seated trunk/thoracic ext/flx  Improve functional gait, squatting, sitting tolerance, functional balance:   Seated marches & knee to chest  Seated knee flx/ext  Seated hip ER/piriformis stretch  Seated hamstring stretch  Seated toe raise to heel raise   Seated " ankle circles each way  Improve overhead reaching/activities, standing tolerance:  Standing lumbar extensions  Standing lateral leaning stretch  Standing quad stretch (UE support for balance)    Mindfulness Moment: Intro to Thi Chi - performed in standing  Mindfulness-based activity involving deep breathing, mindful awareness of the body and headspace  Used to activate parasympathetic nervous system to decrease autonomic threat perception and thus reduce central sensitivity to pain  Patient demonstrate safe performance     Pt Education:  Review  Resisted Exercise Basics  Modified Garrett scale  Motion is lotio, Hurt vs harm, Calming the nervous system  Postural awareness during resisted ex and functional mobility    Sari participated in neuromuscular re-education activities to improve: Balance, Coordination, Kinesthetic, Sense, Proprioception, and Posture for 35 minutes. The following activities were included:    Seated on PB - SOC - postural awareness x15  Supine PPT coordinate with the breathe x10    Peripheral muscle strengthening which included 1-2 sets of 10-20 repetitions at a slow, controlled 5-7 second per rep pace focused on strengthening supporting musculature for improved body mechanics & functional mobility.  Patient & therapist focused on proper form & speed during treatment to ensure optimal strengthening of each targeted muscle group & neuromuscular re-education. Patients are instructed to keep sets/reps with proper load to stay at 3-5 out of 10 on the provided modified Garrett exertion scale.    Kimble Machine Exercises Weight (lbs) Sets Repetitions Garrett Exertion Scale Rating   Leg Extension  5  15 4   Hamstring Curls 15  15    Chest Press       Pec Fly       Lat Pull Down       Mid Row       Bicep Bar Curls       Standing Tricep Extension       Single Leg Press 15  15 3     Sari received therapeutic exercises to develop strength, endurance, ROM, flexibility, posture, and core stabilization for 10  minutes including:    Supine cervical retractions x 10, 3 sec hold x10   Open book stretch x10       Patient describe and demonstrate prior HEP- not performing now  Standing Y, W facing wall  Wall push ups      Patient Education and Home Exercises     Home Exercises Provided and Patient Education Provided     Education provided:   - FRP Educational Topics: Modified Garrett Exertion Scale, Physical & Psychological Pain Cycles, Z-Lying for Pain Relief/Relaxation, and Diaphragmatic Breathing    Written Home Exercises Provided: yes, FRP stretch routine. Previous HEP issued exercises were reviewed and Sari was able to demonstrate them prior to the end of the session.  Sari demonstrated good  understanding of the education provided. See EMR under Patient Instructions for information provided during therapy sessions    ASSESSMENT     Sari participated well in PT session today.        Pt was able to perform without exacerbating symptoms. Neuro muscular re-ed also introduced with resisted training on BATCA machines. Pt educated on resisted ex basics and use of the Garrett scale to show how resistance training can be used to calm the NS.     Sari Is progressing well towards her goals.   Pt prognosis is Good.     Pt will continue to benefit from skilled outpatient physical therapy to address the deficits listed in the problem list box on initial evaluation, provide pt/family education and to maximize pt's level of independence in the home and community environment.     Pt's spiritual, cultural and educational needs considered and pt agreeable to plan of care and goals.     Anticipated Barriers for therapy: chronic nature of issues, psychosocial factors, central sensitization    GOALS: Pt is in agreement with the following goals:  Goal Progress Towards Goal   SHORT Term: In 3 weeks, pt will:     Verbalize & demonstrate good understanding of resisted training with 3-1-3 second rep for [id number]-[id number] contraction  "to encourage full muscle recruitment for strength & endurance. Initiated 1/15/2025   Verbalize & demonstrate good understanding of home stretch routine to improve AROM, help decrease pain & improve mobility. Initiated 1/15/2025   Demonstrate proper supine or seated diaphragmatic breathing with decreased chest excursion & emphasis on full abdominal excursion & increased expiration time to promote muscle relaxation & increased parasympathetic activity to improve patient tolerance to activities. Initiated 1/15/2025   Verbalize good understanding of importance of proper posture in resisted exercise, functional activities & ADL's in order to help reduce postural strain, promote proper breathing. Initiated 1/15/2025   Demonstrate good understanding of full body resisted exercises w/ use of pictures &/or verbal cues to promote independence w/ exercise at the end of the program. Initiated 1/15/2025   Verbalize plan for continuing resisted exercises w/ specific location (i.e. commercial gym, home, community fitness center)  to incorporate all major muscle groups to maintain & progress therapeutic functional improvements. Initiated 1/15/2025   Verbalize difference between  "hurt vs harm"  to demonstrate understanding of fear avoidance in pain cycle.  Initiated 1/15/2025         Midterm: In 8 weeks, pt will:     Verbalize good understanding of activities planning/scheduling (stretching, mindfulness, resisted training, & cardio) prescribed by PT/OT following the end of the program to sustain & progress functional improvements. Initiated 1/15/2025   Perform selected resisted training w/ minimal to no cuing in therapy session to be independent w/ resisted strengthening. Initiated 1/15/2025   Demonstrate improved symptom self-management w/ posture/positioning and mechanical movements and/or implementation of appropriate modalities throughout a typical day.  Initiated 1/15/2025   Demonstrate independence w/ home stretch routine to " improve AROM, help decrease pain & improve mobility. Initiated 1/15/2025         Long Term: In 12 weeks, pt will:     Perform selected cardio & resisted training independently to continue safe regular performance of daily exercise. Initiated 1/15/2025   Maintain 430 meters on 6 MWT /s inc fatigue acute or once get home to improve functional activity tolerance.   Initiated 1/15/2025   Perform single leg stance EC >10 seconds or greater bilaterally to improve safety and balance in ADLs. Initiated 1/15/2025   Demonstrate Timed Up & Go (with appropriate assistive device, if necessary) of 7 seconds or less to decrease fall risk and improve functional mobility. Initiated 1/15/2025   Score NDI 20 or less on CERVICAL FOTO to indicate significant functional improvements in impaired functions secondary to pain. Initiated 1/15/2025     Initiated 1/15/2025          PLAN     Continue PT per POC     Monae Day, PTA

## 2025-01-24 NOTE — PROGRESS NOTES
"  Occupational Therapy Visit    Patient Name: Sari Serna  MRN: [id number]  YOB: 1985  Today's Date: 1/24/2025    Precautions  Right Upper Extremity Weight-Bearing Status: Full weight-bearing  Left Upper Extremity Weight-Bearing Status: Full weight-bearing  Right Lower Extremity Weight-Bearing Status: Full weight-bearing  Left Lower Extremity Weight-Bearing Status: Full weight-bearing  standard      Subjective   endorsed that stretch routine was difficult to follow without numbers and time listings,.  Pain reported as 3. "everything is as it should be"    Objective         Treatment:  Therapeutic Activity  Therapeutic Activity 1: stretch routine, review of breath and movement coordinated, handout reviewed. rated moderate 3/10.  Therapeutic Activity 2: seated on theraball, dynamic movement and reaching activity. discussion on base of support and noticing tension in body vs ease as physical and emotional sensations. rated easy 2/10 and enjoyable.    Assessment & Plan   Assessment: engaged well in education and session. external cues to note physical sensations related to tension/emotion, cues for cooridnation of movement and breath provided. increased insight as session progressed. endorsed increased ease as session progressed, with increased ROM and movement available. Consistent hyper-tension in jaw and upper neck appreciated by therapist and Sari.  Evaluation/Treatment Tolerance: Patient tolerated treatment well, Other (Comment)  Education  Education was done with Patient. The patient's learning style includes Demonstration, Listening, and Tactile. The patient Demonstrates understanding, Verbalizes understanding, and Requires continuing/additional education.         Education on stretch routine, calming the nervous system, PNS vs SNS         Plan: continue POC. homework: daily stretch routine, follow up on functional lifts and creation of list of self care and soothing tasks    Goals:     SHORT Term " goals: In 3 weeks, patient will:  Status of goal:   Implements correct use of breathing techniques during functional lifting tasks, with minimal cues needed. Initiated   Utilizes MICHELLE rate of exertion scale to pace performance with functional lifting tasks, and implements self-care strategies during activity participation to manage pain. Initiated   Verbalize understanding of energy conservation and pacing strategies during daily habits, roles, and routines in order to improve tolerance for work and home demands.  Initiated   Demonstrate increased positional tolerance to the level needed for work, home, and community activities (standing in cue at social event, managing supplies for outing, streneous IADL), as evidenced by having a METS level of 7. Initiated   Demonstrate proper body mechanics with functional lifting tasks (floor to waist, waist to shoulder, maximum lift, and box carry), via teach back method with minimal cues needed. Initiated   Demonstrate increased functional strength by lifting 13 lbs waist to shoulder for management of (I)ADL, including dressing and grooming, placing items in kitchen cabinets, folding towels, and/or managing suitcase when traveling.   Initiated   Demonstrate increased functional strength by lifting 13 lbs floor to waist to meet demand of work/home routine, including lifting groceries, managing laundry, and/or managing suitcase when traveling.   Initiated   Tolerate Home Activity Plan (HAP)/ Home Exercise Program (HEP) to promote safety and independence with ADL, with report of completion of at least 2 out of 4 days outside of program participation.  Initiated   Show improved perception of own abilities as evidenced by increase of FOTO scores to established MDC value and perception of performance ratings regarding personal long term goals.  Initiated         Long Term goals: In 9 weeks, patient will: Status of goal:   Report implementation of application of mindfulness,  stretching, and other coping strategies for improved participation in daily routine. Initiated   Verbalize functional application of lifting techniques in daily life (golfers lift, floor to waist, waist to shoulder). Initiated   Applies functional application of lifting techniques (golfer's lift, floor to waist, waist to shoulder) in activities of daily life, per patient report.   Initiated   Demonstrate physical capacities consistent with a Light (#20 max, frequent lifting/carrying #10, 2.5 METS  demand level, as needed to perform activities to be successful in roles of life, regarding Full Time Employment, Household Management, Volunteer Work, and Community Participation. Initiated   Have an improvement of 3 points in 2/5 personal goals in rating of  performance.  Initiated   Show improved perception of own abilities as evidenced by increase of FOTO scores to established MC II value and perception of performance ratings regarding personal long term goals.  Initiated

## 2025-01-29 ENCOUNTER — CLINICAL SUPPORT (OUTPATIENT)
Dept: REHABILITATION | Facility: OTHER | Age: 40
End: 2025-01-29
Payer: COMMERCIAL

## 2025-01-29 DIAGNOSIS — Z74.09 IMPAIRED FUNCTIONAL MOBILITY AND ACTIVITY TOLERANCE: Primary | ICD-10-CM

## 2025-01-29 NOTE — PROGRESS NOTES
"[facility]    Functional Restoration Program  Physical Therapy Treatment Note      Name: Sari Serna  MRN:[id number]      Therapy Diagnosis:   Encounter Diagnosis   Name Primary?    Impaired functional mobility and activity tolerance Yes         Physician: Debby Ross NP                  Date: 1/29/2025    Time In: 9:00 am  Time Out: 10:00  Total Billable Time: 60 minutes    SUBJECTIVE       Sari reports feeling a little better this morning, her neck pain being more like a 1-2 rather than a 3-4    Rohan yoga challenge, so performing everyday   "I need a brain re-set".     Current 1-2/10  Location(s): bilateral neck  and slight headache     OBJECTIVE     Objective Measures updated at progress report unless specified.     Treatment       Sari received the Individualized Treatments listed below:     Sari participated in dynamic functional therapeutic activities to improve functional performance for 45 minutes, including:      Full body functional mobility w/ 3-10 sec hold technique &/or 3-5 repetition technique to improve functional performance. In order to:  Improve driving safety, visual & spatial awareness:  Cervical flx/ext  Cervical side bending  Cervical rotation  Cervical protraction/retraction  Improve lifting mechanics, showering/bathing, dressing, cooking, reaching, pushing & fine motor skills  Shld rolls  Shld depression/elevation  Scapular retraction/protraction/scaption  Posterior shld stretch (cross arm)  Shld ER stretch (chicken wing)  Elbow flx/ext  Forearm supination/pronation  Wrist flx/ext  Open/close hands/fingers  Improve bed mobility & rolling, bending over, cooking & cleaning:  Seated trunk rotations  Seated trunk/thoracic ext/flx  Improve functional gait, squatting, sitting tolerance, functional balance:   Seated marches & knee to chest  Seated knee flx/ext  Seated hip ER/piriformis stretch  Seated hamstring stretch  Seated toe raise to heel raise "   Seated ankle circles each way  Improve overhead reaching/activities, standing tolerance:  Standing lumbar extensions  Standing lateral leaning stretch  Standing quad stretch (UE support for balance)    Mindfulness Moment  Mindfulness-based activity involving deep breathing, mindful awareness of the body and headspace  Used to activate parasympathetic nervous system to decrease autonomic threat perception and thus reduce central sensitivity to pain  Patient demonstrate safe performance     Pt Education:  Review  Resisted Exercise Basics  Modified Garrett scale  Motion is lotio, Hurt vs harm, Calming the nervous system  Postural awareness during resisted ex and functional mobility    Functional Endurance:  DATE  EQUIPMENT VARIABLES RPE TIME/DISTANCE   1/6/25 Walking No AD 7/10 2 mins w/ 3 breaks taken 2/2 dizziness   01/15/25 Nu Step Level 1 3/10 10 min, 383 steps   01/17/25 Nu Step L 1 3/10 8 min            Sari participated in neuromuscular re-education activities to improve: Balance, Coordination, Kinesthetic, Sense, Proprioception, and Posture for 35 minutes. The following activities were included:    Seated on PB - SOC - postural awareness x15  Supine PPT coordinate with the breathe x10    Peripheral muscle strengthening which included 1-2 sets of 10-20 repetitions at a slow, controlled 5-7 second per rep pace focused on strengthening supporting musculature for improved body mechanics & functional mobility.  Patient & therapist focused on proper form & speed during treatment to ensure optimal strengthening of each targeted muscle group & neuromuscular re-education. Patients are instructed to keep sets/reps with proper load to stay at 3-5 out of 10 on the provided modified Garrett exertion scale.    BATCA Machine Exercises Weight (lbs) Sets Repetitions Garrett Exertion Scale Rating   Leg Extension        Hamstring Curls       Chest Press       Pec Fly 20  20 3   Lat Pull Down 15  20 3.5   Mid Row       Bicep Bar Curls        Standing Tricep Extension       Single Leg Press 20  20 3     Sari received therapeutic exercises to develop strength, endurance, ROM, flexibility, posture, and core stabilization for 0 minutes including:    Supine cervical retractions x 10, 3 sec hold x10   Open book stretch x10       Patient describe and demonstrate prior HEP- not performing now  Standing Y, W facing wall  Wall push ups      Patient Education and Home Exercises     Home Exercises Provided and Patient Education Provided     Education provided:   - FRP Educational Topics: Modified Garrett Exertion Scale, Physical & Psychological Pain Cycles, Z-Lying for Pain Relief/Relaxation, and Diaphragmatic Breathing    Written Home Exercises Provided: yes, FRP stretch routine. Previous HEP issued exercises were reviewed and Sari was able to demonstrate them prior to the end of the session.  Sari demonstrated good  understanding of the education provided. See EMR under Patient Instructions for information provided during therapy sessions    ASSESSMENT     Sari participated well in PT session today.        Pt was able to perform without exacerbating symptoms. Neuro muscular re-ed also introduced with resisted training on BATCA machines. Pt educated on resisted ex basics and use of the Garrett scale to show how resistance training can be used to calm the NS.     Sari Is progressing well towards her goals.   Pt prognosis is Good.     Pt will continue to benefit from skilled outpatient physical therapy to address the deficits listed in the problem list box on initial evaluation, provide pt/family education and to maximize pt's level of independence in the home and community environment.     Pt's spiritual, cultural and educational needs considered and pt agreeable to plan of care and goals.     Anticipated Barriers for therapy: chronic nature of issues, psychosocial factors, central sensitization    GOALS: Pt is in agreement with the following goals:  Goal  "Progress Towards Goal   SHORT Term: In 3 weeks, pt will:     Verbalize & demonstrate good understanding of resisted training with 3-1-3 second rep for [id number]-[id number] contraction to encourage full muscle recruitment for strength & endurance. Initiated 1/15/2025   Verbalize & demonstrate good understanding of home stretch routine to improve AROM, help decrease pain & improve mobility. Initiated 1/15/2025   Demonstrate proper supine or seated diaphragmatic breathing with decreased chest excursion & emphasis on full abdominal excursion & increased expiration time to promote muscle relaxation & increased parasympathetic activity to improve patient tolerance to activities. Initiated 1/15/2025   Verbalize good understanding of importance of proper posture in resisted exercise, functional activities & ADL's in order to help reduce postural strain, promote proper breathing. Initiated 1/15/2025   Demonstrate good understanding of full body resisted exercises w/ use of pictures &/or verbal cues to promote independence w/ exercise at the end of the program. Initiated 1/15/2025   Verbalize plan for continuing resisted exercises w/ specific location (i.e. commercial gym, home, community fitness center)  to incorporate all major muscle groups to maintain & progress therapeutic functional improvements. Initiated 1/15/2025   Verbalize difference between  "hurt vs harm"  to demonstrate understanding of fear avoidance in pain cycle.  Initiated 1/15/2025         Midterm: In 8 weeks, pt will:     Verbalize good understanding of activities planning/scheduling (stretching, mindfulness, resisted training, & cardio) prescribed by PT/OT following the end of the program to sustain & progress functional improvements. Initiated 1/15/2025   Perform selected resisted training w/ minimal to no cuing in therapy session to be independent w/ resisted strengthening. Initiated 1/15/2025   Demonstrate improved symptom self-management w/ " posture/positioning and mechanical movements and/or implementation of appropriate modalities throughout a typical day.  Initiated 1/15/2025   Demonstrate independence w/ home stretch routine to improve AROM, help decrease pain & improve mobility. Initiated 1/15/2025         Long Term: In 12 weeks, pt will:     Perform selected cardio & resisted training independently to continue safe regular performance of daily exercise. Initiated 1/15/2025   Maintain 430 meters on 6 MWT /s inc fatigue acute or once get home to improve functional activity tolerance.   Initiated 1/15/2025   Perform single leg stance EC >10 seconds or greater bilaterally to improve safety and balance in ADLs. Initiated 1/15/2025   Demonstrate Timed Up & Go (with appropriate assistive device, if necessary) of 7 seconds or less to decrease fall risk and improve functional mobility. Initiated 1/15/2025   Score NDI 20 or less on CERVICAL FOTO to indicate significant functional improvements in impaired functions secondary to pain. Initiated 1/15/2025     Initiated 1/15/2025          PLAN     Continue PT per POC     Monae Day, PTA

## 2025-01-31 ENCOUNTER — PATIENT MESSAGE (OUTPATIENT)
Dept: PSYCHIATRY | Facility: CLINIC | Age: 40
End: 2025-01-31
Payer: COMMERCIAL

## 2025-01-31 ENCOUNTER — CLINICAL SUPPORT (OUTPATIENT)
Dept: REHABILITATION | Facility: OTHER | Age: 40
End: 2025-01-31
Payer: COMMERCIAL

## 2025-01-31 DIAGNOSIS — Z74.09 IMPAIRED FUNCTIONAL MOBILITY AND ACTIVITY TOLERANCE: Primary | ICD-10-CM

## 2025-01-31 DIAGNOSIS — R52 PAIN AGGRAVATED BY ACTIVITIES OF DAILY LIVING: Primary | ICD-10-CM

## 2025-01-31 PROCEDURE — 97110 THERAPEUTIC EXERCISES: CPT | Mod: CQ

## 2025-01-31 PROCEDURE — 97530 THERAPEUTIC ACTIVITIES: CPT | Mod: CQ

## 2025-01-31 PROCEDURE — 97112 NEUROMUSCULAR REEDUCATION: CPT | Mod: CQ

## 2025-01-31 PROCEDURE — 97530 THERAPEUTIC ACTIVITIES: CPT

## 2025-01-31 NOTE — PROGRESS NOTES
[facility]  [facility]  Occupational Therapy Visit    Patient Name: Sari Serna  MRN: [id number]  YOB: 1985  Today's Date: 2025    Subjective   shared that she's working on applying stress relieving tasks and PNS groudning activities into her daily life.  Pain reported as 1. notes that mornings are most difficult, waking up in pain (increased to 3, decreases as day progresses)     1/10 current pain  Location: neck trap area, R hand pain (cramping with repetitive use)   Description: Sharp    Patient Specific Activities based on Personal goals:       Activity  (To be rated first session after eval)    Performance Satisfaction   Cooking (R hand use)        2.  Work set up        3.  yoga       4.  Social outings       5.  Groceries/cooking               Total Score       Ratin-10; unable/unsatisfied - Able/Satisfied    Objective     Objective Measures updated at progress report unless specified.    Treatment     Sari received the treatments listed below:     Therapeutic activities and functional lifting activities in order to increase participation in, endurance for, and performance with vocational, selfcare ADLs, and leisure activities in order to improve quality of life, for 60  minutes, including:    Pt completed sustained standing activity to discuss current habits/routines re: daily schedule pre program, and set goals related to intentional movement, water intake, nutrition, stretching and mindfulness in order to create new daily schedule to include those goals. Pt used fine motor and dexterity skills to handwrite old and new schedules. Pt rated activity easy (2/10) exertion.  During activity pt discussed anticipated barriers, anxieties and stressors re: new schedule and self accountability. Pt with good mindset and plan moving forward and with decreased stress and increased self-efficacy post activity. Standing/walking rest break, cues to note  environment, shifting from focus on schedule to awareness of surroundings.       No environmental, cultural, spiritual, developmental or education needs expressed or noted.    Patient Education and Home Exercises   Education provided:   - Progress towards goals   - importance of completion of exercises and activities outside of session to attain goals.   - proper posture and body mechanics.  - anticipated muscular soreness following lift testing and strategies for management including stretching, using ice, scheduled rest.  - Functional pain scale  - MICHELLE rate of perceived exertion scale.   - pain cycle  - pursed lip and diaphragmatic breathing techniques  - details of the Functional Restoration Program.     Written Home Exercises Provided: yes.  Exercises were reviewed and Sari was able to demonstrate them prior to the end of the session. Sari demonstrated fair  understanding of the education provided.     See EMR under Patient Instructions for exercises provided during therapy sessions. Patient education also located in FRP binder provided on day 1 of the cohort.     Assessment     Ms. Serna tolerated today's session  well, with continued introduction to  stretch for pain relief, and diaphragmatic breathing performed. Review of current schedule, review of goals to implement tools and recreation of schedule performed, Sari with good engagement and ability to synthesize goals into approachable schedule.    Sari is progressing well towards her goals and status of goals can be seen below. Patient's prognosis is Good.     Sari would continue to benefit from skilled outpatient occupational therapy to address the deficits listed in the problem list on initial evaluation, provide patient education, and to maximize patient's level of independence in the home and community environment.     Anticipated barriers to occupational therapy: psychosocial stressors      SHORT Term goals: In 3 weeks, patient will:   Status of goal:   Implements correct use of breathing techniques during functional lifting tasks, with minimal cues needed. Initiated   Utilizes MICHELLE rate of exertion scale to pace performance with functional lifting tasks, and implements self-care strategies during activity participation to manage pain. Initiated   Verbalize understanding of energy conservation and pacing strategies during daily habits, roles, and routines in order to improve tolerance for work and home demands.  Initiated   Demonstrate increased positional tolerance to the level needed for work, home, and community activities (standing in cue at social event, managing supplies for outing, streneous IADL), as evidenced by having a METS level of 7. Initiated   Demonstrate proper body mechanics with functional lifting tasks (floor to waist, waist to shoulder, maximum lift, and box carry), via teach back method with minimal cues needed. Initiated   Demonstrate increased functional strength by lifting 13 lbs waist to shoulder for management of (I)ADL, including dressing and grooming, placing items in kitchen cabinets, folding towels, and/or managing suitcase when traveling.   Initiated   Demonstrate increased functional strength by lifting 13 lbs floor to waist to meet demand of work/home routine, including lifting groceries, managing laundry, and/or managing suitcase when traveling.   Initiated   Tolerate Home Activity Plan (HAP)/ Home Exercise Program (HEP) to promote safety and independence with ADL, with report of completion of at least 2 out of 4 days outside of program participation.  Initiated   Show improved perception of own abilities as evidenced by increase of FOTO scores to established MDC value and perception of performance ratings regarding personal long term goals.  Initiated         Long Term goals: In 9 weeks, patient will: Status of goal:   Report implementation of application of mindfulness, stretching, and other coping strategies  for improved participation in daily routine. Initiated   Verbalize functional application of lifting techniques in daily life (golfers lift, floor to waist, waist to shoulder). Initiated   Applies functional application of lifting techniques (golfer's lift, floor to waist, waist to shoulder) in activities of daily life, per patient report.   Initiated   Demonstrate physical capacities consistent with a Light (#20 max, frequent lifting/carrying #10, 2.5 METS  demand level, as needed to perform activities to be successful in roles of life, regarding Full Time Employment, Household Management, Volunteer Work, and Community Participation. Initiated   Have an improvement of 3 points in 2/5 personal goals in rating of  performance.  Initiated   Show improved perception of own abilities as evidenced by increase of FOTO scores to established MC II value and perception of performance ratings regarding personal long term goals.  Initiated              Plan     Continue per plan of care.     Updates/Grading for next session: intro functional lifts    Mireya Hutchinson, OT   1/31/2025

## 2025-01-31 NOTE — PROGRESS NOTES
"[facility]    Functional Restoration Program  Physical Therapy Treatment Note      Name: Sari Serna  MRN:[id number]      Therapy Diagnosis:   Encounter Diagnosis   Name Primary?    Impaired functional mobility and activity tolerance Yes         Physician: Debby Ross NP                  Date: 1/31/2025    Time In: 1:30 pm  Time Out: 2:30 pm  Total Billable Time: 60 minutes    SUBJECTIVE     Sari reports cont to be under increased stress about her FLMA/work situation. She was on the phone all morning with union rep and tried to calm herself down with music on way here.        "I need a brain re-set".     Current 3/10  Location(s): bilateral neck  and slight headache     OBJECTIVE     Objective Measures updated at progress report unless specified.     Treatment       Sari received the Individualized Treatments listed below:     Sari participated in dynamic functional therapeutic activities to improve functional performance for 20 minutes, including:      Full body functional mobility w/ 3-10 sec hold technique &/or 3-5 repetition technique to improve functional performance. In order to:  Improve driving safety, visual & spatial awareness:  Cervical flx/ext  Cervical side bending  Cervical rotation  Cervical protraction/retraction  Improve lifting mechanics, showering/bathing, dressing, cooking, reaching, pushing & fine motor skills  Shld rolls  Shld depression/elevation  Scapular retraction/protraction/scaption  Posterior shld stretch (cross arm)  Shld ER stretch (chicken wing)  Elbow flx/ext  Forearm supination/pronation  Wrist flx/ext  Open/close hands/fingers  Improve bed mobility & rolling, bending over, cooking & cleaning:  Seated trunk rotations  Seated trunk/thoracic ext/flx  Improve functional gait, squatting, sitting tolerance, functional balance:   Seated marches & knee to chest  Seated knee flx/ext  Seated hip ER/piriformis stretch  Seated hamstring " stretch  Seated toe raise to heel raise   Seated ankle circles each way  Improve overhead reaching/activities, standing tolerance:  Standing lumbar extensions  Standing lateral leaning stretch  Standing quad stretch (UE support for balance)    Mindfulness Moment -   Mindfulness-based activity involving deep breathing, mindful awareness of the body and headspace  Used to activate parasympathetic nervous system to decrease autonomic threat perception and thus reduce central sensitivity to pain  Patient demonstrate safe performance     Pt Education:  Review  Resisted Exercise Basics  Modified Garrett scale  Motion is lotio, Hurt vs harm, Calming the nervous system  Postural awareness during resisted ex and functional mobility    Functional Endurance:  DATE  EQUIPMENT VARIABLES RPE TIME/DISTANCE   1/6/25 Walking No AD 7/10 2 mins w/ 3 breaks taken 2/2 dizziness   01/15/25 Nu Step Level 1 3/10 10 min, 383 steps   01/17/25 Nu Step L 1 3/10 8 min            Sari participated in neuromuscular re-education activities to improve: Balance, Coordination, Kinesthetic, Sense, Proprioception, and Posture for 30 minutes. The following activities were included:    Seated on PB - SOC - postural awareness x15  Supine PPT coordinate with the breathe x10    Peripheral muscle strengthening which included 1-2 sets of 10-20 repetitions at a slow, controlled 5-7 second per rep pace focused on strengthening supporting musculature for improved body mechanics & functional mobility.  Patient & therapist focused on proper form & speed during treatment to ensure optimal strengthening of each targeted muscle group & neuromuscular re-education. Patients are instructed to keep sets/reps with proper load to stay at 3-5 out of 10 on the provided modified Garrett exertion scale.    BATCA Machine Exercises Weight (lbs) Sets Repetitions Garrett Exertion Scale Rating   Leg Extension  7.5  20 4   Hamstring Curls 22.5  20 3   Chest Press       Pec Fly 20  20 3.5    Lat Pull Down 15  20 3   Mid Row       Bicep Bar Curls       Standing Tricep Extension       Single Leg Press 20  20 3     Sari received therapeutic exercises to develop strength, endurance, ROM, flexibility, posture, and core stabilization for 10 minutes including:    Supine cervical retractions x 10, 3 sec hold x10   Open book stretch x10       Patient describe and demonstrate prior HEP- not performing now  Standing Y, W facing wall  Wall push ups      Patient Education and Home Exercises     Home Exercises Provided and Patient Education Provided     Education provided:   - FRP Educational Topics: Modified Garrett Exertion Scale, Physical & Psychological Pain Cycles, Z-Lying for Pain Relief/Relaxation, and Diaphragmatic Breathing    Written Home Exercises Provided: yes, FRP stretch routine. Previous HEP issued exercises were reviewed and Sari was able to demonstrate them prior to the end of the session.  Sari demonstrated good  understanding of the education provided. See EMR under Patient Instructions for information provided during therapy sessions    ASSESSMENT     Sari participated well in PT session today. Pt became emotional in discussing her worst pain being in the morning which lead to discussion of more psychological stressors impacting her physical body.       Neuro muscular re-ed cont with resisted training on BATCA machines. Review components of resisted ex basics to use resistance training to calm the NS, activate the PNS and train the body on the diff. between hurt vs harm.  Pt was able to perform without exacerbating symptoms.     Sari Is progressing well towards her goals.   Pt prognosis is Good.     Pt will continue to benefit from skilled outpatient physical therapy to address the deficits listed in the problem list box on initial evaluation, provide pt/family education and to maximize pt's level of independence in the home and community environment.     Pt's spiritual, cultural and  "educational needs considered and pt agreeable to plan of care and goals.     Anticipated Barriers for therapy: chronic nature of issues, psychosocial factors, central sensitization    GOALS: Pt is in agreement with the following goals:  Goal Progress Towards Goal   SHORT Term: In 3 weeks, pt will:     Verbalize & demonstrate good understanding of resisted training with 3-1-3 second rep for [id number] contraction to encourage full muscle recruitment for strength & endurance. Initiated 1/15/2025   Verbalize & demonstrate good understanding of home stretch routine to improve AROM, help decrease pain & improve mobility. Initiated 1/15/2025   Demonstrate proper supine or seated diaphragmatic breathing with decreased chest excursion & emphasis on full abdominal excursion & increased expiration time to promote muscle relaxation & increased parasympathetic activity to improve patient tolerance to activities. Initiated 1/15/2025   Verbalize good understanding of importance of proper posture in resisted exercise, functional activities & ADL's in order to help reduce postural strain, promote proper breathing. Initiated 1/15/2025   Demonstrate good understanding of full body resisted exercises w/ use of pictures &/or verbal cues to promote independence w/ exercise at the end of the program. Initiated 1/15/2025   Verbalize plan for continuing resisted exercises w/ specific location (i.e. commercial gym, home, community fitness center)  to incorporate all major muscle groups to maintain & progress therapeutic functional improvements. Initiated 1/15/2025   Verbalize difference between  "hurt vs harm"  to demonstrate understanding of fear avoidance in pain cycle.  Initiated 1/15/2025         Midterm: In 8 weeks, pt will:     Verbalize good understanding of activities planning/scheduling (stretching, mindfulness, resisted training, & cardio) prescribed by PT/OT following the end of the program to sustain & " progress functional improvements. Initiated 1/15/2025   Perform selected resisted training w/ minimal to no cuing in therapy session to be independent w/ resisted strengthening. Initiated 1/15/2025   Demonstrate improved symptom self-management w/ posture/positioning and mechanical movements and/or implementation of appropriate modalities throughout a typical day.  Initiated 1/15/2025   Demonstrate independence w/ home stretch routine to improve AROM, help decrease pain & improve mobility. Initiated 1/15/2025         Long Term: In 12 weeks, pt will:     Perform selected cardio & resisted training independently to continue safe regular performance of daily exercise. Initiated 1/15/2025   Maintain 430 meters on 6 MWT /s inc fatigue acute or once get home to improve functional activity tolerance.   Initiated 1/15/2025   Perform single leg stance EC >10 seconds or greater bilaterally to improve safety and balance in ADLs. Initiated 1/15/2025   Demonstrate Timed Up & Go (with appropriate assistive device, if necessary) of 7 seconds or less to decrease fall risk and improve functional mobility. Initiated 1/15/2025   Score NDI 20 or less on CERVICAL FOTO to indicate significant functional improvements in impaired functions secondary to pain. Initiated 1/15/2025     Initiated 1/15/2025          PLAN     Continue PT per POC     Monae Day, PTA

## 2025-02-03 ENCOUNTER — CLINICAL SUPPORT (OUTPATIENT)
Dept: REHABILITATION | Facility: OTHER | Age: 40
End: 2025-02-03
Payer: COMMERCIAL

## 2025-02-03 DIAGNOSIS — Z74.09 IMPAIRED FUNCTIONAL MOBILITY AND ACTIVITY TOLERANCE: Primary | ICD-10-CM

## 2025-02-03 DIAGNOSIS — R52 PAIN AGGRAVATED BY ACTIVITIES OF DAILY LIVING: Primary | ICD-10-CM

## 2025-02-03 PROCEDURE — 97110 THERAPEUTIC EXERCISES: CPT

## 2025-02-03 PROCEDURE — 97112 NEUROMUSCULAR REEDUCATION: CPT | Mod: CQ

## 2025-02-03 PROCEDURE — 97530 THERAPEUTIC ACTIVITIES: CPT | Mod: CQ

## 2025-02-03 PROCEDURE — 97530 THERAPEUTIC ACTIVITIES: CPT

## 2025-02-03 NOTE — PROGRESS NOTES
"[facility]  [facility]  Occupational Therapy Visit    Patient Name: Sari Serna  MRN: [id number]  YOB: 1985  Today's Date: 2025    Subjective   shared that she had a good weekend, felt good about scheduling tasks over the weekend. states that she has an upset stomach, endorsed being aware that there would be no trouble with being late today, but still endorsed stress sensations..  Pain reported as 3. notes that mornings are most difficult, waking up in pain (increased to 3, decreases as day progresses)     1/10 current pain  Location: neck trap area, R hand pain (cramping with repetitive use)   Description: Sharp    Patient Specific Activities based on Personal goals:       Activity  (To be rated first session after eval)    Performance Satisfaction   Cooking (R hand use)        2.  Work set up        3.  yoga       4.  Social outings       5.  Groceries/cooking               Total Score       Ratin-10; unable/unsatisfied - Able/Satisfied    Objective     Objective Measures updated at progress report unless specified.    Treatment     Sari received the treatments listed below:     Therapeutic activities and functional lifting activities in order to increase participation in, endurance for, and performance with vocational, selfcare ADLs, and leisure activities in order to improve quality of life, for 45  minutes, including:    Sari participated in functional lift waist to shoulder, 4x5 reps x 8 lbs with a rating of Sort of hard (4/10) exertion. Sari educated on standing posture, core awareness, keeping shoulders depressed and retracted, stepping to place > reaching to place, keeping weight close to center of gravity and pacing as needed.     Seated on bench, pt educated on technique for sit<>stand using education on proper body mechanics, weight shift/momentum as well as "noes over toes", with focus on using BUE and coordinating movement with " breath. Pt completed 5 reps with initial cues needed.  Rated as Moderate (3/10) exertion.     Sari completed functional lifting, floor to waist, 2x5 reps x 8 lbs with exertion rated Sort of hard (4/10); focused education on hip hinge and neutrality of spine.    External cues for rest break 2* increased emotionality.   Cued to take walking rest break.    Therapeutic exercises to develop strength, endurance, ROM, flexibility, posture, and core stabilization for 15 minutes, including:    Sari participated in endurance activity using Nustep for 15 minutes,  to improve functional endurance and progress towards increased MET level for improved community mobility and participation, rated as Moderate (3/10) exertion, Sari re-educated on how to add mindfulness component to activity and how to pace appropriately by completed self check ins and grading activity as needed, with goal to reach moderate to sort of hard by end of activity.  While on nustep, discussion on concept of reframing change noted rather than favoring self-criticism. Discussion  and education on differentiating interoception and proprioception.     No environmental, cultural, spiritual, developmental or education needs expressed or noted.    Patient Education and Home Exercises   Education provided:   - Progress towards goals   - importance of completion of exercises and activities outside of session to attain goals.   - proper posture and body mechanics.  - anticipated muscular soreness following lift testing and strategies for management including stretching, using ice, scheduled rest.  - Functional pain scale  - MICHELLE rate of perceived exertion scale.   - pain cycle  - pursed lip and diaphragmatic breathing techniques  - details of the Functional [facility]oration Program.     Written Home Exercises Provided: yes.  Exercises were reviewed and Sari was able to demonstrate them prior to the end of the session. Sari demonstrated fair  understanding of the  education provided.     See EMR under Patient Instructions for exercises provided during therapy sessions. Patient education also located in FRP binder provided on day 1 of the cohort.     Assessment     Ms. Serna tolerated today's session  well, noted to be self-critical with difficulty applying feedback from therapist 2* tendency to view feedback and own emotionality as sign of setback. Good engagement with concept of pacing with emotional wellbeing as part of concept, in addition to physical challenge.     Sari is progressing well towards her goals and status of goals can be seen below. Patient's prognosis is Good.     Sari would continue to benefit from skilled outpatient occupational therapy to address the deficits listed in the problem list on initial evaluation, provide patient education, and to maximize patient's level of independence in the home and community environment.     Anticipated barriers to occupational therapy: psychosocial stressors      SHORT Term goals: In 3 weeks, patient will:  Status of goal:   Implements correct use of breathing techniques during functional lifting tasks, with minimal cues needed. Initiated   Utilizes MICHELLE rate of exertion scale to pace performance with functional lifting tasks, and implements self-care strategies during activity participation to manage pain. Initiated   Verbalize understanding of energy conservation and pacing strategies during daily habits, roles, and routines in order to improve tolerance for work and home demands.  Initiated   Demonstrate increased positional tolerance to the level needed for work, home, and community activities (standing in cue at social event, managing supplies for outing, streneous IADL), as evidenced by having a METS level of 7. Initiated   Demonstrate proper body mechanics with functional lifting tasks (floor to waist, waist to shoulder, maximum lift, and box carry), via teach back method with minimal cues needed. Initiated    Demonstrate increased functional strength by lifting 13 lbs waist to shoulder for management of (I)ADL, including dressing and grooming, placing items in kitchen cabinets, folding towels, and/or managing suitcase when traveling.   Initiated   Demonstrate increased functional strength by lifting 13 lbs floor to waist to meet demand of work/home routine, including lifting groceries, managing laundry, and/or managing suitcase when traveling.   Initiated   Tolerate Home Activity Plan (HAP)/ Home Exercise Program (HEP) to promote safety and independence with ADL, with report of completion of at least 2 out of 4 days outside of program participation.  Initiated   Show improved perception of own abilities as evidenced by increase of FOTO scores to established MDC value and perception of performance ratings regarding personal long term goals.  Initiated         Long Term goals: In 9 weeks, patient will: Status of goal:   Report implementation of application of mindfulness, stretching, and other coping strategies for improved participation in daily routine. Initiated   Verbalize functional application of lifting techniques in daily life (golfers lift, floor to waist, waist to shoulder). Initiated   Applies functional application of lifting techniques (golfer's lift, floor to waist, waist to shoulder) in activities of daily life, per patient report.   Initiated   Demonstrate physical capacities consistent with a Light (#20 max, frequent lifting/carrying #10, 2.5 METS  demand level, as needed to perform activities to be successful in roles of life, regarding Full Time Employment, Household Management, Volunteer Work, and Community Participation. Initiated   Have an improvement of 3 points in 2/5 personal goals in rating of  performance.  Initiated   Show improved perception of own abilities as evidenced by increase of FOTO scores to established MC II value and perception of performance ratings regarding personal long term  goals.  Initiated              Plan     Continue per plan of care.     Updates/Grading for next session: cont intro functional lifts    Mireya Hutchinson, OT   2/4/2025

## 2025-02-03 NOTE — PROGRESS NOTES
"[facility]    [facility]  Physical Therapy Treatment Note      Name: Sari Serna  MRN:[id number]      Therapy Diagnosis:   Encounter Diagnosis   Name Primary?    Impaired functional mobility and activity tolerance Yes           Physician: Debby Ross NP                  Date: 2/3/2025    Time In: 1:30 pm  Time Out: 2:30 pm  Total Billable Time: 60 minutes    SUBJECTIVE     Sari reports cont to be under increased stress about her FLMA/work situation. She was on the phone all morning with [employer] rep and tried to calm herself down with music on way here. Pt reports emotional tx with OT.       "I need a brain re-set".     Current 2/10  Location(s): bilateral neck  and slight headache     OBJECTIVE     Objective Measures updated at progress report unless specified.     Treatment       Sari received the Individualized Treatments listed below:     Sari participated in dynamic functional therapeutic activities to improve functional performance for 20 minutes, including:      Full body functional mobility w/ 3-10 sec hold technique &/or 3-5 repetition technique to improve functional performance. In order to:  Improve driving safety, visual & spatial awareness:  Cervical flx/ext  Cervical side bending  Cervical rotation  Cervical protraction/retraction  Improve lifting mechanics, showering/bathing, dressing, cooking, reaching, pushing & fine motor skills  Shld rolls  Shld depression/elevation  Scapular retraction/protraction/scaption  Posterior shld stretch (cross arm)  Shld ER stretch (chicken wing)  Elbow flx/ext  Forearm supination/pronation  Wrist flx/ext  Open/close hands/fingers  Improve bed mobility & rolling, bending over, cooking & cleaning:  Seated trunk rotations  Seated trunk/thoracic ext/flx  Improve functional gait, squatting, sitting tolerance, functional balance:   Seated marches & knee to chest  Seated knee flx/ext  Seated hip ER/piriformis " stretch  Seated hamstring stretch  Seated toe raise to heel raise   Seated ankle circles each way  Improve overhead reaching/activities, standing tolerance:  Standing lumbar extensions  Standing lateral leaning stretch  Standing quad stretch (UE support for balance)    Mindfulness Moment - NP  Mindfulness-based activity involving deep breathing, mindful awareness of the body and headspace  Used to activate parasympathetic nervous system to decrease autonomic threat perception and thus reduce central sensitivity to pain  Patient demonstrate safe performance     Pt Education:  Review  Resisted Exercise Basics  Modified Garrett scale  Motion is lotio, Hurt vs harm, Calming the nervous system  Postural awareness during resisted ex and functional mobility    Functional Endurance:  DATE  EQUIPMENT VARIABLES RPE TIME/DISTANCE   1/6/25 Walking No AD 7/10 2 mins w/ 3 breaks taken 2/2 dizziness   01/15/25 Nu Step Level 1 3/10 10 min, 383 steps   01/17/25 Nu Step L 1 3/10 8 min            Sari participated in neuromuscular re-education activities to improve: Balance, Coordination, Kinesthetic, Sense, Proprioception, and Posture for 30 minutes. The following activities were included:    Seated on PB - SOC - postural awareness x15  Supine PPT coordinate with the breathe x10    Peripheral muscle strengthening which included 1-2 sets of 10-20 repetitions at a slow, controlled 5-7 second per rep pace focused on strengthening supporting musculature for improved body mechanics & functional mobility.  Patient & therapist focused on proper form & speed during treatment to ensure optimal strengthening of each targeted muscle group & neuromuscular re-education. Patients are instructed to keep sets/reps with proper load to stay at 3-5 out of 10 on the provided modified Garrett exertion scale.    BATCA Machine Exercises Weight (lbs) Sets Repetitions Garrett Exertion Scale Rating   Leg Extension  7.5  20 4   Hamstring Curls 22.5  20    Chest Press  "17.2  15 5   Pec Fly 22.5  20 4   Lat Pull Down       Mid Row 25  20    Bicep Bar Curls       Standing Tricep Extension       Single Leg Press 25  20 3     Sari received therapeutic exercises to develop strength, endurance, ROM, flexibility, posture, and core stabilization for 10 minutes including:    Supine cervical retractions x 10, 3"  Supine cervical rot x5  Supine cervical side bend x5 ea  Supine cervical flex/ext x5  Open book stretch x10       Patient describe and demonstrate prior HEP- not performing now  Standing Y, W facing wall  Wall push ups      Patient Education and Home Exercises     Home Exercises Provided and Patient Education Provided     Education provided:   - FRP Educational Topics: Modified Garrett Exertion Scale, Physical & Psychological Pain Cycles, Z-Lying for Pain Relief/Relaxation, and Diaphragmatic Breathing    Written Home Exercises Provided: yes, FRP stretch routine. Previous HEP issued exercises were reviewed and Sari was able to demonstrate them prior to the end of the session.  Sari demonstrated good  understanding of the education provided. See EMR under Patient Instructions for information provided during therapy sessions    ASSESSMENT     Sari participated well in PT session today. Pt had emotional OT session previously. Pt acknowledge need to calm the NS. Stretch routine performed with intention of the breathe to activate the PNS.  Neuro muscular re-ed cont with resisted training on BATCA machines. Review components of resisted ex basics to use resistance training to calm the NS, activate the PNS and train the body on the diff. between hurt vs harm.  Pt was able to perform without exacerbating symptoms and focus on the breathe. Noted improved anxiety post tx.    Sari Is progressing well towards her goals.   Pt prognosis is Good.     Pt will continue to benefit from skilled outpatient physical therapy to address the deficits listed in the problem list box on initial " "evaluation, provide pt/family education and to maximize pt's level of independence in the home and community environment.     Pt's spiritual, cultural and educational needs considered and pt agreeable to plan of care and goals.     Anticipated Barriers for therapy: chronic nature of issues, psychosocial factors, central sensitization    GOALS: Pt is in agreement with the following goals:  Goal Progress Towards Goal   SHORT Term: In 3 weeks, pt will:     Verbalize & demonstrate good understanding of resisted training with 3-1-3 second rep for stirerkxmg-vwjecozsv-jgfozhyux contraction to encourage full muscle recruitment for strength & endurance. Initiated 1/15/2025   Verbalize & demonstrate good understanding of home stretch routine to improve AROM, help decrease pain & improve mobility. Initiated 1/15/2025   Demonstrate proper supine or seated diaphragmatic breathing with decreased chest excursion & emphasis on full abdominal excursion & increased expiration time to promote muscle relaxation & increased parasympathetic activity to improve patient tolerance to activities. Initiated 1/15/2025   Verbalize good understanding of importance of proper posture in resisted exercise, functional activities & ADL's in order to help reduce postural strain, promote proper breathing. Initiated 1/15/2025   Demonstrate good understanding of full body resisted exercises w/ use of pictures &/or verbal cues to promote independence w/ exercise at the end of the program. Initiated 1/15/2025   Verbalize plan for continuing resisted exercises w/ specific location (i.e. commercial gym, home, community fitness center)  to incorporate all major muscle groups to maintain & progress therapeutic functional improvements. Initiated 1/15/2025   Verbalize difference between  "hurt vs harm"  to demonstrate understanding of fear avoidance in pain cycle.  Initiated 1/15/2025         Midterm: In 8 weeks, pt will:     Verbalize good understanding of " activities planning/scheduling (stretching, mindfulness, resisted training, & cardio) prescribed by PT/OT following the end of the program to sustain & progress functional improvements. Initiated 1/15/2025   Perform selected resisted training w/ minimal to no cuing in therapy session to be independent w/ resisted strengthening. Initiated 1/15/2025   Demonstrate improved symptom self-management w/ posture/positioning and mechanical movements and/or implementation of appropriate modalities throughout a typical day.  Initiated 1/15/2025   Demonstrate independence w/ home stretch routine to improve AROM, help decrease pain & improve mobility. Initiated 1/15/2025         Long Term: In 12 weeks, pt will:     Perform selected cardio & resisted training independently to continue safe regular performance of daily exercise. Initiated 1/15/2025   Maintain 430 meters on 6 MWT /s inc fatigue acute or once get home to improve functional activity tolerance.   Initiated 1/15/2025   Perform single leg stance EC >10 seconds or greater bilaterally to improve safety and balance in ADLs. Initiated 1/15/2025   Demonstrate Timed Up & Go (with appropriate assistive device, if necessary) of 7 seconds or less to decrease fall risk and improve functional mobility. Initiated 1/15/2025   Score NDI 20 or less on CERVICAL FOTO to indicate significant functional improvements in impaired functions secondary to pain. Initiated 1/15/2025     Initiated 1/15/2025          PLAN     Continue PT per POC     Monae Day, PTA

## 2025-02-04 ENCOUNTER — PATIENT MESSAGE (OUTPATIENT)
Dept: OBSTETRICS AND GYNECOLOGY | Facility: CLINIC | Age: 40
End: 2025-02-04
Payer: COMMERCIAL

## 2025-02-04 ENCOUNTER — PATIENT MESSAGE (OUTPATIENT)
Dept: INTERNAL MEDICINE | Facility: CLINIC | Age: 40
End: 2025-02-04
Payer: COMMERCIAL

## 2025-02-05 ENCOUNTER — CLINICAL SUPPORT (OUTPATIENT)
Dept: REHABILITATION | Facility: OTHER | Age: 40
End: 2025-02-05
Payer: COMMERCIAL

## 2025-02-05 ENCOUNTER — PATIENT MESSAGE (OUTPATIENT)
Dept: PAIN MEDICINE | Facility: OTHER | Age: 40
End: 2025-02-05
Payer: COMMERCIAL

## 2025-02-05 DIAGNOSIS — R52 PAIN AGGRAVATED BY ACTIVITIES OF DAILY LIVING: Primary | ICD-10-CM

## 2025-02-05 DIAGNOSIS — Z74.09 IMPAIRED FUNCTIONAL MOBILITY AND ACTIVITY TOLERANCE: Primary | ICD-10-CM

## 2025-02-05 PROCEDURE — 97112 NEUROMUSCULAR REEDUCATION: CPT | Mod: CQ

## 2025-02-05 PROCEDURE — 97530 THERAPEUTIC ACTIVITIES: CPT | Mod: CQ

## 2025-02-05 PROCEDURE — 97530 THERAPEUTIC ACTIVITIES: CPT

## 2025-02-05 PROCEDURE — 97110 THERAPEUTIC EXERCISES: CPT | Mod: CQ

## 2025-02-05 NOTE — LETTER
February 7, 2025      [facility]  [address], [address]  [facility] [address]  Phone: [phone]  Fax: [phone]       Patient: Sari Serna   YOB: 1985  Date of Visit: 02/07/2025    To Whom It May Concern:    Citlali Serna  was at [facility] on 02/07/2025. The patient may return to work/school on 2/18/2025 with no restrictions. If you have any questions or concerns, or if I can be of further assistance, please do not hesitate to contact me.    Sincerely,    Edith Carbajal MD

## 2025-02-05 NOTE — LETTER
February 5, 2025      [facility]  [address], [address]  [facility] [address]  Phone: [phone]  Fax: [phone]       Patient: Sari Serna   YOB: 1985  Date of Visit: 02/05/2025    To Whom It May Concern:    Citlali Serna  was at [facility] on 02/05/2025. The patient may return to work on 2/18/2025 with no restrictions. If you have any questions or concerns, or if I can be of further assistance, please do not hesitate to contact me.    Sincerely,      Debby Ross NP

## 2025-02-05 NOTE — PROGRESS NOTES
"[facility]    Functional Restoration Program  Physical Therapy Treatment Note      Name: Sari Serna  MRN:[id number]      Therapy Diagnosis:   Encounter Diagnosis   Name Primary?    Impaired functional mobility and activity tolerance Yes       Physician: Debby Ross NP                  Date: 2/5/2025    Time In: 2:30 pm  Time Out: 3:30 pm  Total Billable Time: 60 minutes    SUBJECTIVE     Sari reports she has neck pain as soon as she gets up in the AM.        "I need a brain re-set".     Current 2/10  Location(s): bilateral neck, shld  and R hand    OBJECTIVE     Objective Measures updated at progress report unless specified.     Treatment       Sari received the Individualized Treatments listed below:     Sari participated in dynamic functional therapeutic activities to improve functional performance for 20 minutes, including:      Full body functional mobility w/ 3-10 sec hold technique &/or 3-5 repetition technique to improve functional performance. In order to:  Improve driving safety, visual & spatial awareness:  Cervical flx/ext  Cervical side bending  Cervical rotation  Cervical protraction/retraction  Improve lifting mechanics, showering/bathing, dressing, cooking, reaching, pushing & fine motor skills  Shld rolls  Shld depression/elevation  Scapular retraction/protraction/scaption  Posterior shld stretch (cross arm)  Shld ER stretch (chicken wing)  Elbow flx/ext  Forearm supination/pronation  Wrist flx/ext  Open/close hands/fingers  Improve bed mobility & rolling, bending over, cooking & cleaning:  Seated trunk rotations  Seated trunk/thoracic ext/flx  Improve functional gait, squatting, sitting tolerance, functional balance:   Seated marches & knee to chest  Seated knee flx/ext  Seated hip ER/piriformis stretch  Seated hamstring stretch  Seated toe raise to heel raise   Seated ankle circles each way  Improve overhead reaching/activities, standing " tolerance:  Standing lumbar extensions  Standing lateral leaning stretch  Standing quad stretch (UE support for balance)    Mindfulness Moment - NP  Mindfulness-based activity involving deep breathing, mindful awareness of the body and headspace  Used to activate parasympathetic nervous system to decrease autonomic threat perception and thus reduce central sensitivity to pain  Patient demonstrate safe performance     Pt Education:  Review  Resisted Exercise Basics  Modified Garrett scale  Motion is lotio, Hurt vs harm, Calming the nervous system  Postural awareness during resisted ex and functional mobility    Functional Endurance:  DATE  EQUIPMENT VARIABLES RPE TIME/DISTANCE   1/6/25 Walking No AD 7/10 2 mins w/ 3 breaks taken 2/2 dizziness   01/15/25 Nu Step Level 1 3/10 10 min, 383 steps   01/17/25 Nu Step L 1 3/10 8 min            Sari participated in neuromuscular re-education activities to improve: Balance, Coordination, Kinesthetic, Sense, Proprioception, and Posture for 30 minutes. The following activities were included:    Seated on PB - SOC - postural awareness x15  Supine PPT coordinate with the breathe x10    Peripheral muscle strengthening which included 1-2 sets of 10-20 repetitions at a slow, controlled 5-7 second per rep pace focused on strengthening supporting musculature for improved body mechanics & functional mobility.  Patient & therapist focused on proper form & speed during treatment to ensure optimal strengthening of each targeted muscle group & neuromuscular re-education. Patients are instructed to keep sets/reps with proper load to stay at 3-5 out of 10 on the provided modified Garrett exertion scale.    BATCA Machine Exercises Weight (lbs) Sets Repetitions Garrett Exertion Scale Rating   Leg Extension  7.5  20 4   Hamstring Curls 22.5  20    Chest Press 17.2  15 4*   Pec Fly 27.5  20 4   Lat Pull Down 20  20 3   Mid Row 27.5  20 3   Bicep Bar Curls 7.5  20 4   Standing Tricep Extension 10  20 3  "  Single Leg Press 27.5  20 3   *compensation of shld    Sari received therapeutic exercises to develop strength, endurance, ROM, flexibility, posture, and core stabilization for 10 minutes including:    Supine cervical retractions x 10, 3"  Supine cervical rot x5  Supine cervical side bend x5 ea  Supine cervical flex/ext x5  Open book stretch x10       Patient describe and demonstrate prior HEP- not performing now  Standing Y, W facing wall  Wall push ups      Patient Education and Home Exercises     Home Exercises Provided and Patient Education Provided     Education provided:   - FRP Educational Topics: Modified Garrett Exertion Scale, Physical & Psychological Pain Cycles, Z-Lying for Pain Relief/Relaxation, and Diaphragmatic Breathing    Written Home Exercises Provided: yes, FRP stretch routine. Previous HEP issued exercises were reviewed and Sari was able to demonstrate them prior to the end of the session.  Sari demonstrated good  understanding of the education provided. See EMR under Patient Instructions for information provided during therapy sessions    ASSESSMENT     Sari participated well in PT session today. Pt had emotional OT session previously. Pt acknowledge need to calm the NS. Stretch routine performed with intention of the breathe to activate the PNS. Discussed and performed supine cervical ex she cna perform before getting out of bed in the AM.  Neuro muscular re-ed cont with resisted training on BATCA machines. Review components of resisted ex basics to use resistance training to calm the NS, activate the PNS and train the body on the diff. between hurt vs harm.  Pt was able to perform without exacerbating symptoms and focus on the breathe. Noted improved anxiety post tx.    Sari Is progressing well towards her goals.   Pt prognosis is Good.     Pt will continue to benefit from skilled outpatient physical therapy to address the deficits listed in the problem list box on initial " "evaluation, provide pt/family education and to maximize pt's level of independence in the home and community environment.     Pt's spiritual, cultural and educational needs considered and pt agreeable to plan of care and goals.     Anticipated Barriers for therapy: chronic nature of issues, psychosocial factors, central sensitization    GOALS: Pt is in agreement with the following goals:  Goal Progress Towards Goal   SHORT Term: In 3 weeks, pt will:     Verbalize & demonstrate good understanding of resisted training with 3-1-3 second rep for sukojuzisg-[id number]-[id number] contraction to encourage full muscle recruitment for strength & endurance. Initiated 1/15/2025   Verbalize & demonstrate good understanding of home stretch routine to improve AROM, help decrease pain & improve mobility. Initiated 1/15/2025   Demonstrate proper supine or seated diaphragmatic breathing with decreased chest excursion & emphasis on full abdominal excursion & increased expiration time to promote muscle relaxation & increased parasympathetic activity to improve patient tolerance to activities. Initiated 1/15/2025   Verbalize good understanding of importance of proper posture in resisted exercise, functional activities & ADL's in order to help reduce postural strain, promote proper breathing. Initiated 1/15/2025   Demonstrate good understanding of full body resisted exercises w/ use of pictures &/or verbal cues to promote independence w/ exercise at the end of the program. Initiated 1/15/2025   Verbalize plan for continuing resisted exercises w/ specific location (i.e. commercial gym, home, community fitness center)  to incorporate all major muscle groups to maintain & progress therapeutic functional improvements. Initiated 1/15/2025   Verbalize difference between  "hurt vs harm"  to demonstrate understanding of fear avoidance in pain cycle.  Initiated 1/15/2025         Midterm: In 8 weeks, pt will:     Verbalize good understanding of " activities planning/scheduling (stretching, mindfulness, resisted training, & cardio) prescribed by PT/OT following the end of the program to sustain & progress functional improvements. Initiated 1/15/2025   Perform selected resisted training w/ minimal to no cuing in therapy session to be independent w/ resisted strengthening. Initiated 1/15/2025   Demonstrate improved symptom self-management w/ posture/positioning and mechanical movements and/or implementation of appropriate modalities throughout a typical day.  Initiated 1/15/2025   Demonstrate independence w/ home stretch routine to improve AROM, help decrease pain & improve mobility. Initiated 1/15/2025         Long Term: In 12 weeks, pt will:     Perform selected cardio & resisted training independently to continue safe regular performance of daily exercise. Initiated 1/15/2025   Maintain 430 meters on 6 MWT /s inc fatigue acute or once get home to improve functional activity tolerance.   Initiated 1/15/2025   Perform single leg stance EC >10 seconds or greater bilaterally to improve safety and balance in ADLs. Initiated 1/15/2025   Demonstrate Timed Up & Go (with appropriate assistive device, if necessary) of 7 seconds or less to decrease fall risk and improve functional mobility. Initiated 1/15/2025   Score NDI 20 or less on CERVICAL FOTO to indicate significant functional improvements in impaired functions secondary to pain. Initiated 1/15/2025     Initiated 1/15/2025          PLAN     Continue PT per POC     Monae Day, PTA                      No

## 2025-02-05 NOTE — PROGRESS NOTES
"[facility]    Functional Restoration Program  Physical Therapy Treatment Note      Name: Sari Serna  MRN:[id number]      Therapy Diagnosis:   No diagnosis found.      Physician: Debby Ross, NP                  Physician Orders: PT Eval and Treat   Medical Diagnosis from Referral:   M54.81 (ICD-10-CM) - Occipital neuralgia of right side   R68.89 (ICD-10-CM) - Decreased motor activity   R26.89 (ICD-10-CM) - Decreased spinal mobility      Evaluation Date: 1/15/2025  Authorization Period Expiration: 01/13/26  Plan of Care Expiration: 03/28/25  Visit # / Visits authorized: 01/ 01  FOTO: 01/ 03      Date: 2/5/2025    Time In: ***  Time Out: ***  Total Billable Time: ***      SUBJECTIVE     Sari reports ***    cont to be under increased stress about her FLMA/work situation. She was on the phone all morning with [employer] rep and tried to calm herself down with music on way here. Pt reports emotional tx with OT.       "I need a brain re-set".     Current 2/10 ***  Location(s): bilateral neck  and slight headache     OBJECTIVE     Objective Measures updated at progress report unless specified.     Treatment       Sari received the Individualized Treatments listed below:     Sari participated in dynamic functional therapeutic activities to improve functional performance for *** minutes, including:      Full body functional mobility w/ 3-10 sec hold technique &/or 3-5 repetition technique to improve functional performance. In order to:  Improve driving safety, visual & spatial awareness:  Cervical flx/ext  Cervical side bending  Cervical rotation  Cervical protraction/retraction  Improve lifting mechanics, showering/bathing, dressing, cooking, reaching, pushing & fine motor skills  Shld rolls  Shld depression/elevation  Scapular retraction/protraction/scaption  Posterior shld stretch (cross arm)  Shld ER stretch (chicken wing)  Elbow flx/ext  Forearm supination/pronation  Wrist " flx/ext  Open/close hands/fingers  Improve bed mobility & rolling, bending over, cooking & cleaning:  Seated trunk rotations  Seated trunk/thoracic ext/flx  Improve functional gait, squatting, sitting tolerance, functional balance:   Seated marches & knee to chest  Seated knee flx/ext  Seated hip ER/piriformis stretch  Seated hamstring stretch  Seated toe raise to heel raise   Seated ankle circles each way  Improve overhead reaching/activities, standing tolerance:  Standing lumbar extensions  Standing lateral leaning stretch  Standing quad stretch (UE support for balance)    Mindfulness Moment - NP  Mindfulness-based activity involving deep breathing, mindful awareness of the body and headspace  Used to activate parasympathetic nervous system to decrease autonomic threat perception and thus reduce central sensitivity to pain  Patient demonstrate safe performance     Pt Education:  Review  Resisted Exercise Basics  Modified Garrett scale  Motion is lotio, Hurt vs harm, Calming the nervous system  Postural awareness during resisted ex and functional mobility    Functional Endurance:  DATE  EQUIPMENT VARIABLES RPE TIME/DISTANCE   1/6/25 Walking No AD 7/10 2 mins w/ 3 breaks taken 2/2 dizziness   01/15/25 Nu Step Level 1 3/10 10 min, 383 steps   01/17/25 Nu Step L 1 3/10 8 min            Sari participated in neuromuscular re-education activities to improve: Balance, Coordination, Kinesthetic, Sense, Proprioception, and Posture for *** minutes. The following activities were included:    Seated on PB - SOC - postural awareness x15  Supine PPT coordinate with the breathe x10    Peripheral muscle strengthening which included 1-2 sets of 10-20 repetitions at a slow, controlled 5-7 second per rep pace focused on strengthening supporting musculature for improved body mechanics & functional mobility.  Patient & therapist focused on proper form & speed during treatment to ensure optimal strengthening of each targeted muscle group  "& neuromuscular re-education. Patients are instructed to keep sets/reps with proper load to stay at 3-5 out of 10 on the provided modified Garrett exertion scale.    BATCA Machine Exercises Weight (lbs) Sets Repetitions Garrett Exertion Scale Rating   Leg Extension  7.5  20 4   Hamstring Curls 22.5  20    Chest Press 17.2  15 5   Pec Fly 22.5  20 4   Lat Pull Down       Mid Row 25  20    Bicep Bar Curls       Standing Tricep Extension       Single Leg Press 25  20 3     Sari received therapeutic exercises to develop strength, endurance, ROM, flexibility, posture, and core stabilization for *** minutes including:    Supine cervical retractions x 10, 3"  Supine cervical rot x5  Supine cervical side bend x5 ea  Supine cervical flex/ext x5  Open book stretch x10       Patient describe and demonstrate prior HEP- not performing now  Standing Y, W facing wall  Wall push ups      Patient Education and Home Exercises     Home Exercises Provided and Patient Education Provided     Education provided:   - FRP Educational Topics: Modified Garrett Exertion Scale, Physical & Psychological Pain Cycles, Z-Lying for Pain Relief/Relaxation, and Diaphragmatic Breathing    Written Home Exercises Provided: yes, FRP stretch routine. Previous HEP issued exercises were reviewed and Sari was able to demonstrate them prior to the end of the session.  Sari demonstrated good  understanding of the education provided. See EMR under Patient Instructions for information provided during therapy sessions    ASSESSMENT     Sari ***    participated well in PT session today. Pt had emotional OT session previously. Pt acknowledge need to calm the NS. Stretch routine performed with intention of the breathe to activate the PNS.  Neuro muscular re-ed cont with resisted training on BATCA machines. Review components of resisted ex basics to use resistance training to calm the NS, activate the PNS and train the body on the diff. between hurt vs harm.  Pt was " able to perform without exacerbating symptoms and focus on the breathe. Noted improved anxiety post tx.    Sari Is progressing well towards her goals.   Pt prognosis is Good.     Pt will continue to benefit from skilled outpatient physical therapy to address the deficits listed in the problem list box on initial evaluation, provide pt/family education and to maximize pt's level of independence in the home and community environment.     Pt's spiritual, cultural and educational needs considered and pt agreeable to plan of care and goals.     Anticipated Barriers for therapy: chronic nature of issues, psychosocial factors, central sensitization    GOALS: Pt is in agreement with the following goals:  Goal Progress Towards Goal   SHORT Term: In 3 weeks, pt will:     Verbalize & demonstrate good understanding of resisted training with 3-1-3 second rep for [id number]-oinfaqycd-gfvoqfcyu contraction to encourage full muscle recruitment for strength & endurance. Initiated 1/15/2025   Verbalize & demonstrate good understanding of home stretch routine to improve AROM, help decrease pain & improve mobility. Initiated 1/15/2025   Demonstrate proper supine or seated diaphragmatic breathing with decreased chest excursion & emphasis on full abdominal excursion & increased expiration time to promote muscle relaxation & increased parasympathetic activity to improve patient tolerance to activities. Initiated 1/15/2025   Verbalize good understanding of importance of proper posture in resisted exercise, functional activities & ADL's in order to help reduce postural strain, promote proper breathing. Initiated 1/15/2025   Demonstrate good understanding of full body resisted exercises w/ use of pictures &/or verbal cues to promote independence w/ exercise at the end of the program. Initiated 1/15/2025   Verbalize plan for continuing resisted exercises w/ specific location (i.e. commercial gym, home, community fitness center)  to  "incorporate all major muscle groups to maintain & progress therapeutic functional improvements. Initiated 1/15/2025   Verbalize difference between  "hurt vs harm"  to demonstrate understanding of fear avoidance in pain cycle.  Initiated 1/15/2025         Midterm: In 8 weeks, pt will:     Verbalize good understanding of activities planning/scheduling (stretching, mindfulness, resisted training, & cardio) prescribed by PT/OT following the end of the program to sustain & progress functional improvements. Initiated 1/15/2025   Perform selected resisted training w/ minimal to no cuing in therapy session to be independent w/ resisted strengthening. Initiated 1/15/2025   Demonstrate improved symptom self-management w/ posture/positioning and mechanical movements and/or implementation of appropriate modalities throughout a typical day.  Initiated 1/15/2025   Demonstrate independence w/ home stretch routine to improve AROM, help decrease pain & improve mobility. Initiated 1/15/2025         Long Term: In 12 weeks, pt will:     Perform selected cardio & resisted training independently to continue safe regular performance of daily exercise. Initiated 1/15/2025   Maintain 430 meters on 6 MWT /s inc fatigue acute or once get home to improve functional activity tolerance.   Initiated 1/15/2025   Perform single leg stance EC >10 seconds or greater bilaterally to improve safety and balance in ADLs. Initiated 1/15/2025   Demonstrate Timed Up & Go (with appropriate assistive device, if necessary) of 7 seconds or less to decrease fall risk and improve functional mobility. Initiated 1/15/2025   Score NDI 20 or less on CERVICAL FOTO to indicate significant functional improvements in impaired functions secondary to pain. Initiated 1/15/2025     Initiated 1/15/2025          PLAN     Continue PT per POC     Lamberto Rashid, PT    "

## 2025-02-05 NOTE — TELEPHONE ENCOUNTER
We probably need to discuss other medication options entirely if she keeps breaking through on the pill. The goal is to have as little bleeding as possible with her endometriosis. Set her up for virtual visit. Til then have her stop again to withdraw and then restart the pill.

## 2025-02-06 NOTE — PROGRESS NOTES
"[facility]  [facility]  Occupational Therapy Visit    Patient Name: Sari Serna  MRN: [id number]  YOB: 1985  Today's Date: 2025    Subjective   shared that she had some improved management of stressors with changing breathing style (pref "lions breath" or "the sigh") - endorsed awareness of importance of boundaries in managing psychosocial stressors rather than take things personally.  Pain reported as 3.       1/10 current pain  Location: neck trap area, R hand pain (cramping with repetitive use)   Description: Sharp    Patient Specific Activities based on Personal goals:       Activity  (To be rated first session after eval)    Performance Satisfaction   Cooking (R hand use)        2.  Work set up        3.  yoga       4.  Social outings       5.  Groceries/cooking               Total Score       Ratin-10; unable/unsatisfied - Able/Satisfied    Objective     Objective Measures updated at progress report unless specified.    Treatment     Sari received the treatments listed below:     Therapeutic activities and functional lifting activities in order to increase participation in, endurance for, and performance with vocational, selfcare ADLs, and leisure activities in order to improve quality of life, for 45  minutes, including:    Sari participated in functional lift waist to shoulder, 4x5 reps x 8 lbs with a rating of Sort of hard (4/10) exertion. Sari educated on standing posture, core awareness, keeping shoulders depressed and retracted, stepping to place > reaching to place, keeping weight close to center of gravity and pacing as needed.     Seated on bench, pt educated on technique for sit<>stand using education on proper body mechanics, weight shift/momentum as well as "noes over toes", with focus on using BUE and coordinating movement with breath. Pt completed 5 reps with initial cues needed.  Rated as Moderate (3/10) exertion. "     Sari completed functional lifting, floor to waist, 2x5 reps x 8 lbs with exertion rated Sort of hard (4/10); focused education on hip hinge and neutrality of spine.    External cues for rest break 2* increased emotionality.   Cued to take walking rest break.    Therapeutic exercises to develop strength, endurance, ROM, flexibility, posture, and core stabilization for 15 minutes, including:    Sari participated in endurance activity using Nustep for 15 minutes,  to improve functional endurance and progress towards increased MET level for improved community mobility and participation, rated as Moderate (3/10) exertion, Sari re-educated on how to add mindfulness component to activity and how to pace appropriately by completed self check ins and grading activity as needed, with goal to reach moderate to sort of hard by end of activity.  While on nustep, discussion on concept of reframing change noted rather than favoring self-criticism. Discussion  and education on differentiating interoception and proprioception.     No environmental, cultural, spiritual, developmental or education needs expressed or noted.    Patient Education and Home Exercises   Education provided:   - Progress towards goals   - importance of completion of exercises and activities outside of session to attain goals.   - proper posture and body mechanics.  - anticipated muscular soreness following lift testing and strategies for management including stretching, using ice, scheduled rest.  - Functional pain scale  - MICHELLE rate of perceived exertion scale.   - pain cycle  - pursed lip and diaphragmatic breathing techniques  - details of the Functional Restoration Program.     Written Home Exercises Provided: yes.  Exercises were reviewed and Sari was able to demonstrate them prior to the end of the session. Sari demonstrated fair  understanding of the education provided.     See EMR under Patient Instructions for exercises provided during  therapy sessions. Patient education also located in [facility] binder provided on day 1 of the cohort.     Assessment     Ms. Serna tolerated today's session  well, noted to be self-critical with difficulty applying feedback from therapist 2* tendency to view feedback and own emotionality as sign of setback. Good engagement with concept of pacing with emotional wellbeing as part of concept, in addition to physical challenge.     Sari is progressing well towards her goals and status of goals can be seen below. Patient's prognosis is Good.     Sari would continue to benefit from skilled outpatient occupational therapy to address the deficits listed in the problem list on initial evaluation, provide patient education, and to maximize patient's level of independence in the home and community environment.     Anticipated barriers to occupational therapy: psychosocial stressors      SHORT Term goals: In 3 weeks, patient will:  Status of goal:   Implements correct use of breathing techniques during functional lifting tasks, with minimal cues needed. Initiated   Utilizes MICHELLE rate of exertion scale to pace performance with functional lifting tasks, and implements self-care strategies during activity participation to manage pain. Initiated   Verbalize understanding of energy conservation and pacing strategies during daily habits, roles, and routines in order to improve tolerance for work and home demands.  Initiated   Demonstrate increased positional tolerance to the level needed for work, home, and community activities (standing in cue at social event, managing supplies for outing, streneous IADL), as evidenced by having a METS level of 7. Initiated   Demonstrate proper body mechanics with functional lifting tasks (floor to waist, waist to shoulder, maximum lift, and box carry), via teach back method with minimal cues needed. Initiated   Demonstrate increased functional strength by lifting 13 lbs waist to shoulder for  management of (I)ADL, including dressing and grooming, placing items in kitchen cabinets, folding towels, and/or managing suitcase when traveling.   Initiated   Demonstrate increased functional strength by lifting 13 lbs floor to waist to meet demand of work/home routine, including lifting groceries, managing laundry, and/or managing suitcase when traveling.   Initiated   Tolerate Home Activity Plan (HAP)/ Home Exercise Program (HEP) to promote safety and independence with ADL, with report of completion of at least 2 out of 4 days outside of program participation.  Initiated   Show improved perception of own abilities as evidenced by increase of FOTO scores to established MDC value and perception of performance ratings regarding personal long term goals.  Initiated         Long Term goals: In 9 weeks, patient will: Status of goal:   Report implementation of application of mindfulness, stretching, and other coping strategies for improved participation in daily routine. Initiated   Verbalize functional application of lifting techniques in daily life (golfers lift, floor to waist, waist to shoulder). Initiated   Applies functional application of lifting techniques (golfer's lift, floor to waist, waist to shoulder) in activities of daily life, per patient report.   Initiated   Demonstrate physical capacities consistent with a Light (#20 max, frequent lifting/carrying #10, 2.5 METS  demand level, as needed to perform activities to be successful in roles of life, regarding Full Time Employment, Household Management, Volunteer Work, and Community Participation. Initiated   Have an improvement of 3 points in 2/5 personal goals in rating of  performance.  Initiated   Show improved perception of own abilities as evidenced by increase of FOTO scores to established MC II value and perception of performance ratings regarding personal long term goals.  Initiated              Plan     Continue per plan of care.     Updates/Grading  for next session: cont intro functional lifts    Mireya Hutchinson, OT   2/6/2025

## 2025-02-10 ENCOUNTER — CLINICAL SUPPORT (OUTPATIENT)
Dept: REHABILITATION | Facility: OTHER | Age: 40
End: 2025-02-10
Payer: COMMERCIAL

## 2025-02-10 DIAGNOSIS — Z74.09 IMPAIRED FUNCTIONAL MOBILITY AND ACTIVITY TOLERANCE: Primary | ICD-10-CM

## 2025-02-10 DIAGNOSIS — R52 PAIN AGGRAVATED BY ACTIVITIES OF DAILY LIVING: Primary | ICD-10-CM

## 2025-02-10 PROCEDURE — 97110 THERAPEUTIC EXERCISES: CPT

## 2025-02-10 PROCEDURE — 97530 THERAPEUTIC ACTIVITIES: CPT

## 2025-02-10 PROCEDURE — 97530 THERAPEUTIC ACTIVITIES: CPT | Mod: CQ

## 2025-02-10 PROCEDURE — 97110 THERAPEUTIC EXERCISES: CPT | Mod: CQ

## 2025-02-10 PROCEDURE — 97112 NEUROMUSCULAR REEDUCATION: CPT | Mod: CQ

## 2025-02-10 NOTE — PATIENT INSTRUCTIONS
[place] Theory:    The painful sensation that is perceived and experienced isn't only driven by the sensory event, but also the pain signals sent from the brain due to thoughts, attitudes, emotions, and feelings that surround the painful experience.    If someone has worrisome or anxious thoughts, negative emotions or memories, poor past experiences, or receives negative social feedback, pain signals will be sent down from the brain passing through an 'open gate', and the pain perceived will be greater. However, positive thoughts, emotions, and memories about the painful experience, relaxation, or positive social feedback, will cause the gate to close, and the person will essentially experience less pain.              Tendon Glides         Start at position A and move through each position slowly attempting to achieve full glide.  A-E is ONE repetition.     Complete 5 reps at least 1 times per day.

## 2025-02-10 NOTE — PROGRESS NOTES
"[facility] OUTPATIENT [facility]ERAPY AND [facility]LL[facility]    Functional Restoration Program  Physical Therapy Treatment Note      Name: Sari Serna  MRN:[id number]      Therapy Diagnosis:   Encounter Diagnosis   Name Primary?    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 Pt states she is working out when she will do resisted ex. She does not want to substitute for yoga or viral but will make time for weight lifting also. 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        "I need a brain re-set".     Current 2/10  Location(s): bilateral neck, shld  and R hand    OBJECTIVE     Objective Measures updated at progress report unless specified.     Treatment       Sari received the Individualized Treatments listed below:     Sari participated in dynamic functional therapeutic activities to improve functional performance for 0 minutes, including:       Full body functional mobility w/ 3-10 sec hold technique &/or 3-5 repetition technique to improve functional performance. NP  Improve driving safety, visual & spatial awareness:  Cervical flx/ext  Cervical side bending  Cervical rotation  Cervical protraction/retraction  Improve lifting mechanics, showering/bathing, dressing, cooking, reaching, pushing & fine motor skills  Shld rolls  Shld depression/elevation  Scapular retraction/protraction/scaption  Posterior shld stretch (cross arm)  Shld ER stretch (chicken wing)  Elbow flx/ext  Forearm supination/pronation  Wrist flx/ext  Open/close hands/fingers  Improve bed mobility & rolling, bending over, cooking & cleaning:  Seated trunk rotations  Seated trunk/thoracic ext/flx  Improve functional gait, squatting, sitting tolerance, functional balance:   Seated marches & knee to " "chest  Seated knee flx/ext  Seated hip ER/piriformis stretch  Seated hamstring stretch  Seated toe raise to heel raise   Seated ankle circles each way  Improve overhead reaching/activities, standing tolerance:  Standing lumbar extensions  Standing lateral leaning stretch  Standing quad stretch (UE support for balance)    Diaphragmatic breathing:   Verbal cues for unlabored, long & relaxed breathing w/ increased time for exhalation  Awareness of pulling breath down away from mouth to hips  Cues for for proper abdominal excursion & decreased use of accessory muscles of breathing (hand on stomach & on chest; increased stomach motion, decreased chest motion)  Education on inhalation activating sympathetic nervous system   Education on exhalation activating parasympathetic nervous system  Performed supine on foam roller    Mindfulness Moment: "Floating" music  Mindfulness-based activity involving deep breathing, mindful awareness of the body and headspace  Used to activate parasympathetic nervous system to decrease autonomic threat perception and thus reduce central sensitivity to pain  Patient demonstrate safe performance     Pt Education:  Review  Resisted Exercise Basics  Modified Garrett scale  Motion is lotio, Hurt vs harm, Calming the nervous system  Postural awareness during resisted ex and functional mobility    Functional Endurance:  DATE  EQUIPMENT VARIABLES RPE TIME/DISTANCE   1/6/25 Walking No AD 7/10 2 mins w/ 3 breaks taken 2/2 dizziness   01/15/25 Nu Step Level 1 3/10 10 min, 383 steps   01/17/25 Nu Step L 1 3/10 8 min   2/10/25 TM 2.0mph 3/10 10 min     Sari participated in neuromuscular re-education activities to improve: Balance, Coordination, Kinesthetic, Sense, Proprioception, and Posture for 30 minutes. The following activities were included:    Seated on PB - SOC - postural awareness x15  Supine PPT coordinate with the breathe x10    Peripheral muscle strengthening which included 1-2 sets of 10-20 " "repetitions at a slow, controlled 5-7 second per rep pace focused on strengthening supporting musculature for improved body mechanics & functional mobility.  Patient & therapist focused on proper form & speed during treatment to ensure optimal strengthening of each targeted muscle group & neuromuscular re-education. Patients are instructed to keep sets/reps with proper load to stay at 3-5 out of 10 on the provided modified Garrett exertion scale.    BATCA Machine Exercises Weight (lbs) Sets Repetitions Garrett Exertion Scale Rating   Leg Extension  10  20 3   Hamstring Curls 25  20 3   Chest Press 17.2  15 + 5 4   Pec Fly 27.5  15 4   Lat Pull Down 22.5  20 3   Mid Row 30  20 3   Bicep Bar Curls       Standing Tricep Extension       Single Leg Press 30  20 3   *compensation of shld    Sari received therapeutic exercises to develop strength, endurance, ROM, flexibility, posture, and core stabilization for 20 minutes including:    Supine over yoga mat   Static Stretch   B shld flex c/ dowel x15  B protract/retract c/ dowel x15  Snow angles x15      NP  Supine cervical retractions x 10, 3"  Supine cervical rot x5  Supine cervical side bend x5 ea  Supine cervical flex/ext x5  Open book stretch x10       Patient describe and demonstrate prior HEP- not performing now  Standing Y, W facing wall  Wall push ups      Patient Education and Home Exercises     Home Exercises Provided and Patient Education Provided     Education provided:   - FRP Educational Topics: Modified Garrett Exertion Scale, Physical & Psychological Pain Cycles, Z-Lying for Pain Relief/Relaxation, and Diaphragmatic Breathing    Written Home Exercises Provided: yes, FRP stretch routine. Previous HEP issued exercises were reviewed and Sari was able to demonstrate them prior to the end of the session.  Sari demonstrated good  understanding of the education provided. See EMR under Patient Instructions for information provided during therapy sessions    ASSESSMENT "     Sari participated well in PT session today. As per pt's subjective she is going to make time for the gym seeing the importance of resisted workouts. Neuro muscular re-ed cont with resisted training on BATCA machines. Review components of resisted ex basics to use resistance training to calm the NS, activate the PNS and train the body on the diff. between hurt vs harm.  Pt was able to perform without exacerbating symptoms and focus on the breathe. Lying on foam roller and performing ex were positive for decreasing discomfort and good position to welcome mindfulness.    Sari Is progressing well towards her goals.   Pt prognosis is Good.     Pt will continue to benefit from skilled outpatient physical therapy to address the deficits listed in the problem list box on initial evaluation, provide pt/family education and to maximize pt's level of independence in the home and community environment.     Pt's spiritual, cultural and educational needs considered and pt agreeable to plan of care and goals.     Anticipated Barriers for therapy: chronic nature of issues, psychosocial factors, central sensitization    GOALS: Pt is in agreement with the following goals:  Goal Progress Towards Goal   SHORT Term: In 3 weeks, pt will:     Verbalize & demonstrate good understanding of resisted training with 3-1-3 second rep for [id number] contraction to encourage full muscle recruitment for strength & endurance. Initiated 1/15/2025   Verbalize & demonstrate good understanding of home stretch routine to improve AROM, help decrease pain & improve mobility. Initiated 1/15/2025   Demonstrate proper supine or seated diaphragmatic breathing with decreased chest excursion & emphasis on full abdominal excursion & increased expiration time to promote muscle relaxation & increased parasympathetic activity to improve patient tolerance to activities. Initiated 1/15/2025   Verbalize good understanding of importance of  "proper posture in resisted exercise, functional activities & ADL's in order to help reduce postural strain, promote proper breathing. Initiated 1/15/2025   Demonstrate good understanding of full body resisted exercises w/ use of pictures &/or verbal cues to promote independence w/ exercise at the end of the program. Initiated 1/15/2025   Verbalize plan for continuing resisted exercises w/ specific location (i.e. commercial gym, home, community fitness center)  to incorporate all major muscle groups to maintain & progress therapeutic functional improvements. Initiated 1/15/2025   Verbalize difference between  "hurt vs harm"  to demonstrate understanding of fear avoidance in pain cycle.  Initiated 1/15/2025         Midterm: In 8 weeks, pt will:     Verbalize good understanding of activities planning/scheduling (stretching, mindfulness, resisted training, & cardio) prescribed by PT/OT following the end of the program to sustain & progress functional improvements. Initiated 1/15/2025   Perform selected resisted training w/ minimal to no cuing in therapy session to be independent w/ resisted strengthening. Initiated 1/15/2025   Demonstrate improved symptom self-management w/ posture/positioning and mechanical movements and/or implementation of appropriate modalities throughout a typical day.  Initiated 1/15/2025   Demonstrate independence w/ home stretch routine to improve AROM, help decrease pain & improve mobility. Initiated 1/15/2025         Long Term: In 12 weeks, pt will:     Perform selected cardio & resisted training independently to continue safe regular performance of daily exercise. Initiated 1/15/2025   Maintain 430 meters on 6 MWT /s inc fatigue acute or once get home to improve functional activity tolerance.   Initiated 1/15/2025   Perform single leg stance EC >10 seconds or greater bilaterally to improve safety and balance in ADLs. Initiated 1/15/2025   Demonstrate Timed Up & Go (with appropriate assistive " device, if necessary) of 7 seconds or less to decrease fall risk and improve functional mobility. Initiated 1/15/2025   Score NDI 20 or less on CERVICAL FOTO to indicate significant functional improvements in impaired functions secondary to pain. Initiated 1/15/2025     Initiated 1/15/2025          PLAN     Continue PT per POC     Monae Day, PTA

## 2025-02-10 NOTE — PROGRESS NOTES
"[facility]  [facility]  Occupational Therapy Visit    Patient Name: Sari Serna  MRN: [id number]  YOB: 1985  Today's Date: 2/10/2025    Therapy Diagnosis:        Encounter Diagnoses   Name Primary?    Occipital neuralgia of right side      Decreased motor activity      Decreased spinal mobility      Pain aggravated by activities of daily living Yes      Referring Provider: Debby Ross NP     Physician Orders: eval and treat; FRP  Medical Diagnosis: Occipital neuralgia of right side [M54.81], Decreased motor activity [R68.89], Decreased spinal mobility [R26.89]   Evaluation Date: 1/15/2025  Insurance Authorization Period Expiration: 12/31/25  Plan of Care Certification Period: 4/14/2025  Visit # / Visits authorized: 6/12  FOTO completed: 1/3    Precautions     Standard.      Subjective   :I woke up with some pain, but despite this, I did some stretches. I did some chores that wouldn't really upset things and changed my mind set. I didn't have a pity party like I would have done at one point. I felt productivite. My mood improved and I could give myself amberly"..  Pain reported as 1. Reported waking up with a 4/10.    Objective       See eval for details.    Treatment:  Therapeutic Exercise  Therapeutic Exercise Activity 1: daily stretch routine (performed in seated. Cues to coordinate movement with breath.)  Therapeutic Exercise Activity 2: review of putty HEP  Therapeutic Exercise Activity 3: TGE's    Therapeutic Activity  Therapeutic Activity 1: hand writing (Noted (R)UE shoulder elevation when writing. 11.93 seconds to write 'whales live in [place] blue ocean.')  Therapeutic Activity 2: functional waist to floor. (use of pole to review hip hinging. Anterior tilting occuring. 5 lbs crate used. Rated moderate exertion.)    Manual Therapy  Manual Therapy Activity 1: PROM PIP/DIPS (B) (decreased AROM (B) 3rd digit.)    Assessment & Plan   Assessment: " Difficulties with hip hinge; tends to anterior pelvic tilt with sequence. Activites related to personal goals rated. With handwriting, UE tension with shoulder elevation; open to education provided. Education discussed included gate theory.  Evaluation/Treatment Tolerance: Patient tolerated treatment well  Education  Education was done with Patient.  The patient Requires continuing/additional education.         Pacing, Putty HEP, gate theory, importance of shoulder stability, lifting sequence, calming the nervous system, acknowledging anxiety/danger in me messaging,  on steering wheel.      Anticipated barriers to occupational therapy: psychosocial stressors      SHORT Term goals: In 3 weeks, patient will:  Status of goal:   Implements correct use of breathing techniques during functional lifting tasks, with minimal cues needed. Initiated   Utilizes MICHELLE rate of exertion scale to pace performance with functional lifting tasks, and implements self-care strategies during activity participation to manage pain. Initiated   Verbalize understanding of energy conservation and pacing strategies during daily habits, roles, and routines in order to improve tolerance for work and home demands.  Initiated   Demonstrate increased positional tolerance to the level needed for work, home, and community activities (standing in cue at social event, managing supplies for outing, streneous IADL), as evidenced by having a METS level of 7. Initiated   Demonstrate proper body mechanics with functional lifting tasks (floor to waist, waist to shoulder, maximum lift, and box carry), via teach back method with minimal cues needed. Initiated   Demonstrate increased functional strength by lifting 13 lbs waist to shoulder for management of (I)ADL, including dressing and grooming, placing items in kitchen cabinets, folding towels, and/or managing suitcase when traveling.   Initiated   Demonstrate increased functional strength by lifting 13  lbs floor to waist to meet demand of work/home routine, including lifting groceries, managing laundry, and/or managing suitcase when traveling.   Initiated   Tolerate Home Activity Plan (HAP)/ Home Exercise Program (HEP) to promote safety and independence with ADL, with report of completion of at least 2 out of 4 days outside of program participation.  Initiated   Show improved perception of own abilities as evidenced by increase of FOTO scores to established MDC value and perception of performance ratings regarding personal long term goals.  Initiated         Long Term goals: In 9 weeks, patient will: Status of goal:   Report implementation of application of mindfulness, stretching, and other coping strategies for improved participation in daily routine. Initiated   Verbalize functional application of lifting techniques in daily life (golfers lift, floor to waist, waist to shoulder). Initiated   Applies functional application of lifting techniques (golfer's lift, floor to waist, waist to shoulder) in activities of daily life, per patient report.   Initiated   Demonstrate physical capacities consistent with a Light (#20 max, frequent lifting/carrying #10, 2.5 METS  demand level, as needed to perform activities to be successful in roles of life, regarding Full Time Employment, Household Management, Volunteer Work, and Community Participation. Initiated   Have an improvement of 3 points in 2/5 personal goals in rating of  performance.  Initiated   Show improved perception of own abilities as evidenced by increase of FOTO scores to established MC II value and perception of performance ratings regarding personal long term goals.  Initiated       Plan: Continue with plan of care. progress as tolerated. Next session: f/u lifts, creattion lift of self care and soothing tasks, hand writing (pacing).       SHARRON Moya, OTR/L, CEAS-I  2/10/2025

## 2025-02-12 ENCOUNTER — CLINICAL SUPPORT (OUTPATIENT)
Dept: REHABILITATION | Facility: OTHER | Age: 40
End: 2025-02-12
Payer: COMMERCIAL

## 2025-02-12 DIAGNOSIS — R52 PAIN AGGRAVATED BY ACTIVITIES OF DAILY LIVING: Primary | ICD-10-CM

## 2025-02-12 DIAGNOSIS — Z74.09 IMPAIRED FUNCTIONAL MOBILITY AND ACTIVITY TOLERANCE: Primary | ICD-10-CM

## 2025-02-12 PROCEDURE — 97530 THERAPEUTIC ACTIVITIES: CPT | Mod: CQ

## 2025-02-12 PROCEDURE — 97110 THERAPEUTIC EXERCISES: CPT | Mod: CQ

## 2025-02-12 PROCEDURE — 97110 THERAPEUTIC EXERCISES: CPT

## 2025-02-12 PROCEDURE — 97112 NEUROMUSCULAR REEDUCATION: CPT | Mod: CQ

## 2025-02-12 PROCEDURE — 97530 THERAPEUTIC ACTIVITIES: CPT

## 2025-02-12 NOTE — PROGRESS NOTES
"[facility]    [facility]  Physical Therapy Treatment Note      Name: Sari Serna  MRN:[id number]      Therapy Diagnosis:   Encounter Diagnosis   Name Primary?    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 Pt states she is working out when she will do resisted ex. She does not want to substitute for yoga or viral but will make time for weight lifting also. 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        "I need a brain re-set".     Current 2/10  Location(s): bilateral neck, shld  and R hand    OBJECTIVE     Objective Measures updated at progress report unless specified.     Treatment       Sari received the Individualized Treatments listed below:     Sari participated in dynamic functional therapeutic activities to improve functional performance for 0 minutes, including:       Full body functional mobility w/ 3-10 sec hold technique &/or 3-5 repetition technique to improve functional performance.   Improve driving safety, visual & spatial awareness:  Cervical flx/ext  Cervical side bending  Cervical rotation  Cervical protraction/retraction  Improve lifting mechanics, showering/bathing, dressing, cooking, reaching, pushing & fine motor skills  Shld rolls  Shld depression/elevation  Scapular retraction/protraction/scaption  Posterior shld stretch (cross arm)  Shld ER stretch (chicken wing)  Elbow flx/ext  Forearm supination/pronation  Wrist flx/ext  Open/close hands/fingers  Improve bed mobility & rolling, bending over, cooking & cleaning:  Seated trunk rotations  Seated trunk/thoracic ext/flx  Improve functional gait, squatting, sitting tolerance, functional balance:   Seated marches & knee to " chest  Seated knee flx/ext  Seated hip ER/piriformis stretch  Seated hamstring stretch  Seated toe raise to heel raise   Seated ankle circles each way  Improve overhead reaching/activities, standing tolerance:  Standing lumbar extensions  Standing lateral leaning stretch  Standing quad stretch (UE support for balance)    Diaphragmatic breathing:   Verbal cues for unlabored, long & relaxed breathing w/ increased time for exhalation  Awareness of pulling breath down away from mouth to hips  Cues for for proper abdominal excursion & decreased use of accessory muscles of breathing (hand on stomach & on chest; increased stomach motion, decreased chest motion)  Education on inhalation activating sympathetic nervous system   Education on exhalation activating parasympathetic nervous system  Performed supine on foam roller    Mindfulness Moment: Fan Chi  Mindfulness-based activity involving deep breathing, mindful awareness of the body and headspace  Used to activate parasympathetic nervous system to decrease autonomic threat perception and thus reduce central sensitivity to pain  Patient demonstrate safe performance     Pt Education:  Review  Resisted Exercise Basics  Modified Garrett scale  Motion is lotio, Hurt vs harm, Calming the nervous system  Postural awareness during resisted ex and functional mobility    Functional Endurance:  DATE  EQUIPMENT VARIABLES RPE TIME/DISTANCE   1/6/25 Walking No AD 7/10 2 mins w/ 3 breaks taken 2/2 dizziness   01/15/25 Nu Step Level 1 3/10 10 min, 383 steps   01/17/25 Nu Step L 1 3/10 8 min   2/10/25 TM 2.0mph 3/10 10 min     Sari participated in neuromuscular re-education activities to improve: Balance, Coordination, Kinesthetic, Sense, Proprioception, and Posture for 30 minutes. The following activities were included:    Seated on PB - SOC - postural awareness x15  Supine PPT coordinate with the breathe x10    Peripheral muscle strengthening which included 1-2 sets of 10-20 repetitions  "at a slow, controlled 5-7 second per rep pace focused on strengthening supporting musculature for improved body mechanics & functional mobility.  Patient & therapist focused on proper form & speed during treatment to ensure optimal strengthening of each targeted muscle group & neuromuscular re-education. Patients are instructed to keep sets/reps with proper load to stay at 3-5 out of 10 on the provided modified Garrett exertion scale.    BATCA Machine Exercises Weight (lbs) Sets Repetitions Garrett Exertion Scale Rating   Leg Extension  15  20 3.5   Hamstring Curls 30  20 4   Chest Press       Pec Fly       Lat Pull Down 25  20 4   Mid Row 35  20 4   Bicep Bar Curls  BD 7  20 4   Standing Tricep Extension       Single Leg Press 35  20 3   *compensation of shld    Sari received therapeutic exercises to develop strength, endurance, ROM, flexibility, posture, and core stabilization for 20 minutes including:    Supine over yoga mat   Static Stretch   B shld flex c/ dowel x15  B protract/retract c/ dowel x15  Snow angles x15      NP  Supine cervical retractions x 10, 3"  Supine cervical rot x5  Supine cervical side bend x5 ea  Supine cervical flex/ext x5  Open book stretch x10       Patient describe and demonstrate prior HEP- not performing now  Standing Y, W facing wall  Wall push ups      Patient Education and Home Exercises     Home Exercises Provided and Patient Education Provided     Education provided:   - FRP Educational Topics: Modified Garrett Exertion Scale, Physical & Psychological Pain Cycles, Z-Lying for Pain Relief/Relaxation, and Diaphragmatic Breathing    Written Home Exercises Provided: yes, FRP stretch routine. Previous HEP issued exercises were reviewed and Sari was able to demonstrate them prior to the end of the session.  Sari demonstrated good  understanding of the education provided. See EMR under Patient Instructions for information provided during therapy sessions    ASSESSMENT     Sari " participated well in PT session today. As per pt's subjective she is going to make time for the gym seeing the importance of resisted workouts. Neuro muscular re-ed cont with resisted training on BATCA machines. Review components of resisted ex basics to use resistance training to calm the NS, activate the PNS and train the body on the diff. between hurt vs harm.  Pt was able to perform without exacerbating symptoms and focus on the breathe. Lying on foam roller and performing ex were positive for decreasing discomfort and good position to welcome mindfulness.    Sari Is progressing well towards her goals.   Pt prognosis is Good.     Pt will continue to benefit from skilled outpatient physical therapy to address the deficits listed in the problem list box on initial evaluation, provide pt/family education and to maximize pt's level of independence in the home and community environment.     Pt's spiritual, cultural and educational needs considered and pt agreeable to plan of care and goals.     Anticipated Barriers for therapy: chronic nature of issues, psychosocial factors, central sensitization    GOALS: Pt is in agreement with the following goals:  Goal Progress Towards Goal   SHORT Term: In 3 weeks, pt will:     Verbalize & demonstrate good understanding of resisted training with 3-1-3 second rep for [id number] contraction to encourage full muscle recruitment for strength & endurance. Initiated 1/15/2025   Verbalize & demonstrate good understanding of home stretch routine to improve AROM, help decrease pain & improve mobility. Initiated 1/15/2025   Demonstrate proper supine or seated diaphragmatic breathing with decreased chest excursion & emphasis on full abdominal excursion & increased expiration time to promote muscle relaxation & increased parasympathetic activity to improve patient tolerance to activities. Initiated 1/15/2025   Verbalize good understanding of importance of proper  "posture in resisted exercise, functional activities & ADL's in order to help reduce postural strain, promote proper breathing. Initiated 1/15/2025   Demonstrate good understanding of full body resisted exercises w/ use of pictures &/or verbal cues to promote independence w/ exercise at the end of the program. Initiated 1/15/2025   Verbalize plan for continuing resisted exercises w/ specific location (i.e. commercial gym, home, community fitness center)  to incorporate all major muscle groups to maintain & progress therapeutic functional improvements. Initiated 1/15/2025   Verbalize difference between  "hurt vs harm"  to demonstrate understanding of fear avoidance in pain cycle.  Initiated 1/15/2025         Midterm: In 8 weeks, pt will:     Verbalize good understanding of activities planning/scheduling (stretching, mindfulness, resisted training, & cardio) prescribed by PT/OT following the end of the program to sustain & progress functional improvements. Initiated 1/15/2025   Perform selected resisted training w/ minimal to no cuing in therapy session to be independent w/ resisted strengthening. Initiated 1/15/2025   Demonstrate improved symptom self-management w/ posture/positioning and mechanical movements and/or implementation of appropriate modalities throughout a typical day.  Initiated 1/15/2025   Demonstrate independence w/ home stretch routine to improve AROM, help decrease pain & improve mobility. Initiated 1/15/2025         Long Term: In 12 weeks, pt will:     Perform selected cardio & resisted training independently to continue safe regular performance of daily exercise. Initiated 1/15/2025   Maintain 430 meters on 6 MWT /s inc fatigue acute or once get home to improve functional activity tolerance.   Initiated 1/15/2025   Perform single leg stance EC >10 seconds or greater bilaterally to improve safety and balance in ADLs. Initiated 1/15/2025   Demonstrate Timed Up & Go (with appropriate assistive device, " if necessary) of 7 seconds or less to decrease fall risk and improve functional mobility. Initiated 1/15/2025   Score NDI 20 or less on CERVICAL FOTO to indicate significant functional improvements in impaired functions secondary to pain. Initiated 1/15/2025     Initiated 1/15/2025          PLAN     Continue PT per POC     Monae Day, PTA

## 2025-02-12 NOTE — PROGRESS NOTES
[facility]  [facility]  Occupational Therapy Visit  Treatment note and PROGRESS report    Patient Name: Sari Serna  MRN: [id number]  YOB: 1985  Today's Date: 2/12/2025    Therapy Diagnosis:        Encounter Diagnoses   Name Primary?    Occipital neuralgia of right side      Decreased motor activity      Decreased spinal mobility      Pain aggravated by activities of daily living Yes      Referring Provider: Debby Ross NP     Physician Orders: eval and treat; FRP  Medical Diagnosis: Occipital neuralgia of right side [M54.81], Decreased motor activity [R68.89], Decreased spinal mobility [R26.89]   Evaluation Date: 1/15/2025  Insurance Authorization Period Expiration: 12/31/25  Plan of Care Certification Period: 4/14/2025  Visit # / Visits authorized: 6/12  FOTO completed: 1/3    Precautions     standard      Subjective    Going back to work next week; concerns about managing hand writing. Wrote 7 minutes the other day; focssed on keeping shoulder depresssed.     Pain Related Behaviors Observed: guarded stiff movement; currently rating pain as 2/10.  Functional Pain Scale Rating 0-10: within the last 24 hours  Date 1/15/2025 2/12/2025   Average 3/10 2/10   Worst 3/10 4/10   Best 2/10 1/10   Composite score 2.7/10 2.33/10   [KATHIE  is 1 point or 15-20% change in interference composite score (Aviin et al. 2008)]     Location: neck trap area, R hand pain (cramping with repetitive use)   Description: Sharp  Functional Exacerbations: morning, prolonged sitting, pain in neck, over use of hands, stirring in kitchen  Easing Factors: heat, ice, currently seeing PT for dry needling, manual manipulation    Patient Specific Activities based on Personal goals:  Activity  2/10/2025    Performance Satisfaction   Cooking (R hand use)  7  7   2.  Desk work (office/desk) 7  4   3.  yoga 8  6   4.  Social outings  9 9   5.  Groceries/cooking  9 9           Total Score  8   7   Ratin-10; unable/unsatisfied - Able/Satisfied         Objective       COGNITIVE Exam  Behaviors: engaged, pleasant, tearful at times  Memory: intermittent difficulty with recall/word finding reported at evalIrvin Guo noted 2025 this to be improving.   Safety awareness/insight to disability: fear of pain, boom/bust  Coping skills/emotional control: active coping strategies, focus on work, and exercise ; during session Appropriate to situation.     Dominant hand: right     Active Range of Motion        Upper Extremity 1/15/2025    TASHIA RIVERA Comments   Hands WFL WFL Hypermobility in extension of fingers   Wrist WFL WFL     Elbows WFL WFL     Shoulders WFL WFL        Upper Extremity Strength - Manual Muscle Testing        Motion Tested 1/15/2025    Right Left  comments   Shoulder Flexion 5/5 5/5     Shoulder Extension 5/5 5/5     Shoulder Abduction 5/5 5/5     Shoulder IR 5/5 5/5     Shoulder ER 5/5 5/5     Elbow Flexion 5/5 5/5     Elbow Extension 5/5 5/5         Strength (in pounds, rung III, average of three trials)   2025   Right hand  32.33 lbs   Left hand  32.33 lbs   [norms for women aged 35-39: R=74.1 +/-10.8; L=66.3 +/-11.7 (Mathiowetz et al, 1985)]     Functional Strength  Pile Testing: Lifting    1/15/2025 (lbs/HR/MICHELLE) 2025  (lbs/HR/MICHELLE)    Repetitive Floor to Waist      8105/3 -   Repetitive Waist to Shoulder    /2-3    1- Time Maximum     10/105/3    Carry-2 Handed (40ft)     10107/4    Work demand Level   Sedentary (#10 max, 1.5 METS)     Reason for stop point Inability to maintain proper body mechanics.    Comments   NT due to time   The work demand level listed above is based on the physical performance screening test performed. More comprehensive physical testing integrated with clinical findings would be required to derived an accurate work capacity.      Balance  5 times sit-stand (adults 18-65 y/o) 1/15/2025 2025   >12 sec= fall risk for general elderly  >16 sec=  fall risk for Parkinson's disease  >10 sec= balance/vestibular dysfunction (<59 y/o)  >14.2 sec= balance/vestibular dysfunction (>59 y/o)  >12 sec= fall risk for CVA 10.73 seconds     (without UE) 10.70 seconds    (Without UE needed to push up)   Comments -    -       Endurance Testing: Modified Ramp Treadmill Test    1/15/2025 2/12/2025   Minutes Completed  6 minutes 6 minutes 14 seconds   % Grades  12 % 12 %   Speed (mph)  2.5 mph 2.8 mph attempted   METS 7 7 METS    bpm 131 bpm   Garrett Rating Perceived Exertion Scale   5 4   Reason for stop point GARRETT scale rating beyond recommended levels, Decline in maintaining pace safely  Use of GARRETT scale   Comments -  Cues for gate given         Limitation/Restriction for FOTO NOC Survey     Therapist reviewed FOTO scores for Sari Serna on 1/15/2025.   FOTO documents entered into [employer] - see Media section.    Limitation Score: 48%                Treatment:  Therapeutic Exercise  Therapeutic Exercise Activity 1: wall slides with focus to avoid anterior pelvic tilt; x 6 reps, and performance related to lifting.  Therapeutic Exercise Activity 2: Functional mobility in hallway with focus on reciprocal arm swing x 5 minutes    Therapeutic Activity  Therapeutic Activity 1: Taking of measures (hand , activity tolerance, pain composite score)  Therapeutic Activity 2: Review of ergonomic principles for office use. Rapid office strain assessment check list provided. Educated on being a work place athlete: benefit of left mousing.    Education  Brain/body connection, MET levels, posture and core engagement for lifting, MARILEE check list (rapid office strain assessment), limiting putty therex to every other day, and left hand mousing benefits.       Assessment & Plan   Assessment:    Participated in some aspects of reassessment. With hand , both hands under norm for age group, wtih (R) hand fluctuating 20 lbs. 7 METS with endurance. Improved pain composite score.  With lifting, unable to maintain core engagement. Open to education provided.           Anticipated barriers to occupational therapy: psychosocial stressors    GOALS:    SHORT Term goals: In 3 weeks, patient will:  Status of goal; reviewed 2/12/2025   Implements correct use of breathing techniques during functional lifting tasks, with minimal cues needed. PROGRESSING   Utilizes MICHELLE rate of exertion scale to pace performance with functional lifting tasks, and implements self-care strategies during activity participation to manage pain. PROGRESSING   Verbalize understanding of energy conservation and pacing strategies during daily habits, roles, and routines in order to improve tolerance for work and home demands.  PROGRESSING   Demonstrate increased positional tolerance to the level needed for work, home, and community activities (standing in cue at social event, managing supplies for outing, streneous IADL), as evidenced by having a METS level of 7. MET 2/12/2025   Demonstrate proper body mechanics with functional lifting tasks (floor to waist, waist to shoulder, maximum lift, and box carry), via teach back method with minimal cues needed. PROGRESSING   Demonstrate increased functional strength by lifting 13 lbs waist to shoulder for management of (I)ADL, including dressing and grooming, placing items in kitchen cabinets, folding towels, and/or managing suitcase when traveling.   Initiated   Demonstrate increased functional strength by lifting 13 lbs floor to waist to meet demand of work/home routine, including lifting groceries, managing laundry, and/or managing suitcase when traveling.   Initiated   Tolerate Home Activity Plan (HAP)/ Home Exercise Program (HEP) to promote safety and independence with ADL, with report of completion of at least 2 out of 4 days outside of program participation.  PROGRESSING   Show improved perception of own abilities as evidenced by increase of FOTO scores to established MDC value  and perception of performance ratings regarding personal long term goals.  Initiated         Long Term goals: In 9 weeks, patient will: Status of goal; reviewed 2/12/2025   Report implementation of application of mindfulness, stretching, and other coping strategies for improved participation in daily routine. Initiated   Verbalize functional application of lifting techniques in daily life (golfers lift, floor to waist, waist to shoulder). Initiated   Applies functional application of lifting techniques (golfer's lift, floor to waist, waist to shoulder) in activities of daily life, per patient report.   Initiated   Demonstrate physical capacities consistent with a Light (#20 max, frequent lifting/carrying #10, 2.5 METS  demand level, as needed to perform activities to be successful in roles of life, regarding Full Time Employment, Household Management, Volunteer Work, and Community Participation. Initiated   Have an improvement of 3 points in 2/5 personal goals in rating of  performance.  Initiated   Show improved perception of own abilities as evidenced by increase of FOTO scores to established MC II value and perception of performance ratings regarding personal long term goals.  Initiated         Plan:   continue per plan of care.       SHARRON Moya, ROMAN/L, CEAS-I  2/12/2025

## 2025-02-14 ENCOUNTER — OFFICE VISIT (OUTPATIENT)
Dept: OBSTETRICS AND GYNECOLOGY | Facility: CLINIC | Age: 40
End: 2025-02-14
Payer: COMMERCIAL

## 2025-02-14 DIAGNOSIS — N80.9 ENDOMETRIOSIS DETERMINED BY LAPAROSCOPY: Primary | ICD-10-CM

## 2025-02-14 NOTE — PROGRESS NOTES
The patient location is: [place]  The chief complaint leading to consultation is: endometriosis    Visit type: audiovisual    Face to Face time with patient: 15  min  20 minutes of total time spent on the encounter, which includes face to face time and non-face to face time preparing to see the patient (eg, review of tests), Obtaining and/or reviewing separately obtained history, Documenting clinical information in the electronic or other health record, Independently interpreting results (not separately reported) and communicating results to the patient/family/caregiver, or Care coordination (not separately reported).         Each patient to whom he or she provides medical services by telemedicine is:  (1) informed of the relationship between the physician and patient and the respective role of any other health care provider with respect to management of the patient; and (2) notified that he or she may decline to receive medical services by telemedicine and may withdraw from such care at any time.    Notes: Has been on continuous OCP since her endometriosis surgerr. She says overall her pain is much better since surgery. Not completely gone but better. She started OCP and had 2 weeks of BTB, stopped had a period then restarted. 3 weeks later bled again while on active pills. Did not have to stop to have a withdrawal period, because it stopped by itself and did not last as long. We discussed options:    1- keep going with continuous OCP and give it more time since the most recent BTB was better  2- [place]- explained in detail pros and cons, incl but not limited to bone loss  3- partial hysterectomy since she is 100% sure she has never wanted to have children and then continue continuous OCP for endo control    For now we will go with option 1 and give it more time. Explained if spotting starts try and take 2 pills to prevent BTB  All questions answered.   She will keep me updated.

## 2025-02-19 ENCOUNTER — CLINICAL SUPPORT (OUTPATIENT)
Dept: REHABILITATION | Facility: OTHER | Age: 40
End: 2025-02-19
Payer: COMMERCIAL

## 2025-02-19 DIAGNOSIS — Z74.09 IMPAIRED FUNCTIONAL MOBILITY AND ACTIVITY TOLERANCE: Primary | ICD-10-CM

## 2025-02-19 PROCEDURE — 97110 THERAPEUTIC EXERCISES: CPT

## 2025-02-19 PROCEDURE — 97112 NEUROMUSCULAR REEDUCATION: CPT

## 2025-02-19 PROCEDURE — 97530 THERAPEUTIC ACTIVITIES: CPT

## 2025-02-19 NOTE — PROGRESS NOTES
"[facility]  [facility]  Occupational Therapy Visit  Treatment note    Patient Name: Sari Serna  MRN: [id number]  YOB: 1985  Today's Date: 2025    Therapy Diagnosis:        Encounter Diagnoses   Name Primary?    Occipital neuralgia of right side      Decreased motor activity      Decreased spinal mobility      Pain aggravated by activities of daily living Yes      Referring Provider: Debby Ross NP     Physician Orders: eval and treat; FRP  Medical Diagnosis: Occipital neuralgia of right side [M54.81], Decreased motor activity [R68.89], Decreased spinal mobility [R26.89]   Evaluation Date: 1/15/2025  Insurance Authorization Period Expiration: 25  Plan of Care Certification Period: 2025  Visit # / Visits authorized:  (plus eval)  FOTO completed: 2/3    Time In: 15:30  Time Out: 16:28  Total Billable Time: 58 minutes    Subjective    Is doing some subbing work; 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 "I am not having any regrets". My mom would be terrified just making a u-turn, and would make it sound like we would die. It would help for me to be able to drive [place] since driving school.     Currently rating pain as 1/10.  Location: neck trap area, R hand pain (cramping with repetitive use)   Description: Sharp    Patient Specific Activities based on Personal goals:  Activity  2/10/2025    Performance Satisfaction   Cooking (R hand use)  7  7   2.  Desk work (office/desk) 7  4   3.  yoga 8  6   4.  Social outings  9 9   5.  Groceries/cooking  9 9           Total Score  8  7   Ratin-10; unable/unsatisfied - Able/Satisfied         Objective         COGNITIVE Exam  Behaviors: engaged, pleasant, tearful at times  Memory: intermittent difficulty with recall/word finding reported at [facility]. Sari noted 2025 this to be improving. "   Safety awareness/insight to disability: fear of pain, boom/bust  Coping skills/emotional control: active coping strategies, focus on work, and exercise ; during session Appropriate to situation.    Perceptual testing  Date 2/21/2025 Comments/norms   Letter cancellation NT Average time for completion is 1 minute 15 seconds   Maze Test NT Cut point score is 60 seconds or less with 1 error or less   Trails A 31.68 seconds; 0 errors Average 29 seconds, Deficient > 78 seconds.   Trails B 50.06 seconds; 0 errors Average 75 seconds, Deficient > 273 seconds.   Motor Free Visual Perception Test (MVPT) NT Average time for completion is 7 minutes.      Dominant hand: right     Active Range of Motion  Upper Extremity 1/15/2025    TASHIA RIVERA Comments   Hands WFL WFL Hypermobility in extension of fingers   Wrist WFL WFL     Elbows WFL WFL     Shoulders WFL WFL        Upper Extremity Strength - Manual Muscle Testing  Motion Tested 1/15/2025    Right Left    Shoulder Flexion 5/5 5/5   Shoulder Extension 5/5 5/5   Shoulder Abduction 5/5 5/5   Shoulder IR 5/5 5/5   Shoulder ER 5/5 5/5   Elbow Flexion 5/5 5/5   Elbow Extension 5/5 5/5       Strength (in pounds, rung III, average of three trials)   2/12/2025   Right hand  32.33 lbs   Left hand  32.33 lbs   [norms for women aged 35-39: R=74.1 +/-10.8; L=66.3 +/-11.7 (Dalila et al, 1985)]     Functional Strength  Pile Testing: Lifting    1/15/2025 (lbs/HR/MICHELLE) 2/12/2025  (lbs/HR/MICHELLE)    Repetitive Floor to Waist      8/105/3 -   Repetitive Waist to Shoulder    8/121/2-3    1- Time Maximum     10/105/3    Carry-2 Handed (40ft)     10/107/4    Work demand Level   Sedentary (#10 max, 1.5 METS)     Reason for stop point Inability to maintain proper body mechanics.    Comments   NT due to time   The work demand level listed above is based on the physical performance screening test performed. More comprehensive physical testing integrated with clinical findings would be required to  derived an accurate work capacity.      Balance  5 times sit-stand (adults 18-65 y/o) 1/15/2025 2/12/2025   >12 sec= fall risk for general elderly  >16 sec= fall risk for Parkinson's disease  >10 sec= balance/vestibular dysfunction (<61 y/o)  >14.2 sec= balance/vestibular dysfunction (>61 y/o)  >12 sec= fall risk for CVA 10.73 seconds     (without UE) 10.70 seconds    (Without UE needed to push up)   Comments -    -       Endurance Testing: Modified Ramp Treadmill Test    1/15/2025 2/12/2025   Minutes Completed  6 minutes 6 minutes 14 seconds   % Grades  12 % 12 %   Speed (mph)  2.5 mph 2.8 mph attempted   METS 7 7 METS    bpm 131 bpm   Garrett Rating Perceived Exertion Scale   5 4   Reason for stop point GARRETT scale rating beyond recommended levels, Decline in maintaining pace safely  Use of GARRETT scale   Comments -  Cues for gate given         Limitation/Restriction for FOTO NOC Survey     Therapist reviewed FOTO scores for Sari Serna on 2/12/2025.   FOTO documents entered into [employer] - see Media section.    Limitation Score: 48%                Treatment:  Therapeutic Exercise  Therapeutic Exercise Activity 1: Full body stretch w/ 3-10 sec hold technique &/or 3-5 repetition technique on all of the following: Cervical flx/ext, Cervical sidebending, Cervical rotation, Cervical protraction/retraction, Shoulder rolls, Shoulder depression/elevation, Scapular retraction/protraction/scaption, Posterior shoulder stretch (cross arm), Shoulder ER stretch (chicken wing), Elbow flx/ext, Forearm supination/pronation, Wrist flx/ext, Open/close hands/fingers, Seated trunk rotations, Seated trunk/thoracic ext/flx, Seated toe raise to heel raise, Seated ankle circles each way, Standing lumbar extensions, Standing lateral trunk stretch, Standing quad stretch.  Therapeutic Exercise Activity 2: Sari  used Nustep for 13 minutes, starting at level 1 to 1, to improve functional endurance an progress towards increased MET  level for improved community mobility and particpation rated as Moderate (3/10) exertion, Sari re-educated on how to add mindfulness component to activity and how to pace appropriately by completed self check ins and grading activity as needed, with goal to reach moderate to sort of hard by end of activity. Completed 1186 steps; elevation 1131'.    Therapeutic Activity  Therapeutic Activity 1: floor to waist x 10 reps with 5LBS, and raised surface used (3 crates >2 >1 crate). Able to keep trunk neutral. Rated as moderate exertion (3/10). CUes to avoid reaching forward.  Therapeutic Activity 2: Discussion on driving; Trails A& B completed. (See objective section for details). Education provided glare elimination method. Ability to process information for merging into higher speed traffic.    Education   Glare elimination method for driving  Brain/body connection.    Assessment & Plan   Assessment:     tolerated session well. Impoved liting, with raised surface used initially. Open to discussion on interstate driving; recognizes importance of overcoming anxiety experienced related to this, and tendency to avoid it.     Making progress towards goals. Would continue to benefit from OT. Review schedule next week; with changes in work might be able to attend 2x a week, which would help with continuity of care.      Anticipated barriers to occupational therapy: psychosocial stressors    GOALS:    SHORT Term goals: In 3 weeks, patient will:  Status of goal; reviewed 2/12/2025   Implements correct use of breathing techniques during functional lifting tasks, with minimal cues needed. PROGRESSING   Utilizes MICHELLE rate of exertion scale to pace performance with functional lifting tasks, and implements self-care strategies during activity participation to manage pain. PROGRESSING   Verbalize understanding of energy conservation and pacing strategies during daily habits, roles, and routines in order to improve tolerance for work  and home demands.  PROGRESSING   Demonstrate increased positional tolerance to the level needed for work, home, and community activities (standing in cue at social event, managing supplies for outing, streneous IADL), as evidenced by having a METS level of 7. MET 2/12/2025   Demonstrate proper body mechanics with functional lifting tasks (floor to waist, waist to shoulder, maximum lift, and box carry), via teach back method with minimal cues needed. PROGRESSING   Demonstrate increased functional strength by lifting 13 lbs waist to shoulder for management of (I)ADL, including dressing and grooming, placing items in kitchen cabinets, folding towels, and/or managing suitcase when traveling.   Initiated   Demonstrate increased functional strength by lifting 13 lbs floor to waist to meet demand of work/home routine, including lifting groceries, managing laundry, and/or managing suitcase when traveling.   Initiated   Tolerate Home Activity Plan (HAP)/ Home Exercise Program (HEP) to promote safety and independence with ADL, with report of completion of at least 2 out of 4 days outside of program participation.  PROGRESSING   Show improved perception of own abilities as evidenced by increase of FOTO scores to established MDC value and perception of performance ratings regarding personal long term goals.  Initiated         Long Term goals: In 9 weeks, patient will: Status of goal; reviewed 2/12/2025   Report implementation of application of mindfulness, stretching, and other coping strategies for improved participation in daily routine. Initiated   Verbalize functional application of lifting techniques in daily life (golfers lift, floor to waist, waist to shoulder). Initiated   Applies functional application of lifting techniques (golfer's lift, floor to waist, waist to shoulder) in activities of daily life, per patient report.   Initiated   Demonstrate physical capacities consistent with a Light (#20 max, frequent  lifting/carrying #10, 2.5 METS  demand level, as needed to perform activities to be successful in roles of life, regarding Full Time Employment, Household Management, Volunteer Work, and Community Participation. Initiated   Have an improvement of 3 points in 2/5 personal goals in rating of  performance.  Initiated   Show improved perception of own abilities as evidenced by increase of FOTO scores to established MC II value and perception of performance ratings regarding personal long term goals.  Initiated         Plan:   continue per plan of care. Progress as tolerated.        SHARRON Moya OTR/L, CEAS-I  2/21/2025

## 2025-02-19 NOTE — PROGRESS NOTES
[facility]    Functional Restoration Program  Physical Therapy Treatment Note      Name: Sari Serna  MRN:[id number]    Therapy Diagnosis:   Encounter Diagnosis   Name Primary?    Impaired functional mobility and activity tolerance Yes     Physician: Debby Ross NP                  Date: 2/19/2025    Physician Orders: PT Eval and Treat   Medical Diagnosis from Referral:   M54.81 (ICD-10-CM) - Occipital neuralgia of right side   R68.89 (ICD-10-CM) - Decreased motor activity   R26.89 (ICD-10-CM) - Decreased spinal mobility      Evaluation Date: 1/15/2025  Authorization Period Expiration: 12/31/25  Plan of Care Expiration: 03/28/25  Visit # / Visits authorized: 10/ 20  anticipate d/c visit 12/13  FOTO: 01/ 03       Precautions: Standard     Time In: 1:30 pm   Time Out: 2:30 pm   Total Appointment Time (timed & untimed codes): 60 minutes    SUBJECTIVE     Sari reports having resigned from her current job citing feeling inc stress/anxious /c the position.  Patient also note concern for changes in federal education reform being a component of her stress.    Patient report having started job search process including updated resume.  Patient report adjusting her screen and sitting position to improve per posture.    Patient report using stretching in AM for cervical discomfort /c elements of relief.  Patient follow that she has less fear in driving recognizing improved cervical motion and dec pain with motion.   Upon approaching discharge, patient verbalize intent to continue regular exercise practice including Mack, yoga, resistance training.            Current 2/10  Location(s): bilateral neck, shld  and R hand    OBJECTIVE     Objective Measures updated at progress report unless specified.         Limitation/Restriction for FOTO CERVICAL Survey     Therapist reviewed FOTO scores for Sari Serna on 1/15/2025.   FOTO documents entered into [employer] - see Media section.      INTAKE Score 1/15/2025: 56% (NDI 28)     Predicted Score: 59%           Treatment       Sari received the Individualized Treatments listed below:     Sari participated in dynamic functional therapeutic activities to improve functional performance for 15 minutes, including:     Full body functional mobility w/ 3-10 sec hold technique &/or 3-5 repetition technique to improve functional performance.   Improve driving safety, visual & spatial awareness:  Cervical flx/ext  Cervical side bending  Cervical rotation  Cervical protraction/retraction  Improve lifting mechanics, showering/bathing, dressing, cooking, reaching, pushing & fine motor skills  Shld rolls  Shld depression/elevation  Scapular retraction/protraction/scaption  Posterior shld stretch (cross arm)  Shld ER stretch (chicken wing)  Elbow flx/ext  Forearm supination/pronation  Wrist flx/ext  Open/close hands/fingers  Improve bed mobility & rolling, bending over, cooking & cleaning:  Seated trunk rotations  Seated trunk/thoracic ext/flx  Improve functional gait, squatting, sitting tolerance, functional balance:   Seated marches & knee to chest  Seated knee flx/ext  Seated hip ER/piriformis stretch  Seated hamstring stretch  Seated toe raise to heel raise   Seated ankle circles each way  Improve overhead reaching/activities, standing tolerance:  Standing lumbar extensions  Standing lateral leaning stretch  Standing quad stretch (UE support for balance)        Performed during positional release (see TherEx)   Diaphragmatic breathing:   Verbal cues for unlabored, long & relaxed breathing w/ increased time for exhalation  Awareness of pulling breath down away from mouth to hips  Cues for for proper abdominal excursion & decreased use of accessory muscles of breathing (hand on stomach & on chest; increased stomach motion, decreased chest motion)  Education on inhalation activating sympathetic nervous system   Education on exhalation activating  parasympathetic nervous system  Performed supine on foam roller      NP:   Mindfulness Moment: Fan Chi  Mindfulness-based activity involving deep breathing, mindful awareness of the body and headspace  Used to activate parasympathetic nervous system to decrease autonomic threat perception and thus reduce central sensitivity to pain  Patient demonstrate safe performance     Pt Education:  Review  Resisted Exercise Basics  Modified Garrett scale  Motion is lotio, Hurt vs harm, Calming the nervous system  Postural awareness during resisted ex and functional mobility    Functional Endurance:  DATE  EQUIPMENT VARIABLES RPE TIME/DISTANCE   1/6/25 Walking No AD 7/10 2 mins w/ 3 breaks taken 2/2 dizziness   01/15/25 Nu Step Level 1 3/10 10 min, 383 steps   01/17/25 Nu Step L 1 3/10 8 min   2/10/25 TM 2.0mph 3/10 10 min     Sari participated in neuromuscular re-education activities to improve: Balance, Coordination, Kinesthetic, Sense, Proprioception, and Posture for 10 minutes. The following activities were included:    Peripheral muscle strengthening which included 1-2 sets of 10-20 repetitions at a slow, controlled 5-7 second per rep pace focused on strengthening supporting musculature for improved body mechanics & functional mobility.  Patient & therapist focused on proper form & speed during treatment to ensure optimal strengthening of each targeted muscle group & neuromuscular re-education. Patients are instructed to keep sets/reps with proper load to stay at 3-5 out of 10 on the provided modified Garrett exertion scale.    [employer] Navigation Machine Exercises Weight (lbs) Sets Repetitions Garrett Exertion Scale Rating   Leg Extension  15  20 3.5   Hamstring Curls 30  20 4   Chest Press       Pec Fly       Lat Pull Down 25 1 20 4   Mid Row 35 1 20 4   Bicep Bar Curls  BD 7  20 4   Standing Tricep Extension       Single Leg Press 35  20 3     NP:   Seated on PB - SOC - postural awareness x15  Supine PPT coordinate with the breathe  "x10    Sari received therapeutic exercises to develop strength, endurance, ROM, flexibility, posture, and core stabilization for 35 minutes including:    BASELINE cervical R rot 39 deg  flexion 34 deg    Reviewed HEP   Cervical retractions x 10 cue for level chin, x 10 /c patient OP, x 10 /c cervical ext  Cervical retractions /c slight R rot x 10     Supine over 2 rolled yoga mats  Positional release  B shoulder flex 3# dowel x 20 last 6 reps /c head turns  Snow [place] x 10   D1/D2 /c 1# dowel x 10 each side     Return to BASELINE   cervical R rot 60 deg patient note sig less discomfort /c rotation    flexion 34 deg      NP  Standing Y, W facing wall  Wall push ups   Supine cervical retractions x 10, 3"  Supine cervical rot x5  Supine cervical side bend x5 ea  Supine cervical flex/ext x5  Open book stretch x10            Patient Education and Home Exercises     Home Exercises Provided and Patient Education Provided     Education provided:   - FRP Educational Topics: Modified Garrett Exertion Scale, Physical & Psychological Pain Cycles, Z-Lying for Pain Relief/Relaxation, and Diaphragmatic Breathing    Written Home Exercises Provided: yes, FRP stretch routine. Previous HEP issued exercises were reviewed and Sari was able to demonstrate them prior to the end of the session.  Sari demonstrated good  understanding of the education provided. See EMR under Patient Instructions for information provided during therapy sessions    ASSESSMENT     Patient initially arrive to tx /c emotional response to her job decision.  However, despite high emotional component patient participated well in tx today including showing Ind /c full stretch routine thereby meeting associated goal and regulating her pain response to activity thereby indicating improved insight into role of emotion and sx presentation.  Patient subjective report also indicate inc understanding of posture for sx management including patient having made adjustments " to home computer thereby meeting associated goal.  Mechanically addressed patient concern for cervical ROM and sx response /c sig improved ROM.  Patient report of feeling dec fear /c driving since FRP indicate good assimilation of FRP strengthening/mechanical components to address psychosocial limitations.  Tx return to resistance exercises /c patient demonstrate good technique and controlled motion and breathing thereby meeting associated goal.  Plan to discuss controlled breathing techniques at future sessions to address established goals and progress toward discharge.       Sari Is progressing well towards her goals.   Pt prognosis is Good.     Pt will continue to benefit from skilled outpatient physical therapy to address the deficits listed in the problem list box on initial evaluation, provide pt/family education and to maximize pt's level of independence in the home and community environment.     Pt's spiritual, cultural and educational needs considered and pt agreeable to plan of care and goals.     Anticipated Barriers for therapy: chronic nature of issues, psychosocial factors, central sensitization    GOALS: Pt is in agreement with the following goals:  Goal Progress Towards Goal   SHORT Term: In 3 weeks, pt will:     Verbalize & demonstrate good understanding of resisted training with 3-1-3 second rep for vaihbyafqm-hblruffir-khgypojhq contraction to encourage full muscle recruitment for strength & endurance. MET 02/19/25   Verbalize & demonstrate good understanding of home stretch routine to improve AROM, help decrease pain & improve mobility. MET 02/19/25   Demonstrate proper supine or seated diaphragmatic breathing with decreased chest excursion & emphasis on full abdominal excursion & increased expiration time to promote muscle relaxation & increased parasympathetic activity to improve patient tolerance to activities. Initiated 1/15/2025   Verbalize good understanding of importance of proper  "posture in resisted exercise, functional activities & ADL's in order to help reduce postural strain, promote proper breathing. MET 02/19/25   Demonstrate good understanding of full body resisted exercises w/ use of pictures &/or verbal cues to promote independence w/ exercise at the end of the program. Initiated 1/15/2025   Verbalize plan for continuing resisted exercises w/ specific location (i.e. commercial gym, home, community fitness center)  to incorporate all major muscle groups to maintain & progress therapeutic functional improvements. Initiated 1/15/2025   Verbalize difference between  "hurt vs harm"  to demonstrate understanding of fear avoidance in pain cycle.  Initiated 1/15/2025         Midterm: In 8 weeks, pt will:     Verbalize good understanding of activities planning/scheduling (stretching, mindfulness, resisted training, & cardio) prescribed by PT/OT following the end of the program to sustain & progress functional improvements. Initiated 1/15/2025   Perform selected resisted training w/ minimal to no cuing in therapy session to be independent w/ resisted strengthening. Initiated 1/15/2025   Demonstrate improved symptom self-management w/ posture/positioning and mechanical movements and/or implementation of appropriate modalities throughout a typical day.  Initiated 1/15/2025   Demonstrate independence w/ home stretch routine to improve AROM, help decrease pain & improve mobility. Initiated 1/15/2025         Long Term: In 12 weeks, pt will:     Perform selected cardio & resisted training independently to continue safe regular performance of daily exercise. Initiated 1/15/2025   Maintain 430 meters on 6 MWT /s inc fatigue acute or once get home to improve functional activity tolerance.   Initiated 1/15/2025   Perform single leg stance EC >10 seconds or greater bilaterally to improve safety and balance in ADLs. Initiated 1/15/2025   Demonstrate Timed Up & Go (with appropriate assistive device, if " necessary) of 7 seconds or less to decrease fall risk and improve functional mobility. Initiated 1/15/2025   Score NDI 20 or less on CERVICAL FOTO to indicate significant functional improvements in impaired functions secondary to pain. Initiated 1/15/2025     Initiated 1/15/2025          PLAN     Continue PT per POC     Conner Huff, PT

## 2025-02-21 ENCOUNTER — CLINICAL SUPPORT (OUTPATIENT)
Dept: REHABILITATION | Facility: OTHER | Age: 40
End: 2025-02-21
Payer: COMMERCIAL

## 2025-02-21 DIAGNOSIS — R52 PAIN AGGRAVATED BY ACTIVITIES OF DAILY LIVING: Primary | ICD-10-CM

## 2025-02-24 ENCOUNTER — CLINICAL SUPPORT (OUTPATIENT)
Dept: REHABILITATION | Facility: OTHER | Age: 40
End: 2025-02-24
Payer: COMMERCIAL

## 2025-02-24 DIAGNOSIS — Z74.09 IMPAIRED FUNCTIONAL MOBILITY AND ACTIVITY TOLERANCE: Primary | ICD-10-CM

## 2025-02-24 PROCEDURE — 97112 NEUROMUSCULAR REEDUCATION: CPT | Mod: CQ

## 2025-02-24 PROCEDURE — 97110 THERAPEUTIC EXERCISES: CPT | Mod: CQ

## 2025-02-24 NOTE — PROGRESS NOTES
[facility]    Functional Restoration Program  Physical Therapy Treatment Note      Name: Sari Serna  MRN:[id number]    Therapy Diagnosis:   Encounter Diagnosis   Name Primary?    Impaired functional mobility and activity tolerance Yes       Physician: Debby Ross NP                  Date: 2/24/2025    Physician Orders: PT Eval and Treat   Medical Diagnosis from Referral:   M54.81 (ICD-10-CM) - Occipital neuralgia of right side   R68.89 (ICD-10-CM) - Decreased motor activity   R26.89 (ICD-10-CM) - Decreased spinal mobility      Evaluation Date: 1/15/2025  Authorization Period Expiration: 12/31/25  Plan of Care Expiration: 03/28/25  Visit # / Visits authorized: 11/ 20  anticipate d/c visit 12/13  FOTO: 01/ 03       Precautions: Standard     Time In: 3:30 pm   Time Out: 4:30 pm   Total Appointment Time (timed & untimed codes): 60 minutes    SUBJECTIVE     Sari reports resigning from her job. She realized she was numerous panic attacks just knowing she was returning to work and she has not had one months. Pt states she is still having her worst discomfort in the AM and is performing stretches before getting out of bed, which has helped. Pt states she feels comfortable performing resisted ex at home with free weights and may work in gym later with partner. She is continuing with her cardio workouts with [employer] and mindfulness with yoga classes.          Current 2/10  Location(s): bilateral neck, shld  and R hand    OBJECTIVE     Objective Measures updated at progress report unless specified.     Cervical: BASELINE cervical R rot 39 deg  flexion 34 deg    Limitation/Restriction for FOTO CERVICAL Survey     Therapist reviewed FOTO scores for Sari Serna on 1/15/2025.   FOTO documents entered into [employer] - see Media section.     INTAKE Score 1/15/2025: 56% (NDI 28)     Predicted Score: 59%           Treatment       Sari received the Individualized Treatments listed below:      Sari participated in dynamic functional therapeutic activities to improve functional performance for 20 minutes, including:     Full body functional mobility w/ 3-10 sec hold technique &/or 3-5 repetition technique to improve functional performance.   Improve driving safety, visual & spatial awareness:  Cervical flx/ext  Cervical side bending  Cervical rotation  Cervical protraction/retraction  Improve lifting mechanics, showering/bathing, dressing, cooking, reaching, pushing & fine motor skills  Shld rolls  Shld depression/elevation  Scapular retraction/protraction/scaption  Posterior shld stretch (cross arm)  Shld ER stretch (chicken wing)  Elbow flx/ext  Forearm supination/pronation  Wrist flx/ext  Open/close hands/fingers  Improve bed mobility & rolling, bending over, cooking & cleaning:  Seated trunk rotations  Seated trunk/thoracic ext/flx  Improve functional gait, squatting, sitting tolerance, functional balance:   Seated marches & knee to chest  Seated knee flx/ext  Seated hip ER/piriformis stretch  Seated hamstring stretch  Seated toe raise to heel raise   Seated ankle circles each way  Improve overhead reaching/activities, standing tolerance:  Standing lumbar extensions  Standing lateral leaning stretch  Standing quad stretch (UE support for balance)    Diaphragmatic breathing:   Verbal cues for unlabored, long & relaxed breathing w/ increased time for exhalation  Awareness of pulling breath down away from mouth to hips  Cues for for proper abdominal excursion & decreased use of accessory muscles of breathing (hand on stomach & on chest; increased stomach motion, decreased chest motion)  Education on inhalation activating sympathetic nervous system   Education on exhalation activating parasympathetic nervous system  Performed supine on foam roller      NP:   Mindfulness Moment: Fan Chi  Mindfulness-based activity involving deep breathing, mindful awareness of the body and headspace  Used to activate  parasympathetic nervous system to decrease autonomic threat perception and thus reduce central sensitivity to pain  Patient demonstrate safe performance     Pt Education:  Review  Resisted Exercise Basics  Modified Garrett scale  Motion is lotio, Hurt vs harm, Calming the nervous system  Postural awareness during resisted ex and functional mobility    Functional Endurance:  DATE  EQUIPMENT VARIABLES RPE TIME/DISTANCE   1/6/25 Walking No AD 7/10 2 mins w/ 3 breaks taken 2/2 dizziness   01/15/25 Nu Step Level 1 3/10 10 min, 383 steps   01/17/25 Nu Step L 1 3/10 8 min   2/10/25 TM 2.0mph 3/10 10 min            Sari participated in neuromuscular re-education activities to improve: Balance, Coordination, Kinesthetic, Sense, Proprioception, and Posture for 25 minutes. The following activities were included:    Peripheral muscle strengthening which included 1-2 sets of 10-20 repetitions at a slow, controlled 5-7 second per rep pace focused on strengthening supporting musculature for improved body mechanics & functional mobility.  Patient & therapist focused on proper form & speed during treatment to ensure optimal strengthening of each targeted muscle group & neuromuscular re-education. Patients are instructed to keep sets/reps with proper load to stay at 3-5 out of 10 on the provided modified Garrett exertion scale.    [employer] Machine Exercises Weight (lbs) Sets Repetitions Garrett Exertion Scale Rating   Leg Extension  20  20 3   Hamstring Curls 35  20 3.5   Chest Press 17.5  20 3   Pec Fly 25  20 3.5   Lat Pull Down       Mid Row       Bicep Bar Curls  BD       Standing Tricep Extension       Single Leg Press 40  20 3     NP:   Seated on PB - SOC - postural awareness x15  Supine PPT coordinate with the breathe x10    Sari received therapeutic exercises to develop strength, endurance, ROM, flexibility, posture, and core stabilization for 35 minutes including:    Cervical retractions x 10 cue for level chin, x 10 /c patient OP,  "x 10 /c cervical ext  Cervical retractions /c slight R rot x 10     Supine over 2 rolled yoga mats  Positional release  B shoulder flex 3# dowel x 20 last 6 reps /c head turns  [place] x 10   D1/D2 /c 1# dowel x 10 each side     Return to BASELINE   cervical R rot 60 deg patient note sig less discomfort /c rotation    flexion 34 deg      NP  Standing Y, W facing wall  Wall push ups   Supine cervical retractions x 10, 3"  Supine cervical rot x5  Supine cervical side bend x5 ea  Supine cervical flex/ext x5  Open book stretch x10            Patient Education and Home Exercises     Home Exercises Provided and Patient Education Provided     Education provided:   - FRP Educational Topics: Modified Garrett Exertion Scale, Physical & Psychological Pain Cycles, Z-Lying for Pain Relief/Relaxation, and Diaphragmatic Breathing    Written Home Exercises Provided: yes, FRP stretch routine. Previous HEP issued exercises were reviewed and Sari was able to demonstrate them prior to the end of the session.  Sari demonstrated good  understanding of the education provided. See EMR under Patient Instructions for information provided during therapy sessions    ASSESSMENT     Pt participated very well in PT today. Per pt's subjective, discussed sleeping and pt states she grinds/clenches her jaw during the night. Discussion of may be a cause of suboccipital pain in the AM. Discuss option of TMJ exercises and possible dry needling. Remind pt with her resisted ex and mindfulness the goal is calming the NS. Possible D/C in upcomming visit.     Sari Is progressing well towards her goals.   Pt prognosis is Good.     Pt will continue to benefit from skilled outpatient physical therapy to address the deficits listed in the problem list box on initial evaluation, provide pt/family education and to maximize pt's level of independence in the home and community environment.     Pt's spiritual, cultural and educational needs considered and pt " "agreeable to plan of care and goals.     Anticipated Barriers for therapy: chronic nature of issues, psychosocial factors, central sensitization    GOALS: Pt is in agreement with the following goals:  Goal Progress Towards Goal   SHORT Term: In 3 weeks, pt will:     Verbalize & demonstrate good understanding of resisted training with 3-1-3 second rep for xjedvoqiqb-[id number]-eusjxjstf contraction to encourage full muscle recruitment for strength & endurance. MET 02/19/25   Verbalize & demonstrate good understanding of home stretch routine to improve AROM, help decrease pain & improve mobility. MET 02/19/25   Demonstrate proper supine or seated diaphragmatic breathing with decreased chest excursion & emphasis on full abdominal excursion & increased expiration time to promote muscle relaxation & increased parasympathetic activity to improve patient tolerance to activities. Initiated 1/15/2025   Verbalize good understanding of importance of proper posture in resisted exercise, functional activities & ADL's in order to help reduce postural strain, promote proper breathing. MET 02/19/25   Demonstrate good understanding of full body resisted exercises w/ use of pictures &/or verbal cues to promote independence w/ exercise at the end of the program. Initiated 1/15/2025   Verbalize plan for continuing resisted exercises w/ specific location (i.e. commercial gym, home, community fitness center)  to incorporate all major muscle groups to maintain & progress therapeutic functional improvements. Initiated 1/15/2025   Verbalize difference between  "hurt vs harm"  to demonstrate understanding of fear avoidance in pain cycle.  Initiated 1/15/2025         Midterm: In 8 weeks, pt will:     Verbalize good understanding of activities planning/scheduling (stretching, mindfulness, resisted training, & cardio) prescribed by PT/OT following the end of the program to sustain & progress functional improvements. Initiated 1/15/2025   Perform " selected resisted training w/ minimal to no cuing in therapy session to be independent w/ resisted strengthening. Initiated 1/15/2025   Demonstrate improved symptom self-management w/ posture/positioning and mechanical movements and/or implementation of appropriate modalities throughout a typical day.  Initiated 1/15/2025   Demonstrate independence w/ home stretch routine to improve AROM, help decrease pain & improve mobility. Initiated 1/15/2025         Long Term: In 12 weeks, pt will:     Perform selected cardio & resisted training independently to continue safe regular performance of daily exercise. Initiated 1/15/2025   Maintain 430 meters on 6 MWT /s inc fatigue acute or once get home to improve functional activity tolerance.   Initiated 1/15/2025   Perform single leg stance EC >10 seconds or greater bilaterally to improve safety and balance in ADLs. Initiated 1/15/2025   Demonstrate Timed Up & Go (with appropriate assistive device, if necessary) of 7 seconds or less to decrease fall risk and improve functional mobility. Initiated 1/15/2025   Score NDI 20 or less on CERVICAL FOTO to indicate significant functional improvements in impaired functions secondary to pain. Initiated 1/15/2025     Initiated 1/15/2025          PLAN     Continue PT per POC     Monae Day, PTA

## 2025-02-26 ENCOUNTER — CLINICAL SUPPORT (OUTPATIENT)
Dept: REHABILITATION | Facility: OTHER | Age: 40
End: 2025-02-26
Payer: COMMERCIAL

## 2025-02-26 DIAGNOSIS — R52 PAIN AGGRAVATED BY ACTIVITIES OF DAILY LIVING: Primary | ICD-10-CM

## 2025-02-26 PROCEDURE — 97530 THERAPEUTIC ACTIVITIES: CPT

## 2025-02-26 NOTE — PATIENT INSTRUCTIONS
Joint Protection Program for Arthritis     Joint Protection (Wringing)    Avoid wringing towels by twisting.  Solution: Loop towel around sink faucet as if braiding and pull gently, or let drip-dry.    Joint Protection (Use Large Joints)    Avoid placing pressure on fingertips.  Solution: Transfer work to other parts of body which are not affected or which have greater strength. Using body weight to push heavy doors open is an example.    Joint Protection (Ulnar Deviation)    Avoid positions that cause fingers to lean sideways toward little finger.  Solution: Use devices like jar-openers to assist in activities.    Joint Protection (Pinch)    Avoid tight pinch, such as when holding a pen.  Solution: Use a thick pen with a felt tip to reduce pressure on fingers.    Joint Protection (Lifting)    Avoid picking up heavy items with one hand.  Solution: Use both hands, and slide item whenever possible.     Joint Protection ()    Avoid: grasping thin utensils for prolonged periods.  Solution: Hold thick-handled tools in dagger fashion when-ever possible for performing tasks such as stirring or scrub-zohreh. Relax fingers every 10 minutes during activity.    Joint Protection (Carrying)    Avoid carrying items with weight on fingers.  Solution: Use a shoulder bag or a back pack.  Copyright © I. All rights reserved.     Use of right angle knives to decrease awkward motion in wrist.

## 2025-02-26 NOTE — PROGRESS NOTES
[facility]  [facility]  Occupational Therapy Visit   Treatment note and Discharge Summary    Patient Name: Sari Serna  MRN: [id number]  YOB: 1985  Today's Date: 2/26/2025    Therapy Diagnosis:        Encounter Diagnoses   Name Primary?    Occipital neuralgia of right side      Decreased motor activity      Decreased spinal mobility      Pain aggravated by activities of daily living Yes      Referring Provider: Debby Ross NP     Physician Orders: eval and treat; FRP  Medical Diagnosis:   Occipital neuralgia of right side [M54.81]  Decreased motor activity [R68.89]  Decreased spinal mobility [R26.89]   Evaluation Date: 1/15/2025  Insurance Authorization Period Expiration: 12/31/25  Plan of Care Certification Period: 4/14/2025  Visit # / Visits authorized: 8/12 (plus eval)  FOTO completed: 2/3    Precautions: standard    Time In: 15:38  Time Out: 16:34  Total Billable Time: 56 minutes    Subjective   It's been frustrating with all the changes with work; and the concerns about our finances. I am not sure if I can afford the copay with the change in insurance.     Pain Related Behaviors Observed: guarded stiff movement; currently rating pain as 2/10.   Functional Pain Scale Rating 0-10: within the last 24 hours  Date 1/15/2025 2/12/2025 2/26/2025   Average 3/10 2/10 2/10   Worst 3/10 4/10 2/10   Best 2/10 1/10 2/10   Composite score 2.7/10 2.33/10 2/10   [KATHIE  is 1 point or 15-20% change in interference composite score (Sharadorkin et al. 2008)]     Location: neck trap area, R hand pain (cramping with repetitive use)   Description: Sharp  Functional Exacerbations: morning, prolonged sitting, pain in neck, over use of hands, stirring in kitchen  Easing Factors: heat, ice, currently seeing PT for dry needling, manual manipulation    Patient Specific Activities based on Personal goals:  Activity  2/10/2025 2/26/2025    Performance Satisfaction Performance  Satisfaction   Cooking (R hand use)  7  7 9 9   2.  Desk work (office/desk) 7  4 9 9   3.  yoga 8  6 8 9   4.  Social outings  9 9 10 10   5.  Groceries/cooking  9 9 9 9             Total Score  8  7 9 9.2   Ratin-10; unable/unsatisfied - Able/Satisfied    Objective     Perceptual testing  Date 2025 Comments/norms   Letter cancellation NT Average time for completion is 1 minute 15 seconds   Maze Test NT Cut point score is 60 seconds or less with 1 error or less   Trails A 31.68 seconds; 0 errors Average 29 seconds, Deficient > 78 seconds.   Trails B 50.06 seconds; 0 errors Average 75 seconds, Deficient > 273 seconds.   Motor Free Visual Perception Test (MVPT) NT Average time for completion is 7 minutes.      Dominant hand: right     Active Range of Motion  Upper Extremity 1/15/2025    TASHIA RIVERA Comments   Hands WFL WFL Hypermobility in extension of fingers   Wrist WFL WFL     Elbows WFL WFL     Shoulders WFL WFL        Upper Extremity Strength - Manual Muscle Testing  Motion Tested 1/15/2025    Right Left    Shoulder Flexion 5/5 5/5   Shoulder Extension 5/5 5/5   Shoulder Abduction 5/5 5/5   Shoulder IR 5/5 5/5   Shoulder ER 5/5 5/5   Elbow Flexion 5/5 5/5   Elbow Extension 5/5 5/5       Strength (in pounds, rung III, average of three trials)   2025   Right hand  32.33 lbs 43.33 lbs   Left hand  32.33 lbs 38.33 lbs   [norms for women aged 35-39: R=74.1 +/-10.8; L=66.3 +/-11.7 (Dalila et al, 1985)]     Functional Strength  Pile Testing: Lifting    1/15/2025 (lbs/HR/MICHELLE) 2025  (lbs/HR/MICHELLE)    Repetitive Floor to Waist      /3 18117/4   Repetitive Waist to Shoulder    /2-3 /3   1- Time Maximum     10/105/3 35/102/4   Carry-2 Handed (40ft)     10/107/4 NT   Work demand Level   Sedentary (#10 max, 1.5 METS)  Light-Medium (#35 max, frequent lifting/carrying #20, 3 METS)    Reason for stop point Inability to maintain proper body mechanics. MICHELLE scale, time to complete  reps   Comments   Coordinating breath with movement.   The work demand level listed above is based on the physical performance screening test performed. More comprehensive physical testing integrated with clinical findings would be required to derived an accurate work capacity.      Balance  5 times sit-stand (adults 18-65 y/o) 1/15/2025 2/12/2025 2/26/2025    10.73 seconds     (without UE) 10.70 seconds    (Without UE needed to push up) 8.82 seconds    (Without UE used)   30-39 years: 6.1 ± 1.4 seconds  The Minimal Detectable Change(MDC) time for the test is within 3.6 to 4.2 second and Minimal clinically important difference (MCID) is 2.3 seconds.      Endurance Testing: Modified Ramp Treadmill Test    1/15/2025 2/12/2025 2/26/2025   Minutes Completed  6 minutes 6 minutes 14 seconds 8 minutes    % Grades  12 % 12 % 12 %   Speed (mph)  2.5 mph 2.8 mph attempted 2.8 mph attempted   METS 7 7 METS 8 METS    bpm 131 bpm 137 bpm   Garrett Rating Perceived Exertion Scale   5 4 5   Reason for stop point GARRETT scale rating beyond recommended levels, Decline in maintaining pace safely  Use of GARRETT scale Use of GARRETT scale   Comments -  Cues for gate given  -         Limitation/Restriction for FOTO NOC Survey     Therapist reviewed FOTO scores for Sari Serna on 2/26/2025.  FOTO documents entered into [employer] - see Media section.    Limitation Score: 19%       Treatment:  Sari received the treatments listed below:     Therapeutic activities and functional lifting activities in order to increase participation in, endurance for, and performance with vocational, selfcare ADLs, and leisure activities in order to improve quality of life, for 56 minutes, including:    Taking of measures.    Review of  for hand writing; using thicker pen/ to decrease tension in hand. Hand out reviewed regarding joint protection.    Review of managing shopping cart, rated as sort of hard, even with 150 lbs added to chart. Education  focused on upright standing posture and activating glutes with functional mobility, leading with hips, decreasing  on handles, using push/pull method with hands for improved control of cart, keeping cart close to center of gravity and weight shifting/stretching as needed    No environmental, cultural, spiritual, developmental or education needs expressed or noted.    Education   Importance of following with tools and techniques given, use of MICHELLE scale. Joint protection, right angle knives    Assessment:      Ms. Serna presented to session with report of change in finances and interested in taking measures to see current performance. FOTO and activities related to personal goals improved as compared to eval. Notable, improved trunk control resulting in improved ability to manage weights, able to lift 35 lbs.     Measures taken show Sari decreased fall risk, with 5TST <9 seconds. Sari has improved (10 psi with right hand) gross hand , although not meeting norms for age group for both hands.     Has been making progress towards goals; status can be seen below. Would continue to applying tools/techniques learned. Will discharge at this time from Occupational Therapy.     GOALS:    SHORT Term goals: In 3 weeks, patient will:  Status of goal; reviewed 2/26/2025   Implements correct use of breathing techniques during functional lifting tasks, with minimal cues needed. MET   Utilizes MICHELLE rate of exertion scale to pace performance with functional lifting tasks, and implements self-care strategies during activity participation to manage pain. MET   Verbalize understanding of energy conservation and pacing strategies during daily habits, roles, and routines in order to improve tolerance for work and home demands.  MET   Demonstrate increased positional tolerance to the level needed for work, home, and community activities (standing in cue at social event, managing supplies for outing, streneous IADL), as  evidenced by having a METS level of 7. MET 2/12/2025   Demonstrate proper body mechanics with functional lifting tasks (floor to waist, waist to shoulder, maximum lift, and box carry), via teach back method with minimal cues needed. MET   Demonstrate increased functional strength by lifting 13 lbs waist to shoulder for management of (I)ADL, including dressing and grooming, placing items in kitchen cabinets, folding towels, and/or managing suitcase when traveling.   MET   Demonstrate increased functional strength by lifting 13 lbs floor to waist to meet demand of work/home routine, including lifting groceries, managing laundry, and/or managing suitcase when traveling.   MET   Tolerate Home Activity Plan (HAP)/ Home Exercise Program (HEP) to promote safety and independence with ADL, with report of completion of at least 2 out of 4 days outside of program participation.  MET   Show improved perception of own abilities as evidenced by increase of FOTO scores to established MDC value and perception of performance ratings regarding personal long term goals.  NOT MET         Long Term goals: In 9 weeks, patient will: Status of goal; reviewed 2/26/2025   Report implementation of application of mindfulness, stretching, and other coping strategies for improved participation in daily routine. MET   Verbalize functional application of lifting techniques in daily life (golfers lift, floor to waist, waist to shoulder). MET   Applies functional application of lifting techniques (golfer's lift, floor to waist, waist to shoulder) in activities of daily life, per patient report.   MET   Demonstrate physical capacities consistent with a Light (#20 max, frequent lifting/carrying #10, 2.5 METS  demand level, as needed to perform activities to be successful in roles of life, regarding Full Time Employment, Household Management, Volunteer Work, and Community Participation. MET   Have an improvement of 3 points in 2/5 personal goals in  rating of  performance.  NOT MET    Show improved perception of own abilities as evidenced by increase of FOTO scores to established MC II value and perception of performance ratings regarding personal long term goals.  MET         Plan:   discharge from occupational therapy.       ROMAN Christy/L, CEAS-I  2/26/2025

## 2025-03-02 ENCOUNTER — PATIENT MESSAGE (OUTPATIENT)
Dept: PSYCHIATRY | Facility: CLINIC | Age: 40
End: 2025-03-02
Payer: COMMERCIAL

## 2025-03-05 ENCOUNTER — DOCUMENTATION ONLY (OUTPATIENT)
Dept: REHABILITATION | Facility: OTHER | Age: 40
End: 2025-03-05
Payer: COMMERCIAL

## 2025-03-05 DIAGNOSIS — F41.1 GAD (GENERALIZED ANXIETY DISORDER): ICD-10-CM

## 2025-03-05 RX ORDER — CLONAZEPAM 0.5 MG/1
.25-.5 TABLET ORAL 2 TIMES DAILY PRN
Qty: 15 TABLET | Refills: 0 | Status: SHIPPED | OUTPATIENT
Start: 2025-03-05 | End: 2025-04-04

## 2025-03-05 NOTE — PROGRESS NOTES
PHYSICAL THERAPY DISCHARGE:    This patient was originally evaluated at our facility on: 01/15/25         Pt attended PT for a total of 11 Visits receiving: Manual Therapy, Therex, Postural Education, Body Mechanics Training, Home Exercise Instruction     This patient's last visit occurred on: 02/24/25      Short term goals were achieved: partially met    Long term goals were achieved: partially met    Pt was DC'd from our care secondary to: self discharge - insurance change and requesting discharge.         Therapist: Conner Huff, PT  3/5/2025

## 2025-03-07 ENCOUNTER — PATIENT MESSAGE (OUTPATIENT)
Dept: PAIN MEDICINE | Facility: OTHER | Age: 40
End: 2025-03-07
Payer: COMMERCIAL

## 2025-03-07 DIAGNOSIS — F41.1 GAD (GENERALIZED ANXIETY DISORDER): ICD-10-CM

## 2025-03-10 ENCOUNTER — OFFICE VISIT (OUTPATIENT)
Dept: PSYCHIATRY | Facility: CLINIC | Age: 40
End: 2025-03-10
Payer: COMMERCIAL

## 2025-03-10 VITALS
WEIGHT: 133.19 LBS | DIASTOLIC BLOOD PRESSURE: 67 MMHG | HEART RATE: 106 BPM | BODY MASS INDEX: 23.59 KG/M2 | SYSTOLIC BLOOD PRESSURE: 101 MMHG

## 2025-03-10 DIAGNOSIS — F41.0 PANIC DISORDER: ICD-10-CM

## 2025-03-10 DIAGNOSIS — G47.00 INSOMNIA, UNSPECIFIED TYPE: ICD-10-CM

## 2025-03-10 DIAGNOSIS — F41.1 GENERALIZED ANXIETY DISORDER: Primary | ICD-10-CM

## 2025-03-10 PROCEDURE — 99999 PR PBB SHADOW E&M-EST. PATIENT-LVL II: CPT | Mod: PBBFAC,,,

## 2025-03-10 PROCEDURE — 3078F DIAST BP <80 MM HG: CPT | Mod: CPTII,S$GLB,,

## 2025-03-10 PROCEDURE — 3008F BODY MASS INDEX DOCD: CPT | Mod: CPTII,S$GLB,,

## 2025-03-10 PROCEDURE — 3074F SYST BP LT 130 MM HG: CPT | Mod: CPTII,S$GLB,,

## 2025-03-10 PROCEDURE — 99214 OFFICE O/P EST MOD 30 MIN: CPT | Mod: S$GLB,,,

## 2025-03-10 RX ORDER — AMITRIPTYLINE HYDROCHLORIDE 50 MG/1
50 TABLET, FILM COATED ORAL NIGHTLY
Qty: 30 TABLET | Refills: 2 | Status: SHIPPED | OUTPATIENT
Start: 2025-03-10

## 2025-03-10 RX ORDER — ESCITALOPRAM OXALATE 20 MG/1
20 TABLET ORAL DAILY
Qty: 90 TABLET | Refills: 3 | Status: SHIPPED | OUTPATIENT
Start: 2025-03-10

## 2025-03-10 RX ORDER — CLONAZEPAM 0.5 MG/1
.25-.5 TABLET ORAL 2 TIMES DAILY PRN
Qty: 15 TABLET | Refills: 0 | Status: SHIPPED | OUTPATIENT
Start: 2025-03-10 | End: 2025-04-09
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
## 2025-03-10 NOTE — PROGRESS NOTES
I have reviewed the notes, assessments, and/or procedures performed this visit, and I concur with the documentation.   The patient's goals for the shift include pt. will have decreased number of bowel movement.    The clinical goals for the shift include pt will remain hemodynamically stable        Problem: Safety  Goal: I will remain free of falls  Outcome: Progressing     Problem: Safety  Goal: Patient will be injury free during hospitalization  Outcome: Progressing     Problem: Psychosocial Needs  Goal: Collaborate with me, my family, and caregiver to identify my specific goals  Outcome: Progressing     Problem: Discharge Barriers  Goal: My discharge needs are met  Outcome: Progressing

## 2025-03-12 ENCOUNTER — PATIENT MESSAGE (OUTPATIENT)
Dept: INTERNAL MEDICINE | Facility: CLINIC | Age: 40
End: 2025-03-12
Payer: COMMERCIAL

## 2025-03-12 DIAGNOSIS — Z00.00 PREVENTATIVE HEALTH CARE: Primary | ICD-10-CM

## 2025-03-26 ENCOUNTER — PATIENT MESSAGE (OUTPATIENT)
Dept: PSYCHIATRY | Facility: CLINIC | Age: 40
End: 2025-03-26
Payer: COMMERCIAL

## 2025-04-09 ENCOUNTER — LAB VISIT (OUTPATIENT)
Dept: LAB | Facility: HOSPITAL | Age: 40
End: 2025-04-09
Attending: INTERNAL MEDICINE
Payer: COMMERCIAL

## 2025-04-09 DIAGNOSIS — Z00.00 PREVENTATIVE HEALTH CARE: ICD-10-CM

## 2025-04-09 LAB
ABSOLUTE EOSINOPHIL (OHS): 0.11 K/UL
ABSOLUTE MONOCYTE (OHS): 0.36 K/UL (ref 0.3–1)
ABSOLUTE NEUTROPHIL COUNT (OHS): 2.05 K/UL (ref 1.8–7.7)
ALBUMIN SERPL BCP-MCNC: 3.6 G/DL (ref 3.5–5.2)
ALP SERPL-CCNC: 55 UNIT/L (ref 40–150)
ALT SERPL W/O P-5'-P-CCNC: 11 UNIT/L (ref 10–44)
ANION GAP (OHS): 9 MMOL/L (ref 8–16)
AST SERPL-CCNC: 18 UNIT/L (ref 11–45)
BASOPHILS # BLD AUTO: 0.03 K/UL
BASOPHILS NFR BLD AUTO: 0.6 %
BILIRUB SERPL-MCNC: 0.3 MG/DL (ref 0.1–1)
BUN SERPL-MCNC: 12 MG/DL (ref 6–20)
CALCIUM SERPL-MCNC: 9 MG/DL (ref 8.7–10.5)
CHLORIDE SERPL-SCNC: 106 MMOL/L (ref 95–110)
CHOLEST SERPL-MCNC: 200 MG/DL (ref 120–199)
CHOLEST/HDLC SERPL: 2.7 {RATIO} (ref 2–5)
CO2 SERPL-SCNC: 23 MMOL/L (ref 23–29)
CREAT SERPL-MCNC: 0.6 MG/DL (ref 0.5–1.4)
EAG (OHS): 91 MG/DL (ref 68–131)
ERYTHROCYTE [DISTWIDTH] IN BLOOD BY AUTOMATED COUNT: 12.7 % (ref 11.5–14.5)
GFR SERPLBLD CREATININE-BSD FMLA CKD-EPI: >60 ML/MIN/1.73/M2
GLUCOSE SERPL-MCNC: 83 MG/DL (ref 70–110)
HBA1C MFR BLD: 4.8 % (ref 4–5.6)
HCT VFR BLD AUTO: 44.4 % (ref 37–48.5)
HDLC SERPL-MCNC: 74 MG/DL (ref 40–75)
HDLC SERPL: 37 % (ref 20–50)
HGB BLD-MCNC: 14.2 GM/DL (ref 12–16)
IMM GRANULOCYTES # BLD AUTO: 0.01 K/UL (ref 0–0.04)
IMM GRANULOCYTES NFR BLD AUTO: 0.2 % (ref 0–0.5)
LDLC SERPL CALC-MCNC: 103.4 MG/DL (ref 63–159)
LYMPHOCYTES # BLD AUTO: 2.88 K/UL (ref 1–4.8)
MCH RBC QN AUTO: 28.9 PG (ref 27–31)
MCHC RBC AUTO-ENTMCNC: 32 G/DL (ref 32–36)
MCV RBC AUTO: 90 FL (ref 82–98)
NONHDLC SERPL-MCNC: 126 MG/DL
NUCLEATED RBC (/100WBC) (OHS): 0 /100 WBC
PLATELET # BLD AUTO: 360 K/UL (ref 150–450)
PMV BLD AUTO: 10 FL (ref 9.2–12.9)
POTASSIUM SERPL-SCNC: 4.1 MMOL/L (ref 3.5–5.1)
PROT SERPL-MCNC: 7.1 GM/DL (ref 6–8.4)
RBC # BLD AUTO: 4.92 M/UL (ref 4–5.4)
RELATIVE EOSINOPHIL (OHS): 2 %
RELATIVE LYMPHOCYTE (OHS): 52.9 % (ref 18–48)
RELATIVE MONOCYTE (OHS): 6.6 % (ref 4–15)
RELATIVE NEUTROPHIL (OHS): 37.7 % (ref 38–73)
SODIUM SERPL-SCNC: 138 MMOL/L (ref 136–145)
TRIGL SERPL-MCNC: 113 MG/DL (ref 30–150)
TSH SERPL-ACNC: 1.02 UIU/ML (ref 0.4–4)
WBC # BLD AUTO: 5.44 K/UL (ref 3.9–12.7)

## 2025-04-09 PROCEDURE — 36415 COLL VENOUS BLD VENIPUNCTURE: CPT | Mod: PO

## 2025-04-09 PROCEDURE — 83036 HEMOGLOBIN GLYCOSYLATED A1C: CPT

## 2025-04-09 PROCEDURE — 84443 ASSAY THYROID STIM HORMONE: CPT

## 2025-04-09 PROCEDURE — 85025 COMPLETE CBC W/AUTO DIFF WBC: CPT

## 2025-04-09 PROCEDURE — 80061 LIPID PANEL: CPT

## 2025-04-09 PROCEDURE — 80053 COMPREHEN METABOLIC PANEL: CPT

## 2025-04-14 ENCOUNTER — OFFICE VISIT (OUTPATIENT)
Dept: PSYCHIATRY | Facility: CLINIC | Age: 40
End: 2025-04-14
Payer: COMMERCIAL

## 2025-04-14 ENCOUNTER — PATIENT MESSAGE (OUTPATIENT)
Dept: PSYCHIATRY | Facility: CLINIC | Age: 40
End: 2025-04-14
Payer: COMMERCIAL

## 2025-04-14 ENCOUNTER — OFFICE VISIT (OUTPATIENT)
Dept: INTERNAL MEDICINE | Facility: CLINIC | Age: 40
End: 2025-04-14
Payer: COMMERCIAL

## 2025-04-14 VITALS
HEIGHT: 63 IN | DIASTOLIC BLOOD PRESSURE: 72 MMHG | BODY MASS INDEX: 23.63 KG/M2 | HEART RATE: 88 BPM | SYSTOLIC BLOOD PRESSURE: 124 MMHG | WEIGHT: 133.38 LBS | OXYGEN SATURATION: 98 %

## 2025-04-14 VITALS
DIASTOLIC BLOOD PRESSURE: 74 MMHG | BODY MASS INDEX: 23.57 KG/M2 | WEIGHT: 133.06 LBS | SYSTOLIC BLOOD PRESSURE: 132 MMHG | HEART RATE: 97 BPM

## 2025-04-14 DIAGNOSIS — G89.29 OTHER CHRONIC PAIN: ICD-10-CM

## 2025-04-14 DIAGNOSIS — F41.1 GENERALIZED ANXIETY DISORDER: Primary | ICD-10-CM

## 2025-04-14 DIAGNOSIS — F33.41 RECURRENT MAJOR DEPRESSIVE DISORDER, IN PARTIAL REMISSION: ICD-10-CM

## 2025-04-14 DIAGNOSIS — Z00.00 PREVENTATIVE HEALTH CARE: Primary | ICD-10-CM

## 2025-04-14 DIAGNOSIS — G47.9 SLEEP DISTURBANCE: ICD-10-CM

## 2025-04-14 PROCEDURE — 99214 OFFICE O/P EST MOD 30 MIN: CPT | Mod: S$GLB,,,

## 2025-04-14 PROCEDURE — 3078F DIAST BP <80 MM HG: CPT | Mod: CPTII,S$GLB,,

## 2025-04-14 PROCEDURE — 3044F HG A1C LEVEL LT 7.0%: CPT | Mod: CPTII,S$GLB,,

## 2025-04-14 PROCEDURE — 99999 PR PBB SHADOW E&M-EST. PATIENT-LVL II: CPT | Mod: PBBFAC,,,

## 2025-04-14 PROCEDURE — 99999 PR PBB SHADOW E&M-EST. PATIENT-LVL IV: CPT | Mod: PBBFAC,,, | Performed by: INTERNAL MEDICINE

## 2025-04-14 PROCEDURE — 3075F SYST BP GE 130 - 139MM HG: CPT | Mod: CPTII,S$GLB,,

## 2025-04-14 PROCEDURE — 3008F BODY MASS INDEX DOCD: CPT | Mod: CPTII,S$GLB,,

## 2025-04-14 RX ORDER — PREGABALIN 25 MG/1
25 CAPSULE ORAL NIGHTLY
Qty: 30 CAPSULE | Refills: 3 | Status: SHIPPED | OUTPATIENT
Start: 2025-04-14

## 2025-04-14 NOTE — PROGRESS NOTES
"OUTPATIENT PSYCHIATRY FOLLOW UP VISIT    ENCOUNTER DATE:  4/14/2025  SITE:  [facility], [facility]  LENGTH OF SESSION:  30 minutes      CHIEF COMPLAINT:  Anxiety, Panic Attacks, Depression    HISTORY OF PRESENTING ILLNESS:  Sari Serna is a 40 y.o. female with history of  anxiety and depression  who presents for follow up appointment.      Plan at last appointment on 3/10/2025:  Continue Lexapro 20 mg  Discussed diagnosis, risks and benefits of proposed treatment vs alternative treatments vs no treatment, inherent unpredictability of medications and the potential side effects of these treatments specifically for: SSRI's, including but not limited to GI side effects, sexual dysfunction, psychomotor activation vs. somnolence, urinary retention, dry mouth and constipation.    Increase Elavil to 50 mg nightly for insomnia   Switch from Ativan 0.5 mg daily as needed for severe anxiety/panic attacks to Klonopin 0.25-.5 mg PRN   Continue with therapy   PDMP reviewed: Ativan last filled 10/07/2024 #30     Interval history as told by Patient - & or family/friend/spouse/caregiver with pts permission    Patient reports she has recently been trakcing her sleep for the past 3 days which has shown "restless sleep." States that tracker specifically notes "very high sleep disruption." She does note that she has been falling asleep easier with increased Elavil, however still feels that she is fatigued during the day. Has been getting 7-9 hours per day. Denies any major issues with nightmares, though states this was more of an issues when she was working. States she went to [place] this past weekend, where her boyfriend was playing. 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 She reports she has felt "aimless" without working, so thinks this substitute teaching will help with that. Says anxiety has been " "relatively low, and she has only taken Klonopin once since last visit. States she is feeling more "down" since she has been out of work.  Reports she goes to [employer] about 3 times a week, which is good exercise and social life. Reports normal appetite. Reports ongoing chronic pain primarily with neck and hands which she says usually improves throughout the day. She continues to go to therapy, though hasn't been as much, going about once every other week, though she would prefer to go weekly. Says they have recently been working on breathing exercise and vagal nerves exercises. Otherwise, no further complaints today. No SI or thoughts of self harm. No HI/AVH or paranoia. Is headed to [place] this weekend to celebrate her birthday/    Generalized Anxiety Disorder 7-item (GAD7)  Over the last 2 weeks, how often have you been bothered by the following problems?    Feeling nervous, anxious or on edge +3 - Nearly every day   Not being able to stop or control worrying +2 - More than half the days   Worrying too much about different things +2 - More than half the days   Trouble relaxing +3 - Nearly every day   Being so restless that it is hard to sit still +1 - Several days   Becoming easily annoyed or irritable +1 - Several days   Feeling afraid as if something awful might happen + 0 - Not at all       How difficult have these problems made it for you to do your work, take care of things at home, or get along with other people? Somewhat difficult       Total score 12    Moderate anxiety (10-14)     Sensitivity 89%, Specificity 82%, Positive likelihood ratio 5.1; when score >10    Source: Primary Care Evaluation of Mental Disorders Patient Health Questionnaire (PRIME-MD-PHQ). The PHQ was developed by Rosanne Jacobs, Colleen Miles, Jesus Mistry, and colleagues. For research information, contact Dr. Jacobs at [email or web address]. PRIME-MD® is a trademark of [employer] Copyright© 1999 [employer] All rights " reserved. Reproduced with permission      PSYCHIATRIC REVIEW OF SYSTEMS:(none/ yes- better/worse/stable/& what symptoms)    Symptoms of Depression: stable    Symptoms of Anxiety/ panic attacks: stable    Symptoms of Sobeida/Hypomania: none    Symptoms of psychosis: none    Sleep: better    Appetite: stable    Risk Parameters:  Patient reports no suicidal ideation  Patient reports no homicidal ideation  Patient reports no self-injurious behavior  Patient reports no violent behavior    PSYCHIATRIC MED REVIEW    Current psych meds  Medication side effects:  Drowsiness  Medication compliance:  Yes      PAST PSYCHIATRIC, MEDICAL, AND SOCIAL HISTORY REVIEWED  The patient's past medical, family and social history have been reviewed and updated as appropriate within the electronic medical record - see encounter notes.    MEDICAL REVIEW OF SYSTEMS:  Complete review of systems performed covering Constitutional, Musculoskeletal, Neurologic.  All systems negative.    ALL MEDICATIONS:    Current Outpatient Medications:     albuterol (PROVENTIL) 2.5 mg /3 mL (0.083 %) nebulizer solution, Take 3 mLs (2.5 mg total) by nebulization every 6 (six) hours as needed for Wheezing. Rescue, Disp: 90 mL, Rfl: 1    amitriptyline (ELAVIL) 50 MG tablet, Take 1 tablet (50 mg total) by mouth every evening., Disp: 30 tablet, Rfl: 2    amoxicillin-clavulanate 875-125mg (AUGMENTIN) 875-125 mg per tablet, Take 1 tablet by mouth 2 (two) times daily., Disp: , Rfl:     azelastine 205.5 mcg (0.15 %) [place], , Disp: , Rfl:     baclofen (LIORESAL) 5 mg Tab tablet, TAKE 1 TABLET BY MOUTH THREE TIMES A DAY, Disp: 270 tablet, Rfl: 1    BREZTRI AEROSPHERE 160-9-4.8 mcg/actuation HFAA, SMARTSI-2 Puff(s) By Mouth 1-2 Times Daily, Disp: , Rfl:     clonazePAM (KLONOPIN) 0.5 MG tablet, Take 0.5-1 tablets (0.25-0.5 mg total) by mouth 2 (two) times daily as needed for Anxiety., Disp: 15 tablet, Rfl: 0    EScitalopram oxalate (LEXAPRO) 20 MG tablet, Take 1 tablet (20  mg total) by mouth once daily., Disp: 90 tablet, Rfl: 3    fluticasone (FLONASE) 50 mcg/actuation nasal spray, 2 sprays (100 mcg total) by Each Nare route once daily., Disp: 1 Bottle, Rfl: 0    gabapentin (NEURONTIN) 300 MG capsule, Take 1 capsule (300 mg total) by mouth every evening for 7 days, THEN 1 capsule (300 mg total) 2 (two) times daily for 7 days, THEN 1 capsule (300 mg total) 3 (three) times daily for 16 days., Disp: 69 capsule, Rfl: 0    ibuprofen (ADVIL,MOTRIN) 800 MG tablet, Take 1 tablet (800 mg total) by mouth every 8 (eight) hours as needed for Pain. (Patient not taking: Reported on 10/22/2024), Disp: 30 tablet, Rfl: 0    ipratropium (ATROVENT) 42 mcg (0.06 %) nasal spray, , Disp: , Rfl:     ketoconazole (NIZORAL) 2 % shampoo, Wash scalp with medicated shampoo at least 2x/week. Let sit on scalp at least 5 minutes prior to rinsing, Disp: 240 mL, Rfl: 5    LIDOcaine (LIDODERM) 5 %, Place 1 patch onto the skin once daily. Remove & Discard patch within 12 hours or as directed by MD, Disp: 30 patch, Rfl: 0    loratadine (CLARITIN) 10 mg tablet, , Disp: , Rfl:     LORazepam (ATIVAN) 0.5 MG tablet, Take 1 tablet (0.5 mg total) by mouth 2 (two) times daily as needed for Anxiety., Disp: 30 tablet, Rfl: 0    norgestimate-ethinyl estradioL (ESTARYLLA) 0.25-35 mg-mcg per tablet, Take 1 tablet by mouth once daily. CONTINUOUSLY, NO PLACEBO PILLS, Disp: 112 tablet, Rfl: 2    ondansetron (ZOFRAN-ODT) 4 MG TbDL, DISSOLVE 1 tablet (4 mg total) by mouth every 8 (eight) hours as needed (nausea)., Disp: 10 tablet, Rfl: 0    predniSONE (DELTASONE) 20 MG tablet, Take 3 pills daily for 3 days, then 2 pills daily for 3 days, then 1 pill daily for 3 days, then 1/2 pill daily for 4 days. (Patient not taking: Reported on 10/22/2024), Disp: 20 tablet, Rfl: 0    triamcinolone (NASACORT) 55 mcg nasal inhaler, 2 sprays once daily., Disp: , Rfl:     urea (CARMOL) 40 % Crea, AAA BID, Disp: 28 g, Rfl: 1    Current  Facility-Administered Medications:     dexAMETHasone injection 10 mg, 10 mg, Intramuscular, 1 time in Clinic/HOD,     Facility-Administered Medications Ordered in Other Visits:     LIDOcaine HCL 10 mg/ml (1%) injection 18 mL, 18 mL, Intramuscular, , Georgina Holden MD, 18 mL at 04/06/24 2030    ALLERGIES:  Review of patient's allergies indicates:  No Known Allergies    RELEVANT LABS/STUDIES:    Lab Results   Component Value Date    WBC 5.44 04/09/2025    HGB 14.2 04/09/2025    HCT 44.4 04/09/2025    MCV 90 04/09/2025     04/09/2025     BMP  Lab Results   Component Value Date     04/09/2025    K 4.1 04/09/2025     04/09/2025    CO2 23 04/09/2025    BUN 12 04/09/2025    CREATININE 0.6 04/09/2025    CALCIUM 9.0 04/09/2025    ANIONGAP 9 04/09/2025    ESTGFRAFRICA >60.0 04/10/2021    EGFRNONAA >60.0 04/10/2021     Lab Results   Component Value Date    ALT 11 04/09/2025    AST 18 04/09/2025    ALKPHOS 55 04/09/2025    BILITOT 0.3 04/09/2025     Lab Results   Component Value Date    TSH 1.016 04/09/2025     Lab Results   Component Value Date    HGBA1C 4.8 04/09/2025       VITALS  Vitals:    04/14/25 1254   BP: 132/74   Pulse: 97   Weight: 60.3 kg (133 lb 0.8 oz)       PHYSICAL EXAM  General: well developed, well nourished  Neurologic:   Gait: Normal   Psychomotor signs:  No involuntary movements or tremor  AIMS: none    PSYCHIATRIC EXAM:     Mental Status Exam:  Appearance: unremarkable, age appropriate  Behavior/Cooperation: normal, cooperative  Speech: normal tone, normal rate, normal pitch, normal volume  Language: uses words appropriately; NO aphasia or dysarthria  Mood:  ok  Affect full, reactive appropriate   Thought Process: normal and logical  Thought Content: normal, no suicidality, no homicidality, delusions, or paranoia  Level of Consciousness: Alert and Oriented x3  Memory:  Intact  Attention/concentration: appropriate for age/education.   Fund of Knowledge: appears adequate  Insight:   Impaired/  Limited/ Intact  Judgment: Impaired/  Limited/ Intact       IMPRESSION:    Initial Evaluation 10/7/2024:  Sari Serna is a 39 y.o. female with history of anxiety and depression who presents for initial assessment. Patient reports long history of anxiety, on Lexapro and Klonopin for a few years. Recently with exacerbation of anxiety with panic attacks (crying uncontrollably, shaking and hyperventilating) which attributes to stressors at work and switched from Klonopin to Ativan 4 -6 weeks ago. States she has been taking Ativan 1-3 times per week with benefit. With regards to Lexapro, she is currently on 10 mg, and states she believes it was increased to 15 mg temporarily, and then brought back down to 10 mg. Is amenable to increasing to Lexapro 20 mg today. Will trial 15 mg for a week, then increase to 20 mg pending tolerability. Will continue Ativan in the meantime as well as Elavil. If anxiety is not well controlled on max Lexapro, will consider Effexor. Otherwise, patient denies SI/HI/AVH or paranoia.     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 Reports benefit from increased Elavil with regards to sleep, though still feeling fatigued during the day. Discussed possibility of increasing Elavil for ongoing help with pain and sleep, however patient would like to hold off on this, and discuss further pain options with her PCP whom she will see today. Additionally, given patient's discussion with therapist regarding concern for cPTSD, and her panic attacks possible being related to trauma response, will continue to consider Prazosin for additional treatment. Otherwise no further complaints. No SI/HI/AVH.     Status/Progress:  Based on the examination today, the patient's problem(s) is/are adequately but not ideally  controlled.  New problems have not been presented today.     DIAGNOSES:  Generalized Anxiety Disorder with Panic Attacks  Major Depressive Disorder, recurrent, in partial remission     PLAN:  Psych Med:  Continue Lexapro 20 mg  Discussed diagnosis, risks and benefits of proposed treatment vs alternative treatments vs no treatment, inherent unpredictability of medications and the potential side effects of these treatments specifically for: SSRI's, including but not limited to GI side effects, sexual dysfunction, psychomotor activation vs. somnolence, urinary retention, dry mouth and constipation.    Continue Elavil 50 mg nightly for insomnia   Continue Klonopin 0.25-.5 mg PRN  daily as needed for severe anxiety/panic attacks to   Continue with therapy   PDMP reviewed: Ativan last filled 3/10/2025 #15       Discussed with patient informed consent, risks vs. benefits, alternative treatments, side effect profile and the inherent unpredictability of individual responses to these treatments. Answered any questions patient may have had. The patient expresses understanding of the above and displays the capacity to agree with this current plan     RETURN TO CLINIC:  1-2 months     Staff Psychiatrist: Carley Pham MD  [facility] PGY3  4/14/2025 4:13 PM

## 2025-04-15 NOTE — PROGRESS NOTES
"Patient ID: Sari Serna is a 40 y.o. female    Chief Complaint: Annual Exam    History of Present Illness    CHIEF COMPLAINT:  Patient presents for an annual checkup and to discuss ongoing chronic pain, anxiety, and depression issues that have led to her taking a leave of absence from work since October.    HPI:  Patient reports possible panic attacks, characterized by episodes of crying, hyperventilating, and feeling non-functional. These episodes initially occurred primarily in response to work-related triggers but have expanded to public settings due to persistent work-related thoughts. She took a leave of absence from work in October due to these symptoms.    She participated in a 2-week intensive outpatient "mental wellness program" at [facility], involving group therapy sessions based on dialectical behavior therapy, mindfulness techniques, and talk therapy. She reports feeling significantly better during the program, with reduced anxiety for a few weeks to a month afterward.    Patient reports chronic pain issues, particularly in her trapezius area, neck, and behind her eye, which began around February of last year. She has been evaluated by an orthopedic medicine doctor and has undergone physical therapy. An MRI revealed disc issues and inflammation. She was referred to pain management, where occipital neuralgia was suggested as a possible diagnosis due to disc compression on a nerve. A nerve block was discussed but not pursued.    She reports waking up in severe pain daily, particularly in her neck and trapezius area. She also has hand pain, which she has had for years, characterized by waking up with clenched fists and significant pain when opening and closing her hands in the morning. This pain subsides somewhat throughout the day but rarely disappears completely.    She underwent a fibromyalgia screening with a nurse practitioner, Debby Bocanegra, at [facility], where she scored high on " additional symptoms such as headaches and GI issues. She also received an official endometriosis diagnosis over the summer and underwent surgery, which helped to some extent.    She reports very restless sleep despite getting enough hours, and significant fatigue during the day.    She is currently seeing a  for therapy but reports that the schedule is erratic, allowing for only about 2 sessions per month. She expresses a desire for more frequent, weekly sessions.    She does not deny any specific symptoms or medical diagnoses.    MEDICATIONS:  Patient is on Lexapro for anxiety/depression and Elavil (amitriptyline) 50 mg for pain management. She takes Ativan as needed on days with severe panic symptoms. Baclofen, a muscle relaxer, is listed but she is not currently taking it. She has discontinued gabapentin for pain management and another unspecified muscle relaxer.    MEDICAL HISTORY:  Patient has a history of anxiety, endometriosis, and fibromyalgia.    FAMILY HISTORY:  Family history is significant for mother with a history of untreated depression, anxiety, and severe emotional regulation issues. Her aunt and cousin have recently passed away  . There is a family history of mental health issues on mother's side of the family.    SURGICAL HISTORY:  Patient underwent endometriosis surgery over the summer. Dr. Lora removed approximately 90% of the endometrial tissue during this procedure.    TEST RESULTS:  Patient recently underwent labs, which showed overall normal results. Her thyroid, A1c, cholesterol, chemistry, and blood count were all within normal ranges. A recent white blood cell differential revealed that neutrophils and lymphocytes were shifted but still within normal range, and her white count was normal. Patient underwent a fibromyalgia screening conducted by nurse practitioner Debby Bocanegra at [facility]. She scored high on the widespread pain index and reported additional symptoms such as  headaches and GI issues.    IMAGING:  An MRI of the cervical spine revealed some disc issues and significant inflammation.    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 Well-nourished. No distress.  HEENT: Conjunctivae normal. Pupils are equal and reactive to light. TMs are clear and intact bilaterally. Hearing grossly normal. Nasopharynx clear. Oropharynx clear.  Neck: Supple. No thyromegaly. No bruits.  Lymph: No cervical adenopathy. No supraclavicular adenopathy.  Heart: Regular rate and rhythm. No murmur. No rub. No gallop.  Lungs: Clear to auscultation. Respiratory effort normal.  Abdomen: Soft. Nontender. Nondistended. Normoactive bowel sounds. No hepatomegaly. No masses.  Extremities: Good distal pulses. No edema.  Psych: Oriented to time person place. Judgment unimpaired. Insight unimpaired. Mood appropriate. Affect appropriate.      - Explained the composition of white blood cells and interpretation of shifted neutrophil and lymphocyte counts.  - Monitored the patient's chronic pain issues, including pain in the trapezius area, neck region, and behind the eye.  - Patient also experiences hand pain and wakes up with fist clenched every morning.  - Evaluated the patient's symptoms of panic attacks, spells of crying, and hyperventilating, primarily triggered by work-related stress.  - Patient reports restless sleep and feeling fatigued throughout the day.  - Reviewed MRI results showing disc issues and inflammation.  - Pain management specialist suspects occipital neuralgia due to disc impinging on nerve.  - Noted that the patient met criteria for fibromyalgia on paper according to a nurse practitioner's screening.  - Assessed labs results, which were overall within normal range, including thyroid function, HbA1c, lipid profile, chemistry panel, and complete blood count.  - Neutrophils and  "lymphocytes are slightly shifted but still within normal limits.  - Considered fibromyalgia as a possible diagnosis based on the patient's symptoms and previous screening.  - Suggested discussing with psychiatrist about transitioning from escitalopram to duloxetine for better management of depression, anxiety, panic attacks, and chronic pain syndromes.  - Prescribed pregabalin 25mg at bedtime for sleep disturbance and chronic pain, with plans to titrate up to 50mg in 1 month if needed.  - Noted that the patient is currently on escitalopram and amitriptyline 50mg.  - Suggested considering transition to duloxetine for better management of symptoms.  - Recommend intensive psychological therapy and establishing a long-term therapeutic relationship with a psychologist.  - Follow up in 1 month to assess response to treatment and adjust medications as necessary.        Healthcare maintenance performed, reviewed, updated and counseled today.    No follow-ups on file.    Sari Ligia "Sari" was seen today for annual exam.    Diagnoses and all orders for this visit:    Other chronic pain  -     pregabalin (LYRICA) 25 MG capsule; Take 1 capsule (25 mg total) by mouth every evening.  -     MYC E-Visit    Sleep disturbance  -     pregabalin (LYRICA) 25 MG capsule; Take 1 capsule (25 mg total) by mouth every evening.  -     MYC E-Visit         This note was generated with the assistance of ambient listening technology. Verbal consent was obtained by the patient and accompanying visitor(s) for the recording of patient appointment to facilitate this note. I attest to having reviewed and edited the generated note for accuracy, though some syntax or spelling errors may persist. Please contact the author of this note for any clarification.       "

## 2025-04-16 ENCOUNTER — TELEPHONE (OUTPATIENT)
Dept: PSYCHIATRY | Facility: CLINIC | Age: 40
End: 2025-04-16
Payer: COMMERCIAL

## 2025-04-16 NOTE — TELEPHONE ENCOUNTER
Left voicemail regarding Monday Virtual at 11 am [facility] opening. Advised pt to respond to [facility] message or phone IOP to indicate interest.

## 2025-05-14 ENCOUNTER — PATIENT MESSAGE (OUTPATIENT)
Dept: PSYCHIATRY | Facility: CLINIC | Age: 40
End: 2025-05-14
Payer: COMMERCIAL

## 2025-06-13 ENCOUNTER — PATIENT MESSAGE (OUTPATIENT)
Dept: FAMILY MEDICINE | Facility: CLINIC | Age: 40
End: 2025-06-13
Payer: COMMERCIAL

## 2025-06-30 ENCOUNTER — LAB VISIT (OUTPATIENT)
Dept: LAB | Facility: HOSPITAL | Age: 40
End: 2025-06-30
Payer: COMMERCIAL

## 2025-06-30 ENCOUNTER — OFFICE VISIT (OUTPATIENT)
Facility: CLINIC | Age: 40
End: 2025-06-30
Payer: COMMERCIAL

## 2025-06-30 ENCOUNTER — OFFICE VISIT (OUTPATIENT)
Dept: FAMILY MEDICINE | Facility: CLINIC | Age: 40
End: 2025-06-30
Payer: COMMERCIAL

## 2025-06-30 ENCOUNTER — OFFICE VISIT (OUTPATIENT)
Dept: PSYCHIATRY | Facility: CLINIC | Age: 40
End: 2025-06-30
Payer: COMMERCIAL

## 2025-06-30 VITALS
HEART RATE: 87 BPM | OXYGEN SATURATION: 98 % | HEIGHT: 63 IN | WEIGHT: 134.69 LBS | BODY MASS INDEX: 23.86 KG/M2 | SYSTOLIC BLOOD PRESSURE: 112 MMHG | DIASTOLIC BLOOD PRESSURE: 72 MMHG

## 2025-06-30 VITALS
SYSTOLIC BLOOD PRESSURE: 126 MMHG | WEIGHT: 130 LBS | DIASTOLIC BLOOD PRESSURE: 76 MMHG | BODY MASS INDEX: 23.04 KG/M2 | HEART RATE: 75 BPM | HEIGHT: 63 IN

## 2025-06-30 VITALS
DIASTOLIC BLOOD PRESSURE: 67 MMHG | WEIGHT: 135.25 LBS | HEART RATE: 75 BPM | BODY MASS INDEX: 23.96 KG/M2 | SYSTOLIC BLOOD PRESSURE: 106 MMHG

## 2025-06-30 DIAGNOSIS — M54.81 OCCIPITAL NEURALGIA, UNSPECIFIED LATERALITY: ICD-10-CM

## 2025-06-30 DIAGNOSIS — F41.1 GENERALIZED ANXIETY DISORDER: Primary | ICD-10-CM

## 2025-06-30 DIAGNOSIS — G47.9 SLEEP DISTURBANCE: ICD-10-CM

## 2025-06-30 DIAGNOSIS — R25.8 NOCTURNAL LEG MOVEMENTS: ICD-10-CM

## 2025-06-30 DIAGNOSIS — R25.1 TREMOR: ICD-10-CM

## 2025-06-30 DIAGNOSIS — R76.8 POSITIVE ANA (ANTINUCLEAR ANTIBODY): ICD-10-CM

## 2025-06-30 DIAGNOSIS — G24.9 DYSTONIA: ICD-10-CM

## 2025-06-30 DIAGNOSIS — Z71.89 COUNSELING REGARDING GOALS OF CARE: ICD-10-CM

## 2025-06-30 DIAGNOSIS — R25.1 TREMOR: Primary | ICD-10-CM

## 2025-06-30 DIAGNOSIS — R53.83 FATIGUE, UNSPECIFIED TYPE: ICD-10-CM

## 2025-06-30 DIAGNOSIS — F41.0 PANIC DISORDER: ICD-10-CM

## 2025-06-30 DIAGNOSIS — L65.9 HAIR LOSS: Primary | ICD-10-CM

## 2025-06-30 DIAGNOSIS — R21 BUTTERFLY RASH: ICD-10-CM

## 2025-06-30 PROCEDURE — 86255 FLUORESCENT ANTIBODY SCREEN: CPT

## 2025-06-30 PROCEDURE — 3078F DIAST BP <80 MM HG: CPT | Mod: CPTII,S$GLB,, | Performed by: PSYCHIATRY & NEUROLOGY

## 2025-06-30 PROCEDURE — 3044F HG A1C LEVEL LT 7.0%: CPT | Mod: CPTII,S$GLB,, | Performed by: INTERNAL MEDICINE

## 2025-06-30 PROCEDURE — 99214 OFFICE O/P EST MOD 30 MIN: CPT | Mod: S$GLB,,, | Performed by: INTERNAL MEDICINE

## 2025-06-30 PROCEDURE — 99999 PR PBB SHADOW E&M-EST. PATIENT-LVL II: CPT | Mod: PBBFAC,,,

## 2025-06-30 PROCEDURE — 3008F BODY MASS INDEX DOCD: CPT | Mod: CPTII,S$GLB,, | Performed by: PSYCHIATRY & NEUROLOGY

## 2025-06-30 PROCEDURE — 83884 ASSAY NEURFLMNT LIGHT CHAIN: CPT

## 2025-06-30 PROCEDURE — 99214 OFFICE O/P EST MOD 30 MIN: CPT | Mod: S$GLB,,, | Performed by: PSYCHIATRY & NEUROLOGY

## 2025-06-30 PROCEDURE — 36415 COLL VENOUS BLD VENIPUNCTURE: CPT

## 2025-06-30 PROCEDURE — 1159F MED LIST DOCD IN RCRD: CPT | Mod: CPTII,S$GLB,, | Performed by: INTERNAL MEDICINE

## 2025-06-30 PROCEDURE — 3044F HG A1C LEVEL LT 7.0%: CPT | Mod: CPTII,S$GLB,, | Performed by: PSYCHIATRY & NEUROLOGY

## 2025-06-30 PROCEDURE — 1160F RVW MEDS BY RX/DR IN RCRD: CPT | Mod: CPTII,S$GLB,, | Performed by: INTERNAL MEDICINE

## 2025-06-30 PROCEDURE — 3008F BODY MASS INDEX DOCD: CPT | Mod: CPTII,S$GLB,, | Performed by: INTERNAL MEDICINE

## 2025-06-30 PROCEDURE — 3078F DIAST BP <80 MM HG: CPT | Mod: CPTII,S$GLB,, | Performed by: INTERNAL MEDICINE

## 2025-06-30 PROCEDURE — 3074F SYST BP LT 130 MM HG: CPT | Mod: CPTII,S$GLB,, | Performed by: INTERNAL MEDICINE

## 2025-06-30 PROCEDURE — 99999 PR PBB SHADOW E&M-EST. PATIENT-LVL V: CPT | Mod: PBBFAC,,, | Performed by: INTERNAL MEDICINE

## 2025-06-30 PROCEDURE — 99999 PR PBB SHADOW E&M-EST. PATIENT-LVL IV: CPT | Mod: PBBFAC,,, | Performed by: PHYSICIAN ASSISTANT

## 2025-06-30 PROCEDURE — 3074F SYST BP LT 130 MM HG: CPT | Mod: CPTII,S$GLB,, | Performed by: PSYCHIATRY & NEUROLOGY

## 2025-06-30 RX ORDER — TRIHEXYPHENIDYL HYDROCHLORIDE 2 MG/1
1 TABLET ORAL 2 TIMES DAILY WITH MEALS
Qty: 30 TABLET | Refills: 11 | Status: SHIPPED | OUTPATIENT
Start: 2025-06-30 | End: 2026-06-30

## 2025-06-30 RX ORDER — ESCITALOPRAM OXALATE 20 MG/1
20 TABLET ORAL DAILY
Qty: 90 TABLET | Refills: 1 | Status: SHIPPED | OUTPATIENT
Start: 2025-06-30

## 2025-06-30 NOTE — PROGRESS NOTES
"OUTPATIENT PSYCHIATRY FOLLOW UP VISIT    ENCOUNTER DATE:  6/30/2025  SITE:  [facility]  LENGTH OF SESSION:  30 minutes      CHIEF COMPLAINT:  Anxiety, Panic Attacks, Depression    HISTORY OF PRESENTING ILLNESS:  Sari Serna is a 40 y.o. female with history of  anxiety and depression  who presents for follow up appointment.      Plan at last appointment on 4/14/2025:  Continue Lexapro 20 mg  Discussed diagnosis, risks and benefits of proposed treatment vs alternative treatments vs no treatment, inherent unpredictability of medications and the potential side effects of these treatments specifically for: SSRI's, including but not limited to GI side effects, sexual dysfunction, psychomotor activation vs. somnolence, urinary retention, dry mouth and constipation.    Continue Elavil 50 mg nightly for insomnia   Continue Klonopin 0.25-.5 mg PRN  daily as needed for severe anxiety/panic attacks to   Continue with therapy   PDMP reviewed: Ativan last filled 3/10/2025 #15    Interval history as told by Patient - & or family/friend/spouse/caregiver with pts permission    Patient reports she has been doing "ok." Says she has been going on vacations since last visit, [place] and [place], which she says she enjoyed a lot. 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 Says she had been working as a  prior to this which was initially "triggering" due to working in special education, but she eventually switched and she says this has worked well. This helped relieve some financial stress. She continues to struggle with chronic pain, primarily with neck and hands which she says has led to some feeling down, but otherwise denies any overt depressive symptoms of feeling hopeless, worthless, or guilty. No suicidal thoughts or thoughts of self " jessenia. Last took Klonopin on international flight, and otherwise has not used since before last appointment. She is no longer going to therapy due to scheduling issues and not feeling that she was benefiting from it. 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 Still doing [employer] a few times per week, and feel this movement may help with chronic pain.  She reports sleeping well, getting 7.5 to 8.5 hours per night, and no longer using Elavil, though still reports some drowsiness during the day. Reports good appetite. Otherwise no further complaints. No HI/AVH or paranoia.       PSYCHIATRIC REVIEW OF SYSTEMS:(none/ yes- better/worse/stable/& what symptoms)    Symptoms of Depression: stable    Symptoms of Anxiety/ panic attacks: better    Symptoms of Sobeida/Hypomania: none    Symptoms of psychosis: none    Sleep: better    Appetite: stable    Risk Parameters:  Patient reports no suicidal ideation  Patient reports no homicidal ideation  Patient reports no self-injurious behavior  Patient reports no violent behavior    PSYCHIATRIC MED REVIEW    Current psych meds  Medication side effects:  Drowsiness  Medication compliance:  Yes      PAST PSYCHIATRIC, MEDICAL, AND SOCIAL HISTORY REVIEWED  The patient's past medical, family and social history have been reviewed and updated as appropriate within the electronic medical record - see encounter notes.    MEDICAL REVIEW OF SYSTEMS:  Complete review of systems performed covering Constitutional, Musculoskeletal, Neurologic.  All systems negative.    ALL MEDICATIONS:    Current Outpatient Medications:     albuterol (PROVENTIL) 2.5 mg /3 mL (0.083 %) nebulizer solution, Take 3 mLs (2.5 mg total) by nebulization every 6 (six) hours as needed for Wheezing. Rescue, Disp: 90 mL, Rfl: 1    amitriptyline (ELAVIL) 50 MG tablet, Take 1 tablet (50 mg total) by mouth every evening., Disp: 30 tablet, Rfl: 2    azelastine 205.5 mcg (0.15 %) Kareem, ,  Disp: , Rfl:     BREZTRI AEROSPHERE 160-9-4.8 mcg/actuation HFAA, SMARTSI-2 Puff(s) By Mouth 1-2 Times Daily, Disp: , Rfl:     clonazePAM (KLONOPIN) 0.5 MG tablet, Take 0.5-1 tablets (0.25-0.5 mg total) by mouth 2 (two) times daily as needed for Anxiety., Disp: 15 tablet, Rfl: 0    EScitalopram oxalate (LEXAPRO) 20 MG tablet, Take 1 tablet (20 mg total) by mouth once daily., Disp: 90 tablet, Rfl: 3    fluticasone (FLONASE) 50 mcg/actuation nasal spray, 2 sprays (100 mcg total) by Each Nare route once daily., Disp: 1 Bottle, Rfl: 0    ipratropium (ATROVENT) 42 mcg (0.06 %) nasal spray, , Disp: , Rfl:     ketoconazole (NIZORAL) 2 % shampoo, Wash scalp with medicated shampoo at least 2x/week. Let sit on scalp at least 5 minutes prior to rinsing, Disp: 240 mL, Rfl: 5    loratadine (CLARITIN) 10 mg tablet, , Disp: , Rfl:     norgestimate-ethinyl estradioL (ESTARYLLA) 0.25-35 mg-mcg per tablet, Take 1 tablet by mouth once daily. CONTINUOUSLY, NO PLACEBO PILLS, Disp: 112 tablet, Rfl: 2    ondansetron (ZOFRAN-ODT) 4 MG TbDL, DISSOLVE 1 tablet (4 mg total) by mouth every 8 (eight) hours as needed (nausea)., Disp: 10 tablet, Rfl: 0    pregabalin (LYRICA) 25 MG capsule, Take 1 capsule (25 mg total) by mouth every evening., Disp: 30 capsule, Rfl: 3    triamcinolone (NASACORT) 55 mcg nasal inhaler, 2 sprays once daily., Disp: , Rfl:     urea (CARMOL) 40 % Crea, AAA BID, Disp: 28 g, Rfl: 1    Current Facility-Administered Medications:     dexAMETHasone injection 10 mg, 10 mg, Intramuscular, 1 time in Clinic/HOD,     Facility-Administered Medications Ordered in Other Visits:     LIDOcaine HCL 10 mg/ml (1%) injection 18 mL, 18 mL, Intramuscular, , Georgina Holden MD, 18 mL at 24    ALLERGIES:  Review of patient's allergies indicates:  No Known Allergies    RELEVANT LABS/STUDIES:    Lab Results   Component Value Date    WBC 5.44 2025    HGB 14.2 2025    HCT 44.4 2025    MCV 90 2025    PLT  360 04/09/2025     BMP  Lab Results   Component Value Date     04/09/2025    K 4.1 04/09/2025     04/09/2025    CO2 23 04/09/2025    BUN 12 04/09/2025    CREATININE 0.6 04/09/2025    CALCIUM 9.0 04/09/2025    ANIONGAP 9 04/09/2025    ESTGFRAFRICA >60.0 04/10/2021    EGFRNONAA >60.0 04/10/2021     Lab Results   Component Value Date    ALT 11 04/09/2025    AST 18 04/09/2025    ALKPHOS 55 04/09/2025    BILITOT 0.3 04/09/2025     Lab Results   Component Value Date    TSH 1.016 04/09/2025     Lab Results   Component Value Date    HGBA1C 4.8 04/09/2025       VITALS  BP: 106/47; Pulse 75       PHYSICAL EXAM  General: well developed, well nourished  Neurologic:   Gait: Normal   Psychomotor signs:  No involuntary movements or tremor  AIMS: none    PSYCHIATRIC EXAM:     Mental Status Exam:  Appearance: unremarkable, age appropriate  Behavior/Cooperation: normal, cooperative  Speech: normal tone, normal rate, normal pitch, normal volume  Language: uses words appropriately; NO aphasia or dysarthria  Mood: ok  Affect full, reactive appropriate   Thought Process: normal and logical  Thought Content: normal, no suicidality, no homicidality, delusions, or paranoia  Level of Consciousness: Alert and Oriented x3  Memory:  Intact  Attention/concentration: appropriate for age/education.   Fund of Knowledge: appears adequate  Insight:  Impaired/  Limited/ Intact  Judgment: Impaired/  Limited/ Intact       IMPRESSION:    Initial Evaluation 10/7/2024:  Sari Serna is a 39 y.o. female with history of anxiety and depression who presents for initial assessment. Patient reports long history of anxiety, on Lexapro and Klonopin for a few years. Recently with exacerbation of anxiety with panic attacks (crying uncontrollably, shaking and hyperventilating) which attributes to stressors at work and switched from Klonopin to Ativan 4 -6 weeks ago. States she has been taking Ativan 1-3 times per week with benefit. With regards  to Lexapro, she is currently on 10 mg, and states she believes it was increased to 15 mg temporarily, and then brought back down to 10 mg. Is amenable to increasing to Lexapro 20 mg today. Will trial 15 mg for a week, then increase to 20 mg pending tolerability. Will continue Ativan in the meantime as well as Elavil. If anxiety is not well controlled on max Lexapro, will consider Effexor. Otherwise, patient denies SI/HI/AVH or paranoia.     Interval History 6/30/2025:    Patient overall much improved since last visit, both subjectively and affectively. Reports anxiety is fairly well controlled, only using Klonopin once since last visit while on international flight. States she does not require refill today. Denies any overt depressive symptoms of feeling hopeless, worthless, or guilty. No suicidal thoughts or thoughts of self harm. No longer in therapy but would like to restart prior to starting new job in August. She denies any adverse effects to medication regimen at this time, other than some drowsiness, though it is possible this is more related to chronic pain. She is sleeping well without Elavil, which she self discontinued. Good appetite. Last visit, discussed possibility of cross tapering in the future to an SNRI given patient's comorbid pain issues. This was also brought up by patient's PCP recently. Made joint decision today to defer this cross taper to the future, after patient has transitioned into new job, and when she has established with no psychiatric provider. Of note, patient has trialed Lyrica for pain recently, but states she did not notice much benefit. Additionally, given patient's discussion with therapist regarding concern for cPTSD, and her panic attacks possible being related to trauma response, should continue to consider Prazosin for additional treatment. Otherwise no further complaints. No SI/HI/AVH.     Status/Progress:  Based on the examination today, the patient's problem(s) is/are  improved.  New problems have not been presented today.     DIAGNOSES:  Generalized Anxiety Disorder with Panic Attacks  Major Depressive Disorder, recurrent, in partial remission     PLAN:  Psych Med:  Continue Lexapro 20 mg  Discussed diagnosis, risks and benefits of proposed treatment vs alternative treatments vs no treatment, inherent unpredictability of medications and the potential side effects of these treatments specifically for: SSRI's, including but not limited to GI side effects, sexual dysfunction, psychomotor activation vs. somnolence, urinary retention, dry mouth and constipation.    Stop Elavil 50 mg nightly for insomnia (patient self discontinued and sleeping well without)   Continue Klonopin 0.25-.5 mg PRN  daily as needed for severe anxiety/panic attacks (does not require refill today)  Recommend reestablish with therapy   PDMP reviewed: Klonopin last filled 3/10/2025    Resident transition discussed      Discussed with patient informed consent, risks vs. benefits, alternative treatments, side effect profile and the inherent unpredictability of individual responses to these treatments. Answered any questions patient may have had. The patient expresses understanding of the above and displays the capacity to agree with this current plan     RETURN TO CLINIC:  6-8 weeks     Staff Psychiatrist: Carley Pham MD  [facility] PGY3  6/30/2025 4:13 PM

## 2025-07-01 LAB — NEUROFILAMENT LIGHT CHAIN, PLASMA: 9 PG/ML

## 2025-07-02 NOTE — PROGRESS NOTES
Patient ID: Sari Serna is a 40 y.o. female    Chief Complaint: Hair Loss    History of Present Illness    CHIEF COMPLAINT:  Patient presents for evaluation and management of hair thinning.    HPI:  Patient reports hair thinning, first noticed several years ago when evaluated by Dr. Dillard. At that time, hair loss was more noticeable in the front, and she was informed her hair was growing in very thin rather than falling out. She was prescribed Spironolactone, vitamin D, and OTC iron supplements (Slow Fe). After following this regimen for a period, her labs improved, and she discontinued the treatments.    She believed her condition had stabilized until recently noticing significant thinning along her part line, extending to the back of her head. 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 She describes her condition as female pattern hair loss, more prominent along the part line.      S  She denies any current medical causes for her hair thinning, as her thyroid function is reported to be normal.    She has discontinued Spironolactone, Slow Fe (iron supplement), and a previous vitamin D supplement, all of which were previously taken for hair thinning.    TEST RESULTS:  Patient's thyroid levels are normal. Her vitamin D levels have fluctuated, currently okay but need to be maintained with supplementation. B12 and folic acid levels look good.    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 Well-nourished. No distress.  HEENT: Conjunctivae normal. Pupils are equal and reactive to light. TMs are clear and intact bilaterally. Hearing grossly normal. Nasopharynx clear. Oropharynx clear.  Neck: Supple. No thyromegaly. No bruits.  Lymph: No cervical adenopathy. No supraclavicular adenopathy.  Heart:  "Regular rate and rhythm. No murmur. No rub. No gallop.  Lungs: Clear to auscultation. Respiratory effort normal.  Abdomen: Soft. Nontender. Nondistended. Normoactive bowel sounds. No hepatomegaly. No masses.  Extremities: Good distal pulses. No edema.  Psych: Oriented to time person place. Judgment unimpaired. Insight unimpaired. Mood appropriate. Affect appropriate.          Assessment & Plan    ANDROGENIC ALOPECIA:  - Monitored patient's hair thinning, noting growth is very thin and less concentrated.  - Thyroid function is completely normal, ruling out medical causes for hair loss.  - Explained female pattern hair loss characteristics involve thinner and fewer hair follicles rather than lack of growth.  - Discussed management approaches including topical treatments, supplements, and wigs.  - Patient previously used Spironolactone, vitamin D, and Slow Fe iron for this condition     VITAMIN D DEFICIENCY:  - Continue vitamin D supplementation.    FOLLOW-UP:  - Recommend consultation with dermatologist Ivon Siddiqui, a hair specialist, for comprehensive evaluation and potential newer treatment options.            No follow-ups on file.    Sari Arambula" was seen today for hair loss.    Diagnoses and all orders for this visit:    Hair loss  -     Ambulatory referral/consult to Dermatology; Future  -     URI Screen w/Reflex; Future  -     Vitamin D; Future  -     Vitamin B12; Future    Butterfly rash  -     URI Screen w/Reflex; Future    Positive URI (antinuclear antibody)  -     URI Screen w/Reflex; Future    Sleep disturbance  -     Ambulatory referral/consult to Sleep Disorders; Future    Fatigue, unspecified type  -     Ambulatory referral/consult to Sleep Disorders; Future    Nocturnal leg movements  -     Ambulatory referral/consult to Sleep Disorders; Future         This note was generated with the assistance of ambient listening technology. Verbal consent was obtained by the patient and accompanying " visitor(s) for the recording of patient appointment to facilitate this note. I attest to having reviewed and edited the generated note for accuracy, though some syntax or spelling errors may persist. Please contact the author of this note for any clarification.

## 2025-07-06 DIAGNOSIS — N80.511: ICD-10-CM

## 2025-07-06 DIAGNOSIS — N80.9 ENDOMETRIOSIS DETERMINED BY LAPAROSCOPY: ICD-10-CM

## 2025-07-07 RX ORDER — NORGESTIMATE AND ETHINYL ESTRADIOL 0.25-0.035
1 KIT ORAL
Qty: 84 TABLET | Refills: 2 | Status: SHIPPED | OUTPATIENT
Start: 2025-07-07

## 2025-07-07 NOTE — TELEPHONE ENCOUNTER
Refill Decision Note   Sari Serna  is requesting a refill authorization.    Brief Assessment and Rationale for Refill:   Approve       Medication Therapy Plan:          Comments:     Note composed:2:15 AM 07/07/2025

## 2025-07-12 ENCOUNTER — PATIENT MESSAGE (OUTPATIENT)
Facility: CLINIC | Age: 40
End: 2025-07-12
Payer: MEDICAID

## 2025-07-31 ENCOUNTER — OFFICE VISIT (OUTPATIENT)
Dept: OPTOMETRY | Facility: CLINIC | Age: 40
End: 2025-07-31
Payer: MEDICAID

## 2025-07-31 ENCOUNTER — TELEPHONE (OUTPATIENT)
Dept: OPHTHALMOLOGY | Facility: CLINIC | Age: 40
End: 2025-07-31
Payer: MEDICAID

## 2025-07-31 DIAGNOSIS — H04.123 DRY EYE SYNDROME OF BOTH EYES: Primary | ICD-10-CM

## 2025-07-31 DIAGNOSIS — G51.4 EYELID MYOKYMIA: ICD-10-CM

## 2025-07-31 DIAGNOSIS — H10.13 ALLERGIC CONJUNCTIVITIS OF BOTH EYES: ICD-10-CM

## 2025-07-31 PROCEDURE — 99999 PR PBB SHADOW E&M-EST. PATIENT-LVL III: CPT | Mod: PBBFAC,,, | Performed by: OPTOMETRIST

## 2025-07-31 PROCEDURE — 99213 OFFICE O/P EST LOW 20 MIN: CPT | Mod: PBBFAC,PO | Performed by: OPTOMETRIST

## 2025-07-31 PROCEDURE — 99204 OFFICE O/P NEW MOD 45 MIN: CPT | Mod: S$PBB,,, | Performed by: OPTOMETRIST

## 2025-07-31 PROCEDURE — 3044F HG A1C LEVEL LT 7.0%: CPT | Mod: CPTII,,, | Performed by: OPTOMETRIST

## 2025-07-31 PROCEDURE — 1160F RVW MEDS BY RX/DR IN RCRD: CPT | Mod: CPTII,,, | Performed by: OPTOMETRIST

## 2025-07-31 PROCEDURE — 1159F MED LIST DOCD IN RCRD: CPT | Mod: CPTII,,, | Performed by: OPTOMETRIST

## 2025-07-31 RX ORDER — PREDNISOLONE ACETATE 10 MG/ML
SUSPENSION/ DROPS OPHTHALMIC
Qty: 10 ML | Refills: 0 | Status: SHIPPED | OUTPATIENT
Start: 2025-07-31 | End: 2025-08-28

## 2025-07-31 NOTE — TELEPHONE ENCOUNTER
Copied from CRM #[id number]. Topic: Appointments - Appointment Access  >> Jul 31, 2025  9:07 AM Flores wrote:  Type:  Same Day Appointment Request    Caller is requesting a same day appointment.  Caller declined first available appointment listed below.    Name of Caller:Sari  When is the first available appointment?today   Symptoms:Redness and twitching in her left eye  Best Call Back Number:[phone]   Additional Information:

## 2025-08-01 NOTE — PROGRESS NOTES
HPI     Eye Problem     Additional comments: Patient Sari Serna is a 40 year old   female.           Comments    JOEY: 6 months ago at [facility]  DLS: 03/13/2019 with Dr. Bowling  Chief complaint (CC): Patient Sari Serna is here for an urgent care   visit. She states that she has been having FBS with red eye and twitching   OS for the past week. She states that she has symptoms without her lenses   but symptoms become worse when wearing lenses (can only wear for less than   1 hour). She denies any eye pain, blurry VA, light sensitivity, watering,   or discharge since symptoms began.  Glasses? Yes  Contacts? Yes, [employer] Hydraglide (usually wears lenses for 12-14 hours   before removal, changes lenses every month)  H/o eye surgery, injections or laser: No  H/o eye injury: No  Known eye conditions?  1. Hx of ocular migraine  2. Hx of allergic conjunctivitis, bilateral    Family h/o eye conditions? None  Eye gtts? OTC allergy gtts PRN OU      (-) Flashes (-)  Floaters (-) Mucous   (-)  Tearing (-) Itching (-) Burning   (-) Headaches (+) Eye Pain/discomfort (+) Irritation   (+)  Redness (-) Double vision (-) Blurry vision    Diabetic? No  A1c? N/A              Last edited by Nhi Tobar on 7/31/2025  1:39 PM.            Assessment /Plan     For exam results, see Encounter Report.    Dry eye syndrome of both eyes  -     prednisoLONE acetate (PRED FORTE) 1 % DrpS; Place 1 drop into both eyes 4 (four) times daily for 7 days, THEN 1 drop 3 (three) times daily for 7 days, THEN 1 drop 2 (two) times daily for 7 days, THEN 1 drop once daily for 7 days.  Dispense: 10 mL; Refill: 0    Eyelid myokymia    Allergic conjunctivitis of both eyes  -     prednisoLONE acetate (PRED FORTE) 1 % DrpS; Place 1 drop into both eyes 4 (four) times daily for 7 days, THEN 1 drop 3 (three) times daily for 7 days, THEN 1 drop 2 (two) times daily for 7 days, THEN 1 drop once daily for 7 days.  Dispense: 10 mL; Refill:  0      Rx Pred Forte QiD OU w/tapering.   Recommend iVizia, Systane Ultra or Refresh Optive TID-QID OU to aid with symptoms of dry eyes.  Recommend Pataday, Zaditor or Alaway bid OU and cool compresses to help soothe itching. Patient advised to RTC if condition gets worse.   RTC STAT if S/Sx get worse.     Patient advised to decrease stress, lack of sleep and/or caffeine intake to see improvement in S/Sx. Patient also advised to try drinking tonic water. Monitor.

## 2025-08-18 ENCOUNTER — TELEPHONE (OUTPATIENT)
Dept: FAMILY MEDICINE | Facility: CLINIC | Age: 40
End: 2025-08-18
Payer: MEDICAID

## 2025-09-05 ENCOUNTER — TELEPHONE (OUTPATIENT)
Dept: FAMILY MEDICINE | Facility: CLINIC | Age: 40
End: 2025-09-05
Payer: MEDICAID

## (undated) DEVICE — SUT MCRYL PLUS 4-0 PS2 27IN

## (undated) DEVICE — PACK LAPAROSCOPY BAPTIST

## (undated) DEVICE — GLOVE BIOGEL SKINSENSE PI 6.5

## (undated) DEVICE — BAG TISSUE RETRIEVAL 225ML

## (undated) DEVICE — SUT VICRYL+ 27 UR-6 VIOL

## (undated) DEVICE — ELECTRODE REM PLYHSV RETURN 9

## (undated) DEVICE — SYR 10CC LUER LOCK

## (undated) DEVICE — SOL IRR SOD CHL .9% POUR

## (undated) DEVICE — SOL POVIDONE SCRUB IODINE 4 OZ

## (undated) DEVICE — PAD PINK TRENDELENBURG POS XL

## (undated) DEVICE — TROCAR ENDOPATH XCEL 5X100MM

## (undated) DEVICE — SOL POVIDONE PREP IODINE 4 OZ

## (undated) DEVICE — CANNULA ENDOPATH XCEL 5X100MM

## (undated) DEVICE — IRRIGATOR ENDOSCOPY DISP.

## (undated) DEVICE — NDL INSUFFLATION VERRES 120MM

## (undated) DEVICE — CLIPPER BLADE MOD 4406 (CAREF)

## (undated) DEVICE — JELLY SURGILUBE 5GR

## (undated) DEVICE — SOL NORMAL USPCA 0.9%

## (undated) DEVICE — PENCIL ELECTROSURG HOLST W/BLD